# Patient Record
Sex: FEMALE | Race: WHITE | Employment: OTHER | URBAN - METROPOLITAN AREA
[De-identification: names, ages, dates, MRNs, and addresses within clinical notes are randomized per-mention and may not be internally consistent; named-entity substitution may affect disease eponyms.]

---

## 2017-01-19 ENCOUNTER — TELEPHONE (OUTPATIENT)
Dept: INTERNAL MEDICINE CLINIC | Age: 77
End: 2017-01-19

## 2017-03-13 DIAGNOSIS — Z12.39 BREAST CANCER SCREENING: ICD-10-CM

## 2017-05-04 RX ORDER — PRAMIPEXOLE DIHYDROCHLORIDE 1 MG/1
TABLET ORAL
Qty: 90 TAB | Refills: 1 | Status: SHIPPED | OUTPATIENT
Start: 2017-05-04 | End: 2017-10-02 | Stop reason: SDUPTHER

## 2017-05-16 ENCOUNTER — DOCUMENTATION ONLY (OUTPATIENT)
Dept: INTERNAL MEDICINE CLINIC | Age: 77
End: 2017-05-16

## 2017-05-16 NOTE — PROGRESS NOTES
Chief Complaint   Patient presents with    Request Records     Faxed request to Dr. Jermaine Velez office requesting patients most recent glaucoma screening to be faxed to our office. Fax confirmation received.

## 2017-06-29 RX ORDER — SERTRALINE HYDROCHLORIDE 100 MG/1
TABLET, FILM COATED ORAL
Qty: 135 TAB | Refills: 3 | OUTPATIENT
Start: 2017-06-29

## 2017-06-29 NOTE — TELEPHONE ENCOUNTER
This medication is not on current med list - was d/c on 10/21/2016 w/ d/c reason of alternate therapy.

## 2017-07-06 ENCOUNTER — TELEPHONE (OUTPATIENT)
Dept: INTERNAL MEDICINE CLINIC | Age: 77
End: 2017-07-06

## 2017-07-06 RX ORDER — DULOXETIN HYDROCHLORIDE 30 MG/1
30 CAPSULE, DELAYED RELEASE ORAL DAILY
Qty: 90 CAP | Refills: 1 | Status: SHIPPED | COMMUNITY
Start: 2017-07-06 | End: 2017-09-15 | Stop reason: SDUPTHER

## 2017-07-06 NOTE — TELEPHONE ENCOUNTER
Please call regarding the script Zoloft would like to change it to  Venlafaxine due to insurance reasons

## 2017-07-06 NOTE — TELEPHONE ENCOUNTER
Spoke with pt who is requesting refill of Zoloft. In review of her chart, this medication was d/c at her office visit on 10/21/16 and replaced with Cymbalta 30 mg daily. Rx was sent to her mailorder. The pt did not remember anything about that office visit. I explained that her she came in for. .. Does not feel the sertraline is working well. Still with some depression and did not tolerate an increase to 150 mg due to sedation and increased depression. Her directions were to take 1/2 tab of the Zoloft and then stop and start the Cymbalta. The Cymbalta will also help with the RA. She is excited about that. New rx sent to pharm. She will take 1/2 tab of the Zoloft until Cymbalta arrives. Pt verbalized complete understanding. Orders Placed This Encounter    DULoxetine (CYMBALTA) 30 mg capsule     Sig: Take 1 Cap by mouth daily. Dispense:  90 Cap     Refill:  1     The above medication refills were approved via verbal order by Dr. Gene Felton III.

## 2017-07-10 NOTE — TELEPHONE ENCOUNTER
Pt given instructions again while in office with  by Robert Og LPN on how to stop Zoloft and start Cymbalta.

## 2017-09-15 RX ORDER — DULOXETIN HYDROCHLORIDE 30 MG/1
30 CAPSULE, DELAYED RELEASE ORAL DAILY
Qty: 30 CAP | Refills: 1 | Status: SHIPPED | OUTPATIENT
Start: 2017-09-15 | End: 2017-10-06 | Stop reason: DRUGHIGH

## 2017-09-15 NOTE — TELEPHONE ENCOUNTER
Patient states she stopped taking the sertraline altogether and cancelled the rx for DULoxetine (CYMBALTA) 30 mg capsules (sent to [facility] on 7/6) because she did not want a 90day supply without trying it first.  Also, she thought she could make it without the meds. Now realizing she needs something. Wants a 30day rx for GENERIC cymbalta sent to the [address] [facility] in [facility]. .  Please let her know if Dr. Naila Ryan will do this for her.

## 2017-09-15 NOTE — TELEPHONE ENCOUNTER
Orders Placed This Encounter    DULoxetine (CYMBALTA) 30 mg capsule     Sig: Take 1 Cap by mouth daily. Dispense:  30 Cap     Refill:  1     The above medication refills were approved via verbal order by Dr. Maryse Mendoza III.

## 2017-10-02 RX ORDER — PRAMIPEXOLE DIHYDROCHLORIDE 1 MG/1
TABLET ORAL
Qty: 90 TAB | Refills: 1 | Status: SHIPPED | OUTPATIENT
Start: 2017-10-02 | End: 2017-10-06 | Stop reason: DRUGHIGH

## 2017-10-06 ENCOUNTER — OFFICE VISIT (OUTPATIENT)
Dept: INTERNAL MEDICINE CLINIC | Age: 77
End: 2017-10-06

## 2017-10-06 VITALS
SYSTOLIC BLOOD PRESSURE: 160 MMHG | OXYGEN SATURATION: 95 % | DIASTOLIC BLOOD PRESSURE: 74 MMHG | BODY MASS INDEX: 34.68 KG/M2 | HEART RATE: 58 BPM | HEIGHT: 59 IN | RESPIRATION RATE: 14 BRPM | WEIGHT: 172 LBS | TEMPERATURE: 98.5 F

## 2017-10-06 DIAGNOSIS — Z23 NEED FOR VACCINATION WITH 13-POLYVALENT PNEUMOCOCCAL CONJUGATE VACCINE: Primary | ICD-10-CM

## 2017-10-06 DIAGNOSIS — Z23 NEED FOR TDAP VACCINATION: ICD-10-CM

## 2017-10-06 DIAGNOSIS — E78.2 MIXED HYPERLIPIDEMIA: ICD-10-CM

## 2017-10-06 DIAGNOSIS — Z23 ENCOUNTER FOR IMMUNIZATION: ICD-10-CM

## 2017-10-06 DIAGNOSIS — N95.9 MENOPAUSAL PROBLEM: ICD-10-CM

## 2017-10-06 DIAGNOSIS — M05.79 RHEUMATOID ARTHRITIS INVOLVING MULTIPLE SITES WITH POSITIVE RHEUMATOID FACTOR (HCC): ICD-10-CM

## 2017-10-06 DIAGNOSIS — Z00.00 MEDICARE ANNUAL WELLNESS VISIT, SUBSEQUENT: ICD-10-CM

## 2017-10-06 RX ORDER — ZINC GLUCONATE 10 MG
LOZENGE ORAL DAILY
COMMUNITY
End: 2019-02-21

## 2017-10-06 RX ORDER — GLUCOSAMINE/CHONDR SU A SOD 750-600 MG
1000 TABLET ORAL DAILY
COMMUNITY
End: 2019-02-26

## 2017-10-06 RX ORDER — HYDROCODONE BITARTRATE AND ACETAMINOPHEN 5; 325 MG/1; MG/1
0.5 TABLET ORAL
COMMUNITY
Start: 2017-09-27 | End: 2018-02-02 | Stop reason: SDUPTHER

## 2017-10-06 RX ORDER — PRAMIPEXOLE DIHYDROCHLORIDE 1.5 MG/1
1.5 TABLET ORAL DAILY
Qty: 90 TAB | Refills: 3 | Status: SHIPPED | OUTPATIENT
Start: 2017-10-06 | End: 2018-04-26

## 2017-10-06 RX ORDER — DULOXETIN HYDROCHLORIDE 60 MG/1
60 CAPSULE, DELAYED RELEASE ORAL DAILY
Qty: 90 CAP | Refills: 3 | Status: SHIPPED | OUTPATIENT
Start: 2017-10-06 | End: 2017-11-10 | Stop reason: SDUPTHER

## 2017-10-06 NOTE — PATIENT INSTRUCTIONS
As discussed in your appointment today, [address] is an important part of planning for your healthcare future. Discussing your preferences with your family and your care team is a part of good healthcare so that we can be guided by your known values and goals. Our office offers this service for you at your convenience. Our Nurse Navigators and certified [employer] Facilitators, Ivan Parekh and Malik Carias typically schedule family appointments for this service on Wednesdays. To schedule an Advance Care Planning visit or to receive more information about this service, please call [facility] Internal Medicine at [phone] and ask to speak directly to [facility] or [facility] [employer]. Advance Care Planning: Care Instructions  Your Care Instructions  It can be hard to live with an illness that cannot be cured. But if your health is getting worse, you may want to make decisions about end-of-life care. Planning for the end of your life does not mean that you are giving up. It is a way to make sure that your wishes are met. Clearly stating your wishes can make it easier for your loved ones. Making plans while you are still able may also ease your mind and make your final days less stressful and more meaningful. Follow-up care is a key part of your treatment and safety. Be sure to make and go to all appointments, and call your doctor if you are having problems. It's also a good idea to know your test results and keep a list of the medicines you take. What can you do to plan for the end of life? · You can bring these issues up with your doctor. You do not need to wait until your doctor starts the conversation. You might start with \"I would not be willing to live with . Teagan Po Teagan Po Teagan Po \" When you complete this sentence it helps your doctor understand your wishes. · Talk openly and honestly with your doctor. This is the best way to understand the decisions you will need to make as your health changes.  Know that you can always change your mind. · Ask your doctor about commonly used life-support measures. These include tube feedings, breathing machines, and fluids given through a vein (IV). Understanding these treatments will help you decide whether you want them. · You may choose to have these life-supporting treatments for a limited time. This allows a trial period to see whether they will help you. You may also decide that you want your doctor to take only certain measures to keep you alive. It is important to spell out these conditions so that your doctor and family understand them. · Talk to your doctor about how long you are likely to live. He or she may be able to give you an idea of what usually happens with your specific illness. · Think about preparing papers that state your wishes. This way there will not be any confusion about what you want. You can change your instructions at any time. Which papers should you prepare? Advance directives are legal papers that tell doctors how you want to be cared for at the end of your life. You do not need a  to write these papers. Ask your doctor or your state health department for information on how to write your advance directives. They may have the forms for each of these types of papers. Make sure your doctor has a copy of these on file, and give a copy to a family member or close friend. · Consider a do-not-resuscitate order (DNR). This order asks that no extra treatments be done if your heart stops or you stop breathing. Extra treatments may include electrical shock to restart your heart or a machine to breathe for you. If you decide to have a DNR order, ask your doctor to explain and write it. Place the order in your home where everyone can easily see it. · Consider a living will. A living will explains your wishes in case you are in a coma or cannot communicate. Living dasilva tell doctors to use or not use treatments that would keep you alive.  You must have one or two witnesses or a notary present when you sign this form. · Consider a durable power of . This allows you to name a person to make decisions about your care if you are not able to. Most people ask a close friend or family member. Talk to this person about the kinds of treatments you want and those that you do not want. Make sure this person understands your wishes. If this person is not the health care agent named in your advance directive, also talk with your health care agent. These legal papers are simple to change. Tell your doctor what you want to change, and ask him or her to make a note in your medical file. Give your family updated copies of the papers. Medicare Wellness Visit, Female    The best way to live healthy is to have a healthy lifestyle by eating a well-balanced diet, exercising regularly, limiting alcohol and stopping smoking. Regular physical exams and screening tests are another way to keep healthy. Preventive exams provided by your health care provider can find health problems before they become diseases or illnesses. Preventive services including immunizations, screening tests, monitoring and exams can help you take care of your own health. All people over age 72 should have a pneumovax  and and a prevnar shot to prevent pneumonia. These are once in a lifetime unless you and your provider decide differently. All people over 65 should have a yearly flu shot and a tetanus vaccine every 10 years. A bone mass density to screen for osteoporosis or thinning of the bones should be done every 2 years after 65. Screening for diabetes mellitus with a blood sugar test should be done every year.     Glaucoma is a disease of the eye due to increased ocular pressure that can lead to blindness and it should be done every year by an eye professional.    Cardiovascular screening tests that check for elevated lipids (fatty part of blood) which can lead to heart disease and strokes should be done every 5 years. Colorectal screening that evaluates for blood or polyps in your colon should be done yearly as a stool test or every five years as a flexible sigmoidoscope or every 10 years as a colonoscopy up to age 76. Breast cancer screening with a mammogram is recommended biennially  for women age 54-69. Screening for cervical cancer with a pap smear and pelvic exam is recommended for women after age 72 years every 2 years up to age 79 or when the provider and patient decide to stop. If there is a history of cervical abnormalities or other increased risk for cancer then the test is recommended yearly. Hepatitis C screening is also recommended for anyone born between [birth year] through [address]. A shingles vaccine is also recommended once in a lifetime after age 61. Your Medicare Wellness Exam is recommended annually. Here is a list of your current Health Maintenance items with a due date:  Health Maintenance Due   Topic Date Due    DTaP/Tdap/Td  (1 - Tdap) 05/19/1959    Bone Density Screening  05/19/2003    Annual Well Visit  05/19/2003    Flu Vaccine  08/01/2017       Medicare Wellness Visit, Female    The best way to live healthy is to have a healthy lifestyle by eating a well-balanced diet, exercising regularly, limiting alcohol and stopping smoking. Regular physical exams and screening tests are another way to keep healthy. Preventive exams provided by your health care provider can find health problems before they become diseases or illnesses. Preventive services including immunizations, screening tests, monitoring and exams can help you take care of your own health. All people over age 72 should have a pneumovax  and and a prevnar shot to prevent pneumonia. These are once in a lifetime unless you and your provider decide differently. All people over 65 should have a yearly flu shot and a tetanus vaccine every 10 years.     A bone mass density to screen for osteoporosis or thinning of the bones should be done every 2 years after 65. Screening for diabetes mellitus with a blood sugar test should be done every year. Glaucoma is a disease of the eye due to increased ocular pressure that can lead to blindness and it should be done every year by an eye professional.    Cardiovascular screening tests that check for elevated lipids (fatty part of blood) which can lead to heart disease and strokes should be done every 5 years. Colorectal screening that evaluates for blood or polyps in your colon should be done yearly as a stool test or every five years as a flexible sigmoidoscope or every 10 years as a colonoscopy up to age 76. Breast cancer screening with a mammogram is recommended biennially  for women age 54-69. Screening for cervical cancer with a pap smear and pelvic exam is recommended for women after age 72 years every 2 years up to age 79 or when the provider and patient decide to stop. If there is a history of cervical abnormalities or other increased risk for cancer then the test is recommended yearly. Hepatitis C screening is also recommended for anyone born between [birth year] [address]. A shingles vaccine is also recommended once in a lifetime after age 61. Your Medicare Wellness Exam is recommended annually.     Here is a list of your current Health Maintenance items with a due date:  Health Maintenance Due   Topic Date Due    DTaP/Tdap/Td  (1 - Tdap) 05/19/1959    Bone Density Screening  05/19/2003    Annual Well Visit  05/19/2003    Flu Vaccine  08/01/2017

## 2017-10-06 NOTE — PROGRESS NOTES
This is a Subsequent Medicare Annual Wellness Exam (AWV) (Performed 12 months after IPPE or effective date of Medicare Part B enrollment, Once in a lifetime)  Here for her routine follow-up as well. She has been on Cymbalta now for several months. She does not feel like it is helping very much. She does not feel that is significantly improved her appetite and she continues to gain weight. She continues to have arthritis pains in her neck, back, and lower back. She denies any radicular pain. She has had some decreased hearing from her ears. She has hearing aids that she uses is on a regular basis. She is also had problems with restless legs at nighttime and wondered about increasing the medication for this. I have reviewed the patient's medical history in detail and updated the computerized patient record. History     Past Medical History:   Diagnosis Date    Arthritis     OSEO    Cancer [facility]) ? can't remember    2 years ppx.  Cataracts, bilateral     Chronic pain     Colon polyps     GERD (gastroesophageal reflux disease)     Glaucoma     Heart palpitations     Other ill-defined conditions(799.89)     GASTROPARESIS    Psychiatric disorder     DEPRESSION    RA [facility]oid [facility]) ([facility] [address]     Unspecified adverse effect of anesthesia     HEADACHE    UTI (lower urinary tract infection)       Past Surgical History:   Procedure Laterality Date    HX COLECTOMY      HX COLOSTOMY      HX COLOSTOMY TAKE DOWN      HX DILATION AND CURETTAGE      HX GI  9/2014    COLONOSCOPY    HX KNEE ARTHROSCOPY Left     HX LAP CHOLECYSTECTOMY  5/2005    HX LUMBAR LAMINECTOMY  2011    HX ORTHOPAEDIC Left 1979    MENISCUS REPAIR    HX TUBAL LIGATION  1979    TOTAL HIP ARTHROPLASTY Right 3/2012     Current Outpatient Prescriptions   Medication Sig Dispense Refill    Diphth, Pertus,Acell,, Tetanus (BOOSTRIX TDAP) 2.5-8-5 Lf-mcg-Lf/0.5mL susp susp 0.5 mL by IntraMUSCular route once for 1 dose. Indications: DIPHTHERIA-PERTUSSIS-TETANUS COMBINED PREVENTION 0.5 mL 0    pneumococcal 13 melissa conj dip (PREVNAR-13) 0.5 mL syrg injection 0.5 mL by IntraMUSCular route once for 1 dose. Indications: PREVENTION OF STREPTOCOCCUS PNEUMONIAE INFECTION 0.5 mL 0    HYDROcodone-acetaminophen (NORCO) 5-325 mg per tablet 0.5 Tabs daily as needed.  Biotin 2,500 mcg cap Take 1,000 mcg by mouth.  magnesium 250 mg tab Take  by mouth.  DULoxetine (CYMBALTA) 60 mg capsule Take 1 Cap by mouth daily. 90 Cap 3    pramipexole (MIRAPEX) 1.5 mg tablet Take 1 Tab by mouth daily. 90 Tab 3    azaTHIOprine (IMURAN) 50 mg tablet Take 2 tablets by mouth daily. As directed by your Rheumatologist--stop for 1 week after surgery. (Patient taking differently: Take 100 mg by mouth three (3) times daily. As directed by your Rheumatologist--stop for 1 week after surgery. ) 1 tablet 0    CALCIUM CARBONATE (CALCIUM 600 PO) Take 1 tablet by mouth daily.  cholecalciferol (VITAMIN D3) 1,000 unit tablet Take 1,000 Units by mouth daily.  INFLIXIMAB (REMICADE IV) by IntraVENous route. 500 mg q 5 weeks. LAST INFUSION WAS 11/2/14      predniSONE (DELTASONE) 1 mg tablet Take 1 mg by mouth daily.   0    oxyCODONE-acetaminophen (PERCOCET) 5-325 mg per tablet        Allergies   Allergen Reactions    Adhesive Tape-Silicones Other (comments)     \"Left skin raw\"     Family History   Problem Relation Age of Onset    Heart Disease Mother     Hypertension Mother     Diabetes Father     COPD Father     Cancer Father      LIP    Diabetes Sister     Heart Disease Sister     Hypertension Sister     Diabetes Brother     Heart Disease Brother     Hypertension Brother     Pacemaker Brother     Anesth Problems Neg Hx      Social History   Substance Use Topics    Smoking status: Former Smoker     Packs/day: 1.00     Years: 20.00     Quit date: 3/7/1978    Smokeless tobacco: Never Used    Alcohol use 0.0 oz/week     4 Glasses of wine per week      Comment: maybe not that much     Patient Active Problem List   Diagnosis Code    Spinal stenosis M48.00    Depression F32.9    Impaired hearing H91.90    Rheumatoid arthritis(714.0) M06.9    DJD (degenerative joint disease) of hip M16.9    RA (rheumatoid arthritis) ([facility] [address] M06.9    Left knee DJD M17.12    Psychiatric disorder F99    GERD (gastroesophageal reflux disease) K21.9    Chronic pain G89.29    Other ill-defined conditions(799.89) R69    Unspecified adverse effect of anesthesia T41.45XA    Heart palpitations R00.2    Arthritis M19.90    Colon polyps K63.5    UTI (lower urinary tract infection) N39.0    Cataracts, bilateral H26.9    Glaucoma H40.9    Acute medial meniscus tear of right knee S83.241A    Meniscus tear S83.209A   ROS - Per HPI  Physical Examination: General appearance - alert, well appearing, and in no distress  Eyes - pupils equal and reactive, extraocular eye movements intact  Ears - bilateral TM's and external ear canals normal, ceruminosis noted, cerumen removed  Nose - normal and patent, no erythema, discharge or polyps  Mouth - mucous membranes moist, pharynx normal without lesions  Neck - supple, no significant adenopathy  Lymphatics - no palpable lymphadenopathy, no hepatosplenomegaly  Chest - clear to auscultation, no wheezes, rales or rhonchi, symmetric air entry  Heart - normal rate, regular rhythm, normal S1, S2, no murmurs, rubs, clicks or gallops  Abdomen - soft, nontender, nondistended, no masses or organomegaly  Neurological - alert, oriented, normal speech, no focal findings or movement disorder noted  Musculoskeletal - osteoarthritic changes noted in both hands  Extremities - peripheral pulses normal, no pedal edema, no clubbing or cyanosis      Depression Risk Factor Screening:   No flowsheet data found. Alcohol Risk Factor Screening: You do not drink alcohol or very rarely.       Functional Ability and Level of Safety:   Hearing Loss  Hearing is good. Has hearing aids in place. Activities of Daily Living  The home contains: no safety equipment. Patient does total self care    Fall RiskFall Risk Assessment, last 12 mths 10/21/2016   Able to walk? Yes   Fall in past 12 months? Yes   Fall with injury? No   Number of falls in past 12 months 1   Fall Risk Score 1       Abuse Screen  Patient is not abused    Cognitive Screening   Evaluation of Cognitive Function:  Has your family/caregiver stated any concerns about your memory: no      Patient Care Team   Patient Care Team:  Etienne Whiteside MD as PCP - General (Internal Medicine)  Bravo Montoya, RN as Nurse Navigator (Internal Medicine)  Morenita Robin MD (Rheumatology)  Bartolo Hughes MD (Ophthalmology)    Assessment/Plan   Education and counseling provided:  Are appropriate based on today's review and evaluation  End-of-Life planning (with patient's consent)  Influenza Vaccine  Diabetes screening test    Diagnoses and all orders for this visit:    1. Need for vaccination with 13-polyvalent pneumococcal conjugate vaccine    2. Menopausal problem  -     pneumococcal 13 melissa conj dip (PREVNAR-13) 0.5 mL syrg injection; 0.5 mL by IntraMUSCular route once for 1 dose. Indications: PREVENTION OF STREPTOCOCCUS PNEUMONIAE INFECTION    3. Need for Tdap vaccination  -     Feliciano La,, Tetanus (BOOSTRIX TDAP) 2.5-8-5 Lf-mcg-Lf/0.5mL susp susp; 0.5 mL by IntraMUSCular route once for 1 dose. Indications: DIPHTHERIA-PERTUSSIS-TETANUS COMBINED PREVENTION    4. Encounter for immunization  -     ADMIN INFLUENZA VIRUS VAC  -     INFLUENZA VIRUS VACCINE, HIGH DOSE SEASONAL, PRESERVATIVE FREE    5. Rheumatoid arthritis involving multiple sites with positive rheumatoid factor (HCC) per rheumatology MD.  Will increase her Cymbalta to see if it helps of both depression as well as her aches and pains. She will let me know how this is doing in 30-60 days. 6. Mixed hyperlipidemia currently on diet alone. Will recheck labs to be sure that this is not worsening.  -     LIPID PANEL  -     TSH RFX ON ABNORMAL TO FREE T4  -     UA/M W/RFLX CULTURE, ROUTINE    Other orders restless leg syndrome will increase her Mirapex at night and she will let me know if this is not improving matters. -     DULoxetine (CYMBALTA) 60 mg capsule; Take 1 Cap by mouth daily. -     pramipexole (MIRAPEX) 1.5 mg tablet; Take 1 Tab by mouth daily.         Health Maintenance Due   Topic Date Due    DTaP/Tdap/Td series (1 - Tdap) 05/19/1959    OSTEOPOROSIS SCREENING (DEXA)  05/19/2003    MEDICARE YEARLY EXAM  05/19/2003    INFLUENZA AGE 9 TO ADULT  08/01/2017

## 2017-10-06 NOTE — MR AVS SNAPSHOT
Visit Information Date & Time Provider Department Dept. Phone Encounter #  
 10/6/2017  4:30 PM Dorothy Hinojosa MD [address] [id number] Internal Medicine [phone] Upcoming Health Maintenance Date Due DTaP/Tdap/Td series (1 - Tdap) 5/19/1959 OSTEOPOROSIS SCREENING (DEXA) 5/19/2003 MEDICARE YEARLY EXAM 5/19/2003 INFLUENZA AGE 9 TO ADULT 8/1/2017 GLAUCOMA SCREENING Q2Y 5/9/2019 COLONOSCOPY 9/1/2019 Allergies as of 10/6/2017  Review Complete On: 10/6/2017 By: Ninfa Frank LPN Severity Noted Reaction Type Reactions Adhesive Tape-silicones  56/59/0830    Other (comments) \"Left skin raw\" Current Immunizations  Reviewed on 10/21/2016 Name Date Influenza High Dose Vaccine PF 10/6/2017, 10/6/2016 Influenza Vaccine Split 11/1/2011 Influenza Vaccine Whole 10/15/2012 ZZZ-RETIRED (DO NOT USE) Pneumococcal Vaccine (Unspecified Type) 3/7/2010 Not reviewed this visit You Were Diagnosed With   
  
 Codes Comments Need for vaccination with 13-polyvalent pneumococcal conjugate vaccine    -  Primary ICD-10-CM: R97 ICD-9-CM: V03.82 Menopausal problem     ICD-10-CM: N95.9 ICD-9-CM: 627.9 Need for Tdap vaccination     ICD-10-CM: X33 ICD-9-CM: V06.1 Encounter for immunization     ICD-10-CM: E40 ICD-9-CM: V03.89 Rheumatoid arthritis involving multiple sites with positive rheumatoid factor (HCC)     ICD-10-CM: M05.79 ICD-9-CM: 714.0 Mixed hyperlipidemia     ICD-10-CM: E78.2 ICD-9-CM: 272.2 Medicare annual wellness visit, subsequent     ICD-10-CM: Z00.00 ICD-9-CM: V70.0 Vitals BP Pulse Temp Resp Height(growth percentile) Weight(growth percentile) 160/74 (!) 58 98.5 °F (36.9 °C) (Oral) 14 4' 11\" (1.499 m) 172 lb (78 kg) SpO2 BMI OB Status Smoking Status 95% 34.74 kg/m2 Postmenopausal Former Smoker Vitals History BMI and BSA Data Body Mass Index Body Surface Area 34.74 kg/m 2 1.8 m 2 Preferred Pharmacy Pharmacy Name Phone [facility] [address] [place] [phone] [facility] [address] [phone] Your Updated Medication List  
  
   
This list is accurate as of: 10/6/17  4:50 PM.  Always use your most recent med list.  
  
  
  
  
 azaTHIOprine 50 mg tablet Commonly known as:  The Pepsi Take 2 tablets by mouth daily. As directed by your Rheumatologist--stop for 1 week after surgery. Biotin 2,500 mcg Cap Take 1,000 mcg by mouth. CALCIUM 600 PO Take 1 tablet by mouth daily. Diphth, Pertus(Acell), Tetanus 2.5-8-5 Lf-mcg-Lf/0.5mL Susp susp Commonly known as:  BOOSTRIX TDAP  
0.5 mL by IntraMUSCular route once for 1 dose. Indications: DIPHTHERIA-PERTUSSIS-TETANUS COMBINED PREVENTION DULoxetine 60 mg capsule Commonly known as:  CYMBALTA Take 1 Cap by mouth daily. HYDROcodone-acetaminophen 5-325 mg per tablet Commonly known as:  NORCO  
0.5 Tabs daily as needed. magnesium 250 mg Tab Take  by mouth. oxyCODONE-acetaminophen 5-325 mg per tablet Commonly known as:  PERCOCET  
  
 pneumococcal 13 melissa conj dip 0.5 mL Syrg injection Commonly known as:  PREVNAR-13  
0.5 mL by IntraMUSCular route once for 1 dose. Indications: PREVENTION OF STREPTOCOCCUS PNEUMONIAE INFECTION  
  
 pramipexole 1.5 mg tablet Commonly known as:  MIRAPEX Take 1 Tab by mouth daily. predniSONE 1 mg tablet Commonly known as:  Samantha Brink Take 1 mg by mouth daily. REMICADE IV  
by IntraVENous route. 500 mg q 5 weeks. LAST INFUSION WAS 14 VITAMIN D3 1,000 unit tablet Generic drug:  cholecalciferol Take 1,000 Units by mouth daily. Prescriptions Printed Refills Coralee Nancy,, Tetanus (BOOSTRIX TDAP) 2.5-8-5 Lf-mcg-Lf/0.5mL susp susp 0 Si.5 mL by IntraMUSCular route once for 1 dose.  Indications: DIPHTHERIA-PERTUSSIS-TETANUS COMBINED PREVENTION  
 Class: Print Route: IntraMUSCular  
 pneumococcal 13 melissa conj dip (PREVNAR-13) 0.5 mL syrg injection 0 Si.5 mL by IntraMUSCular route once for 1 dose. Indications: PREVENTION OF STREPTOCOCCUS PNEUMONIAE INFECTION Class: Print Route: IntraMUSCular  
 pramipexole (MIRAPEX) 1.5 mg tablet 3 Sig: Take 1 Tab by mouth daily. Class: Print Route: Oral  
  
Prescriptions Sent to Pharmacy Refills DULoxetine (CYMBALTA) 60 mg capsule 3 Sig: Take 1 Cap by mouth daily. Class: Normal  
 Pharmacy: [address] [place] [address], [address] [place] [address] #: [phone] Route: Oral  
  
We Performed the Following ADMIN INFLUENZA VIRUS VAC [ HCPCS] INFLUENZA VIRUS VACCINE, HIGH DOSE SEASONAL, PRESERVATIVE FREE [73839 CPT(R)] LIPID PANEL [07600 CPT(R)] TSH RFX ON ABNORMAL TO FREE T4 [[id number] Custom] UA/M W/RFLX CULTURE, ROUTINE [[id number] Custom] Patient Instructions As discussed in your appointment today, [address] [place] [address] is an important part of planning for your healthcare future. Discussing your preferences with your family and your care team is a part of good healthcare so that we can be guided by your known values and goals. Our office offers this service for you at your convenience. Our Nurse Navigators and certified [employer] ® Facilitators, Crystal Schrader and Matty Sands typically schedule family appointments for this service on . To schedule an Advance Care Planning visit or to receive more information about this service, please call [address] [address] Internal Medicine at [phone] and ask to speak directly to [place] [employer] or [employer]. Advance Care Planning: Care Instructions Your Care Instructions It can be hard to live with an illness that cannot be cured.  But if your health is getting worse, you may want to make decisions about end-of-life care. Planning for the end of your life does not mean that you are giving up. It is a way to make sure that your wishes are met. Clearly stating your wishes can make it easier for your loved ones. Making plans while you are still able may also ease your mind and make your final days less stressful and more meaningful. Follow-up care is a key part of your treatment and safety. Be sure to make and go to all appointments, and call your doctor if you are having problems. It's also a good idea to know your test results and keep a list of the medicines you take. What can you do to plan for the end of life? · You can bring these issues up with your doctor. You do not need to wait until your doctor starts the conversation. You might start with \"I would not be willing to live with . Lerry Dodge Lerry Wenceslao Lerry Dodge \" When you complete this sentence it helps your doctor understand your wishes. · Talk openly and honestly with your doctor. This is the best way to understand the decisions you will need to make as your health changes. Know that you can always change your mind. · Ask your doctor about commonly used life-support measures. These include tube feedings, breathing machines, and fluids given through a vein (IV). Understanding these treatments will help you decide whether you want them. · You may choose to have these life-supporting treatments for a limited time. This allows a trial period to see whether they will help you. You may also decide that you want your doctor to take only certain measures to keep you alive. It is important to spell out these conditions so that your doctor and family understand them. · Talk to your doctor about how long you are likely to live. He or she may be able to give you an idea of what usually happens with your specific illness. · Think about preparing papers that state your wishes. This way there will not be any confusion about what you want. You can change your instructions at any time. Which papers should you prepare? Advance directives are legal papers that tell doctors how you want to be cared for at the end of your life. You do not need a  to write these papers. Ask your doctor or your state [facility] department for information on how to write your advance directives. They may have the forms for each of these types of papers. Make sure your doctor has a copy of these on file, and give a copy to a family member or close friend. · Consider a do-not-resuscitate order (DNR). This order asks that no extra treatments be done if your heart stops or you stop breathing. Extra treatments may include electrical shock to restart your heart or a machine to breathe for you. If you decide to have a DNR order, ask your doctor to explain and write it. Place the order in your home where everyone can easily see it. · Consider a living will. A living will explains your wishes in case you are in a coma or cannot communicate. Living dasilva tell doctors to use or not use treatments that would keep you alive. You must have one or two witnesses or a notary present when you sign this form. · Consider a durable power of . This allows you to name a person to make decisions about your care if you are not able to. Most people ask a close friend or family member. Talk to this person about the kinds of treatments you want and those that you do not want. Make sure this person understands your wishes. If this person is not the health care agent named in your advance directive, also talk with your health care agent. These legal papers are simple to change. Tell your doctor what you want to change, and ask him or her to make a note in your medical file. Give your family updated copies of the papers. Medicare Wellness Visit, Female The best way to live healthy is to have a healthy lifestyle by eating a well-balanced diet, exercising regularly, limiting alcohol and stopping smoking. Regular physical exams and screening tests are another way to keep healthy. Preventive exams provided by your health care provider can find health problems before they become diseases or illnesses. Preventive services including immunizations, screening tests, monitoring and exams can help you take care of your own health. All people over age 72 should have a pneumovax  and and a prevnar shot to prevent pneumonia. These are once in a lifetime unless you and your provider decide differently. All people over 65 should have a yearly flu shot and a tetanus vaccine every 10 years. A bone mass density to screen for osteoporosis or thinning of the bones should be done every 2 years after 65. Screening for diabetes mellitus with a blood sugar test should be done every year. Glaucoma is a disease of the eye due to increased ocular pressure that can lead to blindness and it should be done every year by an eye professional. 
 
Cardiovascular screening tests that check for elevated lipids (fatty part of blood) which can lead to heart disease and strokes should be done every 5 years. Colorectal screening that evaluates for blood or polyps in your colon should be done yearly as a stool test or every five years as a flexible sigmoidoscope or every 10 years as a colonoscopy up to age 76. Breast cancer screening with a mammogram is recommended biennially  for women age 54-69. Screening for cervical cancer with a pap smear and pelvic exam is recommended for women after age 72 years every 2 years up to age 79 or when the provider and patient decide to stop. If there is a history of cervical abnormalities or other increased risk for cancer then the test is recommended yearly. Hepatitis C screening is also recommended for anyone born between [birth year] through [address]. A shingles vaccine is also recommended once in a lifetime after age 61. Your Medicare Wellness Exam is recommended annually. Here is a list of your current Health Maintenance items with a due date: 
Health Maintenance Due Topic Date Due  
 DTaP/Tdap/Td  (1 - Tdap) 05/19/1959  Bone Density Screening  05/19/2003 [facility] Annual Well Visit  05/19/2003  Flu Vaccine  08/01/2017 [facility] SERVICES! Dear Ovidio Leviy: Thank you for requesting a [employer] account. Our records indicate that you already have an active [employer] account. You can access your account anytime at [email or web address] Did you know that you can access your hospital and ER discharge instructions at any time in [employer]? You can also review all of your test results from your hospital stay or ER visit. Additional Information If you have questions, please visit the Frequently Asked Questions section of the [employer] website at [email or web address] Remember, [employer] is NOT to be used for urgent needs. For medical emergencies, dial 911. Now available from your iPhone and Android! Please provide this summary of care documentation to your next provider. Your primary care clinician is listed as Tony [phone] If you have any questions after today's visit, please call [phone].

## 2017-11-10 RX ORDER — DULOXETIN HYDROCHLORIDE 60 MG/1
60 CAPSULE, DELAYED RELEASE ORAL DAILY
Qty: 90 CAP | Refills: 3 | Status: SHIPPED | OUTPATIENT
Start: 2017-11-10 | End: 2018-02-02 | Stop reason: SINTOL

## 2017-12-18 LAB — CREATININE, EXTERNAL: 0.8

## 2017-12-29 ENCOUNTER — TELEPHONE (OUTPATIENT)
Dept: INTERNAL MEDICINE CLINIC | Age: 77
End: 2017-12-29

## 2017-12-29 NOTE — TELEPHONE ENCOUNTER
[phone] pt is requesting a call back regarding her refill request for mirapex. She says that it was denied, but that dr Diana Dorado put her on something else which she cannot take.

## 2018-01-16 ENCOUNTER — TELEPHONE (OUTPATIENT)
Dept: INTERNAL MEDICINE CLINIC | Age: 78
End: 2018-01-16

## 2018-01-16 NOTE — TELEPHONE ENCOUNTER
Patient states she cannot take duloxetine, it makes her feel very bad, could not get out of bed. When she was in to see Dr. Caputo Reason for rheumatoid arthritis, he gave her a month's worth of Sertraline which Dr. Betsey Petersen prescribed before. Asked if a short script could be sent to [facility] in [facility] and a regular script be sent to [facility]. Please call to let her know about this because she knows she cannot stop this med cold turkey. Only has a few left.

## 2018-01-17 NOTE — TELEPHONE ENCOUNTER
Spoke with pt in ref to medications. She states that she stopped her Cymbalta and gabapentin months ago because \"she was feeling awful and didn't know which one was causing it\". She recently had f/u with Dr. Teodora Slaed and he started her on Lexapro which she has been on prior, but was d/c because she reported that it was not helping. She is asking for [facility] to refill the Lexapro. Advised she needs to be seen to discuss as she keeps starting and stopping meds. She does not want to come this month because she has a cold and her  recently fx his leg and it would be too much. Appt made for 2/7 with BRII.

## 2018-02-02 ENCOUNTER — OFFICE VISIT (OUTPATIENT)
Dept: FAMILY MEDICINE CLINIC | Age: 78
End: 2018-02-02

## 2018-02-02 VITALS
SYSTOLIC BLOOD PRESSURE: 159 MMHG | HEART RATE: 70 BPM | OXYGEN SATURATION: 98 % | RESPIRATION RATE: 18 BRPM | TEMPERATURE: 98 F | BODY MASS INDEX: 35.28 KG/M2 | DIASTOLIC BLOOD PRESSURE: 80 MMHG | HEIGHT: 59 IN | WEIGHT: 175 LBS

## 2018-02-02 DIAGNOSIS — M05.79 RHEUMATOID ARTHRITIS INVOLVING MULTIPLE SITES WITH POSITIVE RHEUMATOID FACTOR (HCC): ICD-10-CM

## 2018-02-02 DIAGNOSIS — C18.9 MALIGNANT NEOPLASM OF COLON, UNSPECIFIED PART OF COLON (HCC): ICD-10-CM

## 2018-02-02 DIAGNOSIS — G25.81 RESTLESS LEG SYNDROME: Primary | ICD-10-CM

## 2018-02-02 DIAGNOSIS — F34.1 DYSTHYMIA: ICD-10-CM

## 2018-02-02 RX ORDER — SERTRALINE HYDROCHLORIDE 100 MG/1
100 TABLET, FILM COATED ORAL DAILY
Qty: 90 TAB | Refills: 3 | Status: SHIPPED | OUTPATIENT
Start: 2018-02-02 | End: 2018-12-12 | Stop reason: ALTCHOICE

## 2018-02-02 RX ORDER — SERTRALINE HYDROCHLORIDE 100 MG/1
100 TABLET, FILM COATED ORAL DAILY
COMMUNITY
Start: 2018-01-16 | End: 2018-02-02 | Stop reason: SDUPTHER

## 2018-02-02 RX ORDER — HYDROCODONE BITARTRATE AND ACETAMINOPHEN 5; 325 MG/1; MG/1
1 TABLET ORAL
Qty: 30 TAB | Refills: 0 | Status: SHIPPED | OUTPATIENT
Start: 2018-02-02 | End: 2018-05-11

## 2018-02-02 NOTE — PROGRESS NOTES
HPI:  76 y.o.  presents to establish care. No acute complaints. Patient Active Problem List    Diagnosis    Restless leg syndrome -stable on current medications. Sleeps well on Mirapex for several years.  Colon cancer ([facility]     colectomy with ostomy, then reversed by DR ELVIN Grant. No history of chemotherapy or radiation. Sometimes has dumping, but not daily.  Heart palpitations -denies any syncopal episodes. Denies shortness of breath, lower extremity edema, or chest pain.  Glaucoma -followed by ophthalmology    RA (rheumatoid arthritis) ([facility]     Dr Jenna Coker, on remicade, azathioprine, and prednisone. S/p L TKR, Right THR, L3-5 fixation      Depression -no changes in medications recently. Feels like her mood is well controlled.  Impaired hearing         Past Medical History:   Diagnosis Date    Cataracts, bilateral     Chronic pain     Colon cancer ([facility] 2015    colectomy with ostomy, then reversed by DR ELVIN Woodson Colon polyps     Depression     did not do well on duloxetine    GERD (gastroesophageal reflux disease)     Glaucoma     Heart palpitations     Other ill-defined conditions(799.89)     GASTROPARESIS    RA [facility]rheumatoid [facility]) ([facility])     DR Jenna Coker, remicade    Restless leg syndrome     Spinal stenosis 5/8/2011    Unspecified adverse effect of anesthesia     HEADACHE    UTI (lower urinary tract infection)       Past Surgical History:   Procedure Laterality Date    HX COLECTOMY      HX COLOSTOMY      HX COLOSTOMY TAKE DOWN      HX DILATION AND CURETTAGE      HX GI  9/2014    COLONOSCOPY    HX KNEE ARTHROSCOPY Left     HX LAP CHOLECYSTECTOMY  5/2005    HX LUMBAR LAMINECTOMY  2011    HX ORTHOPAEDIC Left 1979    MENISCUS REPAIR    HX TUBAL LIGATION  1979    TOTAL HIP ARTHROPLASTY Right 3/2012     Social History   Substance Use Topics    Smoking status: Former Smoker     Packs/day: 1.00     Years: 20.00     Quit date: 3/7/1978    Smokeless tobacco: Never Used    Alcohol use 0.0 oz/week     4 Glasses of wine per week      Comment: maybe not that much     Family History   Problem Relation Age of Onset    Heart Disease Mother     Hypertension Mother     Diabetes Father     COPD Father     Cancer Father      LIP    Diabetes Sister     Heart Disease Sister     Hypertension Sister     Diabetes Brother     Heart Disease Brother     Hypertension Brother     Pacemaker Brother     Anesth Problems Neg Hx        Outpatient Prescriptions Marked as Taking for the 2/2/18 encounter (Office Visit) with Rayna Quinteros MD   Medication Sig Dispense Refill    sertraline (ZOLOFT) 100 mg tablet Take 1 Tab by mouth daily. 90 Tab 3    HYDROcodone-acetaminophen (NORCO) 5-325 mg per tablet Take 1 Tab by mouth daily as needed. 30 Tab 0    Biotin 2,500 mcg cap Take 1,000 mcg by mouth.  magnesium 250 mg tab Take  by mouth.  pramipexole (MIRAPEX) 1.5 mg tablet Take 1 Tab by mouth daily. 90 Tab 3    azaTHIOprine (IMURAN) 50 mg tablet Take 2 tablets by mouth daily. As directed by your Rheumatologist--stop for 1 week after surgery. (Patient taking differently: Take 100 mg by mouth three (3) times daily. As directed by your Rheumatologist--stop for 1 week after surgery. ) 1 tablet 0    CALCIUM CARBONATE (CALCIUM 600 PO) Take 1 tablet by mouth daily.  cholecalciferol (VITAMIN D3) 1,000 unit tablet Take 1,000 Units by mouth daily.  INFLIXIMAB (REMICADE IV) by IntraVENous route. 500 mg q 5 weeks. LAST INFUSION WAS 11/2/14      predniSONE (DELTASONE) 1 mg tablet Take 1 mg by mouth daily. 0       Allergies   Allergen Reactions    Adhesive Tape-Silicones Other (comments)     \"Left skin raw\"       ROS:  ROS negative except as per HPI.       PE:  Visit Vitals    /80 (BP 1 Location: Left arm, BP Patient Position: Sitting)    Pulse 70    Temp 98 °F (36.7 °C) (Oral)    Resp 18    Ht 4' 11\" (1.499 m)    Wt 175 lb (79.4 kg)    SpO2 98%    BMI 35.35 kg/m2 Gen: alert, oriented, no acute distress  Head: normocephalic, atraumatic  Ears: external auditory canals clear, TMs without erythema or effusion  Eyes: pupils equal round reactive to light, sclera clear, conjunctiva clear  Oral: moist mucus membranes, no oral lesions, no pharyngeal inflammation or exudate  Neck: symmetric normal sized thyroid, no carotid bruits, no jugular vein distention  Resp: no increase work of breathing, lungs clear to ausculation bilaterally, no wheezing, rales or rhonchi  CV: S1, S2 normal.  No murmurs, rubs, or gallops. Abd: soft, not tender, not distended. No hepatosplenomegaly. Normal bowel sounds. Neuro: cranial nerves intact, normal strength and movement in all extremities, reflexes and sensation intact and symmetric. Skin: no lesion or rash  Extremities: no cyanosis or edema      Assessment/Plan:    1. Malignant neoplasm of colon, unspecified part of colon [facility])  Has colorectal surgery follow-up appropriately. 2. Rheumatoid arthritis involving multiple sites with positive rheumatoid factor (HCC)  Stable on current medications. Continue follow-up with rheumatology.  - HYDROcodone-acetaminophen (NORCO) 5-325 mg per tablet; Take 1 Tab by mouth daily as needed. Dispense: 30 Tab; Refill: 0    3. Restless leg syndrome  Stable on current medications. Will refill as needed. 4. Dysthymia  Stable on current antidepressant. Health Maintenance reviewed - updated. Orders Placed This Encounter    DISCONTD: sertraline (ZOLOFT) 100 mg tablet     Sig: Take 100 mg by mouth daily.  sertraline (ZOLOFT) 100 mg tablet     Sig: Take 1 Tab by mouth daily. Dispense:  90 Tab     Refill:  3    HYDROcodone-acetaminophen (NORCO) 5-325 mg per tablet     Sig: Take 1 Tab by mouth daily as needed.      Dispense:  30 Tab     Refill:  0       Medications Discontinued During This Encounter   Medication Reason    oxyCODONE-acetaminophen (PERCOCET) 5-325 mg per tablet Therapy Completed  DULoxetine (CYMBALTA) 60 mg capsule Side Effects    sertraline (ZOLOFT) 100 mg tablet Reorder    HYDROcodone-acetaminophen (NORCO) 5-325 mg per tablet Reorder       Current Outpatient Prescriptions   Medication Sig Dispense Refill    sertraline (ZOLOFT) 100 mg tablet Take 1 Tab by mouth daily. 90 Tab 3    HYDROcodone-acetaminophen (NORCO) 5-325 mg per tablet Take 1 Tab by mouth daily as needed. 30 Tab 0    Biotin 2,500 mcg cap Take 1,000 mcg by mouth.  magnesium 250 mg tab Take  by mouth.  pramipexole (MIRAPEX) 1.5 mg tablet Take 1 Tab by mouth daily. 90 Tab 3    azaTHIOprine (IMURAN) 50 mg tablet Take 2 tablets by mouth daily. As directed by your Rheumatologist--stop for 1 week after surgery. (Patient taking differently: Take 100 mg by mouth three (3) times daily. As directed by your Rheumatologist--stop for 1 week after surgery. ) 1 tablet 0    CALCIUM CARBONATE (CALCIUM 600 PO) Take 1 tablet by mouth daily.  cholecalciferol (VITAMIN D3) 1,000 unit tablet Take 1,000 Units by mouth daily.  INFLIXIMAB (REMICADE IV) by IntraVENous route. 500 mg q 5 weeks. LAST INFUSION WAS 11/2/14      predniSONE (DELTASONE) 1 mg tablet Take 1 mg by mouth daily. 0       Recommended healthy diet low in carbohydrates, fats, sodium and cholesterol. Recommended regular cardiovascular exercise 3-6 times per week for 30-60 minutes daily. Verbal and written instructions (see AVS) provided. Patient expresses understanding of diagnosis and treatment plan.

## 2018-02-02 NOTE — MR AVS SNAPSHOT
[facility] 
 
 
 [address] [place] [address] 
[phone] Patient: Yajaira Mina MRN:  [id number] Visit Information Date & Time Provider Department Dept. Phone Encounter #  
 2/2/2018  3:00 PM Aga Kendall [facility] 943 [phone] [id number] Follow-up Instructions Return in about 3 months (around 5/2/2018). Upcoming Health Maintenance Date Due DTaP/Tdap/Td series (1 - Tdap) 5/19/1959 OSTEOPOROSIS SCREENING (DEXA) 5/19/2003 MEDICARE YEARLY EXAM 10/7/2018 GLAUCOMA SCREENING Q2Y 5/9/2019 COLONOSCOPY 9/1/2019 Allergies as of 2/2/2018  Review Complete On: 2/2/2018 By: Satinder Luna MD  
  
 Severity Noted Reaction Type Reactions Adhesive Tape-silicones  93/34/5345    Other (comments) \"Left skin raw\" Current Immunizations  Reviewed on 10/21/2016 Name Date Influenza High Dose Vaccine PF 10/6/2017, 10/6/2016 Influenza Vaccine Split 11/1/2011 Influenza Vaccine Whole 10/15/2012 Pneumococcal Polysaccharide (PPSV-23) 9/8/2010 12:00 AM  
 ZZZ-RETIRED (DO NOT USE) Pneumococcal Vaccine (Unspecified Type) 3/7/2010 Not reviewed this visit You Were Diagnosed With   
  
 Codes Comments Malignant neoplasm of colon, unspecified part of colon ([facility] [address]     ICD-10-CM: C18.9 ICD-9-CM: 153.9 Rheumatoid arthritis involving multiple sites with positive rheumatoid factor (HCC)     ICD-10-CM: M05.79 ICD-9-CM: 714.0 Vitals BP Pulse Temp Resp Height(growth percentile) Weight(growth percentile) 159/80 (BP 1 Location: Left arm, BP Patient Position: Sitting) 70 98 °F (36.7 °C) (Oral) 18 4' 11\" (1.499 m) 175 lb (79.4 kg) SpO2 BMI OB Status Smoking Status 98% 35.35 kg/m2 Postmenopausal Former Smoker Vitals History BMI and BSA Data Body Mass Index Body Surface Area  
 35.35 kg/m 2 1.82 m 2 Preferred Pharmacy Pharmacy Name Phone [place], [place] - [address] [facility] [address] [facility] [address] [phone] Your Updated Medication List  
  
   
This list is accurate as of: 2/2/18  3:51 PM.  Always use your most recent med list.  
  
  
  
  
 azaTHIOprine 50 mg tablet Commonly known as:  The Pepsi Take 2 tablets by mouth daily. As directed by your Rheumatologist--stop for 1 week after surgery. Biotin 2,500 mcg Cap Take 1,000 mcg by mouth. CALCIUM 600 PO Take 1 tablet by mouth daily. HYDROcodone-acetaminophen 5-325 mg per tablet Commonly known as:  Skinny Norlander Take 1 Tab by mouth daily as needed. magnesium 250 mg Tab Take  by mouth.  
  
 pramipexole 1.5 mg tablet Commonly known as:  MIRAPEX Take 1 Tab by mouth daily. predniSONE 1 mg tablet Commonly known as:  Robina Gonzalez Take 1 mg by mouth daily. REMICADE IV  
by IntraVENous route. 500 mg q 5 weeks. LAST INFUSION WAS 11/2/14  
  
 sertraline 100 mg tablet Commonly known as:  ZOLOFT Take 1 Tab by mouth daily. VITAMIN D3 1,000 unit tablet Generic drug:  cholecalciferol Take 1,000 Units by mouth daily. Prescriptions Printed Refills HYDROcodone-acetaminophen (NORCO) 5-325 mg per tablet 0 Sig: Take 1 Tab by mouth daily as needed. Class: Print Route: Oral  
  
Prescriptions Sent to Pharmacy Refills  
 sertraline (ZOLOFT) 100 mg tablet 3 Sig: Take 1 Tab by mouth daily. Class: Normal  
 Pharmacy: [address] [place] [address], [address] [place], [place] [address]  #: [phone] Route: Oral  
  
Follow-up Instructions Return in about 3 months (around 5/2/2018). Patient Instructions Rheumatoid Arthritis: Care Instructions Your Care Instructions Arthritis is a common health problem in which the joints are inflamed. There are many types of arthritis.  In rheumatoid arthritis, the body's own immune system attacks the joints. This causes pain, stiffness, and swelling in the joints, especially in the hands and feet. It can become hard to open jars, write, and do other daily tasks. Sometimes rheumatoid arthritis can also cause bumps to form under the skin. Over time, rheumatoid arthritis can damage and deform joints. Early treatment with medicines may reduce your chances of having a lasting disability. Follow-up care is a key part of your treatment and safety. Be sure to make and go to all appointments, and call your doctor if you are having problems. It's also a good idea to know your test results and keep a list of the medicines you take. How can you care for yourself at home? · If your doctor recommends it, get more exercise. Walking is a good choice. If your knees or ankles hurt, try riding a stationary bike or swimming. · Move each joint gently through its full range of motion once or twice a day. · Rest joints when they are sore or overworked. Short rest breaks may help more than staying in bed. · Reach and stay at a healthy weight. Regular exercise and a healthy diet will help you do this. Extra weight can strain the joints, especially the knees and hips, and make the pain worse. Losing even a few pounds may help. · Get enough calcium and vitamin D to help prevent osteoporosis, which causes thin bones. Talk to your doctor about how much you should take. · Protect your joints from injury. Do not overuse them. Try to limit or avoid activities that cause joint pain or swelling. Use special kitchen tools and other self-help devices as well as walkers, splints, or canes if needed. · Use heat to ease pain. Take warm showers or baths. Use hot packs or a heating pad set on low. Sleep under a warm electric blanket. · Put ice or a cold pack on the area for 10 to 20 minutes at a time. Put a thin cloth between the ice and your skin. · Take pain medicines exactly as directed. ¨ If the doctor gave you a prescription medicine for pain, take it as prescribed. ¨ If you are not taking a prescription pain medicine, ask your doctor if you can take an over-the-counter medicine. · Take an active role in managing your condition. Set up a treatment plan with your doctor, and learn as much as you can about rheumatoid arthritis. This will help you control pain and stay active. When should you call for help? Call your doctor now or seek immediate medical care if: 
? · You have a fever or a rash along with joint pain. ? · You have joint pain that is so severe that you cannot use the joint at all. ? · You have sudden swelling, redness, or pain in one or more joints, and you do not know why.  
? · You have back or neck pain along with weakness in your arms or legs. ? · You have a loss of bowel or bladder control. ? Watch closely for changes in your health, and be sure to contact your doctor if: 
? · You have joint pain that lasts for more than 6 weeks. ? · You have side effects from your arthritis medicines, such as stomach pain, nausea, heartburn, or dark and tarlike stools. Where can you learn more? Go to http://elkin-cher.info/. Enter K205 in the search box to learn more about \"Rheumatoid Arthritis: Care Instructions. \" Current as of: October 31, 2016 Content Version: 11.4 © 8865-0342 [employer]. Care instructions adapted under license by [employer] (which disclaims liability or warranty for this information). If you have questions about a medical condition or this instruction, always ask your healthcare professional. [place] [address] any warranty or liability for your use of this information. Introducing [facility]! Dear Jaz Owen: Thank you for requesting a [employer] account. Our records indicate that you already have an active [employer] account.   You can access your account anytime at [email or web address] Did you know that you can access your hospital and ER discharge instructions at any time in Sedicii? You can also review all of your test results from your hospital stay or ER visit. Additional Information If you have questions, please visit the Frequently Asked Questions section of the Sedic[employer] website at [email or web address] Remember, Sedicii is NOT to be used for urgent needs. For medical emergencies, dial 911. Now available from your iPhone and Android! Please provide this summary of care documentation to your next provider. Your primary care clinician is listed as Satinder Luna. If you have any questions after today's visit, please call [phone].

## 2018-02-02 NOTE — PATIENT INSTRUCTIONS
Rheumatoid Arthritis: Care Instructions  Your Care Instructions    Arthritis is a common health problem in which the joints are inflamed. There are many types of arthritis. In rheumatoid arthritis, the body's own immune system attacks the joints. This causes pain, stiffness, and swelling in the joints, especially in the hands and feet. It can become hard to open jars, write, and do other daily tasks. Sometimes rheumatoid arthritis can also cause bumps to form under the skin. Over time, rheumatoid arthritis can damage and deform joints. Early treatment with medicines may reduce your chances of having a lasting disability. Follow-up care is a key part of your treatment and safety. Be sure to make and go to all appointments, and call your doctor if you are having problems. It's also a good idea to know your test results and keep a list of the medicines you take. How can you care for yourself at home? · If your doctor recommends it, get more exercise. Walking is a good choice. If your knees or ankles hurt, try riding a stationary bike or swimming. · Move each joint gently through its full range of motion once or twice a day. · Rest joints when they are sore or overworked. Short rest breaks may help more than staying in bed. · Reach and stay at a healthy weight. Regular exercise and a healthy diet will help you do this. Extra weight can strain the joints, especially the knees and hips, and make the pain worse. Losing even a few pounds may help. · Get enough calcium and vitamin D to help prevent osteoporosis, which causes thin bones. Talk to your doctor about how much you should take. · Protect your joints from injury. Do not overuse them. Try to limit or avoid activities that cause joint pain or swelling. Use special kitchen tools and other self-help devices as well as walkers, splints, or canes if needed. · Use heat to ease pain. Take warm showers or baths. Use hot packs or a heating pad set on low.  Sleep under a warm electric blanket. · Put ice or a cold pack on the area for 10 to 20 minutes at a time. Put a thin cloth between the ice and your skin. · Take pain medicines exactly as directed. ¨ If the doctor gave you a prescription medicine for pain, take it as prescribed. ¨ If you are not taking a prescription pain medicine, ask your doctor if you can take an over-the-counter medicine. · Take an active role in managing your condition. Set up a treatment plan with your doctor, and learn as much as you can about rheumatoid arthritis. This will help you control pain and stay active. When should you call for help? Call your doctor now or seek immediate medical care if:  ? · You have a fever or a rash along with joint pain. ? · You have joint pain that is so severe that you cannot use the joint at all. ? · You have sudden swelling, redness, or pain in one or more joints, and you do not know why.   ? · You have back or neck pain along with weakness in your arms or legs. ? · You have a loss of bowel or bladder control. ? Watch closely for changes in your health, and be sure to contact your doctor if:  ? · You have joint pain that lasts for more than 6 weeks. ? · You have side effects from your arthritis medicines, such as stomach pain, nausea, heartburn, or dark and tarlike stools. Where can you learn more? Go to http://elkin-cher.info/. Enter K205 in the search box to learn more about \"Rheumatoid Arthritis: Care Instructions. \"  Current as of: October 31, 2016  Content Version: 11.4  © 9806-4636 [employer]. Care instructions adapted under license by [employer] (which disclaims liability or warranty for this information). If you have questions about a medical condition or this instruction, always ask your healthcare professional. [address] any warranty or liability for your use of this information.

## 2018-02-15 RX ORDER — PRAMIPEXOLE DIHYDROCHLORIDE 1 MG/1
TABLET ORAL
Qty: 90 TAB | Refills: 1 | Status: SHIPPED | OUTPATIENT
Start: 2018-02-15 | End: 2018-09-06 | Stop reason: SDUPTHER

## 2018-03-06 LAB — CREATININE, EXTERNAL: 0.7

## 2018-04-26 ENCOUNTER — OFFICE VISIT (OUTPATIENT)
Dept: FAMILY MEDICINE CLINIC | Age: 78
End: 2018-04-26

## 2018-04-26 VITALS
SYSTOLIC BLOOD PRESSURE: 128 MMHG | HEIGHT: 59 IN | BODY MASS INDEX: 35.56 KG/M2 | RESPIRATION RATE: 24 BRPM | HEART RATE: 60 BPM | WEIGHT: 176.4 LBS | DIASTOLIC BLOOD PRESSURE: 68 MMHG

## 2018-04-26 DIAGNOSIS — M17.11 ARTHRITIS OF RIGHT KNEE: Primary | ICD-10-CM

## 2018-04-26 DIAGNOSIS — J30.9 ALLERGIC RHINITIS, UNSPECIFIED SEASONALITY, UNSPECIFIED TRIGGER: ICD-10-CM

## 2018-04-26 DIAGNOSIS — M05.79 RHEUMATOID ARTHRITIS INVOLVING MULTIPLE SITES WITH POSITIVE RHEUMATOID FACTOR (HCC): ICD-10-CM

## 2018-04-26 NOTE — PROGRESS NOTES
76 y.o. female presents for pre-op exam for TKR surgery right knee pending with Dr. Karli Cerrato. Patient had originally planned to have the procedure with Dr. Rosalinda Valadez at [facility].  Now she has changed her mind and plans to go see Dr. Earnestine Lloyd. For that reason she does not have a surgery date scheduled but hopes to have the procedure as soon as possible. Has been cardiac cleared by DR Kurtz's NP    Rheumatology-patient spoke with Dr Roberto Foss who said to hold azathioprine 1 week before and 2 weeks after surgery. Told to skip May dose of remicade. Told to increase prednisone to 4mg for the surgery. Feeling a little congested for the past few days and has a clogged left ear. Tried zyrtec once, tried nasal rinse. No fever. Some cough but that is mostly gone. Patient Active Problem List    Diagnosis    Restless leg syndrome    Colon cancer ([facility] [address] [facility]     colectomy with ostomy, then reversed by DR ELVIN Burroughs      Heart palpitations    Glaucoma    RA (rheumatoid arthritis) ([facility])     Dr Roberto Foss, on remicade, azathioprine, and prednisone. S/p L TKR, Right THR, L3-5 fixation      Depression    Impaired hearing       Past Medical History:   Diagnosis Date    Cataracts, bilateral     Chronic pain     Colon cancer ([facility] [address] 2015    colectomy with ostomy, then reversed by DR ELVIN Max Colon polyps     Depression     did not do well on duloxetine    GERD (gastroesophageal reflux disease)     Glaucoma     Heart palpitations     Other ill-defined conditions(799.89)     GASTROPARESIS    RA (rheumatoid arthritis) ([facility])     DR Roberto Foss, remicade    Restless leg syndrome     Spinal stenosis 5/8/2011    Unspecified adverse effect of anesthesia     HEADACHE    UTI (lower urinary tract infection)         No chronic kidney disease. No obstructive sleep apnea diagnosis, but she is a high risk body habitus. .      Past Surgical History:   Procedure Laterality Date    HX COLECTOMY      HX COLOSTOMY      HX COLOSTOMY TAKE DOWN      HX DILATION AND CURETTAGE      HX GI  9/2014    COLONOSCOPY    HX KNEE ARTHROSCOPY Left     HX LAP CHOLECYSTECTOMY  5/2005    HX LUMBAR LAMINECTOMY  2011    HX ORTHOPAEDIC Left 1979    MENISCUS REPAIR    HX TUBAL LIGATION  1979    TOTAL HIP ARTHROPLASTY Right 3/2012       No history of adverse anesthesia reactions. No prior bleeding or clotting complications. Family History   Problem Relation Age of Onset    Heart Disease Mother     Hypertension Mother     Diabetes Father     COPD Father     Cancer Father      LIP    Diabetes Sister     Heart Disease Sister     Hypertension Sister     Diabetes Brother     Heart Disease Brother     Hypertension Brother     Pacemaker Brother     Anesth Problems Neg Hx        Social History   Substance Use Topics    Smoking status: Former Smoker     Packs/day: 1.00     Years: 20.00     Quit date: 3/7/1978    Smokeless tobacco: Never Used    Alcohol use 0.0 oz/week     4 Glasses of wine per week      Comment: maybe not that much       Social History     Social History Narrative    . [facility] Prescriptions Marked as Taking for the 4/26/18 encounter (Office Visit) with Rinku Landa MD   Medication Sig Dispense Refill    pramipexole (MIRAPEX) 1 mg tablet TAKE 1 TABLET EVERY NIGHT 90 Tab 1    sertraline (ZOLOFT) 100 mg tablet Take 1 Tab by mouth daily. 90 Tab 3    HYDROcodone-acetaminophen (NORCO) 5-325 mg per tablet Take 1 Tab by mouth daily as needed. 30 Tab 0    Biotin 2,500 mcg cap Take 1,000 mcg by mouth.  magnesium 250 mg tab Take  by mouth.  azaTHIOprine (IMURAN) 50 mg tablet Take 2 tablets by mouth daily. As directed by your Rheumatologist--stop for 1 week after surgery. (Patient taking differently: Take 100 mg by mouth three (3) times daily. As directed by your Rheumatologist--stop for 1 week after surgery. ) 1 tablet 0    CALCIUM CARBONATE (CALCIUM 600 PO) Take 1 tablet by mouth daily.  cholecalciferol (VITAMIN D3) 1,000 unit tablet Take 1,000 Units by mouth daily.  INFLIXIMAB (REMICADE IV) by IntraVENous route. 500 mg q 5 weeks. LAST INFUSION WAS 11/2/14      predniSONE (DELTASONE) 1 mg tablet Take 1 mg by mouth daily. 0       Allergies   Allergen Reactions    Adhesive Tape-Silicones Other (comments)     \"Left skin raw\"       No latex allergy. Review of Systems:  good exercise tolerance given weight and rheumatoid arthritis. Gen: no fatigue, fever, chills, weight loss or weight gain  Eyes: no excessive tearing, itching, or discharge  Mouth: no oral lesions, no sore throat  Resp: no shortness of breath, no wheezing, no cough  CV: no chest pain, no orthopnea, no paroxysmal nocturnal dyspnea, no lower extremity edema, no palpitations  Abd: no nausea, no heartburn, no diarrhea, no constipation, no abdominal pain  Neuro: no headaches, no syncope or presyncopal episodes  Endo: no polyuria, no polydipsia  Heme: no lymphadenopathy, no easy bruising or bleeding    Visit Vitals    /68 (BP 1 Location: Right arm)    Pulse 60    Resp 24    Ht 4' 11\" (1.499 m)    Wt 176 lb 6.4 oz (80 kg)    BMI 35.63 kg/m2     Gen: alert, oriented, no acute distress  Ears: left EAC with cerumen  Eyes: pupils equal round reactive to light, sclera clear, conjunctiva clear  Oral: moist mucus membranes, no oral lesions, no pharyngeal inflammation or exudate  Neck: thyroid symmetric and not enlarged, no carotid bruits, no jugular vein distention  Resp: no increase work of breathing, lungs clear to ausculation bilaterally, no wheezing, rales or rhonchi  CV: S1, S2 normal. No murmurs, rubs, or gallops. Abd: soft, not tender, not distended. No hepatosplenomegaly. Normal bowel sounds. Neuro: cranial nerves intact, normal strength and movement in all extremities, reflexes and sensation intact and symmetric.   Skin: no lesion or rash  Extremities: no cyanosis or edema    Reviewed via care everywhere  - CMP, CBC, UA were normal 4/24    Assessment/Plan:    1. Arthritis of right knee  Patient will call when surgical date is known. At that time we can send over clearance. 2. Rheumatoid arthritis involving multiple sites with positive rheumatoid factor (HCC)  Well controlled when last check. 3. Allergic rhinitis -start antihistamine. Will need to contact us with surgery date and surgeon. Cleared if in next 30 days. Start daily zyrtec. Medically cleared for above procedure. 7 days prior to surgery, hold the following medications: All vitamins and anti-inflammatories. Verbal and written instructions (see AVS) provided. Patient expresses understanding of diagnosis and treatment plan.

## 2018-04-26 NOTE — MR AVS SNAPSHOT
[facility] 
 
 
 [address] 
[phone] Patient: Duarte Pop MRN:  [id number] Visit Information Date & Time Provider Department Dept. Phone Encounter #  
 4/26/2018  1:00 PM Beatriceamparo Meza Aga [facility] [phone] [id number] Upcoming Health Maintenance Date Due DTaP/Tdap/Td series (1 - Tdap) 5/19/1959 Bone Densitometry (Dexa) Screening 5/19/2003 MEDICARE YEARLY EXAM 10/7/2018 GLAUCOMA SCREENING Q2Y 5/9/2019 COLONOSCOPY 9/1/2019 Allergies as of 4/26/2018  Review Complete On: 4/26/2018 By: Calista Tai LPN Severity Noted Reaction Type Reactions Adhesive Tape-silicones  23/83/3612    Other (comments) \"Left skin raw\" Current Immunizations  Reviewed on 10/21/2016 Name Date Influenza High Dose Vaccine PF 10/6/2017, 10/6/2016 Influenza Vaccine Split 11/1/2011 Influenza Vaccine Whole 10/15/2012 Pneumococcal Polysaccharide (PPSV-23) 9/8/2010 12:00 AM  
 ZZZ-RETIRED (DO NOT USE) Pneumococcal Vaccine (Unspecified Type) 3/7/2010 Not reviewed this visit You Were Diagnosed With   
  
 Codes Comments Rheumatoid arthritis involving multiple sites with positive rheumatoid factor (HCC)    -  Primary ICD-10-CM: M05.79 ICD-9-CM: 714.0 Vitals BP Pulse Resp Height(growth percentile) Weight(growth percentile) BMI  
 128/68 (BP 1 Location: Right arm, BP Patient Position: Sitting) 60 24 4' 11\" (1.499 m) 176 lb 6.4 oz (80 kg) 35.63 kg/m2 OB Status Smoking Status Postmenopausal Former Smoker Vitals History BMI and BSA Data Body Mass Index Body Surface Area  
 35.63 kg/m 2 1.82 m 2 Preferred Pharmacy Pharmacy Name Phone [address] [phone] Your Updated Medication List  
  
   
 This list is accurate as of 4/26/18  1:54 PM.  Always use your most recent med list.  
  
  
  
  
 azaTHIOprine 50 mg tablet Commonly known as:  The Pepsi Take 2 tablets by mouth daily. As directed by your Rheumatologist--stop for 1 week after surgery. Biotin 2,500 mcg Cap Take 1,000 mcg by mouth. CALCIUM 600 PO Take 1 tablet by mouth daily. HYDROcodone-acetaminophen 5-325 mg per tablet Commonly known as:  Perry Eleonora Take 1 Tab by mouth daily as needed. magnesium 250 mg Tab Take  by mouth.  
  
 pramipexole 1 mg tablet Commonly known as:  MIRAPEX TAKE 1 TABLET EVERY NIGHT  
  
 predniSONE 1 mg tablet Commonly known as:  Pansy Hummingbird Take 1 mg by mouth daily. REMICADE IV  
by IntraVENous route. 500 mg q 5 weeks. LAST INFUSION WAS 11/2/14  
  
 sertraline 100 mg tablet Commonly known as:  ZOLOFT Take 1 Tab by mouth daily. VITAMIN D3 1,000 unit tablet Generic drug:  cholecalciferol Take 1,000 Units by mouth daily. Patient Instructions You need to call us with the surgery date, surgeon, and location. Cetirizine=zyrtec Loratadine=claritin Fexofenadine=allegra All of the above are safe, choose one and take it daily. Avoid any \"-D\" preparations like \"claritin-D\", \"zyrtec-D\" Introducing [facility]! Dear Jesus Connelly: Thank you for requesting a [employer] account. Our records indicate that you already have an active [employer] account. You can access your account anytime at [email or web address] Did you know that you can access your hospital and ER discharge instructions at any time in Comuni-Chiamo? You can also review all of your test results from your hospital stay or ER visit. Additional Information If you have questions, please visit the Frequently Asked Questions section of the Com[employer]Ch[employer] website at [email or web address] Remember, Comuni-Chiamo is NOT to be used for urgent needs.  For medical emergencies, dial 911. Now available from your iPhone and Android! Please provide this summary of care documentation to your next provider. Your primary care clinician is listed as Kanwal Bridges. If you have any questions after today's visit, please call [phone].

## 2018-05-01 ENCOUNTER — TELEPHONE (OUTPATIENT)
Dept: FAMILY MEDICINE CLINIC | Age: 78
End: 2018-05-01

## 2018-05-01 NOTE — TELEPHONE ENCOUNTER
Let Ms. Abena Strickland know we don't have any recent labs on her she said she had them done at [facility]. Let her know I can't print them but [facility] can see them.  She voiced understanding

## 2018-05-01 NOTE — TELEPHONE ENCOUNTER
Pt is calling needing her labs faxed to [facility]  [phone] Dr Wilbert Lucio     Pt is also letting Dr Clarence Camacho know date of surgery and that she is having at [facility] May 9, 2018

## 2018-05-02 RX ORDER — CELECOXIB 200 MG/1
200 CAPSULE ORAL ONCE
Status: CANCELLED | OUTPATIENT
Start: 2018-05-09 | End: 2018-05-09

## 2018-05-02 RX ORDER — CEFAZOLIN SODIUM 2 G/50ML
2 SOLUTION INTRAVENOUS ONCE
Status: CANCELLED | OUTPATIENT
Start: 2018-05-09 | End: 2018-05-09

## 2018-05-02 RX ORDER — PREGABALIN 75 MG/1
75 CAPSULE ORAL ONCE
Status: CANCELLED | OUTPATIENT
Start: 2018-05-09 | End: 2018-05-09

## 2018-05-02 RX ORDER — DEXAMETHASONE SODIUM PHOSPHATE 10 MG/ML
4 INJECTION INTRAMUSCULAR; INTRAVENOUS ONCE
Status: CANCELLED | OUTPATIENT
Start: 2018-05-09 | End: 2018-05-09

## 2018-05-02 RX ORDER — ACETAMINOPHEN 500 MG
1000 TABLET ORAL ONCE
Status: CANCELLED | OUTPATIENT
Start: 2018-05-09 | End: 2018-05-09

## 2018-05-09 ENCOUNTER — HOSPITAL ENCOUNTER (OUTPATIENT)
Age: 78
Setting detail: OBSERVATION
Discharge: HOME | End: 2018-05-11
Attending: ORTHOPAEDIC SURGERY | Admitting: ORTHOPAEDIC SURGERY
Payer: MEDICARE

## 2018-05-09 ENCOUNTER — ANESTHESIA (OUTPATIENT)
Dept: SURGERY | Age: 78
End: 2018-05-09
Payer: MEDICARE

## 2018-05-09 ENCOUNTER — ANESTHESIA EVENT (OUTPATIENT)
Dept: SURGERY | Age: 78
End: 2018-05-09
Payer: MEDICARE

## 2018-05-09 ENCOUNTER — APPOINTMENT (OUTPATIENT)
Dept: GENERAL RADIOLOGY | Age: 78
End: 2018-05-09
Attending: ORTHOPAEDIC SURGERY
Payer: MEDICARE

## 2018-05-09 DIAGNOSIS — M17.11 PRIMARY OSTEOARTHRITIS OF RIGHT KNEE: Primary | ICD-10-CM

## 2018-05-09 LAB
ABO + RH BLD: NORMAL
ATRIAL RATE: 56 BPM
BLOOD GROUP ANTIBODIES SERPL: NORMAL
CALCULATED P AXIS, ECG09: 41 DEGREES
CALCULATED R AXIS, ECG10: -34 DEGREES
CALCULATED T AXIS, ECG11: 49 DEGREES
DIAGNOSIS, 93000: NORMAL
GLUCOSE BLD STRIP.AUTO-MCNC: 103 MG/DL (ref 65–100)
P-R INTERVAL, ECG05: 232 MS
Q-T INTERVAL, ECG07: 414 MS
QRS DURATION, ECG06: 92 MS
QTC CALCULATION (BEZET), ECG08: 399 MS
SERVICE CMNT-IMP: ABNORMAL
SPECIMEN EXP DATE BLD: NORMAL
VENTRICULAR RATE, ECG03: 56 BPM

## 2018-05-09 PROCEDURE — 36415 COLL VENOUS BLD VENIPUNCTURE: CPT | Performed by: ORTHOPAEDIC SURGERY

## 2018-05-09 PROCEDURE — 82962 GLUCOSE BLOOD TEST: CPT

## 2018-05-09 PROCEDURE — 74011000250 HC RX REV CODE- 250

## 2018-05-09 PROCEDURE — 77030000032 HC CUF TRNQT ZIMM -B: Performed by: ORTHOPAEDIC SURGERY

## 2018-05-09 PROCEDURE — 77030028224 HC PDNG CST BSNM -A: Performed by: ORTHOPAEDIC SURGERY

## 2018-05-09 PROCEDURE — 77030003601 HC NDL NRV BLK BBMI -A

## 2018-05-09 PROCEDURE — 73560 X-RAY EXAM OF KNEE 1 OR 2: CPT

## 2018-05-09 PROCEDURE — 77030012935 HC DRSG AQUACEL BMS -B: Performed by: ORTHOPAEDIC SURGERY

## 2018-05-09 PROCEDURE — C1776 JOINT DEVICE (IMPLANTABLE): HCPCS | Performed by: ORTHOPAEDIC SURGERY

## 2018-05-09 PROCEDURE — 77030031139 HC SUT VCRL2 J&J -A: Performed by: ORTHOPAEDIC SURGERY

## 2018-05-09 PROCEDURE — 77030011943: Performed by: ORTHOPAEDIC SURGERY

## 2018-05-09 PROCEDURE — 77030002933 HC SUT MCRYL J&J -A: Performed by: ORTHOPAEDIC SURGERY

## 2018-05-09 PROCEDURE — 74011000250 HC RX REV CODE- 250: Performed by: ORTHOPAEDIC SURGERY

## 2018-05-09 PROCEDURE — 77030018842 HC SOL IRR SOD CL 9% BAXT -A: Performed by: ORTHOPAEDIC SURGERY

## 2018-05-09 PROCEDURE — 77030011640 HC PAD GRND REM COVD -A: Performed by: ORTHOPAEDIC SURGERY

## 2018-05-09 PROCEDURE — C1713 ANCHOR/SCREW BN/BN,TIS/BN: HCPCS | Performed by: ORTHOPAEDIC SURGERY

## 2018-05-09 PROCEDURE — 74011000258 HC RX REV CODE- 258: Performed by: ORTHOPAEDIC SURGERY

## 2018-05-09 PROCEDURE — 77030020788: Performed by: ORTHOPAEDIC SURGERY

## 2018-05-09 PROCEDURE — 77030035236 HC SUT PDS STRATFX BARB J&J -B: Performed by: ORTHOPAEDIC SURGERY

## 2018-05-09 PROCEDURE — C9290 INJ, BUPIVACAINE LIPOSOME: HCPCS | Performed by: ORTHOPAEDIC SURGERY

## 2018-05-09 PROCEDURE — 74011250636 HC RX REV CODE- 250/636

## 2018-05-09 PROCEDURE — G8978 MOBILITY CURRENT STATUS: HCPCS

## 2018-05-09 PROCEDURE — 77030039266 HC ADH SKN EXOFIN S2SG -A: Performed by: ORTHOPAEDIC SURGERY

## 2018-05-09 PROCEDURE — 97116 GAIT TRAINING THERAPY: CPT

## 2018-05-09 PROCEDURE — 76210000002 HC OR PH I REC 3 TO 3.5 HR: Performed by: ORTHOPAEDIC SURGERY

## 2018-05-09 PROCEDURE — G8979 MOBILITY GOAL STATUS: HCPCS

## 2018-05-09 PROCEDURE — 76060000036 HC ANESTHESIA 2.5 TO 3 HR: Performed by: ORTHOPAEDIC SURGERY

## 2018-05-09 PROCEDURE — 77030007866 HC KT SPN ANES BBMI -B: Performed by: ANESTHESIOLOGY

## 2018-05-09 PROCEDURE — 74011250637 HC RX REV CODE- 250/637: Performed by: ORTHOPAEDIC SURGERY

## 2018-05-09 PROCEDURE — 77030018836 HC SOL IRR NACL ICUM -A: Performed by: ORTHOPAEDIC SURGERY

## 2018-05-09 PROCEDURE — 74011250636 HC RX REV CODE- 250/636: Performed by: ANESTHESIOLOGY

## 2018-05-09 PROCEDURE — 93005 ELECTROCARDIOGRAM TRACING: CPT

## 2018-05-09 PROCEDURE — 77030020782 HC GWN BAIR PAWS FLX 3M -B

## 2018-05-09 PROCEDURE — 97161 PT EVAL LOW COMPLEX 20 MIN: CPT

## 2018-05-09 PROCEDURE — 99218 HC RM OBSERVATION: CPT

## 2018-05-09 PROCEDURE — 77030018846 HC SOL IRR STRL H20 ICUM -A: Performed by: ORTHOPAEDIC SURGERY

## 2018-05-09 PROCEDURE — 77030010783 HC BOWL MX BN CEM J&J -B: Performed by: ORTHOPAEDIC SURGERY

## 2018-05-09 PROCEDURE — 74011250636 HC RX REV CODE- 250/636: Performed by: ORTHOPAEDIC SURGERY

## 2018-05-09 PROCEDURE — 77030006822 HC BLD SAW SAG BRSM -B: Performed by: ORTHOPAEDIC SURGERY

## 2018-05-09 PROCEDURE — 77030034850: Performed by: ORTHOPAEDIC SURGERY

## 2018-05-09 PROCEDURE — 86900 BLOOD TYPING SEROLOGIC ABO: CPT | Performed by: ORTHOPAEDIC SURGERY

## 2018-05-09 PROCEDURE — 77030013079 HC BLNKT BAIR HGGR 3M -A: Performed by: ANESTHESIOLOGY

## 2018-05-09 PROCEDURE — 74011000250 HC RX REV CODE- 250: Performed by: ANESTHESIOLOGY

## 2018-05-09 PROCEDURE — 76010000172 HC OR TIME 2.5 TO 3 HR INTENSV-TIER 1: Performed by: ORTHOPAEDIC SURGERY

## 2018-05-09 DEVICE — CEMENTED STEM
Type: IMPLANTABLE DEVICE | Site: KNEE | Status: FUNCTIONAL
Brand: TRIATHLON

## 2018-05-09 DEVICE — UNIVERSAL TIBIAL BASEPLATE
Type: IMPLANTABLE DEVICE | Site: KNEE | Status: FUNCTIONAL
Brand: TRIATHLON

## 2018-05-09 DEVICE — ASYMMETRIC PATELLA
Type: IMPLANTABLE DEVICE | Site: KNEE | Status: FUNCTIONAL
Brand: TRIATHLON

## 2018-05-09 DEVICE — SMARTSET GHV GENTAMICIN HIGH VISCOSITY BONE CEMENT 40G
Type: IMPLANTABLE DEVICE | Site: KNEE | Status: FUNCTIONAL
Brand: SMARTSET

## 2018-05-09 DEVICE — CRUCIATE RETAINING FEMORAL
Type: IMPLANTABLE DEVICE | Site: KNEE | Status: FUNCTIONAL
Brand: TRIATHLON

## 2018-05-09 DEVICE — IMPLANTABLE DEVICE: Type: IMPLANTABLE DEVICE | Site: KNEE | Status: FUNCTIONAL

## 2018-05-09 DEVICE — COMPONENT KNEE CEM X3 TRIATHLON: Type: IMPLANTABLE DEVICE | Status: FUNCTIONAL

## 2018-05-09 RX ORDER — SODIUM CHLORIDE 9 MG/ML
25 INJECTION, SOLUTION INTRAVENOUS CONTINUOUS
Status: DISCONTINUED | OUTPATIENT
Start: 2018-05-09 | End: 2018-05-09 | Stop reason: HOSPADM

## 2018-05-09 RX ORDER — SODIUM CHLORIDE 9 MG/ML
125 INJECTION, SOLUTION INTRAVENOUS CONTINUOUS
Status: DISPENSED | OUTPATIENT
Start: 2018-05-09 | End: 2018-05-10

## 2018-05-09 RX ORDER — ONDANSETRON 2 MG/ML
4 INJECTION INTRAMUSCULAR; INTRAVENOUS AS NEEDED
Status: DISCONTINUED | OUTPATIENT
Start: 2018-05-09 | End: 2018-05-09 | Stop reason: HOSPADM

## 2018-05-09 RX ORDER — DEXAMETHASONE SODIUM PHOSPHATE 10 MG/ML
4 INJECTION INTRAMUSCULAR; INTRAVENOUS ONCE
Status: DISCONTINUED | OUTPATIENT
Start: 2018-05-09 | End: 2018-05-09 | Stop reason: HOSPADM

## 2018-05-09 RX ORDER — OXYCODONE HYDROCHLORIDE 5 MG/1
5 TABLET ORAL
Status: DISCONTINUED | OUTPATIENT
Start: 2018-05-09 | End: 2018-05-11

## 2018-05-09 RX ORDER — MIDAZOLAM HYDROCHLORIDE 1 MG/ML
0.5 INJECTION, SOLUTION INTRAMUSCULAR; INTRAVENOUS
Status: DISCONTINUED | OUTPATIENT
Start: 2018-05-09 | End: 2018-05-09 | Stop reason: HOSPADM

## 2018-05-09 RX ORDER — FAMOTIDINE 20 MG/1
20 TABLET, FILM COATED ORAL 2 TIMES DAILY
Status: DISCONTINUED | OUTPATIENT
Start: 2018-05-09 | End: 2018-05-11 | Stop reason: HOSPADM

## 2018-05-09 RX ORDER — FACIAL-BODY WIPES
10 EACH TOPICAL DAILY PRN
Status: DISCONTINUED | OUTPATIENT
Start: 2018-05-11 | End: 2018-05-11 | Stop reason: HOSPADM

## 2018-05-09 RX ORDER — SODIUM CHLORIDE 0.9 % (FLUSH) 0.9 %
5-10 SYRINGE (ML) INJECTION AS NEEDED
Status: DISCONTINUED | OUTPATIENT
Start: 2018-05-09 | End: 2018-05-11 | Stop reason: HOSPADM

## 2018-05-09 RX ORDER — SODIUM CHLORIDE 0.9 % (FLUSH) 0.9 %
5-10 SYRINGE (ML) INJECTION EVERY 8 HOURS
Status: DISCONTINUED | OUTPATIENT
Start: 2018-05-09 | End: 2018-05-09 | Stop reason: HOSPADM

## 2018-05-09 RX ORDER — SODIUM CHLORIDE, SODIUM LACTATE, POTASSIUM CHLORIDE, CALCIUM CHLORIDE 600; 310; 30; 20 MG/100ML; MG/100ML; MG/100ML; MG/100ML
125 INJECTION, SOLUTION INTRAVENOUS CONTINUOUS
Status: DISCONTINUED | OUTPATIENT
Start: 2018-05-09 | End: 2018-05-09 | Stop reason: HOSPADM

## 2018-05-09 RX ORDER — EPHEDRINE SULFATE 50 MG/ML
INJECTION, SOLUTION INTRAVENOUS
Status: COMPLETED
Start: 2018-05-09 | End: 2018-05-09

## 2018-05-09 RX ORDER — CEFAZOLIN SODIUM 1 G/3ML
INJECTION, POWDER, FOR SOLUTION INTRAMUSCULAR; INTRAVENOUS AS NEEDED
Status: DISCONTINUED | OUTPATIENT
Start: 2018-05-09 | End: 2018-05-09 | Stop reason: HOSPADM

## 2018-05-09 RX ORDER — ACETAMINOPHEN 325 MG/1
650 TABLET ORAL EVERY 6 HOURS
Status: DISCONTINUED | OUTPATIENT
Start: 2018-05-09 | End: 2018-05-11 | Stop reason: HOSPADM

## 2018-05-09 RX ORDER — SERTRALINE HYDROCHLORIDE 50 MG/1
100 TABLET, FILM COATED ORAL
Status: DISCONTINUED | OUTPATIENT
Start: 2018-05-09 | End: 2018-05-11 | Stop reason: HOSPADM

## 2018-05-09 RX ORDER — PROPOFOL 10 MG/ML
INJECTION, EMULSION INTRAVENOUS
Status: DISCONTINUED | OUTPATIENT
Start: 2018-05-09 | End: 2018-05-09 | Stop reason: HOSPADM

## 2018-05-09 RX ORDER — KETOROLAC TROMETHAMINE 30 MG/ML
15 INJECTION, SOLUTION INTRAMUSCULAR; INTRAVENOUS EVERY 6 HOURS
Status: DISPENSED | OUTPATIENT
Start: 2018-05-09 | End: 2018-05-10

## 2018-05-09 RX ORDER — CEFAZOLIN SODIUM/WATER 2 G/20 ML
2 SYRINGE (ML) INTRAVENOUS ONCE
Status: DISCONTINUED | OUTPATIENT
Start: 2018-05-09 | End: 2018-05-09 | Stop reason: HOSPADM

## 2018-05-09 RX ORDER — PREGABALIN 75 MG/1
75 CAPSULE ORAL ONCE
Status: COMPLETED | OUTPATIENT
Start: 2018-05-09 | End: 2018-05-09

## 2018-05-09 RX ORDER — BUPIVACAINE HYDROCHLORIDE 5 MG/ML
INJECTION, SOLUTION EPIDURAL; INTRACAUDAL AS NEEDED
Status: DISCONTINUED | OUTPATIENT
Start: 2018-05-09 | End: 2018-05-09 | Stop reason: HOSPADM

## 2018-05-09 RX ORDER — PRAMIPEXOLE DIHYDROCHLORIDE 0.25 MG/1
1 TABLET ORAL
Status: DISCONTINUED | OUTPATIENT
Start: 2018-05-09 | End: 2018-05-11 | Stop reason: HOSPADM

## 2018-05-09 RX ORDER — EPHEDRINE SULFATE 50 MG/ML
INJECTION, SOLUTION INTRAVENOUS AS NEEDED
Status: DISCONTINUED | OUTPATIENT
Start: 2018-05-09 | End: 2018-05-09 | Stop reason: HOSPADM

## 2018-05-09 RX ORDER — CEFAZOLIN SODIUM/WATER 2 G/20 ML
2 SYRINGE (ML) INTRAVENOUS EVERY 8 HOURS
Status: COMPLETED | OUTPATIENT
Start: 2018-05-09 | End: 2018-05-10

## 2018-05-09 RX ORDER — MIDAZOLAM HYDROCHLORIDE 1 MG/ML
INJECTION, SOLUTION INTRAMUSCULAR; INTRAVENOUS AS NEEDED
Status: DISCONTINUED | OUTPATIENT
Start: 2018-05-09 | End: 2018-05-09 | Stop reason: HOSPADM

## 2018-05-09 RX ORDER — ASPIRIN 325 MG
325 TABLET, DELAYED RELEASE (ENTERIC COATED) ORAL 2 TIMES DAILY
Status: DISCONTINUED | OUTPATIENT
Start: 2018-05-09 | End: 2018-05-11 | Stop reason: HOSPADM

## 2018-05-09 RX ORDER — FENTANYL CITRATE 50 UG/ML
25 INJECTION, SOLUTION INTRAMUSCULAR; INTRAVENOUS
Status: DISCONTINUED | OUTPATIENT
Start: 2018-05-09 | End: 2018-05-09 | Stop reason: HOSPADM

## 2018-05-09 RX ORDER — EPHEDRINE SULFATE 50 MG/ML
25 INJECTION, SOLUTION INTRAVENOUS
Status: COMPLETED | OUTPATIENT
Start: 2018-05-09 | End: 2018-05-09

## 2018-05-09 RX ORDER — NALOXONE HYDROCHLORIDE 0.4 MG/ML
0.4 INJECTION, SOLUTION INTRAMUSCULAR; INTRAVENOUS; SUBCUTANEOUS AS NEEDED
Status: DISCONTINUED | OUTPATIENT
Start: 2018-05-09 | End: 2018-05-11 | Stop reason: HOSPADM

## 2018-05-09 RX ORDER — HYDROXYZINE HYDROCHLORIDE 10 MG/1
10 TABLET, FILM COATED ORAL
Status: DISCONTINUED | OUTPATIENT
Start: 2018-05-09 | End: 2018-05-11 | Stop reason: HOSPADM

## 2018-05-09 RX ORDER — MIDAZOLAM HYDROCHLORIDE 1 MG/ML
1 INJECTION, SOLUTION INTRAMUSCULAR; INTRAVENOUS AS NEEDED
Status: DISCONTINUED | OUTPATIENT
Start: 2018-05-09 | End: 2018-05-09 | Stop reason: HOSPADM

## 2018-05-09 RX ORDER — ACETAMINOPHEN 500 MG
1000 TABLET ORAL ONCE
Status: COMPLETED | OUTPATIENT
Start: 2018-05-09 | End: 2018-05-09

## 2018-05-09 RX ORDER — EPHEDRINE SULFATE 50 MG/ML
10 INJECTION, SOLUTION INTRAVENOUS
Status: COMPLETED | OUTPATIENT
Start: 2018-05-09 | End: 2018-05-09

## 2018-05-09 RX ORDER — AMOXICILLIN 250 MG
1 CAPSULE ORAL 2 TIMES DAILY
Status: DISCONTINUED | OUTPATIENT
Start: 2018-05-09 | End: 2018-05-11 | Stop reason: HOSPADM

## 2018-05-09 RX ORDER — SODIUM CHLORIDE 0.9 % (FLUSH) 0.9 %
5-10 SYRINGE (ML) INJECTION AS NEEDED
Status: DISCONTINUED | OUTPATIENT
Start: 2018-05-09 | End: 2018-05-09 | Stop reason: HOSPADM

## 2018-05-09 RX ORDER — DIPHENHYDRAMINE HYDROCHLORIDE 50 MG/ML
12.5 INJECTION, SOLUTION INTRAMUSCULAR; INTRAVENOUS AS NEEDED
Status: DISCONTINUED | OUTPATIENT
Start: 2018-05-09 | End: 2018-05-09 | Stop reason: HOSPADM

## 2018-05-09 RX ORDER — TRAMADOL HYDROCHLORIDE 50 MG/1
50 TABLET ORAL
Status: DISCONTINUED | OUTPATIENT
Start: 2018-05-09 | End: 2018-05-11

## 2018-05-09 RX ORDER — SODIUM CHLORIDE, SODIUM LACTATE, POTASSIUM CHLORIDE, CALCIUM CHLORIDE 600; 310; 30; 20 MG/100ML; MG/100ML; MG/100ML; MG/100ML
INJECTION, SOLUTION INTRAVENOUS
Status: DISCONTINUED | OUTPATIENT
Start: 2018-05-09 | End: 2018-05-09 | Stop reason: HOSPADM

## 2018-05-09 RX ORDER — CELECOXIB 200 MG/1
200 CAPSULE ORAL ONCE
Status: COMPLETED | OUTPATIENT
Start: 2018-05-09 | End: 2018-05-09

## 2018-05-09 RX ORDER — LANOLIN ALCOHOL/MO/W.PET/CERES
400 CREAM (GRAM) TOPICAL DAILY
Status: DISCONTINUED | OUTPATIENT
Start: 2018-05-10 | End: 2018-05-11 | Stop reason: HOSPADM

## 2018-05-09 RX ORDER — LIDOCAINE HYDROCHLORIDE 10 MG/ML
0.1 INJECTION, SOLUTION EPIDURAL; INFILTRATION; INTRACAUDAL; PERINEURAL AS NEEDED
Status: DISCONTINUED | OUTPATIENT
Start: 2018-05-09 | End: 2018-05-09 | Stop reason: HOSPADM

## 2018-05-09 RX ORDER — ONDANSETRON 2 MG/ML
4 INJECTION INTRAMUSCULAR; INTRAVENOUS
Status: ACTIVE | OUTPATIENT
Start: 2018-05-09 | End: 2018-05-10

## 2018-05-09 RX ORDER — DICLOFENAC SODIUM 50 MG/1
50 TABLET, DELAYED RELEASE ORAL 2 TIMES DAILY
Status: DISCONTINUED | OUTPATIENT
Start: 2018-05-10 | End: 2018-05-11 | Stop reason: HOSPADM

## 2018-05-09 RX ORDER — PREDNISONE 1 MG/1
1 TABLET ORAL DAILY
Status: DISCONTINUED | OUTPATIENT
Start: 2018-05-10 | End: 2018-05-11 | Stop reason: HOSPADM

## 2018-05-09 RX ORDER — FENTANYL CITRATE 50 UG/ML
50 INJECTION, SOLUTION INTRAMUSCULAR; INTRAVENOUS AS NEEDED
Status: DISCONTINUED | OUTPATIENT
Start: 2018-05-09 | End: 2018-05-09 | Stop reason: HOSPADM

## 2018-05-09 RX ORDER — AZATHIOPRINE 50 MG/1
100 TABLET ORAL DAILY
Status: DISCONTINUED | OUTPATIENT
Start: 2018-05-10 | End: 2018-05-11 | Stop reason: HOSPADM

## 2018-05-09 RX ORDER — SODIUM CHLORIDE 0.9 % (FLUSH) 0.9 %
5-10 SYRINGE (ML) INJECTION EVERY 8 HOURS
Status: DISCONTINUED | OUTPATIENT
Start: 2018-05-09 | End: 2018-05-11 | Stop reason: HOSPADM

## 2018-05-09 RX ORDER — HYDROMORPHONE HYDROCHLORIDE 1 MG/ML
0.5 INJECTION, SOLUTION INTRAMUSCULAR; INTRAVENOUS; SUBCUTANEOUS
Status: ACTIVE | OUTPATIENT
Start: 2018-05-09 | End: 2018-05-10

## 2018-05-09 RX ORDER — HYDROMORPHONE HYDROCHLORIDE 1 MG/ML
0.2 INJECTION, SOLUTION INTRAMUSCULAR; INTRAVENOUS; SUBCUTANEOUS
Status: COMPLETED | OUTPATIENT
Start: 2018-05-09 | End: 2018-05-09

## 2018-05-09 RX ORDER — TRANEXAMIC ACID 100 MG/ML
INJECTION, SOLUTION INTRAVENOUS AS NEEDED
Status: DISCONTINUED | OUTPATIENT
Start: 2018-05-09 | End: 2018-05-09 | Stop reason: HOSPADM

## 2018-05-09 RX ORDER — OXYCODONE AND ACETAMINOPHEN 5; 325 MG/1; MG/1
1 TABLET ORAL AS NEEDED
Status: DISCONTINUED | OUTPATIENT
Start: 2018-05-09 | End: 2018-05-09 | Stop reason: HOSPADM

## 2018-05-09 RX ORDER — POLYETHYLENE GLYCOL 3350 17 G/17G
17 POWDER, FOR SOLUTION ORAL DAILY
Status: DISCONTINUED | OUTPATIENT
Start: 2018-05-10 | End: 2018-05-11 | Stop reason: HOSPADM

## 2018-05-09 RX ADMIN — FENTANYL CITRATE 25 MCG: 50 INJECTION, SOLUTION INTRAMUSCULAR; INTRAVENOUS at 12:45

## 2018-05-09 RX ADMIN — PROPOFOL 30 MCG/KG/MIN: 10 INJECTION, EMULSION INTRAVENOUS at 09:25

## 2018-05-09 RX ADMIN — SODIUM CHLORIDE, SODIUM LACTATE, POTASSIUM CHLORIDE, CALCIUM CHLORIDE: 600; 310; 30; 20 INJECTION, SOLUTION INTRAVENOUS at 09:11

## 2018-05-09 RX ADMIN — FENTANYL CITRATE 25 MCG: 50 INJECTION, SOLUTION INTRAMUSCULAR; INTRAVENOUS at 12:15

## 2018-05-09 RX ADMIN — OXYCODONE HYDROCHLORIDE 5 MG: 5 TABLET ORAL at 23:21

## 2018-05-09 RX ADMIN — EPHEDRINE SULFATE 10 MG: 50 INJECTION, SOLUTION INTRAVENOUS at 13:43

## 2018-05-09 RX ADMIN — EPHEDRINE SULFATE 10 MG: 50 INJECTION, SOLUTION INTRAVENOUS at 09:44

## 2018-05-09 RX ADMIN — EPHEDRINE SULFATE 10 MG: 50 INJECTION INTRAVENOUS at 13:43

## 2018-05-09 RX ADMIN — CELECOXIB 200 MG: 200 CAPSULE ORAL at 08:25

## 2018-05-09 RX ADMIN — EPHEDRINE SULFATE 25 MG: 50 INJECTION INTRAVENOUS at 13:45

## 2018-05-09 RX ADMIN — HYDROMORPHONE HYDROCHLORIDE 0.2 MG: 1 INJECTION, SOLUTION INTRAMUSCULAR; INTRAVENOUS; SUBCUTANEOUS at 13:19

## 2018-05-09 RX ADMIN — HYDROMORPHONE HYDROCHLORIDE 0.2 MG: 1 INJECTION, SOLUTION INTRAMUSCULAR; INTRAVENOUS; SUBCUTANEOUS at 13:03

## 2018-05-09 RX ADMIN — SODIUM CHLORIDE 125 ML/HR: 900 INJECTION, SOLUTION INTRAVENOUS at 21:15

## 2018-05-09 RX ADMIN — OXYCODONE HYDROCHLORIDE 5 MG: 5 TABLET ORAL at 19:23

## 2018-05-09 RX ADMIN — FENTANYL CITRATE 50 MCG: 50 INJECTION, SOLUTION INTRAMUSCULAR; INTRAVENOUS at 08:45

## 2018-05-09 RX ADMIN — CEFAZOLIN SODIUM 2 G: 1 INJECTION, POWDER, FOR SOLUTION INTRAMUSCULAR; INTRAVENOUS at 09:34

## 2018-05-09 RX ADMIN — FAMOTIDINE 20 MG: 20 TABLET ORAL at 19:23

## 2018-05-09 RX ADMIN — EPHEDRINE SULFATE 10 MG: 50 INJECTION, SOLUTION INTRAVENOUS at 10:25

## 2018-05-09 RX ADMIN — Medication 2 G: at 16:17

## 2018-05-09 RX ADMIN — PRAMIPEXOLE DIHYDROCHLORIDE 1 MG: 0.25 TABLET ORAL at 21:13

## 2018-05-09 RX ADMIN — ACETAMINOPHEN 1000 MG: 500 TABLET, FILM COATED ORAL at 08:25

## 2018-05-09 RX ADMIN — MIDAZOLAM HYDROCHLORIDE 1 MG: 1 INJECTION, SOLUTION INTRAMUSCULAR; INTRAVENOUS at 11:00

## 2018-05-09 RX ADMIN — SODIUM CHLORIDE, SODIUM LACTATE, POTASSIUM CHLORIDE, AND CALCIUM CHLORIDE 125 ML/HR: 600; 310; 30; 20 INJECTION, SOLUTION INTRAVENOUS at 08:30

## 2018-05-09 RX ADMIN — MIDAZOLAM HYDROCHLORIDE 1 MG: 1 INJECTION, SOLUTION INTRAMUSCULAR; INTRAVENOUS at 11:01

## 2018-05-09 RX ADMIN — ASPIRIN 325 MG: 325 TABLET, DELAYED RELEASE ORAL at 21:00

## 2018-05-09 RX ADMIN — EPHEDRINE SULFATE 25 MG: 50 INJECTION, SOLUTION INTRAVENOUS at 13:45

## 2018-05-09 RX ADMIN — KETOROLAC TROMETHAMINE 15 MG: 30 INJECTION, SOLUTION INTRAMUSCULAR at 19:23

## 2018-05-09 RX ADMIN — ACETAMINOPHEN 650 MG: 325 TABLET, FILM COATED ORAL at 19:23

## 2018-05-09 RX ADMIN — PREGABALIN 75 MG: 75 CAPSULE ORAL at 08:25

## 2018-05-09 RX ADMIN — SODIUM CHLORIDE, SODIUM LACTATE, POTASSIUM CHLORIDE, CALCIUM CHLORIDE: 600; 310; 30; 20 INJECTION, SOLUTION INTRAVENOUS at 10:41

## 2018-05-09 RX ADMIN — OXYCODONE HYDROCHLORIDE 5 MG: 5 TABLET ORAL at 16:17

## 2018-05-09 RX ADMIN — BUPIVACAINE HYDROCHLORIDE 9.5 MG: 5 INJECTION, SOLUTION EPIDURAL; INTRACAUDAL at 09:24

## 2018-05-09 RX ADMIN — LIDOCAINE HYDROCHLORIDE 0.1 ML: 10 INJECTION, SOLUTION EPIDURAL; INFILTRATION; INTRACAUDAL; PERINEURAL at 08:30

## 2018-05-09 RX ADMIN — EPHEDRINE SULFATE 10 MG: 50 INJECTION, SOLUTION INTRAVENOUS at 09:15

## 2018-05-09 RX ADMIN — SERTRALINE HYDROCHLORIDE 100 MG: 50 TABLET ORAL at 22:00

## 2018-05-09 RX ADMIN — MIDAZOLAM HYDROCHLORIDE 2 MG: 1 INJECTION, SOLUTION INTRAMUSCULAR; INTRAVENOUS at 08:45

## 2018-05-09 NOTE — PROGRESS NOTES
Bedside and Verbal shift change report given to Clayton (oncoming nurse) by Dale Reno (offgoing nurse). Report included the following information SBAR, Kardex, Intake/Output and MAR.

## 2018-05-09 NOTE — PERIOP NOTES
TRANSFER - OUT REPORT:    Verbal report given to Maia(name) on Avie Osler  being transferred to [facility](unit) for routine post - op       Report consisted of patients Situation, Background, Assessment and   Recommendations(SBAR). Information from the following report(s) SBAR was reviewed with the receiving nurse. Lines:   Peripheral IV 05/09/18 Left Hand (Active)   Site Assessment Clean, dry, & intact 5/9/2018  2:00 PM   Phlebitis Assessment 0 5/9/2018  2:00 PM   Infiltration Assessment 0 5/9/2018  2:00 PM   Dressing Status Clean, dry, & intact 5/9/2018  2:00 PM   Dressing Type Tape;Transparent 5/9/2018  2:00 PM   Hub Color/Line Status Infusing 5/9/2018  2:00 PM   Alcohol Cap Used Yes 5/9/2018  2:00 PM        Opportunity for questions and clarification was provided.       Patient transported with:   [facility]

## 2018-05-09 NOTE — ANESTHESIA PROCEDURE NOTES
Spinal Block    Start time: 5/9/2018 9:17 AM  End time: 5/9/2018 9:25 AM  Performed by: Marleen Mcallister  Authorized by: Marleen Mcallister     Pre-procedure:   Indications: at surgeon's request and primary anesthetic  Preanesthetic Checklist: patient identified, risks and benefits discussed, anesthesia consent, site marked, patient being monitored and timeout performed    Timeout Time: 09:17          Spinal Block:   Patient Position:  Seated  Prep Region:  Lumbar  Prep: Betadine      Location:  L2-3  Technique:  Single shot  Local:  Lidocaine 1%  Local Dose (mL):  3    Needle:   Needle Type:  Pencil-tip  Needle Gauge:  25 G  Attempts:  2      Events: CSF confirmed, no blood with aspiration and no paresthesia        Assessment:  Insertion:  Uncomplicated  Patient tolerance:  Patient tolerated the procedure well with no immediate complications

## 2018-05-09 NOTE — PROGRESS NOTES
TRANSFER - IN REPORT:    Verbal report received from [facility] on Ofelia Stagers  being received from PACU  for routine post - op      Report consisted of patients Situation, Background, Assessment and   Recommendations(SBAR). Information from the following report(s) SBAR, Kardex, Intake/Output and MAR was reviewed with the receiving nurse. Opportunity for questions and clarification was provided. Assessment completed upon patients arrival to unit and care assumed.

## 2018-05-09 NOTE — PROGRESS NOTES
Primary Nurse Jian Rangel RN and Marichuy Goss RN performed a dual skin assessment on this patient No impairment noted  Steven score is 21

## 2018-05-09 NOTE — ANESTHESIA PREPROCEDURE EVALUATION
Anesthetic History   No history of anesthetic complications            Review of Systems / Medical History  Patient summary reviewed, nursing notes reviewed and pertinent labs reviewed    Pulmonary  Within defined limits                 Neuro/Psych         Psychiatric history     Cardiovascular  Within defined limits                Exercise tolerance: >4 METS     GI/Hepatic/Renal     GERD: well controlled           Endo/Other        Obesity and arthritis     Other Findings   Comments: RA         Physical Exam    Airway  Mallampati: II  TM Distance: > 6 cm  Neck ROM: normal range of motion   Mouth opening: Normal     Cardiovascular  Regular rate and rhythm,  S1 and S2 normal,  no murmur, click, rub, or gallop             Dental    Dentition: Caps/crowns     Pulmonary  Breath sounds clear to auscultation               Abdominal  GI exam deferred       Other Findings            Anesthetic Plan    ASA: 3  Anesthesia type: spinal      Post-op pain plan if not by surgeon: peripheral nerve block single    Induction: Intravenous  Anesthetic plan and risks discussed with: Patient

## 2018-05-09 NOTE — PROGRESS NOTES
Problem: Mobility Impaired (Adult and Pediatric)  Goal: *Acute Goals and Plan of Care (Insert Text)  Physical Therapy Goals  Initiated 5/9/2018    1. Patient will move from supine to sit and sit to supine  in bed with independence within 4 days. 2. Patient will perform sit to stand with modified independence within 4 days. 3. Patient will ambulate with modified independence for 150 feet with the least restrictive device within 4 days. 4. Patient will ascend/descend 13 stairs with 1 handrail(s) with modified independence within 4 days. 5. Patient will perform home exercise program per protocol with independence within 4 days. 6. Patient will demonstrate AROM 0-90 degrees in operative joint within 4 days. physical Therapy EVALUATION  Patient: Rosey Valle (8 y.o. female)  Date: 5/9/2018  Primary Diagnosis: PRIMARY OSTEOARTHRITIS  RIGHT KNEE   Osteoarthritis of right knee  Procedure(s) (LRB):  RIGHT TOTAL KNEE ARTHROPLASTY (Right) Day of Surgery   Precautions:   Fall (several falls in the last year)    ASSESSMENT :  Based on the objective data described below, the patient presents with decreased ROM right knee, decreased generalized strength, decreased balance, and impaired functional mobility. Pt ambulates independently however reports she \"falls a lot\". Pt had fair activity tolerance due to moderate right knee pain and brief episode of lightheadedness while ambulating in room. She completed bed mobility with supervision, sit <> stand with CGA, and tolerated ambulation with rolling walker x 20 feet with CGA. Pt  lives with her  who she states is \"handicapped\". Her son and grandson will be assisting her at home as needed. Anticipate pt will progress well. Recommend home health PT. Patient will benefit from skilled intervention to address the above impairments.   Patients rehabilitation potential is considered to be Good  Factors which may influence rehabilitation potential include: []         None noted  []         Mental ability/status  []         Medical condition  []         Home/family situation and support systems  []         Safety awareness  [x]         Pain tolerance/management  []         Other:      PLAN :  Recommendations and Planned Interventions:  [x]           Bed Mobility Training             []    Neuromuscular Re-Education  [x]           Transfer Training                   []    Orthotic/Prosthetic Training  [x]           Gait Training                         []    Modalities  [x]           Therapeutic Exercises           []    Edema Management/Control  [x]           Therapeutic Activities            [x]    Patient and Family Training/Education  []           Other (comment):    Frequency/Duration: Patient will be followed by physical therapy  twice daily to address goals. Discharge Recommendations: Home Health  Further Equipment Recommendations for Discharge: has equipment at home     SUBJECTIVE:   Patient stated My  and I fall a lot. I am a tough bird.     OBJECTIVE DATA SUMMARY:   HISTORY:    Past Medical History:   Diagnosis Date    Cataracts, bilateral     Chronic pain     [facility]    colectomy with ostomy, then reversed by DR ELVIN Velazquez Colon polyps     Depression     did not do well on duloxetine    GERD (gastroesophageal reflux disease)     Glaucoma     Heart palpitations     Other ill-defined conditions(799.89)     GASTROPARESIS    RA (rheumatoid arthritis) ([facility]     DR Gama Rosenberg, remicade    Restless leg syndrome     Spinal stenosis 5/8/2011    Unspecified adverse effect of anesthesia     HEADACHE    UTI (lower urinary tract infection)      Past Surgical History:   Procedure Laterality Date    HX COLECTOMY  2015    HX COLOSTOMY  2016    REVERSAL    HX COLOSTOMY TAKE DOWN      HX DILATION AND CURETTAGE      HX GI  9/2014    COLONOSCOPY    HX KNEE ARTHROSCOPY Left 1979    MENISCUS REPAIR    HX KNEE ARTHROSCOPY Left     HX KNEE ARTHROSCOPY Right 2005    HX KNEE REPLACEMENT Left 2005    HX LAP CHOLECYSTECTOMY  5/2005    HX LUMBAR LAMINECTOMY  2011    HX TUBAL LIGATION  1979    TOTAL HIP ARTHROPLASTY Right 3/2012     Prior Level of Function/Home Situation: independent, several falls in the past year  Personal factors and/or comorbidities impacting plan of care: falls, reports  is \"handicapped\" but able to assist minimally, son and grandson will be assisting pt at home    [address]: Private residence  # Steps to Enter: 1  Rails to Enter: Yes  Hand Rails : Bilateral  Wheelchair Ramp: Yes  One/Two Story Residence: Two story  # of Interior Steps: 15  Interior Rails: Both  Lift Chair Available: No  Living Alone: No  Support Systems: Spouse/Significant Other/Partner, Family member(s) (son and grandson will be assisting att home)  Patient Expects to be Discharged to[de-identified] Private residence  Current DME Used/Available at Home: Juan Cobos, rollator, 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, reads lips  Hearing Aids/Status: Bilateral  Skin:  Dressing intact    Range Of Motion:   uninvolved within functional limits, decreased ROM right knee                       Strength:     generally decreased, functional                     Tone & Sensation:    intact                              Coordination:   intact  Vision:    reading glasses  Functional Mobility:  Bed Mobility:     Supine to Sit: Supervision  Sit to Supine: Supervision  Scooting: Supervision  Transfers:  Sit to Stand: Contact guard assistance;Assist x1  Stand to Sit: Contact guard assistance;Assist x1                       Balance:   Sitting: Intact  Standing: Impaired  Standing - Static: Good  Standing - Dynamic : Good  Ambulation/Gait Training:  Distance (ft): 20 Feet (ft)  Assistive Device: Gait belt;Walker, rolling  Ambulation - Level of Assistance: Contact guard assistance;Assist x1     Gait Description (WDL): Exceptions to WDL  Gait Abnormalities: Antalgic;Decreased step clearance;Trunk sway increased  Right Side Weight Bearing: As tolerated        Stance: Right decreased  Speed/Ania: Slow  Step Length: Left shortened;Right shortened                  Functional Measure:  Tinetti test:    Sitting Balance: 1  Arises: 2  Attempts to Rise: 2  Immediate Standing Balance: 1  Standing Balance: 1  Nudged: 1  Eyes Closed: 0  Turn 360 Degrees - Continuous/Discontinuous: 1  Turn 360 Degrees - Steady/Unsteady: 1  Sitting Down: 1  Balance Score: 11  Indication of Gait: 1  R Step Length/Height: 1  L Step Length/Height: 1  R Foot Clearance: 1  L Foot Clearance: 1  Step Symmetry: 1  Step Continuity: 1  Path: 1  Trunk: 1  Walking Time: 1  Gait Score: 10  Total Score: 21       Tinetti Test and G-code impairment scale:  Percentage of Impairment CH    0%   CI    1-19% CJ    20-39% CK    40-59% CL    60-79% CM    80-99% CN     100%   Tinetti  Score 0-28 28 23-27 17-22 12-16 6-11 1-5 0       Tinetti Tool Score Risk of Falls  <19 = High Fall Risk  19-24 = Moderate Fall Risk  25-28 = Low Fall Risk  Tinetti ME. Performance-Oriented Assessment of Mobility Problems in Elderly Patients. [facility]; [id number].  (Scoring Description: PT Bulletin Feb. 10, 1993)    Older adults: Dahlia Hashimoto et al, 2009; n = [address] elderly evaluated with ABC, REJI, ADL, and IADL)  · Mean REJI score for males aged 69-68 years = 26.21(3.40)  · Mean REJI score for females age 69-68 years = 25.16(4.30)  · Mean REJI score for males over 80 years = 23.29(6.02)  · Mean REJI score for females over 80 years = 17.20(8.32)       G codes: In compliance with CMSs Claims Based Outcome Reporting, the following G-code set was chosen for this patient based on their primary functional limitation being treated: The outcome measure chosen to determine the severity of the functional limitation was the tinetti with a score of 21/28 which was correlated with the impairment scale. ? Mobility - Walking and Moving Around:     - CURRENT STATUS: CJ - 20%-39% impaired, limited or restricted    - GOAL STATUS: CI - 1%-19% impaired, limited or restricted    - D/C STATUS:  ---------------To be determined---------------      Physical Therapy Evaluation Charge Determination   History Examination Presentation Decision-Making   MEDIUM  Complexity : 1-2 comorbidities / personal factors will impact the outcome/ POC  MEDIUM Complexity : 3 Standardized tests and measures addressing body structure, function, activity limitation and / or participation in recreation  LOW Complexity : Stable, uncomplicated  LOW Complexity : FOTO score of       Based on the above components, the patient evaluation is determined to be of the following complexity level: LOW     Pain:  Pain Scale 1: Numeric (0 - 10)  Pain Intensity 1: 5  Pain Location 1: Knee  Pain Orientation 1: Right  Pain Description 1: Aching  Pain Intervention(s) 1: Medication (see MAR); Ice;Rest  Activity Tolerance:   Fair due to moderate pain and transient c/o lightheadedness with ambulation   Please refer to the flowsheet for vital signs taken during this treatment.   Vitals:    05/09/18 1636   BP: 127/76   BP 1 Location: Right arm   BP Patient Position: Supine   Pulse: 78   Resp:    Temp:    SpO2:    Weight:    Height:      After treatment:   []         Patient left in no apparent distress sitting up in chair  [x]         Patient left in no apparent distress in bed  [x]         Call bell left within reach  [x]         Nursing notified  []         Caregiver present  []         Bed alarm activated    COMMUNICATION/EDUCATION:   The patients plan of care was discussed with: Registered Nurse. [x]         Fall prevention education was provided and the patient/caregiver indicated understanding. []         Patient/family have participated as able in goal setting and plan of care. [x]         Patient/family agree to work toward stated goals and plan of care. []         Patient understands intent and goals of therapy, but is neutral about his/her participation. []         Patient is unable to participate in goal setting and plan of care.     Thank you for this referral.  Scarlet Khan [facility]   Time Calculation: 15 mins

## 2018-05-09 NOTE — PROGRESS NOTES
1335: Lunch relief provided, SBAR report received from Jef Henry RN. Called to Dr. Aris Clark. Patient HR continues 37-42bpm, blood pressure 97/39. Orders received to administer 10mg IVP ephedrine, 25mg IM ephedrine. 1345: Ephedrine administered. Patient resting comfortable. Patient HR 40-50, /40.    1400: Patient HR 50-60, /97.

## 2018-05-09 NOTE — IP AVS SNAPSHOT
[address] [facility] 13 
[phone] Patient: Jose Phipps MRN: [id number] About your hospitalization You were admitted on:  May 9, 2018 You last received care in the:  [address] [place] [address] You were discharged on:  May 11, 2018 Why you were hospitalized Your primary diagnosis was:  Not on File Your diagnoses also included:  Osteoarthritis Of Right Knee Follow-up Information Follow up With Details Comments Contact Info Derrick Tate MD   [address] 
[phone] AT [place] [address] 
[phone] Discharge Orders None A check ashely indicates which time of day the medication should be taken. My Medications START taking these medications Instructions Each Dose to Equal  
 Morning Noon Evening Bedtime  
 aspirin delayed-release 325 mg tablet Your last dose was: Your next dose is: Take 1 Tab by mouth two (2) times a day. 325 mg  
    
   
   
   
  
 diclofenac EC 50 mg EC tablet Commonly known as:  VOLTAREN Your last dose was: Your next dose is: Take 1 Tab by mouth two (2) times a day. 50 mg  
    
   
   
   
  
 famotidine 20 mg tablet Commonly known as:  PEPCID Your last dose was: Your next dose is: Take 1 Tab by mouth two (2) times a day. 20 mg  
    
   
   
   
  
 oxyCODONE IR 5 mg immediate release tablet Commonly known as:  Winnie Parody Your last dose was: Your next dose is: Take 1 Tab by mouth every three (3) hours as needed. Max Daily Amount: 40 mg.  
 5 mg  
    
   
   
   
  
 traMADol 50 mg tablet Commonly known as:  ULTRAM  
   
Your last dose was: Your next dose is: Take 1 Tab by mouth every six (6) hours as needed. Max Daily Amount: 200 mg.  
 50 mg CHANGE how you take these medications Instructions Each Dose to Equal  
 Morning Noon Evening Bedtime  
 azaTHIOprine 50 mg tablet Commonly known as:  The Pepsi What changed:   
- when to take this 
- additional instructions Your last dose was: Your next dose is: Take 2 tablets by mouth daily. As directed by your Rheumatologist--stop for 1 week after surgery. 100 mg  
    
   
   
   
  
 sertraline 100 mg tablet Commonly known as:  ZOLOFT What changed:  when to take this Your last dose was: Your next dose is: Take 1 Tab by mouth daily. 100 mg CONTINUE taking these medications Instructions Each Dose to Equal  
 Morning Noon Evening Bedtime Biotin 2,500 mcg Cap Your last dose was: Your next dose is: Take 1,000 mcg by mouth daily. 1000 mcg CALCIUM 600 PO Your last dose was: Your next dose is: Take 1 tablet by mouth daily. 1 Tab  
    
   
   
   
  
 magnesium 250 mg Tab Your last dose was: Your next dose is: Take  by mouth daily. pramipexole 1 mg tablet Commonly known as:  MIRAPEX Your last dose was: Your next dose is: TAKE 1 TABLET EVERY NIGHT  
     
   
   
   
  
 predniSONE 1 mg tablet Commonly known as:  Destiny Odell Your last dose was: Your next dose is: Take 1 mg by mouth daily. 1 mg REMICADE IV Your last dose was: Your next dose is:    
   
   
 by IntraVENous route. 500 mg q 5 weeks. LAST INFUSION WAS 11/2/14 VITAMIN D3 1,000 unit tablet Generic drug:  cholecalciferol Your last dose was: Your next dose is: Take 1,000 Units by mouth daily. 1000 Units STOP taking these medications HYDROcodone-acetaminophen 5-325 mg per tablet Commonly known as:  Miley Black Where to Get Your Medications Information on where to get these meds will be given to you by the nurse or doctor. ! Ask your nurse or doctor about these medications  
  aspirin delayed-release 325 mg tablet  
 diclofenac EC 50 mg EC tablet  
 famotidine 20 mg tablet  
 oxyCODONE IR 5 mg immediate release tablet  
 traMADol 50 mg tablet Opioid Education Prescription Opioids: What You Need to Know: 
 
Prescription opioids can be used to help relieve moderate-to-severe pain and are often prescribed following a surgery or injury, or for certain health conditions. These medications can be an important part of treatment but also come with serious risks. Opioids are strong pain medicines. Examples include hydrocodone, oxycodone, fentanyl, and morphine. Heroin is an example of an illegal opioid. It is important to work with your health care provider to make sure you are getting the safest, most effective care. WHAT ARE THE RISKS AND SIDE EFFECTS OF OPIOID USE? Prescription opioids carry serious risks of addiction and overdose, especially with prolonged use. An opioid overdose, often marked by slow breathing, can cause sudden death. The use of prescription opioids can have a number of side effects as well, even when taken as directed. · Tolerance-meaning you might need to take more of a medication for the same pain relief · Physical dependence-meaning you have symptoms of withdrawal when the medication is stopped. Withdrawal symptoms can include nausea, sweating, chills, diarrhea, stomach cramps, and muscle aches. Withdrawal can last up to several weeks, depending on which drug you took and how long you took it. · Increased sensitivity to pain · Constipation · Nausea, vomiting, and dry mouth · Sleepiness and dizziness · Confusion · Depression · Low levels of testosterone that can result in lower sex drive, energy, and strength · Itching and sweating RISKS ARE GREATER WITH:      
· History of drug misuse, substance use disorder, or overdose · Mental health conditions (such as depression or anxiety) · Sleep apnea · Older age (72 years or older) · Pregnancy Avoid alcohol while taking prescription opioids. Also, unless specifically advised by your health care provider, medications to avoid include: · Benzodiazepines (such as Xanax or Valium) · Muscle relaxants (such as Soma or Flexeril) · Hypnotics (such as Ambien or Lunesta) · Other prescription opioids KNOW YOUR OPTIONS Talk to your health care provider about ways to manage your pain that don't involve prescription opioids. Some of these options may actually work better and have fewer risks and side effects. Options may include: 
· Pain relievers such as acetaminophen, ibuprofen, and naproxen · Some medications that are also used for depression or seizures · Physical therapy and exercise · Counseling to help patients learn how to cope better with triggers of pain and stress. · Application of heat or cold compress · Massage therapy · Relaxation techniques Be Informed Make sure you know the name of your medication, how much and how often to take it, and its potential risks & side effects. IF YOU ARE PRESCRIBED OPIOIDS FOR PAIN: 
· Never take opioids in greater amounts or more often than prescribed. Remember the goal is not to be pain-free but to manage your pain at a tolerable level. · Follow up with your primary care provider to: · Work together to create a plan on how to manage your pain. · Talk about ways to help manage your pain that don't involve prescription opioids. · Talk about any and all concerns and side effects. · Help prevent misuse and abuse. · Never sell or share prescription opioids · Help prevent misuse and abuse. · Store prescription opioids in a secure place and out of reach of others (this may include visitors, children, friends, and family). · Safely dispose of unused/unwanted prescription opioids: Find your community drug take-back program or your pharmacy mail-back program, or flush them down the toilet, following guidance from the Food and Drug Administration (www.fda.gov/Drugs/ResourcesForYou). · Visit www.cdc.gov/drugoverdose to learn about the risks of opioid abuse and overdose. · If you believe you may be struggling with addiction, tell your health care provider and ask for guidance or call [facility] at 2-951-897-TNRJ. Discharge Instructions Patient meets criteria for BUNDLED PAYMENT  
for Care Improvement Initiative Criteria Contact Information for Orthopedic Nurse Navigator:     
LORIN Damon, RN-BC 
K:[phone] K:[phone] M:[phone] After Hospital Care Plan:  Discharge Instructions Knee Replacement Dr. Edward Hendrickson Patient Name: Eliazar Homans Date of procedure: 5/9/2018 Procedure: Procedure(s): RIGHT TOTAL KNEE ARTHROPLASTY Surgeon: Surgeon(s) and Role: Andrew Rowland MD - Primary PCP: Edna Bloch, MD 
Date of discharge: No discharge date for patient encounter. Follow up appointments 
-follow up with Dr. Edward Hendrickson in 4 weeks. Call [phone], ext [phone]) to make an appointment. Home Health Agency: ________________________ phone: _____________________ The agency will contact you to arrange dates/times for visits. Please call them if you do not hear from them within 24 hours after you are discharged When to call your Orthopaedic Surgeon: 
-unrelieved pain 
-Signs of infection-if your incision is red; continues to have drainage; drainage has a foul odor or if you have a persistent fever over 101 degrees 
-Signs of a blood clot in your leg-calf pain, tenderness, redness, swelling of lower leg When to call your Primary Care Physician: 
-Concerns about medical conditions such as diabetes, high blood pressure, asthma, congestive heart failure 
-Call if blood sugars are elevated, persistent headache or dizziness, coughing or congestion, constipation or diarrhea, burning with urination, abnormal heart rate When to call 911and go to the nearest emergency room 
-acute onset of chest pain, shortness of breath, difficulty breathing Activity 
-walk with your walker or crutches putting as much weight on your leg as you can tolerate. Refer to pages 23-31 of your handbook for instructions and pictures 
-do 20 repetitions of each exercise 3 times each day as instructed by the physical therapist.  Refer to pages 32-40 of your handbook for exercise instructions and pictures 
-get up every one hour and walk (except at night when sleeping) 
-do not drive or operate heavy machinery Incision Care 
-the Aquacel (brown, waterproof) surgical dressing is to remain on your knee for 7 days. On the 7th day have someone gently peel the dressing off by carefully lifting the edge and stretching it slightly to break the adhesive seal and leave incision open to air. You may take a shower with the Aquacel dressing in place. Preventing blood clots  
-Take Aspirin 325 mg twice daily as prescribed  by Dr. Alessia Vega for one month following surgery Pain management - Please take scheduled 650 mg tylenol every 6 hours for 4 weeks - Please take scheduled diclofenac 50 mg twice daily for 4 weeks - Please take tramadol every 6 hours for pain as needed for pain. - For breakthrough please take 5-10 mg oxycodone - While taking aspirin and diclofenac, please take famotidine (PEPCID) as prescribed 20 mg twice daily to prevent stomach ulcers/irritation.  
- If you have a history of stomach ulcers, do not take diclofenac. - Avoid alcoholic beverages while taking pain medication - Do not take any over-the-counter medication for pain except Tylenol (acetaminophen) - Please be aware that many medications contain Tylenol. We do not want you to over medicate so please read the information below as a guide. Do not take more than 4 Grams of Tylenol in a 24 hour - You may place an ice bag on your knee for 15-20 minutes after exercising and as needed throughout the day and night Diet 
-resume usual diet; drink plenty of fluids; eat foods high in fiber 
-you may want to take a stool softener (such as Senokot-S or Colace) to prevent constipation while you are taking pain medication. If constipation occurs, take a laxative (such as Dulcolax tablets, Milk of Magnesia, or a suppository) Home Health Care Protocol (to be followed by [facility]) Nursing-per physicians order 
-remove Aquacel dressing at 7 days post-op  
-complete head to toe assessment, vital signs 
-medication reconciliation 
-review pain management 
-manage chronic medical conditions Physical Therapy-per physicians order Weight bearing status: 
Precautions at Admission: Fall (several falls in the last year) Right Side Weight Bearing: As tolerated Mobility Status: 
Supine to Sit: Supervision Sit to Stand: Contact guard assistance, Assist x1 Sit to Supine: Supervision Gait: 
Distance (ft): 20 Feet (ft) Ambulation - Level of Assistance: Contact guard assistance, Assist x1 Assistive Device: Gait belt, Walker, rolling Gait Abnormalities: Antalgic, Decreased step clearance, Trunk sway increased ADL status overall composite: 
  
  
  
  
  
 
Physical Therapy 
-assessment and evaluation-bed mobility; functional transfers (bed, chair, bathroom, stairs); ambulation with equipment, car transfers, safety and ability to get out of house in the event of an emergency 
-review and maintain weight bearing as tolerated 
-discuss pain management 
-review how to do ADLs 
 
-Home Exercise Program-refer to pages 32-40 of the patient handbook for exercises 
-supine exercises-ankle pumps, hamstring sets, quad sets, heel slides, short arc quad sets, long arc quads-prop heel on pillow for stretch, straight leg raise 
-sitting exercises-active knee flexion, passive knee flexion, active knee extension, ankle pumps, bicep curls (use weights as appropriate), triceps pushups, shoulder flexion (use weights as appropriate) -standing exercises holding onto supportive counter-toe raises, heel raises, mini-squats, heel/toe touches with knee bend, marching in place, hamstring curls Physical therapy goals by discharge from [facility] [address] ambulation with walker, crutches or cane if needed (level surfaces and   stairs) -Flexion > 90 degrees, extension 0 degrees 
-Daily performance of home exercise program 
-Independent with stair climbing and car transfers 
-Progress to community ambulator (least restrictive assistive device) ACO Transitions of Care Introducing [facility] [address] [facility] offers a voluntary care coordination program to provide high quality service and care to [facility] fee-for-service beneficiaries. [employer] was designed to help you enhance your health and well-being through the following services: ? Transitions of Care  support for individuals who are transitioning from one care setting to another (example: Hospital to home). ? Chronic and Complex Care Coordination  support for individuals and caregivers of those with serious or chronic illnesses or with more than one chronic (ongoing) condition and those who take a number of different medications. If you meet specific medical criteria, a [address] [place] [address] Rd may call you directly to coordinate your care with your primary care physician and your other care providers. For questions about the [facility] programs, please, contact your physicians office. For general questions or additional information about Accountable Care Organizations: 
Please visit www.medicare.gov/acos. html or call 1-800-MEDICARE (7-787.558.8238) TTY users should call 2-122.872.1740. Introducing [facility]! [facility]: Thank you for requesting a [employer] account. Our records indicate that you already have an active [employer] account. You can access your account anytime at [email or web address] Did you know that you can access your hospital and ER discharge instructions at any time in [employer]? You can also review all of your test results from your hospital stay or ER visit. Additional Information If you have questions, please visit the Frequently Asked Questions section of the [employer] website at [email or web address] Remember, [employer] is NOT to be used for urgent needs. For medical emergencies, dial 911. Now available from your iPhone and Android! Introducing Brennan Hough As a [employer] patient, I wanted to make you aware of our electronic visit tool called Brennan Hough. [employer] 24/7 allows you to connect within minutes with a medical provider 24 hours a day, seven days a week via a mobile device or tablet or logging into a secure website from your computer. You can access Brennan Hough from anywhere in the [place].  
 
A virtual visit might be right for you when you have a simple condition and feel like you just dont want to get out of bed, or cant get away from work for an appointment, when your regular [employer] provider is not available (evenings, weekends or holidays), or when youre out of town and need minor care. Electronic visits cost only $49 and if the [facility] 24/7 provider determines a prescription is needed to treat your condition, one can be electronically transmitted to a nearby pharmacy*. Please take a moment to enroll today if you have not already done so. The enrollment process is free and takes just a few minutes. To enroll, please download the [employer]/7 malorie to your tablet or phone, or visit [email or web address] to enroll on your computer. And, as an [address] [place] [address] patient with a [employer] account, the results of your visits will be scanned into your electronic medical record and your primary care provider will be able to view the scanned results. We urge you to continue to see your regular [facility] provider for your ongoing medical care. And while your primary care provider may not be the one available when you seek a [facility] virtual visit, the peace of mind you get from getting a real diagnosis real time can be priceless. For more information on [employer], view our Frequently Asked Questions (FAQs) at [email or web address]. Sincerely, 
 
Odessa Winn MD 
Chief Medical Officer Sarah Kaminski *:  certain medications cannot be prescribed via [employer] Providers Seen During Your Hospitalization Provider Specialty Primary office phone Tiffany Enrique, [address] [facility] Orthopedic Surgery [phone] Your Primary Care Physician (PCP) Primary Care Physician Office Phone Office Fax [phone] Aditya Paul  [phone] You are allergic to the following Allergen Reactions Adhesive Tape-Silicones Other (comments) \"Left skin raw\" Recent Documentation Height Weight BMI OB Status Smoking Status 1.499 m 78 kg 34.74 kg/m2 Postmenopausal Former Smoker Emergency Contacts Name Discharge Info Relation Home Work Mobile [address] CAREGIVER [3] Spouse [3] [phone] [address] [facility] Bishop Velazquez CAREGIVER [3] Son [22] [phone] Patient Belongings The following personal items are in your possession at time of discharge: 
  Dental Appliances: None  Visual Aid: Glasses   Hearing Aids/Status: Bilateral  Home Medications: None   Jewelry: None  Clothing: Footwear, Pants, Undergarments, Socks, Shirt (sent to [employer])    Other Valuables: None Discharge Instructions Attachments/References MEFS - OXYCODONE, RAPID RELEASE (ETH-OXYDOSE, OXY IR, ROXICODONE) - (BY MOUTH) (ENGLISH) MEFS - ASPIRIN (VALENTINA EXTRA STRENGTH, VALENTINA ASPIRIN CHILDREN'S, BUFFERIN, BUFFERIN LOW DOSE) - (BY MOUTH) (ENGLISH) Patient Handouts Oxycodone, Rapid Release (ETH-Oxydose, Oxy IR, Roxicodone) - (By mouth) Why this medicine is used:  
Treats moderate to severe pain. This medicine is a narcotic pain reliever. Contact a nurse or doctor right away if you have: 
· Fast or slow heart beat, shallow breathing, blue lips, skin or fingernails · Anxiety, restlessness, fever, sweating, muscle spasms, twitching, seeing or hearing things that are not there · Extreme weakness, shallow breathing, slow heartbeat · Severe confusion, lightheadedness, dizziness, fainting · Sweating or cold, clammy skin, seizures · Severe constipation, stomach pain, nausea, vomiting Common side effects: · Mild constipation · Sleepiness, tiredness © 2017 [facility] Information is for End User's use only and may not be sold, redistributed or otherwise used for commercial purposes. Aspirin (Valentina Extra Strength, Avlentina Aspirin Children's, Bufferin, Bufferin Low Dose) - (By mouth) Why this medicine is used:  
Treats pain, fever, and inflammation. May also reduce the risk of heart attack. Contact a nurse or doctor right away if you have: · Bloody vomit or vomit that looks like coffee grounds · Blood in urine or bloody or black, tarry stools · Wheezing or trouble breathing Common side effects: · Upset stomach 
© 2017 [facility] Information is for End User's use only and may not be sold, redistributed or otherwise used for commercial purposes. Please provide this summary of care documentation to your next provider. Signatures-by signing, you are acknowledging that this After Visit Summary has been reviewed with you and you have received a copy. Patient Signature:  ____________________________________________________________ Date:  ____________________________________________________________  
  
Tanvi Amin Provider Signature:  ____________________________________________________________ Date:  ____________________________________________________________

## 2018-05-09 NOTE — H&P
Date of Surgery Update:  Vira Matamoros was seen and examined. History and physical has been reviewed. The patient has been examined. There have been no significant clinical changes since the completion of the originally dated History and Physical.  Patient identified by surgeon; surgical site was confirmed by patient and surgeon.     Signed By: Hailee De Anda MD     May 9, 2018 7:34 AM

## 2018-05-09 NOTE — IP AVS SNAPSHOT
[address] 
[phone] Patient: Antonino Sykes MRN: [id number] NRO:[id number] A check ashely indicates which time of day the medication should be taken. My Medications START taking these medications Instructions Each Dose to Equal  
 Morning Noon Evening Bedtime  
 aspirin delayed-release 325 mg tablet Your last dose was: Your next dose is: Take 1 Tab by mouth two (2) times a day. 325 mg  
    
   
   
   
  
 diclofenac EC 50 mg EC tablet Commonly known as:  VOLTAREN Your last dose was: Your next dose is: Take 1 Tab by mouth two (2) times a day. 50 mg  
    
   
   
   
  
 famotidine 20 mg tablet Commonly known as:  PEPCID Your last dose was: Your next dose is: Take 1 Tab by mouth two (2) times a day. 20 mg  
    
   
   
   
  
 oxyCODONE IR 5 mg immediate release tablet Commonly known as:  Lo Nichols Your last dose was: Your next dose is: Take 1 Tab by mouth every three (3) hours as needed. Max Daily Amount: 40 mg.  
 5 mg  
    
   
   
   
  
 traMADol 50 mg tablet Commonly known as:  ULTRAM  
   
Your last dose was: Your next dose is: Take 1 Tab by mouth every six (6) hours as needed. Max Daily Amount: 200 mg.  
 50 mg CHANGE how you take these medications Instructions Each Dose to Equal  
 Morning Noon Evening Bedtime  
 azaTHIOprine 50 mg tablet Commonly known as:  [employer] What changed:   
- when to take this 
- additional instructions Your last dose was: Your next dose is: Take 2 tablets by mouth daily. As directed by your Rheumatologist--stop for 1 week after surgery. 100 mg  
    
   
   
   
  
 sertraline 100 mg tablet Commonly known as:  ZOLOFT What changed:  when to take this Your last dose was: Your next dose is: Take 1 Tab by mouth daily. 100 mg CONTINUE taking these medications Instructions Each Dose to Equal  
 Morning Noon Evening Bedtime Biotin 2,500 mcg Cap Your last dose was: Your next dose is: Take 1,000 mcg by mouth daily. 1000 mcg CALCIUM 600 PO Your last dose was: Your next dose is: Take 1 tablet by mouth daily. 1 Tab  
    
   
   
   
  
 magnesium 250 mg Tab Your last dose was: Your next dose is: Take  by mouth daily. pramipexole 1 mg tablet Commonly known as:  MIRAPEX Your last dose was: Your next dose is: TAKE 1 TABLET EVERY NIGHT  
     
   
   
   
  
 predniSONE 1 mg tablet Commonly known as:  Ancel Clock Your last dose was: Your next dose is: Take 1 mg by mouth daily. 1 mg REMICADE IV Your last dose was: Your next dose is:    
   
   
 by IntraVENous route. 500 mg q 5 weeks. LAST INFUSION WAS 11/2/14 VITAMIN D3 1,000 unit tablet Generic drug:  cholecalciferol Your last dose was: Your next dose is: Take 1,000 Units by mouth daily. 1000 Units STOP taking these medications HYDROcodone-acetaminophen 5-325 mg per tablet Commonly known as:  [facility] Where to Get Your Medications Information on where to get these meds will be given to you by the nurse or doctor. ! Ask your nurse or doctor about these medications  
  aspirin delayed-release 325 mg tablet  
 diclofenac EC 50 mg EC tablet  
 famotidine 20 mg tablet  
 oxyCODONE IR 5 mg immediate release tablet  
 traMADol 50 mg tablet

## 2018-05-09 NOTE — OP NOTES
Name: Sara Moreno  MRN:  [id number]  : 1940  Age:  76 y.o. Surgery Date: 2018      OPERATIVE REPORT - RIGHT TOTAL KNEE REPLACEMENT    PREOPERATIVE DIAGNOSIS: Osteoarthritis, right knee. POSTOPERATIVE DIAGNOSIS: Osteoarthritis, right knee. PROCEDURE PERFORMED: Right total knee arthroplasty. SURGEON: Christine Butler MD    FIRST ASSISTANTBertin Sanchez    ANESTHESIA: Spinal    PRE-OP ANTIBIOTIC: Ancef 2g    COMPLICATIONS: None. ESTIMATED BLOOD LOSS: 50 mL. SPECIMENS REMOVED: None. COMPONENTS IMPLANTED:   Implant Name Type Inv. Item Serial No.  Lot No. LRB No. Used Action   CEMENT BNE GENTAM[employer] GHV 40GM -- SMARTSET - SN/A  CEMENT BNE GENTAMC GHV 40GM -- SMARTSET [facility] [id number] Right 2 Implanted   FEM KNE FRED CR SZ 2 RT -- TRIATHLON - SN/A  FEM KNE FRED CR SZ 2 RT -- TRIATHLON N/A [employer] [id number] Right 1 Implanted   STEM FEM FRED TRIATHLON 9X50MM --  - SN/A  STEM FEM FRED TRIATHLON 9X50MM --  N/A [employer] [id number] Right 1 Implanted   BASEPLT TIB UNIV TRIATHLN 1 --  - SN/A  BASEPLT TIB UNIV TRIATHLN 1 --  N/A [employer] [id number] Right 1 Implanted   PAT ASYM TRIATHLN X3 29X9MM -- TRIATHLON ASYMMETRIC X3 - SN/A  PAT ASYM TRIATHLN X3 29X9MM -- TRIATHLON ASYMMETRIC X3 N/A [employer] [id number] Right 1 Implanted   INSERT TIB CR TRIATHLN X3 1 11 --  - SN/A   INSERT TIB CR TRIATHLN X3 1 11 --  N/A [employer] [id number] Right 1 Implanted       INDICATIONS:  The patient is an 76 yrs female with progressive debilitating right knee pain due to severe osteoarthritis. Symptoms have progressed despite comprehensive conservative treatment and they presents for right total knee replacement. Risks, benefits, alternatives of the procedure were reviewed in detail and the patient desired to proceed.  Risks including bleeding, infection, damage to surrounding structures, blood clots, pulmonary embolism, and death were discussed. DESCRIPTION OF PROCEDURE:  Epidural/spinal anesthesia was initiated. Two grams of cefazolin were administered within 30 minutes of incision. The right lower extremity was prepped and draped in the usual sterile fashion. The skin was covered with Ioban occlusive dressing. A tourniquet was only employed for cementation- total time- 16 minutes. A midline skin incision was made with a 10 blade and small flaps were elevated. A medial parapatellar incision was made to the knee. The knee was exposed and the distal femur was cut at 5 degrees distal femoral valgus. The tibia was subluxed and a neutral varus/valgus proximal tibial cut was made with 3 degrees posterior slope. The meniscal remnants were removed. The posterior osteophytes were removed from the femur. The extension gap was determined using spacer blocks and appropriate releases were made. Femur was sized per above. An AP cutting block was placed, rotated using a gap balancing techniqe. Anterior, posterior, and chamfer cuts were carried out. The tibial was then sized and correct rotation was identified using the medial 1/3 of the tibial tubercle as a landmark. The tibia was prepared using a drill followed by a keel punch. A reciprocating saw was used to begin the cuts for the keel punch to avoid tibial fracture. The tibia was also prepared for a stem. Trials were placed. The patella was sized using a caliper and an appropriate resection  was performed. Lug holes were drilled and a trial was fitted. The knee tracked well with all trial implants. The trials were then removed. Bony surfaces were drilled, lavaged, and dried. All components were cemented. Excess cement was removed. Polyethylene component was placed. After the cement had fully cured, the knee was ranged and  irrigated copiously with pulsatile lavage. All surrounding soft tissues were infiltrated with local anesthetic.  The arthrotomy  was closed with running quill suture and 0 vicryl suture. Skin and subcutaneous tissues were irrigated and closed in standard fashion with 2-0 vicryl and 3-0 monocryl. An aquacel dressing was placed. The patient underwent straight catheterization at the end of the case. There were no complications. The procedure was a RIGHT TOTAL KNEE REPLACEMENT. A [employer] total knee construct was utilized. No specimens were obtained/sent. The patient was transferred to the recovery room in stable condition.       Echo Palafox MD

## 2018-05-09 NOTE — ANESTHESIA POSTPROCEDURE EVALUATION
Post-Anesthesia Evaluation and Assessment    Patient: Stephanie Zaragoza MRN: [id number]  SSN: [id number]    YOB: 1940  Age: 76 y.o. Sex: female       Cardiovascular Function/Vital Signs  Visit Vitals    /76 (BP 1 Location: Right arm, BP Patient Position: Supine)    Pulse 78    Temp 36.7 °C (98 °F)    Resp 14    Ht 4' 11\" (1.499 m)    Wt 78 kg (172 lb)    SpO2 96%    BMI 34.74 kg/m2       Patient is status post spinal anesthesia for Procedure(s):  RIGHT TOTAL KNEE ARTHROPLASTY. Nausea/Vomiting: None    Postoperative hydration reviewed and adequate. Pain:  Pain Scale 1: Numeric (0 - 10) (05/09/18 1611)  Pain Intensity 1: 8 (05/09/18 1611)   Managed    Neurological Status:   Neuro (WDL): Within Defined Limits (05/09/18 1430)  Neuro  Neurologic State: Alert; Appropriate for age (05/09/18 3075)  Orientation Level: Oriented X4 (05/09/18 1632)  Cognition: Appropriate decision making; Appropriate for age attention/concentration; Appropriate safety awareness (05/09/18 1632)  Speech: Clear (05/09/18 1533)  LUE Motor Response: Purposeful (05/09/18 1533)  LLE Motor Response: Purposeful (05/09/18 1533)  RUE Motor Response: Purposeful (05/09/18 1533)  RLE Motor Response: Purposeful (05/09/18 1533)   At baseline    Mental Status and Level of Consciousness: Arousable    Pulmonary Status:   O2 Device: Nasal cannula (05/09/18 1611)   Adequate oxygenation and airway patent    Complications related to anesthesia: None    Post-anesthesia assessment completed.  No concerns    Signed By: Philipp Ingram MD     May 9, 2018

## 2018-05-09 NOTE — PERIOP NOTES
Dr. Paulino Neville notified of low heart rate.  Pt is warm and dry CN=544/54  No orders received continue to observe

## 2018-05-10 LAB
ANION GAP SERPL CALC-SCNC: 7 MMOL/L (ref 5–15)
BUN SERPL-MCNC: 10 MG/DL (ref 6–20)
BUN/CREAT SERPL: 14 (ref 12–20)
CALCIUM SERPL-MCNC: 8.4 MG/DL (ref 8.5–10.1)
CHLORIDE SERPL-SCNC: 105 MMOL/L (ref 97–108)
CO2 SERPL-SCNC: 26 MMOL/L (ref 21–32)
CREAT SERPL-MCNC: 0.73 MG/DL (ref 0.55–1.02)
ERYTHROCYTE [DISTWIDTH] IN BLOOD BY AUTOMATED COUNT: 14.3 % (ref 11.5–14.5)
GLUCOSE SERPL-MCNC: 106 MG/DL (ref 65–100)
HCT VFR BLD AUTO: 34 % (ref 35–47)
HGB BLD-MCNC: 11.1 G/DL (ref 11.5–16)
MCH RBC QN AUTO: 31 PG (ref 26–34)
MCHC RBC AUTO-ENTMCNC: 32.6 G/DL (ref 30–36.5)
MCV RBC AUTO: 95 FL (ref 80–99)
NRBC # BLD: 0 K/UL (ref 0–0.01)
NRBC BLD-RTO: 0 PER 100 WBC
PLATELET # BLD AUTO: 146 K/UL (ref 150–400)
PMV BLD AUTO: 10.8 FL (ref 8.9–12.9)
POTASSIUM SERPL-SCNC: 3.9 MMOL/L (ref 3.5–5.1)
RBC # BLD AUTO: 3.58 M/UL (ref 3.8–5.2)
SODIUM SERPL-SCNC: 138 MMOL/L (ref 136–145)
WBC # BLD AUTO: 7.8 K/UL (ref 3.6–11)

## 2018-05-10 PROCEDURE — G8989 SELF CARE D/C STATUS: HCPCS

## 2018-05-10 PROCEDURE — 85027 COMPLETE CBC AUTOMATED: CPT | Performed by: ORTHOPAEDIC SURGERY

## 2018-05-10 PROCEDURE — 36415 COLL VENOUS BLD VENIPUNCTURE: CPT | Performed by: ORTHOPAEDIC SURGERY

## 2018-05-10 PROCEDURE — 74011250636 HC RX REV CODE- 250/636: Performed by: ORTHOPAEDIC SURGERY

## 2018-05-10 PROCEDURE — G8987 SELF CARE CURRENT STATUS: HCPCS

## 2018-05-10 PROCEDURE — 77010033678 HC OXYGEN DAILY

## 2018-05-10 PROCEDURE — 74011250637 HC RX REV CODE- 250/637: Performed by: ORTHOPAEDIC SURGERY

## 2018-05-10 PROCEDURE — 80048 BASIC METABOLIC PNL TOTAL CA: CPT | Performed by: ORTHOPAEDIC SURGERY

## 2018-05-10 PROCEDURE — 74011636637 HC RX REV CODE- 636/637: Performed by: ORTHOPAEDIC SURGERY

## 2018-05-10 PROCEDURE — G8988 SELF CARE GOAL STATUS: HCPCS

## 2018-05-10 PROCEDURE — 97110 THERAPEUTIC EXERCISES: CPT | Performed by: PHYSICAL THERAPIST

## 2018-05-10 PROCEDURE — 97165 OT EVAL LOW COMPLEX 30 MIN: CPT

## 2018-05-10 PROCEDURE — A9270 NON-COVERED ITEM OR SERVICE: HCPCS | Performed by: ORTHOPAEDIC SURGERY

## 2018-05-10 PROCEDURE — 97116 GAIT TRAINING THERAPY: CPT | Performed by: PHYSICAL THERAPIST

## 2018-05-10 PROCEDURE — 99218 HC RM OBSERVATION: CPT

## 2018-05-10 RX ORDER — FAMOTIDINE 20 MG/1
20 TABLET, FILM COATED ORAL 2 TIMES DAILY
Qty: 60 TAB | Refills: 0 | Status: SHIPPED | OUTPATIENT
Start: 2018-05-10 | End: 2019-01-16 | Stop reason: ALTCHOICE

## 2018-05-10 RX ORDER — DICLOFENAC SODIUM 50 MG/1
50 TABLET, DELAYED RELEASE ORAL 2 TIMES DAILY
Qty: 60 TAB | Refills: 0 | Status: SHIPPED | OUTPATIENT
Start: 2018-05-10 | End: 2019-01-16 | Stop reason: ALTCHOICE

## 2018-05-10 RX ORDER — OXYCODONE HYDROCHLORIDE 5 MG/1
5 TABLET ORAL
Qty: 60 TAB | Refills: 0 | Status: SHIPPED | OUTPATIENT
Start: 2018-05-10 | End: 2019-01-16 | Stop reason: ALTCHOICE

## 2018-05-10 RX ORDER — TRAMADOL HYDROCHLORIDE 50 MG/1
50 TABLET ORAL
Qty: 60 TAB | Refills: 0 | Status: SHIPPED | OUTPATIENT
Start: 2018-05-10 | End: 2019-01-16 | Stop reason: ALTCHOICE

## 2018-05-10 RX ORDER — ASPIRIN 325 MG
325 TABLET, DELAYED RELEASE (ENTERIC COATED) ORAL 2 TIMES DAILY
Qty: 60 TAB | Refills: 0 | Status: SHIPPED | OUTPATIENT
Start: 2018-05-10 | End: 2019-01-16 | Stop reason: ALTCHOICE

## 2018-05-10 RX ADMIN — OXYCODONE HYDROCHLORIDE 5 MG: 5 TABLET ORAL at 16:01

## 2018-05-10 RX ADMIN — OXYCODONE HYDROCHLORIDE 5 MG: 5 TABLET ORAL at 04:30

## 2018-05-10 RX ADMIN — TRAMADOL HYDROCHLORIDE 50 MG: 50 TABLET, FILM COATED ORAL at 14:18

## 2018-05-10 RX ADMIN — Medication 10 ML: at 06:20

## 2018-05-10 RX ADMIN — ACETAMINOPHEN 650 MG: 325 TABLET, FILM COATED ORAL at 12:16

## 2018-05-10 RX ADMIN — OXYCODONE HYDROCHLORIDE 5 MG: 5 TABLET ORAL at 08:51

## 2018-05-10 RX ADMIN — Medication 400 MG: at 08:51

## 2018-05-10 RX ADMIN — OXYCODONE HYDROCHLORIDE 5 MG: 5 TABLET ORAL at 12:40

## 2018-05-10 RX ADMIN — ASPIRIN 325 MG: 325 TABLET, DELAYED RELEASE ORAL at 08:51

## 2018-05-10 RX ADMIN — ACETAMINOPHEN 650 MG: 325 TABLET, FILM COATED ORAL at 06:20

## 2018-05-10 RX ADMIN — PREDNISONE 1 MG: 1 TABLET ORAL at 11:13

## 2018-05-10 RX ADMIN — DICLOFENAC SODIUM 50 MG: 50 TABLET, DELAYED RELEASE ORAL at 19:27

## 2018-05-10 RX ADMIN — OXYCODONE HYDROCHLORIDE 5 MG: 5 TABLET ORAL at 23:31

## 2018-05-10 RX ADMIN — Medication 10 ML: at 00:09

## 2018-05-10 RX ADMIN — SERTRALINE HYDROCHLORIDE 100 MG: 50 TABLET ORAL at 22:17

## 2018-05-10 RX ADMIN — FAMOTIDINE 20 MG: 20 TABLET ORAL at 08:51

## 2018-05-10 RX ADMIN — ACETAMINOPHEN 650 MG: 325 TABLET, FILM COATED ORAL at 18:44

## 2018-05-10 RX ADMIN — ACETAMINOPHEN 650 MG: 325 TABLET, FILM COATED ORAL at 23:31

## 2018-05-10 RX ADMIN — KETOROLAC TROMETHAMINE 15 MG: 30 INJECTION, SOLUTION INTRAMUSCULAR at 01:36

## 2018-05-10 RX ADMIN — SODIUM CHLORIDE 125 ML/HR: 900 INJECTION, SOLUTION INTRAVENOUS at 07:30

## 2018-05-10 RX ADMIN — PRAMIPEXOLE DIHYDROCHLORIDE 1 MG: 0.25 TABLET ORAL at 22:17

## 2018-05-10 RX ADMIN — ASPIRIN 325 MG: 325 TABLET, DELAYED RELEASE ORAL at 22:17

## 2018-05-10 RX ADMIN — OXYCODONE HYDROCHLORIDE 5 MG: 5 TABLET ORAL at 19:28

## 2018-05-10 RX ADMIN — Medication 2 G: at 00:09

## 2018-05-10 RX ADMIN — ACETAMINOPHEN 650 MG: 325 TABLET, FILM COATED ORAL at 00:09

## 2018-05-10 RX ADMIN — KETOROLAC TROMETHAMINE 15 MG: 30 INJECTION, SOLUTION INTRAMUSCULAR at 07:27

## 2018-05-10 RX ADMIN — FAMOTIDINE 20 MG: 20 TABLET ORAL at 18:44

## 2018-05-10 NOTE — PROGRESS NOTES
Spiritual Care Partner Volunteer visited patient in Rm 556 on 5/10/18. Documented by:   Chaplain Coley MDiv, MACE  [phone])

## 2018-05-10 NOTE — PROGRESS NOTES
No new complaints overnight. Denies chest pain or shortness of breath. Tolerating diet well      GEN:  NAD.  AOx3   ABD:  S/NT/ND   RLE:  Dressing C/D/I , 5/5 motor, Calf nttp (Bilat), Sensation rossly intact to light touch throughout, 1+ dp/pt pulses, foot perfused    Patient Vitals for the past 24 hrs:   Temp Pulse Resp BP SpO2   05/10/18 0428 97.5 °F (36.4 °C) 60 18 130/59 93 %   05/09/18 2319 97.8 °F (36.6 °C) 72 18 107/55 93 %   05/09/18 2001 97.5 °F (36.4 °C) 75 16 135/88 100 %   05/09/18 1808 98 °F (36.7 °C) 65 14 116/50 100 %   05/09/18 1636 - 78 - 127/76 -   05/09/18 1611 98 °F (36.7 °C) (!) 55 14 127/63 96 %   05/09/18 1533 98.2 °F (36.8 °C) 67 18 149/73 99 %   05/09/18 1445 - (!) 58 19 128/67 98 %   05/09/18 1430 - (!) 55 18 136/56 95 %   05/09/18 1415 - (!) 56 22 128/54 95 %   05/09/18 1400 - (!) 51 16 (!) 135/97 95 %   05/09/18 1345 - (!) 39 18 107/40 95 %   05/09/18 1330 - (!) 47 16 (!) 97/39 95 %   05/09/18 1315 - (!) 44 17 108/46 93 %   05/09/18 1300 - (!) 50 18 129/54 96 %   05/09/18 1245 - (!) 50 16 129/49 92 %   05/09/18 1230 - (!) 54 17 150/51 94 %   05/09/18 1215 - 60 16 (!) 172/136 97 %   05/09/18 1200 - (!) 57 16 138/53 95 %   05/09/18 1155 96 °F (35.6 °C) (!) 53 14 134/56 96 %   05/09/18 0807 98 °F (36.7 °C) 61 20 144/62 96 %       Current Facility-Administered Medications   Medication Dose Route Frequency    azaTHIOprine (IMURAN) tablet 100 mg  100 mg Oral DAILY    magnesium oxide (MAG-OX) tablet 400 mg  400 mg Oral DAILY    pramipexole (MIRAPEX) tablet 1 mg  1 mg Oral QHS    predniSONE (DELTASONE) tablet 1 mg  1 mg Oral DAILY    sertraline (ZOLOFT) tablet 100 mg  100 mg Oral QHS    0.9% sodium chloride infusion  125 mL/hr IntraVENous CONTINUOUS    sodium chloride 0.9 % bolus infusion 500 mL  500 mL IntraVENous ONCE PRN    sodium chloride (NS) flush 5-10 mL  5-10 mL IntraVENous Q8H    sodium chloride (NS) flush 5-10 mL  5-10 mL IntraVENous PRN    acetaminophen (TYLENOL) tablet 650 mg  650 mg Oral Q6H    oxyCODONE IR (ROXICODONE) tablet 5 mg  5 mg Oral Q3H PRN    HYDROmorphone (PF) (DILAUDID) injection 0.5 mg  0.5 mg IntraVENous Q4H PRN    naloxone (NARCAN) injection 0.4 mg  0.4 mg IntraVENous PRN    ondansetron (ZOFRAN) injection 4 mg  4 mg IntraVENous Q4H PRN    prochlorperazine (COMPAZINE) with saline injection 5 mg  5 mg IntraVENous Q6H PRN    hydrOXYzine HCl (ATARAX) tablet 10 mg  10 mg Oral Q8H PRN    famotidine (PEPCID) tablet 20 mg  20 mg Oral BID    senna-docusate (PERICOLACE) 8.6-50 mg per tablet 1 Tab  1 Tab Oral BID    polyethylene glycol (MIRALAX) packet 17 g  17 g Oral DAILY    [START ON 5/11/2018] bisacodyl (DULCOLAX) suppository 10 mg  10 mg Rectal DAILY PRN    aspirin delayed-release tablet 325 mg  325 mg Oral BID    ketorolac (TORADOL) injection 15 mg  15 mg IntraVENous Q6H    diclofenac EC (VOLTAREN) tablet 50 mg  50 mg Oral BID    traMADol (ULTRAM) tablet 50 mg  50 mg Oral Q6H PRN       Lab Results   Component Value Date/Time    HGB 11.1 (L) 05/10/2018 04:32 AM    INR 1.2 (H) 12/12/2014 04:20 AM       Lab Results   Component Value Date/Time    Sodium 138 05/10/2018 04:32 AM    Potassium 3.9 05/10/2018 04:32 AM    Chloride 105 05/10/2018 04:32 AM    CO2 26 05/10/2018 04:32 AM    BUN 10 05/10/2018 04:32 AM    Creatinine 0.73 05/10/2018 04:32 AM    Calcium 8.4 (L) 05/10/2018 04:32 AM            76 y.o. female s/p right total knee arthroplasty on 5/9/2018  . Doing well.        ABX: Complete 24 hours perioperative abx  DVT Prophylaxis: Aspirin 325 enteric coated  Weight Bearing: WBAT RLE   Pain Control: Diclofenac, toradol (if Cr normal), Tylenol, scheduled tramadol, oxy 5 mg for breakthrough, dilaudid IV 0.5 mg for secondary breakthrough  Disposition: Home, [facility]     Straight cath per protocol     Patient will likely need an extra day in the hospital due to PT needs

## 2018-05-10 NOTE — DISCHARGE INSTRUCTIONS
Patient meets criteria for   BUNDLED PAYMENT   for Care Improvement Initiative Criteria    Contact Information for Orthopedic Nurse Navigator:      LORIN Schmid, RN-BC  E:[phone]  N:[phone]  J:[phone]        After Hospital Care Plan:  Discharge Instructions Knee Replacement  Dr. Mitch Puente    Patient Name: Garrick Stephen  Date of procedure: 5/9/2018   Procedure: Procedure(s):  RIGHT TOTAL KNEE ARTHROPLASTY  Surgeon: Yaneli Santiago) and Role:     * Uri Rodarte MD - Primary  PCP: Ramez Mondragon MD  Date of discharge: No discharge date for patient encounter. Follow up appointments  -follow up with Dr. Mitch Puente in 4 weeks. Call [phone], ext [phone]) to make an appointment. [facility] Agency: ________________________ phone: _____________________  The agency will contact you to arrange dates/times for visits. Please call them if you do not hear from them within 24 hours after you are discharged    When to call your Orthopaedic Surgeon:  -unrelieved pain  -Signs of infection-if your incision is red; continues to have drainage; drainage has a foul odor or if you have a persistent fever over 101 degrees  -Signs of a blood clot in your leg-calf pain, tenderness, redness, swelling of lower leg    When to call your Primary Care Physician:  -Concerns about medical conditions such as diabetes, high blood pressure, asthma, congestive heart failure  -Call if blood sugars are elevated, persistent headache or dizziness, coughing or congestion, constipation or diarrhea, burning with urination, abnormal heart rate    When to call 181pnd go to the nearest emergency room  -acute onset of chest pain, shortness of breath, difficulty breathing    Activity  -walk with your walker or crutches putting as much weight on your leg as you can tolerate.   Refer to pages 23-31 of your handbook for instructions and pictures  -do 20 repetitions of each exercise 3 times each day as instructed by the physical therapist.  Refer to pages 32-40 of your handbook for exercise instructions and pictures  -get up every one hour and walk (except at night when sleeping)  -do not drive or operate heavy machinery    Incision Care  -the Aquacel (brown, waterproof) surgical dressing is to remain on your knee for 7 days. On the 7th day have someone gently peel the dressing off by carefully lifting the edge and stretching it slightly to break the adhesive seal and leave incision open to air. You may take a shower with the Aquacel dressing in place. Preventing blood clots   -Take Aspirin 325 mg twice daily as prescribed  by Dr. Nancy Ramos for one month following surgery      Pain management  - Please take scheduled 650 mg tylenol every 6 hours for 4 weeks  - Please take scheduled diclofenac 50 mg twice daily for 4 weeks  - Please take tramadol every 6 hours for pain as needed for pain. - For breakthrough please take 5-10 mg oxycodone     - While taking aspirin and diclofenac, please take famotidine (PEPCID) as prescribed 20 mg twice daily to prevent stomach ulcers/irritation.   - If you have a history of stomach ulcers, do not take diclofenac. - Avoid alcoholic beverages while taking pain medication  - Do not take any over-the-counter medication for pain except Tylenol (acetaminophen)  - Please be aware that many medications contain Tylenol. We do not want you to over medicate so please read the information below as a guide. Do not take more than 4 Grams of Tylenol in a 24 hour  - You may place an ice bag on your knee for 15-20 minutes after exercising and as needed throughout the day and night      Diet  -resume usual diet; drink plenty of fluids; eat foods high in fiber  -you may want to take a stool softener (such as Senokot-S or Colace) to prevent constipation while you are taking pain medication.   If constipation occurs, take a laxative (such as Dulcolax tablets, Milk of Magnesia, or a suppository)    Home Health Care Protocol (to be followed by [facility] [address])    Nursing-per physicians order  -remove Aquacel dressing at 7 days post-op   -complete head to toe assessment, vital signs  -medication reconciliation  -review pain management  -manage chronic medical conditions    Physical Therapy-per physicians order    Weight bearing status:  Precautions at Admission: Fall (several falls in the last year)     Right Side Weight Bearing: As tolerated    Mobility Status:  Supine to Sit: Supervision  Sit to Stand: Contact guard assistance, Assist x1  Sit to Supine: Supervision       Gait:  Distance (ft): 20 Feet (ft)  Ambulation - Level of Assistance: Contact guard assistance, Assist x1  Assistive Device: Gait belt, Walker, rolling  Gait Abnormalities: Antalgic, Decreased step clearance, Trunk sway increased    ADL status overall composite:                   Physical Therapy  -assessment and evaluation-bed mobility; functional transfers (bed, chair, bathroom, stairs); ambulation with equipment, car transfers, safety and ability to get out of house in the event of an emergency  -review and maintain weight bearing as tolerated  -discuss pain management  -review how to do ADLs    -Home Exercise Program-refer to pages 32-40 of the patient handbook for exercises  -supine exercises-ankle pumps, hamstring sets, quad sets, heel slides, short arc quad sets, long arc quads-prop heel on pillow for stretch, straight leg raise  -sitting exercises-active knee flexion, passive knee flexion, active knee extension, ankle pumps, bicep curls (use weights as appropriate), triceps pushups, shoulder flexion (use weights as appropriate)  -standing exercises holding onto supportive counter-toe raises, heel raises, mini-squats, heel/toe touches with knee bend, marching in place, hamstring curls    Physical therapy goals by discharge from [address] [place] [address] Drive ambulation with walker, crutches or cane if needed (level surfaces and   stairs)  -Flexion > 90 degrees, extension 0 degrees  -Daily performance of home exercise program  -Independent with stair climbing and car transfers  -Progress to community ambulator (least restrictive assistive device)

## 2018-05-10 NOTE — PROGRESS NOTES
Problem: Mobility Impaired (Adult and Pediatric)  Goal: *Acute Goals and Plan of Care (Insert Text)  Physical Therapy Goals  Initiated 5/9/2018    1. Patient will move from supine to sit and sit to supine  in bed with independence within 4 days. 2. Patient will perform sit to stand with modified independence within 4 days. 3. Patient will ambulate with modified independence for 150 feet with the least restrictive device within 4 days. 4. Patient will ascend/descend 13 stairs with 1 handrail(s) with modified independence within 4 days. 5. Patient will perform home exercise program per protocol with independence within 4 days. 6. Patient will demonstrate AROM 0-90 degrees in operative joint within 4 days. physical Therapy TREATMENT  Patient: Juan Liu (85 y.o. female)  Date: 5/10/2018  Diagnosis: PRIMARY OSTEOARTHRITIS  RIGHT KNEE   Osteoarthritis of right knee <principal problem not specified>  Procedure(s) (LRB):  RIGHT TOTAL KNEE ARTHROPLASTY (Right) 1 Day Post-Op  Precautions: Fall (several falls in the last year)  Chart, physical therapy assessment, plan of care and goals were reviewed. ASSESSMENT:  Patient making steady progress toward goals. Received supine in bed and agreeable to PT. Instructed in HEP, use of ice and to amb 1x/hour when awake. Currently  Needs supervision for bed mobility and CGA for transfers. Amb approx 120 feet with RW and CGA with slow christina and step through pattern. No overt LOB noted. Will plan to address stairs this pm in prep to [place] home. Progression toward goals:  [x]      Improving appropriately and progressing toward goals  []      Improving slowly and progressing toward goals  []      Not making progress toward goals and plan of care will be adjusted     PLAN:  Patient continues to benefit from skilled intervention to address the above impairments. Continue treatment per established plan of care.   Discharge Recommendations:  Home Health  Further Equipment Recommendations for Discharge:  none     SUBJECTIVE:   Patient stated I'm doing pretty good but I felt meliton dizzy early.     OBJECTIVE DATA SUMMARY:   Critical Behavior:  Neurologic State: Alert, Appropriate for age  Orientation Level: Oriented X4  Cognition: Appropriate decision making, Appropriate for age attention/concentration, Appropriate safety awareness  Safety/Judgement: Insight into deficits, Good awareness of safety precautions, Fall prevention, Home safety        Functional Mobility Training:  Bed Mobility:     Supine to Sit: Supervision  Sit to Supine: Supervision  Scooting: Supervision        Transfers:  Sit to Stand: Contact guard assistance  Stand to Sit: Contact guard assistance                             Balance:  Sitting: Intact  Standing: Impaired  Standing - Static: Good  Standing - Dynamic : Good  Ambulation/Gait Training:  Distance (ft): 120 Feet (ft)  Assistive Device: Gait belt;Walker, rolling  Ambulation - Level of Assistance: Contact guard assistance        Gait Abnormalities: Antalgic;Decreased step clearance           Stance: Left increased;Right increased  Speed/Ania: Pace decreased (<100 feet/min); Slow  Step Length: Left shortened;Right shortened     Therapeutic Exercises:     EXERCISE   Sets   Reps   Active Active Assist   Passive Self ROM   Comments   Ankle Pumps 1 10 []                                        []                                        []                                        []                                           Quad Sets 1 10 []                                        []                                        []                                        []                                           Hamstring Sets   []                                        []                                        []                                        []                                           Short Arc Quads   []                                        [] []                                        []                                           Knee Extension Stretch     []                                          []                                          []                                          []                                           Heel Slides 1 10 []                                        []                                        []                                        []                                           Long Arc Quads   []                                        []                                        []                                        []                                           Knee Flexion Stretch 1 10 []                                        []                                        []                                        []                                           Straight Leg Raises   []                                        []                                        []                                        []                                             Pain:  Pain Scale 1: Numeric (0 - 10)  Pain Intensity 1: 4  Pain Location 1: Knee  Pain Orientation 1: Right  Pain Description 1: Aching  Pain Intervention(s) 1: Medication (see MAR)  Activity Tolerance:   VSS  Please refer to the flowsheet for vital signs taken during this treatment.   After treatment:   [] Patient left in no apparent distress sitting up in chair  [x] Patient left in no apparent distress in bed  [x] Call bell left within reach  [x] Nursing notified  [] Caregiver present  [] Bed alarm activated    COMMUNICATION/COLLABORATION:   The patients plan of care was discussed with: Physical Therapist, Occupational Therapist and Registered Nurse    Toby Georges, PT, DPT   Time Calculation: 24 mins

## 2018-05-10 NOTE — PROGRESS NOTES
Problem: Falls - Risk of  Goal: *Absence of Falls  Document Wali Fall Risk and appropriate interventions in the flowsheet.    Outcome: Progressing Towards Goal  Fall Risk Interventions:  Mobility Interventions: Patient to call before getting OOB         Medication Interventions: Patient to call before getting OOB    Elimination Interventions: Call light in reach

## 2018-05-10 NOTE — PROGRESS NOTES
Occupational Therapy EVALUATION/discharge  Patient: Janes Soriano (89 y.o. female)  Date: 5/10/2018  Primary Diagnosis: PRIMARY OSTEOARTHRITIS  RIGHT KNEE   Osteoarthritis of right knee  Procedure(s) (LRB):  RIGHT TOTAL KNEE ARTHROPLASTY (Right) 1 Day Post-Op   Precautions:   Fall    ASSESSMENT:   Based on the objective data described below, the patient presents with overall SBA to Cristian for self-care tasks and SBA with RW for functional mobility s/p R TKR POD 1. Pt alert, oriented x4. Pt performed bed mobility at Cristian and ambulated to bathroom with RW and SBA, minimal c/o pain. Pt familiar with all AE/DME and has necessary equipment ( handicapped). Pt educated on home safety/modification, ADL progression, fall prevention. Pt will have son/grandson assistance with IADLs once home; no further acute OT needs. Further skilled acute occupational therapy is not indicated at this time. Discharge Recommendations: None for OT  Further Equipment Recommendations for Discharge: none for OT      SUBJECTIVE:   Patient stated I'm just gonna get up and go; I can't sit still.     OBJECTIVE DATA SUMMARY:   HISTORY:   Past Medical History:   Diagnosis Date    Cataracts, bilateral     Chronic pain     Colon cancer ([facility] [address] 2015    colectomy with ostomy, then reversed by DR ELVIN Nino Colon polyps     Depression     did not do well on duloxetine    GERD (gastroesophageal reflux disease)     Glaucoma     Heart palpitations     Other ill-defined conditions(799.89)     GASTROPARESIS    RA (rheumatoid arthritis) ([facility] [address]     DR Kaye Ponce, remicade    Restless leg syndrome     Spinal stenosis 5/8/2011    Unspecified adverse effect of anesthesia     HEADACHE    UTI (lower urinary tract infection)      Past Surgical History:   Procedure Laterality Date    HX COLECTOMY  2015    HX COLOSTOMY  2016    REVERSAL    HX COLOSTOMY TAKE DOWN      HX DILATION AND CURETTAGE      HX GI  9/2014    COLONOSCOPY    HX KNEE ARTHROSCOPY Left 1979    MENISCUS REPAIR    HX KNEE ARTHROSCOPY Left     HX KNEE ARTHROSCOPY Right 2005    HX KNEE REPLACEMENT Left 2005    HX LAP CHOLECYSTECTOMY  5/2005    HX LUMBAR LAMINECTOMY  2011    HX TUBAL LIGATION  1979    TOTAL HIP ARTHROPLASTY Right 3/2012       Prior Level of Function/Environment/Context: Pt lives with  who is handicapped. Pt IND-Cristian with ADLs. Will have son/grandson assists with IADLs. Occupations in which the patient is/was successful, what are the barriers preventing that success:   Performance Patterns (routines, roles, habits, and rituals):   Personal Interests and/or values:   Expanded or extensive additional review of patient history:     Home Situation  Home Environment: Private residence  # Steps to Enter: 1  Rails to Enter: Yes  Hand Rails : Bilateral  Wheelchair Ramp: Yes  One/Two Story Residence: Two story (can stay downstairs)  # of Interior Steps: 13  Interior Rails: Both  Lift Chair Available: No  Living Alone: No  Support Systems: Spouse/Significant Other/Partner, Family member(s)  Patient Expects to be Discharged to[de-identified] Private residence  Current DME Used/Available at Home: Grab bars, Shower chair, Walker, rolling, Raised toilet seat, Adaptive bathing aides, Adaptive dressing aides, Commode, bedside, Cane, straight  Tub or Shower Type: Shower (handicap shower downstairs, tub upstairs)  [x]  Right hand dominant   []  Left hand dominant    EXAMINATION OF PERFORMANCE DEFICITS:  Cognitive/Behavioral Status:  Neurologic State: Alert  Orientation Level: Oriented X4  Cognition: Appropriate decision making; Appropriate for age attention/concentration; Appropriate safety awareness  Perception: Appears intact  Perseveration: No perseveration noted  Safety/Judgement: Awareness of environment; Fall prevention;Good awareness of safety precautions; Home safety; Insight into deficits    Skin: appears intact    Edema: none noted in BUEs    Hearing:   Auditory  Auditory Impairment: Hard of hearing, bilateral  Hearing Aids/Status: Bilateral    Vision/Perceptual:    Tracking: Able to track stimulus in all quadrants w/o difficulty                 Diplopia: No         Corrective Lenses: Glasses    Range of Motion:    AROM: Within functional limits  PROM: Within functional limits                      Strength:    Strength: Within functional limits                Coordination:  Coordination: Within functional limits  Fine Motor Skills-Upper: Right Intact; Left Intact    Gross Motor Skills-Upper: Left Intact; Right Intact    Tone & Sensation:    Tone: Normal  Sensation: Intact                      Balance:  Sitting: Intact  Standing: Impaired  Standing - Static: Good  Standing - Dynamic : Good    Functional Mobility and Transfers for ADLs:  Bed Mobility:  Supine to Sit: Supervision  Sit to Supine: Supervision  Scooting: Supervision    Transfers:  Sit to Stand: Contact guard assistance  Stand to Sit: Contact guard assistance  Toilet Transfer : Modified independent    ADL Assessment:  Feeding: Independent    Oral Facial Hygiene/Grooming: Independent    Bathing: Stand-by assistance; Additional time; Adaptive equipment    Upper Body Dressing: Independent    Lower Body Dressing: Modified independent; Adaptive equipment; Additional time    Toileting: Modified independent                ADL Intervention and task modifications:                                     Cognitive Retraining  Safety/Judgement: Awareness of environment; Fall prevention;Good awareness of safety precautions; Home safety; Insight into deficits       Functional Measure:  Barthel Index:    Bathin  Bladder: 10  Bowels: 10  Groomin  Dressing: 10  Feeding: 10  Mobility: 15  Stairs: 10  Toilet Use: 10  Transfer (Bed to Chair and Back): 15  Total: 100       Barthel and G-code impairment scale:  Percentage of impairment CH  0% CI  1-19% CJ  20-39% CK  40-59% CL  60-79% CM  80-99% CN  100%   Barthel Score 0-100 100 99-80 79-60 59-40 20-39 1-19   0   Barthel Score 0-20 20 17-19 13-16 9-12 5-8 1-4 0      The Barthel ADL Index: Guidelines  1. The index should be used as a record of what a patient does, not as a record of what a patient could do. 2. The main aim is to establish degree of independence from any help, physical or verbal, however minor and for whatever reason. 3. The need for supervision renders the patient not independent. 4. A patient's performance should be established using the best available evidence. Asking the patient, friends/relatives and nurses are the usual sources, but direct observation and common sense are also important. However direct testing is not needed. 5. Usually the patient's performance over the preceding 24-48 hours is important, but occasionally longer periods will be relevant. 6. Middle categories imply that the patient supplies over 50 per cent of the effort. 7. Use of aids to be independent is allowed. Parag Ambrose., Barthel, D.W. (6729). Functional evaluation: the Barthel Index. [address] [facility] (14)2. STERLING Johnson, Silver Ply., Duy Smith., Ginger, [facility] (1999). Measuring the change indisability after inpatient rehabilitation; comparison of the responsiveness of the Barthel Index and Functional [place] Measure. Journal of Neurology, Neurosurgery, and Psychiatry, 66(4), 578-416. LOREN ChaparroJ.KATIE, MONA Helms, & Kerri Austin, MArielA. (2004.) Assessment of post-stroke quality of life in cost-effectiveness studies: The usefulness of the Barthel Index and the EuroQoL-5D. Quality of Life Research, 13, 724-36         G codes: In compliance with CMSs Claims Based Outcome Reporting, the following G-code set was chosen for this patient based on their primary functional limitation being treated:     The outcome measure chosen to determine the severity of the functional limitation was the Barthel Index with a score of 100/100 which was correlated with the impairment scale.    ? Self Care:     - CURRENT STATUS: CH - 0% impaired, limited or restricted    - GOAL STATUS: CH - 0% impaired, limited or restricted    - D/C STATUS:  CH - 0% impaired, limited or restricted     Occupational Therapy Evaluation Charge Determination   History Examination Decision-Making   LOW Complexity : Brief history review  LOW Complexity : 1-3 performance deficits relating to physical, cognitive , or psychosocial skils that result in activity limitations and / or participation restrictions  LOW Complexity : No comorbidities that affect functional and no verbal or physical assistance needed to complete eval tasks       Based on the above components, the patient evaluation is determined to be of the following complexity level: LOW   Activity Tolerance:   VSS    Please refer to the flowsheet for vital signs taken during this treatment. After treatment:   [x]  Patient left in no apparent distress sitting up in chair  []  Patient left in no apparent distress in bed  [x]  Call bell left within reach  [x]  Nursing notified  []  Caregiver present  []  Bed alarm activated    COMMUNICATION/EDUCATION:   Communication/Collaboration:  [x]      Home safety education was provided and the patient/caregiver indicated understanding. [x]      Patient/family have participated as able and agree with findings and recommendations. []      Patient is unable to participate in plan of care at this time.   Findings and recommendations were discussed with: Physical Therapist and Registered Nurse    Yisel Stone OT  Time Calculation: 13 mins

## 2018-05-10 NOTE — PROGRESS NOTES
Bedside and Verbal shift change report given to Jeff Medley RN (oncoming nurse) by Edgard Moore RN (offgoing nurse). Report included the following information SBAR, Kardex and MAR.

## 2018-05-10 NOTE — PROGRESS NOTES
Bedside and Verbal shift change report given to Sulma (oncoming nurse) by Melyssa Hernandes (offgoing nurse). Report included the following information SBAR, Kardex, Intake/Output, MAR and Recent Results.

## 2018-05-10 NOTE — PROGRESS NOTES
Bedside and Verbal shift change report given to Moiz Devi (oncoming nurse) by Erum Marshall RN (offgoing nurse). Report given with SBAR, Kardex and MAR.

## 2018-05-10 NOTE — PROGRESS NOTES
Problem: Mobility Impaired (Adult and Pediatric)  Goal: *Acute Goals and Plan of Care (Insert Text)  Physical Therapy Goals  Initiated 5/9/2018    1. Patient will move from supine to sit and sit to supine  in bed with independence within 4 days. 2. Patient will perform sit to stand with modified independence within 4 days. 3. Patient will ambulate with modified independence for 150 feet with the least restrictive device within 4 days. 4. Patient will ascend/descend 13 stairs with 1 handrail(s) with modified independence within 4 days. 5. Patient will perform home exercise program per protocol with independence within 4 days. 6. Patient will demonstrate AROM 0-90 degrees in operative joint within 4 days. physical Therapy TREATMENT  Patient: Anila Alcala (11 y.o. female)  Date: 5/10/2018  Diagnosis: PRIMARY OSTEOARTHRITIS  RIGHT KNEE   Osteoarthritis of right knee <principal problem not specified>  Procedure(s) (LRB):  RIGHT TOTAL KNEE ARTHROPLASTY (Right) 1 Day Post-Op  Precautions: Fall  Chart, physical therapy assessment, plan of care and goals were reviewed. ASSESSMENT:  Patient making steady progress toward goals. Received supine in bed. Reports high pain levels but agreeable to PT. Supine to sit with supervision. Sit to stand with SBA. Amb approx 120 feet with RW and CGA and slow christina but no overt LOB. UP/down 4 stairs with 1 rail and CGA and cues for technique. Recommend HH PT at [place]. Should clear after AM session. Progression toward goals:  [x]      Improving appropriately and progressing toward goals  []      Improving slowly and progressing toward goals  []      Not making progress toward goals and plan of care will be adjusted     PLAN:  Patient continues to benefit from skilled intervention to address the above impairments. Continue treatment per established plan of care.   Discharge Recommendations:  Home Health  Further Equipment Recommendations for Discharge:  None SUBJECTIVE:   Patient stated I have so much pain.     OBJECTIVE DATA SUMMARY:   Critical Behavior:  Neurologic State: Alert  Orientation Level: Oriented X4  Cognition: Appropriate decision making, Appropriate for age attention/concentration, Appropriate safety awareness  Safety/Judgement: Awareness of environment, Fall prevention, Good awareness of safety precautions, Home safety, Insight into deficits  Range of Motion:  AROM: Within functional limits  PROM: Within functional limits                    Functional Mobility Training:  Bed Mobility:     Supine to Sit: Supervision  Sit to Supine: Supervision  Scooting: Supervision        Transfers:  Sit to Stand: Contact guard assistance  Stand to Sit: Contact guard assistance                             Balance:  Sitting: Intact  Standing: Intact; With support  Standing - Static: Good  Standing - Dynamic : Good  Ambulation/Gait Training:  Distance (ft): 120 Feet (ft)  Assistive Device: Gait belt;Walker, rolling  Ambulation - Level of Assistance: Contact guard assistance        Gait Abnormalities: Antalgic;Decreased step clearance           Stance: Left increased;Right increased  Speed/Ania: Pace decreased (<100 feet/min); Slow  Step Length: Left shortened;Right shortened       Stairs:  Number of Stairs Trained: 4  Stairs - Level of Assistance: Contact guard assistance  Rail Use: Right     Pain:  Pain Scale 1: Numeric (0 - 10)  Pain Intensity 1: 6  Pain Location 1: Knee  Pain Orientation 1: Right  Pain Description 1: Aching  Pain Intervention(s) 1: Medication (see MAR)  Activity Tolerance:   VSS  Please refer to the flowsheet for vital signs taken during this treatment.   After treatment:   [] Patient left in no apparent distress sitting up in chair  [x] Patient left in no apparent distress in bed  [x] Call bell left within reach  [x] Nursing notified  [x] Caregiver present  [] Bed alarm activated    COMMUNICATION/COLLABORATION:   The patients plan of care was discussed with: Physical Therapist, Occupational Therapist and Registered Nurse    Pamela Wylie, PT, DPT   Time Calculation: 18 mins

## 2018-05-11 VITALS
TEMPERATURE: 97.8 F | SYSTOLIC BLOOD PRESSURE: 114 MMHG | RESPIRATION RATE: 16 BRPM | HEART RATE: 63 BPM | OXYGEN SATURATION: 96 % | DIASTOLIC BLOOD PRESSURE: 55 MMHG | HEIGHT: 59 IN | WEIGHT: 172 LBS | BODY MASS INDEX: 34.68 KG/M2

## 2018-05-11 LAB — HGB BLD-MCNC: 11.1 G/DL (ref 11.5–16)

## 2018-05-11 PROCEDURE — 74011250637 HC RX REV CODE- 250/637: Performed by: ORTHOPAEDIC SURGERY

## 2018-05-11 PROCEDURE — 97116 GAIT TRAINING THERAPY: CPT

## 2018-05-11 PROCEDURE — 74011636637 HC RX REV CODE- 636/637: Performed by: ORTHOPAEDIC SURGERY

## 2018-05-11 PROCEDURE — A9270 NON-COVERED ITEM OR SERVICE: HCPCS | Performed by: ORTHOPAEDIC SURGERY

## 2018-05-11 PROCEDURE — 85018 HEMOGLOBIN: CPT | Performed by: ORTHOPAEDIC SURGERY

## 2018-05-11 PROCEDURE — 36415 COLL VENOUS BLD VENIPUNCTURE: CPT | Performed by: ORTHOPAEDIC SURGERY

## 2018-05-11 PROCEDURE — 99218 HC RM OBSERVATION: CPT

## 2018-05-11 RX ORDER — OXYCODONE HYDROCHLORIDE 5 MG/1
10 TABLET ORAL
Status: DISCONTINUED | OUTPATIENT
Start: 2018-05-11 | End: 2018-05-11 | Stop reason: HOSPADM

## 2018-05-11 RX ORDER — TRAMADOL HYDROCHLORIDE 50 MG/1
50 TABLET ORAL
Status: DISCONTINUED | OUTPATIENT
Start: 2018-05-11 | End: 2018-05-11 | Stop reason: HOSPADM

## 2018-05-11 RX ORDER — OXYCODONE HYDROCHLORIDE 5 MG/1
5 TABLET ORAL
Status: DISCONTINUED | OUTPATIENT
Start: 2018-05-11 | End: 2018-05-11 | Stop reason: HOSPADM

## 2018-05-11 RX ADMIN — OXYCODONE HYDROCHLORIDE 10 MG: 5 TABLET ORAL at 01:58

## 2018-05-11 RX ADMIN — OXYCODONE HYDROCHLORIDE 10 MG: 5 TABLET ORAL at 11:27

## 2018-05-11 RX ADMIN — Medication 10 ML: at 06:10

## 2018-05-11 RX ADMIN — PREDNISONE 1 MG: 1 TABLET ORAL at 10:10

## 2018-05-11 RX ADMIN — DICLOFENAC SODIUM 50 MG: 50 TABLET, DELAYED RELEASE ORAL at 10:10

## 2018-05-11 RX ADMIN — FAMOTIDINE 20 MG: 20 TABLET ORAL at 10:11

## 2018-05-11 RX ADMIN — OXYCODONE HYDROCHLORIDE 10 MG: 5 TABLET ORAL at 07:38

## 2018-05-11 RX ADMIN — ACETAMINOPHEN 650 MG: 325 TABLET, FILM COATED ORAL at 06:09

## 2018-05-11 RX ADMIN — ASPIRIN 325 MG: 325 TABLET, DELAYED RELEASE ORAL at 10:11

## 2018-05-11 RX ADMIN — ACETAMINOPHEN 650 MG: 325 TABLET, FILM COATED ORAL at 11:27

## 2018-05-11 RX ADMIN — Medication 400 MG: at 10:11

## 2018-05-11 NOTE — PROGRESS NOTES
Pain issues, improving      GEN:  NAD.  AOx3   ABD:  S/NT/ND   RLE:  Dressing C/D/I , 5/5 motor, Calf nttp (Bilat), Sensation rossly intact to light touch throughout, 1+ dp/pt pulses, foot perfused    Patient Vitals for the past 24 hrs:   Temp Pulse Resp BP SpO2   05/11/18 0156 97.9 °F (36.6 °C) 65 16 120/63 93 %   05/10/18 1944 99.2 °F (37.3 °C) 70 15 132/68 93 %   05/10/18 1427 98.3 °F (36.8 °C) 63 16 133/55 92 %   05/10/18 0837 98.2 °F (36.8 °C) 73 18 130/56 93 %       Current Facility-Administered Medications   Medication Dose Route Frequency    oxyCODONE IR (ROXICODONE) tablet 10 mg  10 mg Oral Q3H PRN    oxyCODONE IR (ROXICODONE) tablet 5 mg  5 mg Oral Q3H PRN    traMADol (ULTRAM) tablet 50 mg  50 mg Oral Q6H PRN    azaTHIOprine (IMURAN) tablet 100 mg  100 mg Oral DAILY    magnesium oxide (MAG-OX) tablet 400 mg  400 mg Oral DAILY    pramipexole (MIRAPEX) tablet 1 mg  1 mg Oral QHS    predniSONE (DELTASONE) tablet 1 mg  1 mg Oral DAILY    sertraline (ZOLOFT) tablet 100 mg  100 mg Oral QHS    sodium chloride 0.9 % bolus infusion 500 mL  500 mL IntraVENous ONCE PRN    sodium chloride (NS) flush 5-10 mL  5-10 mL IntraVENous Q8H    sodium chloride (NS) flush 5-10 mL  5-10 mL IntraVENous PRN    acetaminophen (TYLENOL) tablet 650 mg  650 mg Oral Q6H    naloxone (NARCAN) injection 0.4 mg  0.4 mg IntraVENous PRN    hydrOXYzine HCl (ATARAX) tablet 10 mg  10 mg Oral Q8H PRN    famotidine (PEPCID) tablet 20 mg  20 mg Oral BID    senna-docusate (PERICOLACE) 8.6-50 mg per tablet 1 Tab  1 Tab Oral BID    polyethylene glycol (MIRALAX) packet 17 g  17 g Oral DAILY    bisacodyl (DULCOLAX) suppository 10 mg  10 mg Rectal DAILY PRN    aspirin delayed-release tablet 325 mg  325 mg Oral BID    diclofenac EC (VOLTAREN) tablet 50 mg  50 mg Oral BID       Lab Results   Component Value Date/Time    HGB 11.1 (L) 05/11/2018 06:11 AM    INR 1.2 (H) 12/12/2014 04:20 AM       Lab Results   Component Value Date/Time Sodium 138 05/10/2018 04:32 AM    Potassium 3.9 05/10/2018 04:32 AM    Chloride 105 05/10/2018 04:32 AM    CO2 26 05/10/2018 04:32 AM    BUN 10 05/10/2018 04:32 AM    Creatinine 0.73 05/10/2018 04:32 AM    Calcium 8.4 (L) 05/10/2018 04:32 AM            76 y.o. female s/p right total knee arthroplasty on 5/9/2018  . Doing well.        DVT Prophylaxis: Aspirin 325 enteric coated  Weight Bearing: WBAT RLE   Pain Control: Diclofenac, toradol (if Cr normal), Tylenol, scheduled tramadol, oxy 5 mg for breakthrough, dilaudid IV 0.5 mg for secondary breakthrough  Disposition: Home, HHPT

## 2018-05-11 NOTE — PROGRESS NOTES
[facility]  SBAR Bundled Payment Handoff     FROM:                                TO: At [address]                                                      ([address] [place] [address] or Facility name)  Lisa Thompson 53  [facility] [place] [address]  Dept: [phone]  Gabriel Lopez: [phone]                                      Room#:  079/03                                                      Discharging Nurse:  Raeann Luis  Unit XX#:[phone]         SITUATION      ASAScore: ASA 3 - Patient with moderate systemic disease with functional limitations    Admitted:  5/9/2018  Hospital Day: 3      Attending Provider:  Echo Palafox MD     Consultations:  None    PCP:  Ashish Sloan MD   [phone]     Admitting Dx:  PRIMARY OSTEOARTHRITIS  RIGHT KNEE   Osteoarthritis of right knee       Active Problems:    Osteoarthritis of right knee (5/9/2018)      2 Days Post-Op of   Procedure(s):  RIGHT TOTAL KNEE ARTHROPLASTY   BY: Echo Palafox MD             ON: 5/9/2018                  Code Status: Full Code             Advance Directive? No Doesnt Have (Send w/patient)     Isolation:  There are currently no Active Isolations       MDRO: No current active infections    BACKGROUND     Allergies: Allergies   Allergen Reactions    Adhesive Tape-Silicones Other (comments)     \"Left skin raw\"       Past Medical History:   Diagnosis Date    Cataracts, bilateral     Chronic pain     Colon cancer ([facility] [address] 2015    colectomy with ostomy, then reversed by DR ELVIN Currie Colon polyps     Depression     did not do well on duloxetine    GERD (gastroesophageal reflux disease)     Glaucoma     Heart palpitations     Other ill-defined conditions(799.89)     GASTROPARESIS    RA (rheumatoid arthritis) ([facility] [address]     DR Morenita Zaragoza, remicade    Restless leg syndrome     Spinal stenosis 5/8/2011    Unspecified adverse effect of anesthesia     HEADACHE    UTI (lower urinary tract infection)        Past Surgical History:   Procedure Laterality Date    HX COLECTOMY  2015    HX COLOSTOMY  2016    REVERSAL    HX COLOSTOMY TAKE DOWN      HX DILATION AND CURETTAGE      HX GI  9/2014    COLONOSCOPY    HX KNEE ARTHROSCOPY Left 1979    MENISCUS REPAIR    HX KNEE ARTHROSCOPY Left     HX KNEE ARTHROSCOPY Right 2005    HX KNEE REPLACEMENT Left 2005    HX LAP CHOLECYSTECTOMY  5/2005    HX LUMBAR LAMINECTOMY  2011    HX TUBAL LIGATION  1979    TOTAL HIP ARTHROPLASTY Right 3/2012       Prior to Admission Medications   Prescriptions Last Dose Informant Patient Reported? Taking? Biotin 2,500 mcg cap 4/25/2018  Yes Yes   Sig: Take 1,000 mcg by mouth daily. CALCIUM CARBONATE (CALCIUM 600 PO) 4/25/2018 Self Yes Yes   Sig: Take 1 tablet by mouth daily. HYDROcodone-acetaminophen (NORCO) 5-325 mg per tablet 5/8/2018 at Unknown time  No Yes   Sig: Take 1 Tab by mouth daily as needed. INFLIXIMAB (REMICADE IV) Unknown at Unknown time Self Yes No   Sig: by IntraVENous route. 500 mg q 5 weeks. LAST INFUSION WAS 11/2/14   azaTHIOprine (IMURAN) 50 mg tablet 4/25/2018  No Yes   Sig: Take 2 tablets by mouth daily. As directed by your Rheumatologist--stop for 1 week after surgery. Patient taking differently: Take 100 mg by mouth three (3) times daily. As directed by your Rheumatologist--stop for 1 week after surgery. cholecalciferol (VITAMIN D3) 1,000 unit tablet 4/25/2018 Self Yes Yes   Sig: Take 1,000 Units by mouth daily. magnesium 250 mg tab 4/25/2018  Yes Yes   Sig: Take  by mouth daily. pramipexole (MIRAPEX) 1 mg tablet 5/8/2018 at Unknown time  No Yes   Sig: TAKE 1 TABLET EVERY NIGHT   predniSONE (DELTASONE) 1 mg tablet 5/8/2018 at Unknown time Self Yes Yes   Sig: Take 1 mg by mouth daily. sertraline (ZOLOFT) 100 mg tablet 5/8/2018 at Unknown time  No Yes   Sig: Take 1 Tab by mouth daily. Patient taking differently: Take 100 mg by mouth nightly.       Facility-Administered Medications: None       Vaccinations:    Immunization History   Administered Date(s) Administered    Influenza High Dose Vaccine PF 10/06/2016, 10/06/2017    Influenza Vaccine Split 11/01/2011    Influenza Vaccine Whole 10/15/2012    Pneumococcal Polysaccharide (PPSV-23) 09/08/2010    ZZZ-RETIRED (DO NOT USE) Pneumococcal Vaccine (Unspecified Type) 03/07/2010         ASSESSMENT   Age: 76 y.o. Gender: female        Height: Height: 4' 11\" (149.9 cm)                    Weight:Weight: 78 kg (172 lb)     Patient Vitals for the past 8 hrs:   Temp Pulse Resp BP SpO2   05/11/18 0820 97.8 °F (36.6 °C) 63 16 114/55 96 %            Active Orders   Diet    DIET REGULAR       Orientation: Orientation Level: Oriented X4    Active Lines/Drains:  (Peg Tube / Parekh / CL or S/L?):no    Urinary Status: Voiding      Last BM: Last Bowel Movement Date: 05/09/18     Skin Integrity: Incision (comment) (rt knee)   Wound Knee Right-DRESSING STATUS: Clean, dry, and intact    Wound Knee Right-DRESSING TYPE: Aquacel    Mobility: Slightly limited   Weight Bearing Status: WBAT (Weight Bearing as Tolerated)      Gait Training  Assistive Device: Gait belt, Walker, rolling  Ambulation - Level of Assistance: Modified independent  Distance (ft): 350 Feet (ft)  Stairs - Level of Assistance: Modified independent  Number of Stairs Trained: 4  Rail Use: Both     On Anticoagulation?  YES  Aspirin                                      Last dose:  5/11/2018at 10:11    Pain Medications given:  Oxycodone 10 mg                                 Last dose: 5/11/2018 at  7:38    Lab Results   Component Value Date/Time    Glucose 106 (H) 05/10/2018 04:32 AM    Hemoglobin A1c 5.3 12/01/2014 01:09 PM    INR 1.2 (H) 12/12/2014 04:20 AM    INR 1.0 12/11/2014 03:38 AM    HGB 11.1 (L) 05/11/2018 06:11 AM    HGB 11.1 (L) 05/10/2018 04:32 AM    HGB 12.1 12/22/2014 12:36 PM    HGB 11.1 (L) 12/12/2014 04:20 AM       Readmission Risks:  Score:         RECOMMENDATION     See After Visit Summary (AVS) for:  · Discharge instructions  · After [address]   · Medication Reconciliation          [facility] Orthopaedic Nurse Navigator  LORIN Dixon, RN-BC       Office  [phone]  Cell      [phone]  Fax      [phone]  [email or web address]             . [email or web address]

## 2018-05-11 NOTE — PROGRESS NOTES
Bedside and Verbal shift change report given to Vida Ramírez RN (oncoming nurse) by Nemesio Lynn RN (offgoing nurse). Report included the following information SBAR.

## 2018-05-11 NOTE — PROGRESS NOTES
I have reviewed discharge instructions with the patient. The patient verbalized understanding. Pt watched d/c video. Pt received hard rxs. Pt was d/c home via wheelchair by volunteers.

## 2018-05-11 NOTE — PROGRESS NOTES
Bedside and Verbal shift change report given to Fahad Head (oncoming nurse) by Miya Meier RN (offgoing nurse). Report given with Any GANDARA and MAR.

## 2018-05-11 NOTE — PROGRESS NOTES
Problem: Mobility Impaired (Adult and Pediatric)  Goal: *Acute Goals and Plan of Care (Insert Text)  Physical Therapy Goals  Initiated 5/9/2018    1. Patient will move from supine to sit and sit to supine  in bed with independence within 4 days. 2. Patient will perform sit to stand with modified independence within 4 days. 3. Patient will ambulate with modified independence for 150 feet with the least restrictive device within 4 days. 4. Patient will ascend/descend 13 stairs with 1 handrail(s) with modified independence within 4 days. 5. Patient will perform home exercise program per protocol with independence within 4 days. 6. Patient will demonstrate AROM 0-90 degrees in operative joint within 4 days. physical Therapy TREATMENT  Patient: George Cabrales (42 y.o. female)  Date: 5/11/2018  Diagnosis: PRIMARY OSTEOARTHRITIS  RIGHT KNEE   Osteoarthritis of right knee <principal problem not specified>  Procedure(s) (LRB):  RIGHT TOTAL KNEE ARTHROPLASTY (Right) 2 Days Post-Op  Precautions: Fall  Chart, physical therapy assessment, plan of care and goals were reviewed. ASSESSMENT:  Patient continues to progress. She is mod I for all observed transfers with RW and bed mobility. Ambulated around unit with RW mod I, step through pattern. Also navigated 4 stairs with B railings mod I, safe step to pattern without cues. Patient reports compliance with LE exercises this session. R knee ROM 3-85 degrees currently. Patient returned to bed with ice donned at end of session. Patient is cleared for discharge from PT standpoint, RN aware. Recommend HHPT.     Patient is cleared for discharge from PT standpoint:  YES [x]     NO []     Progression toward goals:  [x]      Improving appropriately and progressing toward goals  []      Improving slowly and progressing toward goals  []      Not making progress toward goals and plan of care will be adjusted     PLAN:  Patient continues to benefit from skilled intervention to address the above impairments. Continue treatment per established plan of care. Discharge Recommendations:  Home Health  Further Equipment Recommendations for Discharge: Owns all     SUBJECTIVE:   Patient stated I'm doing good.     OBJECTIVE DATA SUMMARY:   Critical Behavior:  Neurologic State: Alert  Orientation Level: Oriented X4  Cognition: Appropriate decision making, Appropriate for age attention/concentration, Appropriate safety awareness  Safety/Judgement: Awareness of environment, Fall prevention, Good awareness of safety precautions, Home safety, Insight into deficits  Range of Motion:           RLE AROM  R Knee Flexion: 85  R Knee Extension: 3              Functional Mobility Training:  Bed Mobility:     Supine to Sit:  (received up in chair)  Sit to Supine: Modified independent           Transfers:  Sit to Stand: Modified independent  Stand to Sit: Modified independent  Stand Pivot Transfers: Modified independent                          Balance:  Sitting: Intact  Standing: Intact; With support  Ambulation/Gait Training:  Distance (ft): 350 Feet (ft)  Assistive Device: Gait belt;Walker, rolling  Ambulation - Level of Assistance: Modified independent        Gait Abnormalities: Antalgic;Decreased step clearance (step through)  Right Side Weight Bearing: As tolerated        Stance: Right decreased  Speed/Ania: Pace decreased (<100 feet/min)  Step Length: Right shortened;Left shortened                    Stairs:  Number of Stairs Trained: 4  Stairs - Level of Assistance: Modified independent  Rail Use: Both      Pain:  Pain Scale 1: Numeric (0 - 10)  Pain Intensity 1: 0  Pain Location 1: Knee  Pain Orientation 1: Right  Pain Description 1: Aching  Pain Intervention(s) 1: Medication (see MAR); Cold pack  Activity Tolerance:   Good  Please refer to the flowsheet for vital signs taken during this treatment.   After treatment:   [] Patient left in no apparent distress sitting up in chair  [x] Patient left in no apparent distress in bed  [x] Call bell left within reach  [x] Nursing notified  [] Caregiver present  [] Bed alarm activated    COMMUNICATION/COLLABORATION:   The patients plan of care was discussed with: Registered Nurse    Christiane Bill, PT   Time Calculation: 20 mins

## 2018-05-16 ENCOUNTER — PATIENT OUTREACH (OUTPATIENT)
Dept: OTHER | Age: 78
End: 2018-05-16

## 2018-05-16 NOTE — PROGRESS NOTES
This note will not be viewable in [address] Post Discharge Follow-up contact after Joint Replacement    Patient discharged on 5/11/18  By  Olga Shin   following  right knee Arthroplasty. Spoke with patient today, who reports they are \" doing pretty well; my  and I are taking care of each other. \"  Denies Fever, Shortness of Breath or Chest Pain. Home Health has visited. Patient also reports:. Incision  clean, dry, intact  Calf is non-tender,   operative extremity has moderate swelling. Pain is well managed. Discussed use of ice & elevation. is progressing with therapy and is exercising independently. Taking Aspirin for anticoagulation, oxycodone occasionally for pain. Patient   is not experiencing symptoms of constipation & urinating without difficulty. Discussed side effects of anticoagulants & pain medications (bleeding/bruising, constipation, lightheaded/dizziness)  Follow up appointment is scheduled for 6/4/18. Discussed calling surgeon Dr Nelia Johnson  for drainage, bleeding, swelling in operative extremity, fever or pain. Discussed calling PCP Dr Nereida Jeffers with other medical issues.

## 2018-07-05 LAB — CREATININE, EXTERNAL: 0.7

## 2018-09-06 RX ORDER — PRAMIPEXOLE DIHYDROCHLORIDE 1 MG/1
TABLET ORAL
Qty: 90 TAB | Refills: 1 | Status: SHIPPED | OUTPATIENT
Start: 2018-09-06 | End: 2019-01-03 | Stop reason: SDUPTHER

## 2018-11-01 LAB — CREATININE, EXTERNAL: 0.7

## 2018-12-12 RX ORDER — DULOXETIN HYDROCHLORIDE 60 MG/1
CAPSULE, DELAYED RELEASE ORAL
Qty: 90 CAP | Refills: 3 | Status: SHIPPED | OUTPATIENT
Start: 2018-12-12 | End: 2019-04-15

## 2019-01-03 RX ORDER — PRAMIPEXOLE DIHYDROCHLORIDE 1 MG/1
TABLET ORAL
Qty: 90 TAB | Refills: 1 | Status: SHIPPED | COMMUNITY
Start: 2019-01-03 | End: 2019-05-15 | Stop reason: SDUPTHER

## 2019-01-03 NOTE — TELEPHONE ENCOUNTER
Orders Placed This Encounter    pramipexole (MIRAPEX) 1 mg tablet     Sig: TAKE 1 TABLET EVERY NIGHT     Dispense:  90 Tab     Refill:  1     APPOINTMENT REQUIRED FOR ADDITIONAL REFILLS     The above orders were approved via [facility] per Dr. Mady Conway, III.

## 2019-01-08 ENCOUNTER — TELEPHONE (OUTPATIENT)
Dept: INTERNAL MEDICINE CLINIC | Age: 79
End: 2019-01-08

## 2019-01-08 NOTE — TELEPHONE ENCOUNTER
Patient went to [facility] ER on Sat. Patient asked for callback from the nurse to explain what the labs showed and if she needs to be seen by Dr. Colin Camacho.

## 2019-01-16 ENCOUNTER — OFFICE VISIT (OUTPATIENT)
Dept: INTERNAL MEDICINE CLINIC | Age: 79
End: 2019-01-16

## 2019-01-16 ENCOUNTER — HOSPITAL ENCOUNTER (OUTPATIENT)
Dept: LAB | Age: 79
Discharge: HOME | End: 2019-01-16
Payer: MEDICARE

## 2019-01-16 VITALS
DIASTOLIC BLOOD PRESSURE: 72 MMHG | SYSTOLIC BLOOD PRESSURE: 153 MMHG | WEIGHT: 179 LBS | BODY MASS INDEX: 36.08 KG/M2 | TEMPERATURE: 97.8 F | HEIGHT: 59 IN | HEART RATE: 62 BPM | RESPIRATION RATE: 18 BRPM | OXYGEN SATURATION: 98 %

## 2019-01-16 DIAGNOSIS — M05.79 RHEUMATOID ARTHRITIS INVOLVING MULTIPLE SITES WITH POSITIVE RHEUMATOID FACTOR (HCC): ICD-10-CM

## 2019-01-16 DIAGNOSIS — N28.9 ACUTE RENAL INSUFFICIENCY: Primary | ICD-10-CM

## 2019-01-16 DIAGNOSIS — C18.9 MALIGNANT NEOPLASM OF COLON, UNSPECIFIED PART OF COLON (HCC): ICD-10-CM

## 2019-01-16 DIAGNOSIS — A08.4 VIRAL GASTROENTERITIS: ICD-10-CM

## 2019-01-16 PROBLEM — E66.01 SEVERE OBESITY (HCC): Status: ACTIVE | Noted: 2019-01-16

## 2019-01-16 PROCEDURE — 81001 URINALYSIS AUTO W/SCOPE: CPT

## 2019-01-16 PROCEDURE — 80048 BASIC METABOLIC PNL TOTAL CA: CPT

## 2019-01-16 PROCEDURE — 87086 URINE CULTURE/COLONY COUNT: CPT

## 2019-01-16 RX ORDER — HYDROCODONE BITARTRATE AND ACETAMINOPHEN 5; 325 MG/1; MG/1
.5-1 TABLET ORAL DAILY
COMMUNITY
Start: 2012-11-07 | End: 2019-04-18

## 2019-01-16 NOTE — PATIENT INSTRUCTIONS
As discussed in your appointment today, [address] is an important part of planning for your healthcare future. Discussing your preferences with your family and your care team is a part of good healthcare so that we can be guided by your known values and goals. Our office offers this service for you at your convenience. Our Nurse Navigators and certified Respecting Choices ® Facilitators, Alex Solis and Vidal Elders typically schedule family appointments for this service on Wednesdays. To schedule an Advance Care Planning visit or to receive more information about this service, please call [facility] at [phone] and ask to speak directly to [employer] or [employer]. Advance Care Planning: Care Instructions  Your Care Instructions  It can be hard to live with an illness that cannot be cured. But if your health is getting worse, you may want to make decisions about end-of-life care. Planning for the end of your life does not mean that you are giving up. It is a way to make sure that your wishes are met. Clearly stating your wishes can make it easier for your loved ones. Making plans while you are still able may also ease your mind and make your final days less stressful and more meaningful. Follow-up care is a key part of your treatment and safety. Be sure to make and go to all appointments, and call your doctor if you are having problems. It's also a good idea to know your test results and keep a list of the medicines you take. What can you do to plan for the end of life? · You can bring these issues up with your doctor. You do not need to wait until your doctor starts the conversation. You might start with \"I would not be willing to live with . Munira Reji Munira Reji Munira Dewitt \" When you complete this sentence it helps your doctor understand your wishes. · Talk openly and honestly with your doctor. This is the best way to understand the decisions you will need to make as your health changes.  Know that you can always change your mind. · Ask your doctor about commonly used life-support measures. These include tube feedings, breathing machines, and fluids given through a vein (IV). Understanding these treatments will help you decide whether you want them. · You may choose to have these life-supporting treatments for a limited time. This allows a trial period to see whether they will help you. You may also decide that you want your doctor to take only certain measures to keep you alive. It is important to spell out these conditions so that your doctor and family understand them. · Talk to your doctor about how long you are likely to live. He or she may be able to give you an idea of what usually happens with your specific illness. · Think about preparing papers that state your wishes. This way there will not be any confusion about what you want. You can change your instructions at any time. Which papers should you prepare? Advance directives are legal papers that tell doctors how you want to be cared for at the end of your life. You do not need a  to write these papers. Ask your doctor or your state health department for information on how to write your advance directives. They may have the forms for each of these types of papers. Make sure your doctor has a copy of these on file, and give a copy to a family member or close friend. · Consider a do-not-resuscitate order (DNR). This order asks that no extra treatments be done if your heart stops or you stop breathing. Extra treatments may include electrical shock to restart your heart or a machine to breathe for you. If you decide to have a DNR order, ask your doctor to explain and write it. Place the order in your home where everyone can easily see it. · Consider a living will. A living will explains your wishes in case you are in a coma or cannot communicate. Living dasilva tell doctors to use or not use treatments that would keep you alive.  You must have one or two witnesses or a notary present when you sign this form. · Consider a durable power of . This allows you to name a person to make decisions about your care if you are not able to. Most people ask a close friend or family member. Talk to this person about the kinds of treatments you want and those that you do not want. Make sure this person understands your wishes. If this person is not the health care agent named in your advance directive, also talk with your health care agent. These legal papers are simple to change. Tell your doctor what you want to change, and ask him or her to make a note in your medical file. Give your family updated copies of the papers.

## 2019-01-17 NOTE — PROGRESS NOTES
HPI:  Minoo Boucher is a [de-identified]y.o. year old female who is here for a routine visit:    Presents for an emergency room follow-up visit. She was seen there about 10 days with nausea, vomiting, and diarrhea. She had just been seen by her rheumatologist and had lab work done that was normal.  In the emergency room her BUN was elevated as was her creatinine up to 1.57. She was hydrated and discharged home with nausea medications. She has had no further episodes of nausea or vomiting since then. Her arthritis is under reasonable control. She is seeing the orthopedist this week for repeat lower back injections. She also wanted to discuss colonoscopy. She does not recall when her last one was but her last surgery was in 2015 when she had a colostomy takedown. She denies any melena or hematochezia. No fevers or chills. Past Medical History:   Diagnosis Date    Cataracts, bilateral     Chronic pain     Colon cancer ([facility] [address] 2015    colectomy with ostomy, then reversed by DR ELVIN Friedman Solian Colon polyps     Depression     did not do well on duloxetine    GERD (gastroesophageal reflux disease)     Glaucoma     Heart palpitations     Other ill-defined conditions(799.89)     GASTROPARESIS    [facility]oid [facility] ([facility])     DR Candida Desai, remicade    Restless leg syndrome     Spinal stenosis 5/8/2011    Unspecified adverse effect of anesthesia     HEADACHE    UTI (lower urinary tract infection)        Past Surgical History:   Procedure Laterality Date    HX APPENDECTOMY  surg. 2016?coloesty    HX CHOLECYSTECTOMY  10/2016    HX COLECTOMY  2015    HX COLOSTOMY  2016    REVERSAL    HX COLOSTOMY TAKE DOWN      HX DILATION AND CURETTAGE      HX GI  9/2014    COLONOSCOPY    HX HEENT  hearing loss    HX KNEE ARTHROSCOPY Left 1979    MENISCUS REPAIR    HX KNEE ARTHROSCOPY Left     HX KNEE ARTHROSCOPY Right 2005    HX KNEE REPLACEMENT Left 2005    HX LAP CHOLECYSTECTOMY  5/2005    HX LUMBAR LAMINECTOMY  2011    HX TUBAL LIGATION  1979    TOTAL HIP ARTHROPLASTY Right 3/2012       Prior to Admission medications    Medication Sig Start Date End Date Taking? Authorizing Provider   HYDROcodone-acetaminophen (NORCO) 5-325 mg per tablet Take 0.5 Tabs by mouth. 11/7/12  Yes Provider, Historical   pramipexole (MIRAPEX) 1 mg tablet TAKE 1 TABLET EVERY NIGHT 1/3/19  Yes Álvaro Luu III, MD   DULoxetine (CYMBALTA) 60 mg capsule TAKE 1 CAPSULE EVERY DAY 12/12/18  Yes Álvaro Luu III, MD   Biotin 2,500 mcg cap Take 1,000 mcg by mouth daily. Yes Provider, Historical   magnesium 250 mg tab Take  by mouth daily. Yes Provider, Historical   azaTHIOprine (IMURAN) 50 mg tablet Take 2 tablets by mouth daily. As directed by your Rheumatologist--stop for 1 week after surgery. Patient taking differently: Take 100 mg by mouth three (3) times daily. As directed by your Rheumatologist--stop for 1 week after surgery. 12/11/14  Yes Smiley Crowell NP   CALCIUM CARBONATE (CALCIUM 600 PO) Take 1 tablet by mouth daily. Yes Provider, Historical   cholecalciferol (VITAMIN D3) 1,000 unit tablet Take 1,000 Units by mouth daily. Yes Provider, Historical   INFLIXIMAB (REMICADE IV) by IntraVENous route. 500 mg q 4 weeks. LAST INFUSION WAS 11/2/14   Yes Provider, Historical   predniSONE (DELTASONE) 1 mg tablet Take 1 mg by mouth daily.  11/26/14  Yes Trevon Laguna MD       Social History     Socioeconomic History    Marital status:      Spouse name: Not on file    Number of children: Not on file    Years of education: Not on file    Highest education level: Not on file   Social Needs    Financial resource strain: Not on file    Food insecurity - worry: Not on file    Food insecurity - inability: Not on file    Transportation needs - medical: Not on file   Virool needs - non-medical: Not on file   Occupational History    Not on file   Tobacco Use    Smoking status: Former Smoker     Packs/day: 1.00     Years: 20.00     Pack years: 20.00     Last attempt to quit: 3/7/1978     Years since quittin.8    Smokeless tobacco: Never Used   Substance and Sexual Activity    Alcohol use: Yes     Alcohol/week: 0.0 oz     Types: 4 Glasses of wine per week     Comment: maybe not that much    Drug use: No    Sexual activity: Not Currently   Other Topics Concern    Not on file   Social History Narrative    . REMY to Lazaro JARRETT  Per HPI    Visit Vitals  /72   Pulse 62   Temp 97.8 °F (36.6 °C) (Oral)   Resp 18   Ht 4' 11\" (1.499 m)   Wt 179 lb (81.2 kg)   SpO2 98%   BMI 36.15 kg/m²         Physical Exam   Physical Examination: General appearance - alert, well appearing, and in no distress  Mouth - mucous membranes moist, pharynx normal without lesions  Neck - supple, no significant adenopathy  Chest - clear to auscultation, no wheezes, rales or rhonchi, symmetric air entry  Heart - normal rate, regular rhythm, normal S1, S2, no murmurs, rubs, clicks or gallops  Abdomen - soft, nontender, nondistended, no masses or organomegaly  Musculoskeletal - no joint tenderness, deformity or swelling, osteoarthritic changes noted in both hands  Extremities - peripheral pulses normal, no pedal edema, no clubbing or cyanosis      Assessment/Plan:  Diagnoses and all orders for this visit:    1. Acute renal insufficiency -likely resolved with hydration. We will repeat lab work today to be sure that it has done so. -     METABOLIC PANEL, BASIC  -     UA/M W/RFLX CULTURE, ROUTINE    2. Rheumatoid arthritis involving multiple sites with positive rheumatoid factor ([facility] [address] -Per rheumatology. Appears to be stable on current meds. 3. Malignant neoplasm of colon, unspecified part of colon [facility]) -she is previously had her colonoscopies done near her home. She will contact Dr. Kimo Kaplan there to get a repeat one. 4. Viral gastroenteritis -resolved. Follow-up Disposition: 6  Months and as needed.       Advised her to call back or return to office if symptoms worsen/change/persist.  Discussed expected course/resolution/complications of diagnosis in detail with patient. Medication risks/benefits/costs/interactions/alternatives discussed with patient. She was given an after visit summary which includes diagnoses, current medications, & vitals. She expressed understanding with the diagnosis and plan.

## 2019-01-19 LAB
APPEARANCE UR: CLEAR
BACTERIA #/AREA URNS HPF: NORMAL /[HPF]
BACTERIA UR CULT: NORMAL
BILIRUB UR QL STRIP: NEGATIVE
BUN SERPL-MCNC: 11 MG/DL (ref 8–27)
BUN/CREAT SERPL: 16 (ref 12–28)
CALCIUM SERPL-MCNC: 9.8 MG/DL (ref 8.7–10.3)
CASTS URNS QL MICRO: NORMAL /LPF
CHLORIDE SERPL-SCNC: 102 MMOL/L (ref 96–106)
CO2 SERPL-SCNC: 22 MMOL/L (ref 20–29)
COLOR UR: YELLOW
CREAT SERPL-MCNC: 0.69 MG/DL (ref 0.57–1)
EPI CELLS #/AREA URNS HPF: NORMAL /HPF
GLUCOSE SERPL-MCNC: 100 MG/DL (ref 65–99)
GLUCOSE UR QL: NEGATIVE
HGB UR QL STRIP: NEGATIVE
KETONES UR QL STRIP: NEGATIVE
LEUKOCYTE ESTERASE UR QL STRIP: NEGATIVE
MICRO URNS: ABNORMAL
MUCOUS THREADS URNS QL MICRO: PRESENT
NITRITE UR QL STRIP: POSITIVE
PH UR STRIP: 5 [PH] (ref 5–7.5)
POTASSIUM SERPL-SCNC: 4.1 MMOL/L (ref 3.5–5.2)
PROT UR QL STRIP: NEGATIVE
RBC #/AREA URNS HPF: NORMAL /HPF
SODIUM SERPL-SCNC: 141 MMOL/L (ref 134–144)
SP GR UR: 1.02 (ref 1–1.03)
URINALYSIS REFLEX, 377202: ABNORMAL
UROBILINOGEN UR STRIP-MCNC: 0.2 MG/DL (ref 0.2–1)
WBC #/AREA URNS HPF: NORMAL /HPF

## 2019-02-18 ENCOUNTER — OFFICE VISIT (OUTPATIENT)
Dept: INTERNAL MEDICINE CLINIC | Age: 79
End: 2019-02-18

## 2019-02-18 VITALS
HEART RATE: 77 BPM | RESPIRATION RATE: 12 BRPM | WEIGHT: 180 LBS | OXYGEN SATURATION: 97 % | DIASTOLIC BLOOD PRESSURE: 72 MMHG | TEMPERATURE: 97.7 F | SYSTOLIC BLOOD PRESSURE: 133 MMHG | BODY MASS INDEX: 36.29 KG/M2 | HEIGHT: 59 IN

## 2019-02-18 DIAGNOSIS — M54.16 LUMBAR RADICULOPATHY: ICD-10-CM

## 2019-02-18 DIAGNOSIS — Z01.818 PREOPERATIVE GENERAL PHYSICAL EXAMINATION: Primary | ICD-10-CM

## 2019-02-18 NOTE — PROGRESS NOTES
Preoperative Evaluation    Date of Exam: 2/18/2019    Nathan Marques is a [de-identified] y.o. female ([id number]) who presents for preoperative evaluation. Procedure/Surgery:Lumbar luminectomy  Date of Procedure/Surgery: 2/26/19  Surgeon: Ronnie Donovan MD  Hospital/Surgical Facility: [address] [place] [address]'s  Primary Physician: Shanae Gaona MD  Latex Allergy: no    Problem List:     Patient Active Problem List    Diagnosis Date Noted    Severe obesity ([facility] [address] 01/16/2019    Osteoarthritis of right knee 05/09/2018    Restless leg syndrome     Colon cancer ([facility] [address] 01/01/2015    Heart palpitations     Glaucoma     RA (rheumatoid arthritis) ([facility] [address]     Depression 05/08/2011    Impaired hearing 05/08/2011     Medical History:     Past Medical History:   Diagnosis Date    Cataracts, bilateral     Chronic pain     Colon cancer ([facility] [address] 2015    colectomy with ostomy, then reversed by DR ELVIN Max Colon polyps     Depression     did not do well on duloxetine    GERD (gastroesophageal reflux disease)     Glaucoma     Heart palpitations     Other ill-defined conditions(799.89)     GASTROPARESIS    RA [facility]rheumatoid arthritis) [facility]     DR Roberto Foss, remicade    Restless leg syndrome     Spinal stenosis 5/8/2011    Unspecified adverse effect of anesthesia     HEADACHE    UTI (lower urinary tract infection)      Allergies: Allergies   Allergen Reactions    Adhesive Tape-Silicones Other (comments)     \"Left skin raw\"      Medications:     Current Outpatient Medications   Medication Sig    HYDROcodone-acetaminophen (NORCO) 5-325 mg per tablet Take 0.5 Tabs by mouth.  pramipexole (MIRAPEX) 1 mg tablet TAKE 1 TABLET EVERY NIGHT    DULoxetine (CYMBALTA) 60 mg capsule TAKE 1 CAPSULE EVERY DAY    Biotin 2,500 mcg cap Take 1,000 mcg by mouth daily.  magnesium 250 mg tab Take  by mouth daily.  azaTHIOprine (IMURAN) 50 mg tablet Take 2 tablets by mouth daily.  As directed by your Rheumatologist--stop for 1 week after surgery. (Patient taking differently: Take 100 mg by mouth three (3) times daily. As directed by your Rheumatologist--stop for 1 week after surgery.)    CALCIUM CARBONATE (CALCIUM 600 PO) Take 1 tablet by mouth daily.  cholecalciferol (VITAMIN D3) 1,000 unit tablet Take 1,000 Units by mouth daily.  INFLIXIMAB (REMICADE IV) by IntraVENous route. 500 mg q 4 weeks. LAST INFUSION WAS 14    predniSONE (DELTASONE) 1 mg tablet Take 1 mg by mouth daily. No current facility-administered medications for this visit. Surgical History:     Past Surgical History:   Procedure Laterality Date    HX APPENDECTOMY  surg. 2016?coloesty    HX CHOLECYSTECTOMY  10/2016    HX COLECTOMY  2015    HX COLOSTOMY  2016    REVERSAL    HX COLOSTOMY TAKE DOWN      HX DILATION AND CURETTAGE      HX GI  2014    COLONOSCOPY    HX HEENT  hearing loss    HX KNEE ARTHROSCOPY Left     MENISCUS REPAIR    HX KNEE ARTHROSCOPY Left     HX KNEE ARTHROSCOPY Right     HX KNEE REPLACEMENT Left     HX KNEE REPLACEMENT Right 2018    HX LAP CHOLECYSTECTOMY  2005    HX LUMBAR LAMINECTOMY      HX TUBAL LIGATION  1979    TOTAL HIP ARTHROPLASTY Right 3/2012     Social History:     Social History     Socioeconomic History    Marital status:      Spouse name: Not on file    Number of children: Not on file    Years of education: Not on file    Highest education level: Not on file   Tobacco Use    Smoking status: Former Smoker     Packs/day: 1.00     Years: 20.00     Pack years: 20.00     Last attempt to quit: 3/7/1978     Years since quittin.9    Smokeless tobacco: Never Used   Substance and Sexual Activity    Alcohol use: Yes     Alcohol/week: 0.0 oz     Types: 4 Glasses of wine per week     Comment: maybe not that much    Drug use: No    Sexual activity: Not Currently   Social History Narrative    .   REMY to Aaron Cervantes        Recent use of: No recent use of aspirin (ASA), NSAIDS or steroids    Anesthesia Complications: None  History of abnormal bleeding : None  History of Blood Transfusions: no  Health Care Directive or Living Will: no    REVIEW OF SYSTEMS:  A comprehensive review of systems was negative except for: Constitutional: positive for weight up and not able to exercise as much as she needs to. Respiratory: positive for negative  Cardiovascular: positive for none  Some lower back and leg pain. EXAM:   Visit Vitals  /72   Pulse 77   Temp 97.7 °F (36.5 °C) (Oral)   Resp 12   Ht 4' 11\" (1.499 m)   Wt 180 lb (81.6 kg)   SpO2 97%   BMI 36.36 kg/m²     General appearance - alert, well appearing, and in no distress  Mouth - mucous membranes moist, pharynx normal without lesions  Neck - supple, no significant adenopathy  Lymphatics - no palpable lymphadenopathy, no hepatosplenomegaly  Chest - clear to auscultation, no wheezes, rales or rhonchi, symmetric air entry  Heart - normal rate, regular rhythm, normal S1, S2, no murmurs, rubs, clicks or gallops  Abdomen - soft, nontender, nondistended, no masses or organomegaly  Back exam - full range of motion, no tenderness, palpable spasm or pain on motion  Neurological - alert, oriented, normal speech, no focal findings or movement disorder noted  Musculoskeletal -RA changes in both of her hands. No joints that are red. Extremities - peripheral pulses normal, no pedal edema, no clubbing or cyanosis      DIAGNOSTICS:   1. EKG: EKG FINDINGS - Pending  2. CXR: Pending  3. Labs: Pending    IMPRESSION:   1. Rheumatoid arthritis-stable  2. Lumbar radiculopathy-cleared for surgery. Plan -   No contraindications to planned surgery  Labs and EKG with preop testing will be forwarded to me.     Анна Lr MD   2/18/2019

## 2019-02-19 ENCOUNTER — DOCUMENTATION ONLY (OUTPATIENT)
Dept: INTERNAL MEDICINE CLINIC | Age: 79
End: 2019-02-19

## 2019-02-21 ENCOUNTER — HOSPITAL ENCOUNTER (OUTPATIENT)
Dept: PREADMISSION TESTING | Age: 79
Discharge: HOME | End: 2019-02-21
Payer: MEDICARE

## 2019-02-21 VITALS
HEIGHT: 59 IN | TEMPERATURE: 97.6 F | SYSTOLIC BLOOD PRESSURE: 154 MMHG | BODY MASS INDEX: 36.08 KG/M2 | HEART RATE: 57 BPM | DIASTOLIC BLOOD PRESSURE: 75 MMHG | WEIGHT: 179 LBS

## 2019-02-21 LAB
ABO + RH BLD: NORMAL
ANION GAP SERPL CALC-SCNC: 6 MMOL/L (ref 5–15)
APPEARANCE UR: CLEAR
ATRIAL RATE: 57 BPM
BACTERIA URNS QL MICRO: NEGATIVE /HPF
BILIRUB UR QL: NEGATIVE
BLOOD GROUP ANTIBODIES SERPL: NORMAL
BUN SERPL-MCNC: 14 MG/DL (ref 6–20)
BUN/CREAT SERPL: 18 (ref 12–20)
CALCIUM SERPL-MCNC: 9.5 MG/DL (ref 8.5–10.1)
CALCULATED P AXIS, ECG09: 42 DEGREES
CALCULATED R AXIS, ECG10: -32 DEGREES
CALCULATED T AXIS, ECG11: 85 DEGREES
CHLORIDE SERPL-SCNC: 104 MMOL/L (ref 97–108)
CO2 SERPL-SCNC: 28 MMOL/L (ref 21–32)
COLOR UR: NORMAL
CREAT SERPL-MCNC: 0.78 MG/DL (ref 0.55–1.02)
DIAGNOSIS, 93000: NORMAL
EPITH CASTS URNS QL MICRO: NORMAL /LPF
ERYTHROCYTE [DISTWIDTH] IN BLOOD BY AUTOMATED COUNT: 13.3 % (ref 11.5–14.5)
EST. AVERAGE GLUCOSE BLD GHB EST-MCNC: 120 MG/DL
GLUCOSE SERPL-MCNC: 102 MG/DL (ref 65–100)
GLUCOSE UR STRIP.AUTO-MCNC: NEGATIVE MG/DL
HBA1C MFR BLD: 5.8 % (ref 4.2–6.3)
HCT VFR BLD AUTO: 42.8 % (ref 35–47)
HGB BLD-MCNC: 14.1 G/DL (ref 11.5–16)
HGB UR QL STRIP: NEGATIVE
HYALINE CASTS URNS QL MICRO: NORMAL /LPF (ref 0–5)
INR PPP: 1 (ref 0.9–1.1)
KETONES UR QL STRIP.AUTO: NEGATIVE MG/DL
LEUKOCYTE ESTERASE UR QL STRIP.AUTO: NEGATIVE
MCH RBC QN AUTO: 31.2 PG (ref 26–34)
MCHC RBC AUTO-ENTMCNC: 32.9 G/DL (ref 30–36.5)
MCV RBC AUTO: 94.7 FL (ref 80–99)
NITRITE UR QL STRIP.AUTO: NEGATIVE
NRBC # BLD: 0 K/UL (ref 0–0.01)
NRBC BLD-RTO: 0 PER 100 WBC
P-R INTERVAL, ECG05: 206 MS
PH UR STRIP: 6.5 [PH] (ref 5–8)
PLATELET # BLD AUTO: 189 K/UL (ref 150–400)
PMV BLD AUTO: 11.8 FL (ref 8.9–12.9)
POTASSIUM SERPL-SCNC: 4 MMOL/L (ref 3.5–5.1)
PROT UR STRIP-MCNC: NEGATIVE MG/DL
PROTHROMBIN TIME: 9.7 SEC (ref 9–11.1)
Q-T INTERVAL, ECG07: 376 MS
QRS DURATION, ECG06: 98 MS
QTC CALCULATION (BEZET), ECG08: 365 MS
RBC # BLD AUTO: 4.52 M/UL (ref 3.8–5.2)
RBC #/AREA URNS HPF: NORMAL /HPF (ref 0–5)
SODIUM SERPL-SCNC: 138 MMOL/L (ref 136–145)
SP GR UR REFRACTOMETRY: 1.02 (ref 1–1.03)
SPECIMEN EXP DATE BLD: NORMAL
UA: UC IF INDICATED,UAUC: NORMAL
UROBILINOGEN UR QL STRIP.AUTO: 0.2 EU/DL (ref 0.2–1)
VENTRICULAR RATE, ECG03: 57 BPM
WBC # BLD AUTO: 5 K/UL (ref 3.6–11)
WBC URNS QL MICRO: NORMAL /HPF (ref 0–4)

## 2019-02-21 PROCEDURE — 93005 ELECTROCARDIOGRAM TRACING: CPT

## 2019-02-21 PROCEDURE — 85027 COMPLETE CBC AUTOMATED: CPT

## 2019-02-21 PROCEDURE — 80048 BASIC METABOLIC PNL TOTAL CA: CPT

## 2019-02-21 PROCEDURE — 83036 HEMOGLOBIN GLYCOSYLATED A1C: CPT

## 2019-02-21 PROCEDURE — 81001 URINALYSIS AUTO W/SCOPE: CPT

## 2019-02-21 PROCEDURE — 86900 BLOOD TYPING SEROLOGIC ABO: CPT

## 2019-02-21 PROCEDURE — 85610 PROTHROMBIN TIME: CPT

## 2019-02-21 RX ORDER — BACLOFEN 20 MG
1 TABLET ORAL
COMMUNITY
End: 2020-06-12 | Stop reason: ALTCHOICE

## 2019-02-21 RX ORDER — MULTIVITAMIN
1 TABLET ORAL DAILY
COMMUNITY
End: 2020-06-12 | Stop reason: ALTCHOICE

## 2019-02-22 LAB
BACTERIA SPEC CULT: NORMAL
BACTERIA SPEC CULT: NORMAL
SERVICE CMNT-IMP: NORMAL

## 2019-02-25 ENCOUNTER — DOCUMENTATION ONLY (OUTPATIENT)
Dept: CARDIOLOGY CLINIC | Age: 79
End: 2019-02-25

## 2019-02-25 NOTE — PERIOP NOTES
NOHEMI FROM DR PAIGE'S OFFICE RETURNED CALL STATING THEY WERE AWARE OF PATIENT NEEDING FURTHER CARDIAC EVALUATION.

## 2019-02-25 NOTE — PERIOP NOTES
DR PAIGE'S OFFICE CALLED MADE AWARE PATIENT NEEDS FURTHER CARDIAC EVALUATION PER DR Kelechi Hooks ANESTHESIA. MESSAGE LEFT ON [facility]'S VOICEMAIL.

## 2019-02-25 NOTE — PERIOP NOTES
CALLED DR Ryan Hayes OFFICE SPOKE WITH HELENA TO BE SURE THEY WERE AWARE PATIENT NEEDS FURTHER CARDIAC EVAL PRIOR TO SURGERY.  EKG WAS FAXED TO THEM

## 2019-02-26 ENCOUNTER — OFFICE VISIT (OUTPATIENT)
Dept: CARDIOLOGY CLINIC | Age: 79
End: 2019-02-26

## 2019-02-26 VITALS
DIASTOLIC BLOOD PRESSURE: 80 MMHG | BODY MASS INDEX: 36.89 KG/M2 | HEIGHT: 59 IN | SYSTOLIC BLOOD PRESSURE: 170 MMHG | RESPIRATION RATE: 16 BRPM | OXYGEN SATURATION: 93 % | WEIGHT: 183 LBS | HEART RATE: 60 BPM

## 2019-02-26 DIAGNOSIS — Z82.49 FAMILY HISTORY OF EARLY CAD: ICD-10-CM

## 2019-02-26 DIAGNOSIS — R00.2 HEART PALPITATIONS: Primary | ICD-10-CM

## 2019-02-26 DIAGNOSIS — Z01.818 PRE-OPERATIVE EXAMINATION: ICD-10-CM

## 2019-02-26 DIAGNOSIS — R06.09 DOE (DYSPNEA ON EXERTION): ICD-10-CM

## 2019-02-26 DIAGNOSIS — E78.2 MIXED HYPERLIPIDEMIA: ICD-10-CM

## 2019-02-26 NOTE — PROGRESS NOTES
Patient requesting surgical clearance for revised laminectomy with Dr Mariann Rodríguez she C/O palpitations relieved with magnesium daily and SOB.

## 2019-02-26 NOTE — PROGRESS NOTES
Leonora Torres DNP, ANP-BC  Subjective/HPI:     Nathan Marques is a [de-identified] y.o. female is here for new patient consultation for cardiac clearance pending laminectomy. Preoperative EKG notable for frequent PVCs, was started on magnesium and she reports her palpitations have resolved. There is a concern with progressive dyspnea on exertion, patient admits to a 30 pound weight gain secondary to high dosing of prednisone and limited mobility secondary to rheumatoid arthritis. She has a family history of atherosclerotic heart disease. Noted blood pressure today 170/80, she is in moderate amount of discomfort secondary to being off the rheumatoid arthritis meds for upcoming surgery. Last week she was normotensive at primary care. PCP Provider  Guzman Estrada MD  Past Medical History:   Diagnosis Date    Cataracts, bilateral     Chronic pain     LOWER BACK    Colon cancer ([facility] [address] 2015    colectomy with ostomy, then reversed by DR ELVIN Max Colon polyps     Depression     did not do well on duloxetine    GERD (gastroesophageal reflux disease)     Glaucoma     Heart palpitations     Other ill-defined conditions(799.89)     GASTROPARESIS    RA (rheumatoid arthritis) ([facility] [address]     DR Roberto Foss, remicade    Restless leg syndrome     Spinal stenosis 5/8/2011    Unspecified adverse effect of anesthesia     HEADACHE    UTI (lower urinary tract infection)       Past Surgical History:   Procedure Laterality Date    HX APPENDECTOMY  2015    HX COLECTOMY  2015    WITH COLOSTOMY AND THEN REOMVAL OF COLOSTOMY    HX COLOSTOMY  2016    REVERSAL    HX COLOSTOMY TAKE DOWN      HX DILATION AND CURETTAGE      HX GI  9/2014    COLONOSCOPY    HX KNEE ARTHROSCOPY Left 1979    MENISCUS REPAIR    HX KNEE ARTHROSCOPY Left     HX KNEE ARTHROSCOPY Right 2005    HX KNEE REPLACEMENT Left 2005    HX KNEE REPLACEMENT Right 05/09/2018    HX LAP CHOLECYSTECTOMY  5/2005    HX LUMBAR LAMINECTOMY  2011    HX ORTHOPAEDIC Right     HAND FX    HX TUBAL LIGATION  1979    HX WRIST FRACTURE TX Left     TOTAL HIP ARTHROPLASTY Right 3/2012     Allergies   Allergen Reactions    Adhesive Tape-Silicones Other (comments)     \"Left skin raw\"      Family History   Problem Relation Age of Onset    Heart Disease Mother     Hypertension Mother     Arthritis-osteo Mother     Lung Disease Mother         BENIGN TUMOR    Heart Failure Mother     Diabetes Father     COPD Father     Cancer Father         LIP CANCER    Diabetes Sister     Heart Disease Sister         SOMETHING WITH HER HEART MUSCLE    Hypertension Sister     Diabetes Brother     Heart Disease Brother     Hypertension Brother     Pacemaker Brother         AND DEFIBRILLATOR    Stroke Brother     Anesth Problems Neg Hx       Current Outpatient Medications   Medication Sig    calcium-cholecalciferol, D3, (CALCIUM 600 + D) tablet Take 1 Tab by mouth daily.  magnesium oxide 500 mg tab Take 1 Tab by mouth every Monday, Wednesday, Friday.  HYDROcodone-acetaminophen (NORCO) 5-325 mg per tablet Take 0.5-1 Tabs by mouth daily.  pramipexole (MIRAPEX) 1 mg tablet TAKE 1 TABLET EVERY NIGHT (Patient taking differently: TAKE 1.5 TABLET EVERY NIGHT)    DULoxetine (CYMBALTA) 60 mg capsule TAKE 1 CAPSULE EVERY DAY    predniSONE (DELTASONE) 1 mg tablet Take 1 mg by mouth daily.  INFLIXIMAB (REMICADE IV) by IntraVENous route. EVERY 4 WEEKS. LAST INFUSION WAS January 31, 2019     No current facility-administered medications for this visit.        Vitals:    02/26/19 1009 02/26/19 1010   BP: 180/90 170/80   Pulse: 60    Resp: 16    SpO2: 93%    Weight: 183 lb (83 kg)    Height: 4' 11\" (1.499 m)      Social History     Socioeconomic History    Marital status:      Spouse name: Not on file    Number of children: Not on file    Years of education: Not on file    Highest education level: Not on file   Social Needs    Financial resource strain: Not on file  Food insecurity - worry: Not on file    Food insecurity - inability: Not on file    Transportation needs - medical: Not on file   [employer] needs - non-medical: Not on file   Occupational History    Not on file   Tobacco Use    Smoking status: Former Smoker     Packs/day: 1.00     Years: 20.00     Pack years: 20.00     Last attempt to quit: 3/7/1978     Years since quittin.0    Smokeless tobacco: Never Used   Substance and Sexual Activity    Alcohol use: Yes     Alcohol/week: 0.0 oz     Types: 4 Glasses of wine per week     Comment: maybe not that much    Drug use: No    Sexual activity: Not Currently   Other Topics Concern    Not on file   Social History Narrative    . REMY to Sanna Reynoso        I have reviewed the nurses notes, vitals, problem list, allergy list, medical history, family, social history and medications. Review of Symptoms:    General: Pt denies excessive weight gain or loss. Pt is able to conduct ADL's  HEENT: Denies blurred vision, headaches, epistaxis and difficulty swallowing. Respiratory: Denies shortness of breath, + SARMIENTO, wheezing or stridor. Cardiovascular: Denies precordial pain, palpitations, edema or PND  Gastrointestinal: Denies poor appetite, indigestion, abdominal pain or blood in stool  Musculoskeletal: Diffuse joint skeletal pain secondary to RA  Neurologic: Denies tremor, paresthesias, or sensory motor disturbance  Skin: Denies rash, itching or texture change. Physical Exam:      General: Well developed, in no acute distress, cooperative and alert  HEENT: No carotid bruits, no JVD, trach is midline. Neck Supple, PEERL, EOM intact. Heart:  Normal S1/S2 negative S3 or S4. Regular, no murmur, gallop or rub.   Respiratory: Clear bilaterally x 4, no wheezing or rales  Abdomen:   Soft, non-tender, no masses, bowel sounds are active.   Extremities:  No edema, normal cap refill, no cyanosis, atraumatic.    Neuro: A&Ox3, speech clear, gait slow and cautious. Skin: Skin color is normal. No rashes or lesions. Non diaphoretic  Vascular: 2+ pulses symmetric in all extremities    Cardiographics    ECG: Sinus rhythm with poor R wave anterior septal wall  Results for orders placed or performed during the hospital encounter of 02/21/19   EKG, 12 LEAD, INITIAL   Result Value Ref Range    Ventricular Rate 57 BPM    Atrial Rate 57 BPM    P-R Interval 206 ms    QRS Duration 98 ms    Q-T Interval 376 ms    QTC Calculation (Bezet) 365 ms    Calculated P Axis 42 degrees    Calculated R Axis -32 degrees    Calculated T Axis 85 degrees    Diagnosis       Sinus bradycardia with frequent premature ventricular complexes with 1st   degree AV block  Left axis deviation  Nonspecific ST and T wave abnormality  When compared with ECG of 09-MAY-2018 08:39,  premature ventricular complexes are now present  Confirmed by Saida Maravilla MD, Rachel Red ([id number]) on 2/21/2019 4:10:59 PM     Results for orders placed or performed in visit on 05/06/11   AMB POC EKG ROUTINE W/ 12 LEADS, INTER & REP    Impression    Normal sinus rhythm without any acute changes. Cardiology Labs:  Lab Results   Component Value Date/Time    Cholesterol, total 331 (H) 11/13/2012 11:04 AM    HDL Cholesterol 62 11/13/2012 11:04 AM    LDL, calculated 244 (H) 11/13/2012 11:04 AM    Triglyceride 127 11/13/2012 11:04 AM       Lab Results   Component Value Date/Time    Sodium 138 02/21/2019 02:58 PM    Potassium 4.0 02/21/2019 02:58 PM    Chloride 104 02/21/2019 02:58 PM    CO2 28 02/21/2019 02:58 PM    Anion gap 6 02/21/2019 02:58 PM    Glucose 102 (H) 02/21/2019 02:58 PM    BUN 14 02/21/2019 02:58 PM    Creatinine 0.78 02/21/2019 02:58 PM    BUN/Creatinine ratio 18 02/21/2019 02:58 PM    GFR est AA >60 02/21/2019 02:58 PM    GFR est non-AA >60 02/21/2019 02:58 PM    Calcium 9.5 02/21/2019 02:58 PM    Bilirubin, total 0.6 09/07/2012 01:20 PM    AST (SGOT) 12 (L) 09/07/2012 01:20 PM    Alk.  phosphatase 104 09/07/2012 01:20 PM    Protein, total 6.9 09/07/2012 01:20 PM    Albumin 3.1 (L) 09/07/2012 01:20 PM    Globulin 3.8 09/07/2012 01:20 PM    A-G Ratio 0.8 (L) 09/07/2012 01:20 PM    ALT (SGPT) 29 09/07/2012 01:20 PM           Assessment:     Assessment:     Diagnoses and all orders for this visit:    1. Heart palpitations  -     AMB POC EKG ROUTINE W/ 12 LEADS, INTER & REP  -     ECHO ADULT COMPLETE; Future  -     NUCLEAR CARDIAC STRESS TEST; Future  -     LIPID PANEL    2. Family history of early CAD    3. SARMIENTO (dyspnea on exertion)        ICD-10-CM ICD-9-CM    1. Heart palpitations R00.2 785.1 AMB POC EKG ROUTINE W/ 12 LEADS, INTER & REP      ECHO ADULT COMPLETE      NUCLEAR CARDIAC STRESS TEST      LIPID PANEL   2. Family history of early CAD Z80.55 V17.3    3. SARMIENTO (dyspnea on exertion) R06.09 786.09      Orders Placed This Encounter    LIPID PANEL    AMB POC EKG ROUTINE W/ 12 LEADS, INTER & REP     Order Specific Question:   Reason for Exam:     Answer:   routine        Plan:     Patient is a 78-year-old female presenting for cardiac clearance. Reported dyspnea on exertion and initially abnormal EKG showing frequent PVCs which have normalized with the use of magnesium. Will evaluate for ischemia with Lexiscan nuclear stress test, echocardiogram.  Will repeat blood pressure at subsequent visit for reevaluation of hypertension as today is a one-time reading. Will check lipid panel given family history of atherosclerotic heart disease. If she has a normal stress test and echocardiogram she will be cleared for surgery. Leonora Torres NP    This note was created using voice recognition software. Despite editing, there may be syntax errors. Patient seen and examined by me with nurse practitioner. Edison Sewell personally performed all components of the history, physical, and medical decision making and agree with the assessment and plan with minor modifications as noted.     Aroldo Salinas MD

## 2019-03-14 ENCOUNTER — TELEPHONE (OUTPATIENT)
Dept: CARDIOLOGY CLINIC | Age: 79
End: 2019-03-14

## 2019-03-14 DIAGNOSIS — Z82.49 FAMILY HISTORY OF HEART DISEASE: ICD-10-CM

## 2019-03-14 DIAGNOSIS — E78.2 MIXED HYPERLIPIDEMIA: Primary | ICD-10-CM

## 2019-03-14 DIAGNOSIS — R00.2 HEART PALPITATIONS: ICD-10-CM

## 2019-03-14 NOTE — TELEPHONE ENCOUNTER
Please call patient in regards to  Recent lab slip, unable to get approval by medicare based on icd codes.       Thanks

## 2019-03-14 NOTE — TELEPHONE ENCOUNTER
Pt returned call, verified pt with two pt identifiers, pt advised that Medicare is not covering her labs and she did not have the labs done. She advised that someone up front is taking care of it. I verified her address and advised I would look into it. Advised if there is an issue I would call her back. Advised if I get the lab slip verified then I would just mail it to her. Pt verbalized understanding.

## 2019-03-15 NOTE — TELEPHONE ENCOUNTER
Printed new lab slip with different dx codes. Mailed out to pt and advised hopefully this will work. If not to call me back.

## 2019-03-19 ENCOUNTER — TELEPHONE (OUTPATIENT)
Dept: CARDIOLOGY CLINIC | Age: 79
End: 2019-03-19

## 2019-03-19 NOTE — TELEPHONE ENCOUNTER
----- Message from Rehan Golden MD sent at 3/15/2019  4:03 PM EDT -----  Inform her Echo is k      Called pt,verified pt with two pt identifiers, told pt her echo is okay. Advised we are waiting her other test results before we can clear her for surgery. Advised pt I mailed out her lab slip with new codes. Pt verbalized understanding.

## 2019-03-22 ENCOUNTER — TELEPHONE (OUTPATIENT)
Dept: CARDIOLOGY CLINIC | Age: 79
End: 2019-03-22

## 2019-03-25 NOTE — TELEPHONE ENCOUNTER
Message   Received: 3 days ago   Message Contents   MD Kandy Marsh LPN   Caller: Unspecified (3 days ago, 11:15 AM)             Stress test is k. Can proceed with surgery. Called pt,verified pt with two pt identifiers, told pt that her stress test is okay and she can proceed with her surgery. She advised  with [facility]. # [phone]. I advised I would fax over the clearance. Pt verbalized understanding. Called Lacy to get fax number. [phone]. Faxed note to [address] office at [phone] stating that pt is cleared for her upcoming procedure per . Received fax confirmation.

## 2019-03-27 ENCOUNTER — HOSPITAL ENCOUNTER (OUTPATIENT)
Dept: LAB | Age: 79
Discharge: HOME | End: 2019-03-27
Payer: MEDICARE

## 2019-03-27 PROCEDURE — 36415 COLL VENOUS BLD VENIPUNCTURE: CPT

## 2019-03-27 PROCEDURE — 80061 LIPID PANEL: CPT

## 2019-03-28 LAB
CHOLEST SERPL-MCNC: 351 MG/DL (ref 100–199)
COMMENT, 011824: ABNORMAL
HDLC SERPL-MCNC: 44 MG/DL
INTERPRETATION, 910389: NORMAL
LDLC SERPL CALC-MCNC: 250 MG/DL (ref 0–99)
TRIGL SERPL-MCNC: 283 MG/DL (ref 0–149)
VLDLC SERPL CALC-MCNC: 57 MG/DL (ref 5–40)

## 2019-04-11 NOTE — H&P
Lazara Lipoma Location: [address] Patient #: [id number] : 1940  / Language: Kalee Vanessa / Joana Common: Lisa Null Female History of Present Illness The patient is a [de-identified]year old female who presents for a Recheck of Chronic lumbar back pain. The last clinic visit was 2 month(s) ago. Management changes made at the last visit include ordering test(s) (SUSIE). Symptoms include pain, stiffness and decreased range of motion. Symptoms are located in the low back. Onset was gradual. The symptoms occur frequently. The patient describes symptoms as moderate in severity and worsening. Symptoms are exacerbated by weight bearing, back motion and standing. Current treatment includes opioid analgesics. Recently the disease has been unchanged. The patient has a surgical history of back surgery and knee surgery ( and ). The patient was previously evaluated in this clinic. Past evaluation has included x-ray of the lumbar spine and MRI of the lumbar spine. Past treatment has included epidural injection. Note for \"Chronic lumbar back pain\": Ramon Waller . 
Ms. Lyndsey Max returns today for a follow up , with continued concerns of left low back pain with radiation to the hip, groin and occasionally down the leg.  She has had two rounds of epidural injections, which provided her with no significant lasting relief of her pain. She continues to deny weakness in her legs or changes in bowel/bladder function. She feels unsteady in her gait, but denies any falls. She ambulates with a cane. She has a history of colon cancer resulting in a complete colectomy.  
 
 
Problem List/Past Medical  
OA SHOULDER (715.91)   
RC TEAR - TRAUMA (840.4)   
S/P TKR (total knee replacement) (V43.65  Z96.659)   
S/P TOTAL HIP (V43.64)   
DDD (degenerative disc disease), lumbar (722.52  M51.36)   
REVIEW OF SYSTEMS: Systems were reviewed by the provider.   
MMT, ACUTE (836.0)   
Rheumatoid arthritis (714.0) (714.0  M06.9)   
 Pain, joint, knee, left (719.46  M25.562)   
OA, KNEE (715.96)   
ELBOW PAIN (719.42)   Low back pain with radiation (724.3  M54.40)   
Lumbar stenosis with neurogenic claudication (724.03  M48.062)   
Lumbar post-laminectomy syndrome (722.83  M96.1)   
SHOULDER PAIN (719.41)   Facet arthropathy, lumbar (721.3  M47.816)   
FOLLOW-UP AFTER SURGERY (V67.00)   
CONTUSION (923.11)   Flat-back syndrome (737.29  M40.30)   
LMT, ACUTE (836.1)   Weight above 97th percentile (V49.89  Z78.9)   
Primary osteoarthritis of right knee (715.16  M17.11)   Allergies No Known Allergies Family History Diabetes Mellitus   
Arthritis   
Heart disease in male family member before age 54   
Hypercholesterolemia   
Hypertension   
Heart disease in female family member before age 72   
 
Social History Caffeine use: Drinks coffee and tea 1-2 times a day. Tobacco use   Former smoker. Alcohol use : Never drinks. Tobacco / smoke exposure   : No 
HIV risk factors: no 
Exercise  : Walks 3-4 times a week. Illicit drug use   : prefer to discuss with physician Sun Exposure  : occasionally Seat Belt Use : always Medication History Medications Reconciled  
 
Past Surgical History Colon Polyp Removal - Colonoscopy   
Gallbladder Surgery   laparoscopic Total Hip Replacement   right Tubal Ligation   
Other Joint Surgery   
Arthroscopy of Knee   left Diagnostic Studies History MRI, Spine   Date: 10/8/2018, Results: Prior laminectomy at L4-5. Lumbar stenosis at L2-3 L3-4. Left-sided disc protrusion at L2-3 causing lateral recess and foraminal stenosis. Other Problems Rheumatoid Arthritis   
Depression   
Arthritis   
Autoimmune disorder   
Unspecified Diagnosis   
Treatment options were discussed with the patient in full.   
 
 
Review of Systems General Present- Appetite Loss. Not Present- Chills, Fatigue, Fever, Night Sweats, Weight Gain and Weight Loss. Musculoskeletal Present- Joint Pain and Joint Stiffness. Not Present- Back Pain and Joint Swelling. Neurological Present- Numbness, Tingling, Unsteadiness and Weakness. Not Present- Fainting, Headaches, Memory Loss, Seizures and Tremor. Physical Exam 
 
Neurologic Sensory Light Touch - Intact - Globally. Overall Assessment of Muscle Strength and Tone reveals Lower Extremities - Right Iliopsoas - 5/5. Left Iliopsoas - 4/5. Right Tibialis Anterior - 5/5. Left Tibialis Anterior - 5/5. Right Gastroc-Soleus - 5/5. Left Gastroc-Soleus - 5/5. Right EHL - 5/5. Left EHL - 5/5. General Assessment of Reflexes Right Ankle - Clonus is not present. Left Ankle - Clonus is not present. Reflexes (Dermatomes) 2/2 Normal - Left Achilles (L5-S2), Left Knee (L2-4), Right Achilles (L5-S2) and Right Knee (L2-4). Musculoskeletal 
Global Assessment Examination of related systems reveals - well-developed, well-nourished, in no acute distress, alert and oriented x 3. Gait and Station - normal gait and station and normal posture. Right Lower Extremity - normal strength and tone, normal range of motion without pain and no instability, subluxation or laxity. Left Lower Extremity - normal strength and tone, normal range of motion without pain and no instability, subluxation or laxity. Spine/Ribs/Pelvis Cervical Spine - Examination of the cervical spine reveals - no tenderness to palpation, no pain, no swelling, edema or erythema, normal cervical spine movements and normal sensation. Thoracic (Dorsal) Spine - Examination of the thoracic spine reveals - no tenderness over thoracic vertebrae, no pain, normal sensation and normal thoracic spine movements. Lumbosacral Spine - Examination of the lumbosacral spine reveals - no known fractures or deformities. Inspection and Palpation - Tenderness - moderate.  Assessment of pain reveals the following findings - The pain is characterized as - moderate. Location - pain refers to lower back bilaterally. ROJM - Trunk Extension - 15 degrees. Lumbar Spine Flexion - . Lumbosacral Spine - Functional Testing - Babinski Test negative, Prone Knee Bending Test negative, Slump Test negative, Straight Leg Raising Test negative. Assessment & Plan Lumbar stenosis with neurogenic claudication (724.03  M48.062) Impression: She has a prior lumbar laminectomy at L4-5. She has left sided thigh and knee pain with some weakness. She has some junctional stenosis and a left-sided disc protrusion At L2-3 and L3-4. Injections have only given her temporary relief. I think that she is a candidate for a revision laminectomy at L2-3 and L3-4 with discectomy. The risks and benefits were discussed at length with the patient and the patient has elected to proceed. Indications for surgery include failed conservative treatment. Alternative treatments, risks and the perioperative course were discussed with the patient. All questions were answered. The risks and benefits of the procedure were explained. Benefits include definitive diagnosis, relief of pain, elimination of deformity and improved function. Risks of surgery including bleeding, infection, weakness, numbness, CSF leak, failure to improve symptoms, exacerbation of medical co-morbidities and even death were discussed with the patient. Lumbar post-laminectomy syndrome (722.83  M96.1) Treatment options were discussed with the patient in full.(V65.49) Current Plans Pt Education - How to access health information online: discussed with patient and provided information. Pt Education - Educational materials were provided.: discussed with patient and provided information. Presurgical planning was preformed with the patient today Surgery to be scheduled Procedure: Lumbar Laminectomy L2-3 and L3-4 revision laminectomy Signed by Zac Caldwell MD

## 2019-04-15 ENCOUNTER — OFFICE VISIT (OUTPATIENT)
Dept: CARDIOLOGY CLINIC | Age: 79
End: 2019-04-15

## 2019-04-15 VITALS
SYSTOLIC BLOOD PRESSURE: 138 MMHG | OXYGEN SATURATION: 97 % | DIASTOLIC BLOOD PRESSURE: 80 MMHG | HEART RATE: 64 BPM | RESPIRATION RATE: 20 BRPM | BODY MASS INDEX: 36.67 KG/M2 | WEIGHT: 181.9 LBS | HEIGHT: 59 IN

## 2019-04-15 DIAGNOSIS — R00.2 HEART PALPITATIONS: Primary | ICD-10-CM

## 2019-04-15 DIAGNOSIS — Z01.818 PRE-OPERATIVE EXAMINATION: ICD-10-CM

## 2019-04-15 DIAGNOSIS — E78.2 MIXED HYPERLIPIDEMIA: ICD-10-CM

## 2019-04-15 RX ORDER — DULOXETIN HYDROCHLORIDE 30 MG/1
CAPSULE, DELAYED RELEASE ORAL
COMMUNITY
Start: 2019-03-29 | End: 2019-09-19 | Stop reason: ALTCHOICE

## 2019-04-15 NOTE — PROGRESS NOTES
Sravan Martin, FNP-BC Subjective/HPI:  
 
Ms. Jan Estrada is a [de-identified] y.o. female is here to discuss results of stress test. She has a PMHx of PVCs, back pain, HLD, and depression. She had stress test and echocardiogram done as part of pre-operative workup for laminectomy, due to PVCs noted on EKG. Lexiscan Stress Test (3/14/19): · Baseline ECG: Sinus rhythm, PVCs. 1st Degree AVB, PVC's · Negative stress test. 
· Gated SPECT: Left ventricular function post-stress was normal. Calculated ejection fraction is 69%. There is no evidence of transient ischemic dilation (TID). · Left ventricular perfusion is normal. 
· Normal myocardial perfusion imaging. Myocardial perfusion imaging supports a low risk stress test. 
  
 
Echocardiogram (3/14/19): · Normal cavity size and systolic function (ejection fraction normal). Mild septal and moderate posterior wall hypertrophy. The estimated ejection fraction is 61 - 65%. No regional wall motion abnormality noted. There is mild (grade 1) left ventricular diastolic dysfunction. · Mild pulmonic valve regurgitation is present. PCP Provider Pierre Juarez MD 
Past Medical History:  
Diagnosis Date  Cataracts, bilateral   
 Chronic pain LOWER BACK  Colon cancer ([facility] [address] 2015  
 colectomy with ostomy, then reversed by DR ELVIN Daniel Levo  Colon polyps  Depression   
 did not do well on duloxetine  GERD (gastroesophageal reflux disease)  Glaucoma  Heart palpitations  Other ill-defined conditions(799.89) GASTROPARESIS  
 RA (rheumatoid arthritis) ([facility] [address] DR Teddy Alston  Restless leg syndrome  Spinal stenosis 5/8/2011  Unspecified adverse effect of anesthesia HEADACHE  
 UTI (lower urinary tract infection) Past Surgical History:  
Procedure Laterality Date  HX APPENDECTOMY  2015  HX COLECTOMY  2015 WITH COLOSTOMY AND THEN REOMVAL OF COLOSTOMY  HX COLOSTOMY  2016  REVERSAL  
  HX COLOSTOMY TAKE DOWN    
 HX DILATION AND CURETTAGE    
 HX GI  2014 COLONOSCOPY  
 HX KNEE ARTHROSCOPY Left  MENISCUS REPAIR  
 HX KNEE ARTHROSCOPY Left  HX KNEE ARTHROSCOPY Right   HX KNEE REPLACEMENT Left   HX KNEE REPLACEMENT Right 2018  HX LAP CHOLECYSTECTOMY  2005  HX LUMBAR LAMINECTOMY  2011  HX ORTHOPAEDIC Right HAND FX  
 HX TUBAL LIGATION    HX WRIST FRACTURE TX Left  TOTAL HIP ARTHROPLASTY Right 3/2012 Family History Problem Relation Age of Onset  Heart Disease Mother  Hypertension Mother [facility] [address] [facility] Arthritis-osteo Mother  Lung Disease Mother BENIGN TUMOR  Heart Failure Mother  Diabetes Father  COPD Father  Cancer Father LIP CANCER  
 Diabetes Sister  Heart Disease Sister SOMETHING WITH HER HEART MUSCLE  
 Hypertension Sister  Diabetes Brother  Heart Disease Brother  Hypertension Brother  Pacemaker Brother AND DEFIBRILLATOR  Stroke Brother  Anesth Problems Neg Hx Social History Socioeconomic History  Marital status:  Spouse name: Not on file  Number of children: Not on file  Years of education: Not on file  Highest education level: Not on file Occupational History  Not on file Social Needs  Financial resource strain: Not on file  Food insecurity:  
  Worry: Not on file Inability: Not on file  Transportation needs:  
  Medical: Not on file Non-medical: Not on file Tobacco Use  Smoking status: Former Smoker Packs/day: 1.00 Years: 20.00 Pack years: 20.00 Last attempt to quit: 3/7/1978 Years since quittin.1  Smokeless tobacco: Never Used Substance and Sexual Activity  Alcohol use: Yes Alcohol/week: 0.0 oz Types: 4 Glasses of wine per week Comment: maybe not that much  Drug use: No  
 Sexual activity: Not Currently Lifestyle  Physical activity:  
  Days per week: Not on file Minutes per session: Not on file  Stress: Not on file Relationships  Social connections:  
  Talks on phone: Not on file Gets together: Not on file Attends Spiritism service: Not on file Active member of club or organization: Not on file Attends meetings of clubs or organizations: Not on file Relationship status: Not on file  Intimate partner violence:  
  Fear of current or ex partner: Not on file Emotionally abused: Not on file Physically abused: Not on file Forced sexual activity: Not on file Other Topics Concern  Not on file Social History Narrative . [employer] to [employer] Allergies Allergen Reactions  Adhesive Tape-Silicones Other (comments) \"Left skin raw\" Current Outpatient Medications Medication Sig  DULoxetine (CYMBALTA) 30 mg capsule  calcium-cholecalciferol, D3, (CALCIUM 600 + D) tablet Take 1 Tab by mouth daily.  magnesium oxide 500 mg tab Take 1 Tab by mouth every Monday, Wednesday, Friday.  HYDROcodone-acetaminophen (NORCO) 5-325 mg per tablet Take 0.5-1 Tabs by mouth daily.  pramipexole (MIRAPEX) 1 mg tablet TAKE 1 TABLET EVERY NIGHT (Patient taking differently: TAKE 1.5 TABLET EVERY NIGHT)  INFLIXIMAB (REMICADE IV) by IntraVENous route. EVERY 4 WEEKS. LAST INFUSION WAS January 31, 2019  predniSONE (DELTASONE) 1 mg tablet Take 1 mg by mouth daily. Takes 3 tabs (3mg) daily No current facility-administered medications for this visit. I have reviewed the problem list, allergy list, medical history, family, social history and medications. Review of Symptoms: 
 
Review of Systems Constitutional: Negative for chills, fever and weight loss. HENT: Negative for nosebleeds. Eyes: Negative for blurred vision and double vision. Respiratory: Negative for cough, shortness of breath and wheezing. Cardiovascular: Negative for chest pain, palpitations, orthopnea, leg swelling and PND. Gastrointestinal: Negative for abdominal pain, blood in stool, diarrhea, nausea and vomiting. Musculoskeletal: Negative for joint pain. Skin: Negative for rash. Neurological: Negative for dizziness, tingling and loss of consciousness. Endo/Heme/Allergies: Does not bruise/bleed easily. Physical Exam:   
 
General: Well developed, in no acute distress, cooperative and alert HEENT: No carotid bruits, no JVD, trach is midline. Neck Supple, PEERL, EOM intact. Heart:  reg rate and rhythm; normal S1/S2; no murmurs, gallops or rubs. Respiratory: Clear bilaterally x 4, no wheezing or rales Abdomen:   Soft, non-tender, no distention, no masses. + BS. Extremities:  Normal cap refill, no cyanosis, atraumatic. No edema. Neuro: A&Ox3, speech clear, gait stable. Skin: Skin color is normal. No rashes or lesions. Non diaphoretic Vascular: 2+ pulses symmetric in all extremities Vitals:  
 04/15/19 1153 04/15/19 1156 BP: 140/80 138/80 Pulse: 64 Resp: 20 SpO2: 97% Weight: 181 lb 14.4 oz (82.5 kg) Height: 4' 11\" (1.499 m) Cardiographics Results for orders placed or performed during the hospital encounter of 02/21/19 EKG, 12 LEAD, INITIAL Result Value Ref Range Ventricular Rate 57 BPM  
 Atrial Rate 57 BPM  
 P-R Interval 206 ms QRS Duration 98 ms Q-T Interval 376 ms QTC Calculation (Bezet) 365 ms Calculated P Axis 42 degrees Calculated R Axis -32 degrees Calculated T Axis 85 degrees Diagnosis Sinus bradycardia with frequent premature ventricular complexes with 1st  
degree AV block Left axis deviation Nonspecific ST and T wave abnormality When compared with ECG of 09-MAY-2018 08:39, 
premature ventricular complexes are now present Confirmed by Esther Kirkpatrick MD, Mimi Gramajo ([id number]) on 2/21/2019 4:10:59 PM 
  
 Results for orders placed or performed in visit on 05/06/11 AMB POC EKG ROUTINE W/ 12 LEADS, INTER & REP Impression Normal sinus rhythm without any acute changes. Cardiology Labs: 
Lab Results Component Value Date/Time Cholesterol, total 351 (H) 03/27/2019 09:15 AM  
 HDL Cholesterol 44 03/27/2019 09:15 AM  
 LDL, calculated 250 (H) 03/27/2019 09:15 AM  
 Triglyceride 283 (H) 03/27/2019 09:15 AM  
 
 
Lab Results Component Value Date/Time Sodium 138 02/21/2019 02:58 PM  
 Potassium 4.0 02/21/2019 02:58 PM  
 Chloride 104 02/21/2019 02:58 PM  
 CO2 28 02/21/2019 02:58 PM  
 Anion gap 6 02/21/2019 02:58 PM  
 Glucose 102 (H) 02/21/2019 02:58 PM  
 BUN 14 02/21/2019 02:58 PM  
 Creatinine 0.78 02/21/2019 02:58 PM  
 BUN/Creatinine ratio 18 02/21/2019 02:58 PM  
 GFR est AA >60 02/21/2019 02:58 PM  
 GFR est non-AA >60 02/21/2019 02:58 PM  
 Calcium 9.5 02/21/2019 02:58 PM  
 Bilirubin, total 0.6 09/07/2012 01:20 PM  
 AST (SGOT) 12 (L) 09/07/2012 01:20 PM  
 Alk. phosphatase 104 09/07/2012 01:20 PM  
 Protein, total 6.9 09/07/2012 01:20 PM  
 Albumin 3.1 (L) 09/07/2012 01:20 PM  
 Globulin 3.8 09/07/2012 01:20 PM  
 A-G Ratio 0.8 (L) 09/07/2012 01:20 PM  
 ALT (SGPT) 29 09/07/2012 01:20 PM  
  
 
 
 Assessment: 
 
 Assessment: ICD-10-CM ICD-9-CM 1. Heart palpitations R00.2 785.1 2. Pre-operative examination Z01.818 V72.84   
3. Mixed hyperlipidemia E78.2 272.2 evolocumab (REPATHA SURECLICK) pen injection Plan: 1. Heart palpitations Noted PVCs on EKG, which have resolved with magnesium supplementation. Echocardiogram done 3/2019 with preserved LVEF 60-65% with grade 1 DD with mild PI. Lexiscan stress test was negative for ischemia. Continue with present conservative management. Should symptoms recur, may consider addition of BB therapy or EP referral. 
 
2. Pre-operative examination Given normal echocardiogram and lexiscan stress test, would be considered low operative risk for procedure from cardiovascular standpoint. 3.  Mixed hyperlipidemia Lipids done 3/2019 with , , HDL 44 and . Has previously been intolerant to Lipitor, Crestor and Pravastatin due to severe myalgias affecting mobility. Discussed PCSK9-I. Repatha use demonstrated in office today, she is agreeable to start medication. F/u in 3 months to assess response and plan to repeat lipid panel in 3 months from starting drug. Joslyn Espinal NP Patient seen and examined by me with nurse practitioner. Maranda Hoffman personally performed all components of the history, physical, and medical decision making and agree with the assessment and plan with minor modifications as noted.  
 
Ellis Moffett MD

## 2019-04-15 NOTE — PROGRESS NOTES
Reviewed record in preparation for visit and obtained necessary documentation. Verified patient with 2 identifiers Chief Complaint Patient presents with  Results Here to discuss results of Nuclear stress test   
 Shortness of Breath  
  upon exertion  Hand Swelling  
  question heart related or RA 1. Have you been to the ER, urgent care clinic since your last visit? Hospitalized since your last visit? No  
 
2. Have you seen or consulted any other health care providers outside of the [address] [place] [address] since your last visit? Include any pap smears or colon screening.   No

## 2019-04-16 ENCOUNTER — APPOINTMENT (OUTPATIENT)
Dept: GENERAL RADIOLOGY | Age: 79
DRG: 517 | End: 2019-04-16
Attending: ORTHOPAEDIC SURGERY
Payer: MEDICARE

## 2019-04-16 ENCOUNTER — ANESTHESIA (OUTPATIENT)
Dept: SURGERY | Age: 79
DRG: 517 | End: 2019-04-16
Payer: MEDICARE

## 2019-04-16 ENCOUNTER — HOSPITAL ENCOUNTER (INPATIENT)
Age: 79
LOS: 1 days | Discharge: HOME HEALTH | DRG: 517 | End: 2019-04-18
Attending: ORTHOPAEDIC SURGERY | Admitting: ORTHOPAEDIC SURGERY
Payer: MEDICARE

## 2019-04-16 ENCOUNTER — ANESTHESIA EVENT (OUTPATIENT)
Dept: SURGERY | Age: 79
DRG: 517 | End: 2019-04-16
Payer: MEDICARE

## 2019-04-16 DIAGNOSIS — M48.061 SPINAL STENOSIS OF LUMBAR REGION WITHOUT NEUROGENIC CLAUDICATION: Primary | ICD-10-CM

## 2019-04-16 LAB
ABO + RH BLD: NORMAL
BLOOD GROUP ANTIBODIES SERPL: NORMAL
GLUCOSE BLD STRIP.AUTO-MCNC: 109 MG/DL (ref 65–100)
SERVICE CMNT-IMP: ABNORMAL
SPECIMEN EXP DATE BLD: NORMAL

## 2019-04-16 PROCEDURE — 99218 HC RM OBSERVATION: CPT

## 2019-04-16 PROCEDURE — 77030038600 HC TU BPLR IRR DISP STRY -B: Performed by: ORTHOPAEDIC SURGERY

## 2019-04-16 PROCEDURE — 74011250637 HC RX REV CODE- 250/637: Performed by: PHYSICIAN ASSISTANT

## 2019-04-16 PROCEDURE — 77030032490 HC SLV COMPR SCD KNE COVD -B: Performed by: ORTHOPAEDIC SURGERY

## 2019-04-16 PROCEDURE — 76010000162 HC OR TIME 1.5 TO 2 HR INTENSV-TIER 1: Performed by: ORTHOPAEDIC SURGERY

## 2019-04-16 PROCEDURE — 86901 BLOOD TYPING SEROLOGIC RH(D): CPT

## 2019-04-16 PROCEDURE — 74011250636 HC RX REV CODE- 250/636

## 2019-04-16 PROCEDURE — 76060000034 HC ANESTHESIA 1.5 TO 2 HR: Performed by: ORTHOPAEDIC SURGERY

## 2019-04-16 PROCEDURE — 77030012893

## 2019-04-16 PROCEDURE — 77030031139 HC SUT VCRL2 J&J -A: Performed by: ORTHOPAEDIC SURGERY

## 2019-04-16 PROCEDURE — 36415 COLL VENOUS BLD VENIPUNCTURE: CPT

## 2019-04-16 PROCEDURE — 77030026438 HC STYL ET INTUB CARD -A: Performed by: ANESTHESIOLOGY

## 2019-04-16 PROCEDURE — 74011000272 HC RX REV CODE- 272: Performed by: ORTHOPAEDIC SURGERY

## 2019-04-16 PROCEDURE — 74011250636 HC RX REV CODE- 250/636: Performed by: ANESTHESIOLOGY

## 2019-04-16 PROCEDURE — V2790 AMNIOTIC MEMBRANE: HCPCS | Performed by: ORTHOPAEDIC SURGERY

## 2019-04-16 PROCEDURE — 74011250636 HC RX REV CODE- 250/636: Performed by: PHYSICIAN ASSISTANT

## 2019-04-16 PROCEDURE — 77030020782 HC GWN BAIR PAWS FLX 3M -B

## 2019-04-16 PROCEDURE — 74011000250 HC RX REV CODE- 250: Performed by: PHYSICIAN ASSISTANT

## 2019-04-16 PROCEDURE — 74011000250 HC RX REV CODE- 250: Performed by: ORTHOPAEDIC SURGERY

## 2019-04-16 PROCEDURE — 82962 GLUCOSE BLOOD TEST: CPT

## 2019-04-16 PROCEDURE — 77030014650 HC SEAL MTRX FLOSEL BAXT -C: Performed by: ORTHOPAEDIC SURGERY

## 2019-04-16 PROCEDURE — 77030037713 HC CLOSR DEV INCIS ZIP STRY -B: Performed by: ORTHOPAEDIC SURGERY

## 2019-04-16 PROCEDURE — 74011000250 HC RX REV CODE- 250

## 2019-04-16 PROCEDURE — 01NB0ZZ RELEASE LUMBAR NERVE, OPEN APPROACH: ICD-10-PCS | Performed by: ORTHOPAEDIC SURGERY

## 2019-04-16 PROCEDURE — 77030029099 HC BN WAX SSPC -A: Performed by: ORTHOPAEDIC SURGERY

## 2019-04-16 PROCEDURE — 76210000016 HC OR PH I REC 1 TO 1.5 HR: Performed by: ORTHOPAEDIC SURGERY

## 2019-04-16 PROCEDURE — 77030008684 HC TU ET CUF COVD -B: Performed by: ANESTHESIOLOGY

## 2019-04-16 DEVICE — IMPLANT THN HYDRPHLC AMNIO MEMEBRANE 4 X 4 CM: Type: IMPLANTABLE DEVICE | Site: BACK | Status: FUNCTIONAL

## 2019-04-16 RX ORDER — MORPHINE SULFATE 2 MG/ML
2 INJECTION, SOLUTION INTRAMUSCULAR; INTRAVENOUS
Status: ACTIVE | OUTPATIENT
Start: 2019-04-16 | End: 2019-04-17

## 2019-04-16 RX ORDER — ONDANSETRON 2 MG/ML
4 INJECTION INTRAMUSCULAR; INTRAVENOUS AS NEEDED
Status: DISCONTINUED | OUTPATIENT
Start: 2019-04-16 | End: 2019-04-16 | Stop reason: HOSPADM

## 2019-04-16 RX ORDER — SUCCINYLCHOLINE CHLORIDE 20 MG/ML
INJECTION INTRAMUSCULAR; INTRAVENOUS AS NEEDED
Status: DISCONTINUED | OUTPATIENT
Start: 2019-04-16 | End: 2019-04-16 | Stop reason: HOSPADM

## 2019-04-16 RX ORDER — SODIUM CHLORIDE 9 MG/ML
125 INJECTION, SOLUTION INTRAVENOUS CONTINUOUS
Status: DISPENSED | OUTPATIENT
Start: 2019-04-16 | End: 2019-04-17

## 2019-04-16 RX ORDER — OXYCODONE HYDROCHLORIDE 5 MG/1
10 TABLET ORAL
Status: DISCONTINUED | OUTPATIENT
Start: 2019-04-16 | End: 2019-04-18 | Stop reason: HOSPADM

## 2019-04-16 RX ORDER — LANOLIN ALCOHOL/MO/W.PET/CERES
400 CREAM (GRAM) TOPICAL
Status: DISCONTINUED | OUTPATIENT
Start: 2019-04-17 | End: 2019-04-18 | Stop reason: HOSPADM

## 2019-04-16 RX ORDER — SODIUM CHLORIDE 9 MG/ML
25 INJECTION, SOLUTION INTRAVENOUS CONTINUOUS
Status: DISCONTINUED | OUTPATIENT
Start: 2019-04-16 | End: 2019-04-16 | Stop reason: HOSPADM

## 2019-04-16 RX ORDER — ONDANSETRON 2 MG/ML
4 INJECTION INTRAMUSCULAR; INTRAVENOUS
Status: ACTIVE | OUTPATIENT
Start: 2019-04-16 | End: 2019-04-17

## 2019-04-16 RX ORDER — SODIUM CHLORIDE 0.9 % (FLUSH) 0.9 %
5-40 SYRINGE (ML) INJECTION EVERY 8 HOURS
Status: DISCONTINUED | OUTPATIENT
Start: 2019-04-16 | End: 2019-04-16 | Stop reason: HOSPADM

## 2019-04-16 RX ORDER — FENTANYL CITRATE 50 UG/ML
50 INJECTION, SOLUTION INTRAMUSCULAR; INTRAVENOUS AS NEEDED
Status: DISCONTINUED | OUTPATIENT
Start: 2019-04-16 | End: 2019-04-16 | Stop reason: HOSPADM

## 2019-04-16 RX ORDER — EPHEDRINE SULFATE 50 MG/ML
INJECTION, SOLUTION INTRAVENOUS AS NEEDED
Status: DISCONTINUED | OUTPATIENT
Start: 2019-04-16 | End: 2019-04-16 | Stop reason: HOSPADM

## 2019-04-16 RX ORDER — PROPOFOL 10 MG/ML
INJECTION, EMULSION INTRAVENOUS AS NEEDED
Status: DISCONTINUED | OUTPATIENT
Start: 2019-04-16 | End: 2019-04-16 | Stop reason: HOSPADM

## 2019-04-16 RX ORDER — SODIUM CHLORIDE 0.9 % (FLUSH) 0.9 %
5-40 SYRINGE (ML) INJECTION AS NEEDED
Status: DISCONTINUED | OUTPATIENT
Start: 2019-04-16 | End: 2019-04-18 | Stop reason: HOSPADM

## 2019-04-16 RX ORDER — SODIUM CHLORIDE, SODIUM LACTATE, POTASSIUM CHLORIDE, CALCIUM CHLORIDE 600; 310; 30; 20 MG/100ML; MG/100ML; MG/100ML; MG/100ML
25 INJECTION, SOLUTION INTRAVENOUS CONTINUOUS
Status: DISCONTINUED | OUTPATIENT
Start: 2019-04-16 | End: 2019-04-16 | Stop reason: HOSPADM

## 2019-04-16 RX ORDER — PRAMIPEXOLE DIHYDROCHLORIDE 0.25 MG/1
1.5 TABLET ORAL
Status: DISCONTINUED | OUTPATIENT
Start: 2019-04-16 | End: 2019-04-18 | Stop reason: HOSPADM

## 2019-04-16 RX ORDER — FACIAL-BODY WIPES
10 EACH TOPICAL DAILY PRN
Status: DISCONTINUED | OUTPATIENT
Start: 2019-04-18 | End: 2019-04-18 | Stop reason: HOSPADM

## 2019-04-16 RX ORDER — MIDAZOLAM HYDROCHLORIDE 1 MG/ML
1 INJECTION, SOLUTION INTRAMUSCULAR; INTRAVENOUS AS NEEDED
Status: DISCONTINUED | OUTPATIENT
Start: 2019-04-16 | End: 2019-04-16 | Stop reason: HOSPADM

## 2019-04-16 RX ORDER — OXYCODONE HYDROCHLORIDE 5 MG/1
5 TABLET ORAL AS NEEDED
Status: DISCONTINUED | OUTPATIENT
Start: 2019-04-16 | End: 2019-04-16 | Stop reason: HOSPADM

## 2019-04-16 RX ORDER — HYDROMORPHONE HYDROCHLORIDE 2 MG/ML
INJECTION, SOLUTION INTRAMUSCULAR; INTRAVENOUS; SUBCUTANEOUS AS NEEDED
Status: DISCONTINUED | OUTPATIENT
Start: 2019-04-16 | End: 2019-04-16 | Stop reason: HOSPADM

## 2019-04-16 RX ORDER — SODIUM CHLORIDE, SODIUM LACTATE, POTASSIUM CHLORIDE, CALCIUM CHLORIDE 600; 310; 30; 20 MG/100ML; MG/100ML; MG/100ML; MG/100ML
INJECTION, SOLUTION INTRAVENOUS
Status: DISCONTINUED | OUTPATIENT
Start: 2019-04-16 | End: 2019-04-16 | Stop reason: HOSPADM

## 2019-04-16 RX ORDER — MIDAZOLAM HYDROCHLORIDE 1 MG/ML
0.5 INJECTION, SOLUTION INTRAMUSCULAR; INTRAVENOUS
Status: DISCONTINUED | OUTPATIENT
Start: 2019-04-16 | End: 2019-04-16 | Stop reason: HOSPADM

## 2019-04-16 RX ORDER — PREDNISONE 1 MG/1
3 TABLET ORAL DAILY
Status: DISCONTINUED | OUTPATIENT
Start: 2019-04-17 | End: 2019-04-18 | Stop reason: HOSPADM

## 2019-04-16 RX ORDER — DEXMEDETOMIDINE HYDROCHLORIDE 4 UG/ML
INJECTION, SOLUTION INTRAVENOUS AS NEEDED
Status: DISCONTINUED | OUTPATIENT
Start: 2019-04-16 | End: 2019-04-16 | Stop reason: HOSPADM

## 2019-04-16 RX ORDER — GLYCOPYRROLATE 0.2 MG/ML
INJECTION INTRAMUSCULAR; INTRAVENOUS AS NEEDED
Status: DISCONTINUED | OUTPATIENT
Start: 2019-04-16 | End: 2019-04-16 | Stop reason: HOSPADM

## 2019-04-16 RX ORDER — KETOROLAC TROMETHAMINE 30 MG/ML
15 INJECTION, SOLUTION INTRAMUSCULAR; INTRAVENOUS EVERY 6 HOURS
Status: COMPLETED | OUTPATIENT
Start: 2019-04-16 | End: 2019-04-17

## 2019-04-16 RX ORDER — NALOXONE HYDROCHLORIDE 0.4 MG/ML
0.4 INJECTION, SOLUTION INTRAMUSCULAR; INTRAVENOUS; SUBCUTANEOUS AS NEEDED
Status: DISCONTINUED | OUTPATIENT
Start: 2019-04-16 | End: 2019-04-18 | Stop reason: HOSPADM

## 2019-04-16 RX ORDER — AMOXICILLIN 250 MG
1 CAPSULE ORAL 2 TIMES DAILY
Status: DISCONTINUED | OUTPATIENT
Start: 2019-04-16 | End: 2019-04-18 | Stop reason: HOSPADM

## 2019-04-16 RX ORDER — LIDOCAINE HYDROCHLORIDE 10 MG/ML
0.1 INJECTION, SOLUTION EPIDURAL; INFILTRATION; INTRACAUDAL; PERINEURAL AS NEEDED
Status: DISCONTINUED | OUTPATIENT
Start: 2019-04-16 | End: 2019-04-16 | Stop reason: HOSPADM

## 2019-04-16 RX ORDER — SODIUM CHLORIDE 0.9 % (FLUSH) 0.9 %
5-40 SYRINGE (ML) INJECTION AS NEEDED
Status: DISCONTINUED | OUTPATIENT
Start: 2019-04-16 | End: 2019-04-16 | Stop reason: HOSPADM

## 2019-04-16 RX ORDER — DIPHENHYDRAMINE HYDROCHLORIDE 50 MG/ML
12.5 INJECTION, SOLUTION INTRAMUSCULAR; INTRAVENOUS
Status: ACTIVE | OUTPATIENT
Start: 2019-04-16 | End: 2019-04-17

## 2019-04-16 RX ORDER — CEFAZOLIN SODIUM/WATER 2 G/20 ML
2 SYRINGE (ML) INTRAVENOUS EVERY 8 HOURS
Status: COMPLETED | OUTPATIENT
Start: 2019-04-16 | End: 2019-04-17

## 2019-04-16 RX ORDER — ROCURONIUM BROMIDE 10 MG/ML
INJECTION, SOLUTION INTRAVENOUS AS NEEDED
Status: DISCONTINUED | OUTPATIENT
Start: 2019-04-16 | End: 2019-04-16 | Stop reason: HOSPADM

## 2019-04-16 RX ORDER — CYCLOBENZAPRINE HCL 10 MG
10 TABLET ORAL
Status: DISCONTINUED | OUTPATIENT
Start: 2019-04-16 | End: 2019-04-18 | Stop reason: HOSPADM

## 2019-04-16 RX ORDER — DIPHENHYDRAMINE HYDROCHLORIDE 50 MG/ML
12.5 INJECTION, SOLUTION INTRAMUSCULAR; INTRAVENOUS AS NEEDED
Status: DISCONTINUED | OUTPATIENT
Start: 2019-04-16 | End: 2019-04-16 | Stop reason: HOSPADM

## 2019-04-16 RX ORDER — OXYCODONE HYDROCHLORIDE 5 MG/1
5 TABLET ORAL
Status: DISCONTINUED | OUTPATIENT
Start: 2019-04-16 | End: 2019-04-18 | Stop reason: HOSPADM

## 2019-04-16 RX ORDER — KETAMINE HCL IN 0.9 % NACL 50 MG/5 ML
SYRINGE (ML) INTRAVENOUS AS NEEDED
Status: DISCONTINUED | OUTPATIENT
Start: 2019-04-16 | End: 2019-04-16 | Stop reason: HOSPADM

## 2019-04-16 RX ORDER — DULOXETIN HYDROCHLORIDE 30 MG/1
30 CAPSULE, DELAYED RELEASE ORAL DAILY
Status: DISCONTINUED | OUTPATIENT
Start: 2019-04-17 | End: 2019-04-18 | Stop reason: HOSPADM

## 2019-04-16 RX ORDER — MORPHINE SULFATE 10 MG/ML
2 INJECTION, SOLUTION INTRAMUSCULAR; INTRAVENOUS
Status: DISCONTINUED | OUTPATIENT
Start: 2019-04-16 | End: 2019-04-16 | Stop reason: HOSPADM

## 2019-04-16 RX ORDER — FERROUS SULFATE, DRIED 160(50) MG
1 TABLET, EXTENDED RELEASE ORAL DAILY
Status: DISCONTINUED | OUTPATIENT
Start: 2019-04-17 | End: 2019-04-18 | Stop reason: HOSPADM

## 2019-04-16 RX ORDER — HYDROMORPHONE HYDROCHLORIDE 1 MG/ML
0.2 INJECTION, SOLUTION INTRAMUSCULAR; INTRAVENOUS; SUBCUTANEOUS
Status: DISCONTINUED | OUTPATIENT
Start: 2019-04-16 | End: 2019-04-16 | Stop reason: HOSPADM

## 2019-04-16 RX ORDER — NEOSTIGMINE METHYLSULFATE 1 MG/ML
INJECTION INTRAVENOUS AS NEEDED
Status: DISCONTINUED | OUTPATIENT
Start: 2019-04-16 | End: 2019-04-16 | Stop reason: HOSPADM

## 2019-04-16 RX ORDER — SODIUM CHLORIDE 0.9 % (FLUSH) 0.9 %
5-40 SYRINGE (ML) INJECTION EVERY 8 HOURS
Status: DISCONTINUED | OUTPATIENT
Start: 2019-04-16 | End: 2019-04-18 | Stop reason: HOSPADM

## 2019-04-16 RX ORDER — LIDOCAINE HYDROCHLORIDE 20 MG/ML
INJECTION, SOLUTION EPIDURAL; INFILTRATION; INTRACAUDAL; PERINEURAL AS NEEDED
Status: DISCONTINUED | OUTPATIENT
Start: 2019-04-16 | End: 2019-04-16 | Stop reason: HOSPADM

## 2019-04-16 RX ORDER — ONDANSETRON 2 MG/ML
INJECTION INTRAMUSCULAR; INTRAVENOUS AS NEEDED
Status: DISCONTINUED | OUTPATIENT
Start: 2019-04-16 | End: 2019-04-16 | Stop reason: HOSPADM

## 2019-04-16 RX ORDER — SODIUM CHLORIDE, SODIUM LACTATE, POTASSIUM CHLORIDE, CALCIUM CHLORIDE 600; 310; 30; 20 MG/100ML; MG/100ML; MG/100ML; MG/100ML
125 INJECTION, SOLUTION INTRAVENOUS CONTINUOUS
Status: DISCONTINUED | OUTPATIENT
Start: 2019-04-16 | End: 2019-04-16 | Stop reason: HOSPADM

## 2019-04-16 RX ORDER — FENTANYL CITRATE 50 UG/ML
INJECTION, SOLUTION INTRAMUSCULAR; INTRAVENOUS AS NEEDED
Status: DISCONTINUED | OUTPATIENT
Start: 2019-04-16 | End: 2019-04-16 | Stop reason: HOSPADM

## 2019-04-16 RX ORDER — POLYETHYLENE GLYCOL 3350 17 G/17G
17 POWDER, FOR SOLUTION ORAL DAILY
Status: DISCONTINUED | OUTPATIENT
Start: 2019-04-17 | End: 2019-04-18 | Stop reason: HOSPADM

## 2019-04-16 RX ORDER — FENTANYL CITRATE 50 UG/ML
25 INJECTION, SOLUTION INTRAMUSCULAR; INTRAVENOUS
Status: DISCONTINUED | OUTPATIENT
Start: 2019-04-16 | End: 2019-04-16 | Stop reason: HOSPADM

## 2019-04-16 RX ORDER — CEFAZOLIN SODIUM/WATER 2 G/20 ML
2 SYRINGE (ML) INTRAVENOUS ONCE
Status: COMPLETED | OUTPATIENT
Start: 2019-04-16 | End: 2019-04-16

## 2019-04-16 RX ORDER — ACETAMINOPHEN 500 MG
1000 TABLET ORAL EVERY 6 HOURS
Status: DISCONTINUED | OUTPATIENT
Start: 2019-04-16 | End: 2019-04-18 | Stop reason: HOSPADM

## 2019-04-16 RX ADMIN — Medication 2 G: at 22:55

## 2019-04-16 RX ADMIN — SENNOSIDES,DOCUSATE SODIUM 1 TABLET: 8.6; 5 TABLET, FILM COATED ORAL at 18:21

## 2019-04-16 RX ADMIN — EPHEDRINE SULFATE 10 MG: 50 INJECTION, SOLUTION INTRAVENOUS at 14:49

## 2019-04-16 RX ADMIN — ACETAMINOPHEN 1000 MG: 500 TABLET ORAL at 18:21

## 2019-04-16 RX ADMIN — Medication 2 G: at 14:24

## 2019-04-16 RX ADMIN — OXYCODONE HYDROCHLORIDE 5 MG: 5 TABLET ORAL at 22:56

## 2019-04-16 RX ADMIN — ROCURONIUM BROMIDE 25 MG: 10 INJECTION, SOLUTION INTRAVENOUS at 14:24

## 2019-04-16 RX ADMIN — GLYCOPYRROLATE 0.4 MG: 0.2 INJECTION INTRAMUSCULAR; INTRAVENOUS at 15:25

## 2019-04-16 RX ADMIN — PRAMIPEXOLE DIHYDROCHLORIDE 1.5 MG: 0.25 TABLET ORAL at 22:55

## 2019-04-16 RX ADMIN — DEXMEDETOMIDINE HYDROCHLORIDE 20 MCG: 4 INJECTION, SOLUTION INTRAVENOUS at 14:34

## 2019-04-16 RX ADMIN — PROPOFOL 100 MG: 10 INJECTION, EMULSION INTRAVENOUS at 14:18

## 2019-04-16 RX ADMIN — ROCURONIUM BROMIDE 5 MG: 10 INJECTION, SOLUTION INTRAVENOUS at 14:18

## 2019-04-16 RX ADMIN — SODIUM CHLORIDE, SODIUM LACTATE, POTASSIUM CHLORIDE, CALCIUM CHLORIDE: 600; 310; 30; 20 INJECTION, SOLUTION INTRAVENOUS at 15:20

## 2019-04-16 RX ADMIN — Medication 20 MG: at 14:29

## 2019-04-16 RX ADMIN — KETOROLAC TROMETHAMINE 15 MG: 30 INJECTION, SOLUTION INTRAMUSCULAR at 18:21

## 2019-04-16 RX ADMIN — FENTANYL CITRATE 100 MCG: 50 INJECTION, SOLUTION INTRAMUSCULAR; INTRAVENOUS at 14:18

## 2019-04-16 RX ADMIN — SODIUM CHLORIDE, SODIUM LACTATE, POTASSIUM CHLORIDE, AND CALCIUM CHLORIDE 25 ML/HR: 600; 310; 30; 20 INJECTION, SOLUTION INTRAVENOUS at 13:37

## 2019-04-16 RX ADMIN — Medication 10 ML: at 22:59

## 2019-04-16 RX ADMIN — SODIUM CHLORIDE, SODIUM LACTATE, POTASSIUM CHLORIDE, CALCIUM CHLORIDE: 600; 310; 30; 20 INJECTION, SOLUTION INTRAVENOUS at 14:07

## 2019-04-16 RX ADMIN — ONDANSETRON 4 MG: 2 INJECTION INTRAMUSCULAR; INTRAVENOUS at 15:25

## 2019-04-16 RX ADMIN — GLYCOPYRROLATE 0.2 MG: 0.2 INJECTION INTRAMUSCULAR; INTRAVENOUS at 14:46

## 2019-04-16 RX ADMIN — NEOSTIGMINE METHYLSULFATE 3 MG: 1 INJECTION INTRAVENOUS at 15:25

## 2019-04-16 RX ADMIN — SODIUM CHLORIDE 125 ML/HR: 900 INJECTION, SOLUTION INTRAVENOUS at 15:45

## 2019-04-16 RX ADMIN — HYDROMORPHONE HYDROCHLORIDE 0.5 MG: 2 INJECTION, SOLUTION INTRAMUSCULAR; INTRAVENOUS; SUBCUTANEOUS at 15:33

## 2019-04-16 RX ADMIN — LIDOCAINE HYDROCHLORIDE 100 MG: 20 INJECTION, SOLUTION EPIDURAL; INFILTRATION; INTRACAUDAL; PERINEURAL at 14:18

## 2019-04-16 RX ADMIN — SUCCINYLCHOLINE CHLORIDE 120 MG: 20 INJECTION INTRAMUSCULAR; INTRAVENOUS at 14:18

## 2019-04-16 NOTE — PROGRESS NOTES
TRANSFER - IN REPORT: 
 
Verbal report received from CLIVE Sifuentes (name) on Dena Villarreal  being received from PACU (unit) for routine post - op Report consisted of patients Situation, Background, Assessment and  
Recommendations(SBAR). Information from the following report(s) SBAR, Kardex, OR Summary, Intake/Output, MAR, Accordion and Recent Results was reviewed with the receiving nurse. Opportunity for questions and clarification was provided. Assessment completed upon patients arrival to unit and care assumed.

## 2019-04-16 NOTE — ANESTHESIA PREPROCEDURE EVALUATION
Anesthetic History No history of anesthetic complications Review of Systems / Medical History Patient summary reviewed, nursing notes reviewed and pertinent labs reviewed Pulmonary Within defined limits Neuro/Psych Psychiatric history Cardiovascular Dysrhythmias : PVC Exercise tolerance: >4 METS Comments: Neg stress. EF 60-65% GI/Hepatic/Renal 
  
GERD: well controlled Endo/Other Obesity and arthritis Other Findings Comments: RA Physical Exam 
 
Airway Mallampati: II 
TM Distance: > 6 cm Neck ROM: normal range of motion Mouth opening: Normal 
 
 Cardiovascular Regular rate and rhythm,  S1 and S2 normal,  no murmur, click, rub, or gallop Dental 
 
Dentition: Caps/crowns Pulmonary Breath sounds clear to auscultation Abdominal 
GI exam deferred Other Findings Anesthetic Plan ASA: 3 Anesthesia type: general 
 
 
 
 
Induction: Intravenous Anesthetic plan and risks discussed with: Patient

## 2019-04-16 NOTE — ROUTINE PROCESS
TRANSFER - OUT REPORT: 
 
Verbal report given to CLIVE MAYNARD(name) on Nii Lopez  being transferred to [facility](unit) for routine post - op Report consisted of patients Situation, Background, Assessment and  
Recommendations(SBAR). Time Pre op antibiotic given:1424 Anesthesia Stop time: [phone] Parekh Present on Transfer to [place] [address] Order for Parekh on Chart:NO Discharge Prescriptions with Chart:NO Information from the following report(s) SBAR, Kardex, OR Summary, Procedure Summary and Cardiac Rhythm NSR was reviewed with the receiving nurse. Opportunity for questions and clarification was provided. Is the patient on 02? YES 
     L/Min 2 Is the patient on a monitor? NO Is the nurse transporting with the patient? NO Surgical Waiting Area notified of patient's transfer from PACU? YES The following personal items collected during your admission accompanied patient upon transfer:  
Dental Appliance: Dental Appliances: None Vision: Visual Aid: Glasses Hearing Aid: Hearing Aid: Bilateral 
Jewelry: Jewelry: None Clothing: Clothing: (glasses, hearing aids, valuables bag w pt in PACU) Other Valuables: Other Valuables: Eyeglasses, Other (comment)(GLASSES & HEARING AIDS TO PACU) Valuables sent to safe:

## 2019-04-16 NOTE — BRIEF OP NOTE
BRIEF OPERATIVE NOTE Date of Procedure: 4/16/2019 Preoperative Diagnosis: STENOSIS, POST LAMINECTOMY SYNDROME, LOW BACK PAIN Postoperative Diagnosis: STENOSIS, POST LAMINECTOMY SYNDROME, LOW BACK PAIN Procedure(s): LUMBAR LAMINECTOMY L2-3, L3-4, POSSIBLE L4,5 REVISION LAMINECTOMY Surgeon(s) and Role: Daniela Fong MD - Primary Surgical Assistant: Mervat Melo PA-C Surgical Staff: 
Circ-1: Gregg Coe RN 
Circ-2: Jose Vallecillo RN Physician Assistant: IRAJ Gonzales Radiology Technician: sIai Alvarado Scrub RN-1: Delena Fothergill, RN Scrub RN-Relief: Ayush Camargo RN Event Time In Time Out Incision Start 1440 Incision Close 1532 Anesthesia: General  
Estimated Blood Loss: 50-80cc Specimens: * No specimens in log * Findings: Lumbar stenosis Complications: None Implants:  
Implant Name Type Inv. Item Serial No.  Lot No. [id number] No. Used Action MEMBRANE AMNIOTIC WND 4X4CM [employer] [place]  MEMBRANE AMNIOTIC WND 4X4CM -- [facility] [id number] [id number] N/A 1 Implanted

## 2019-04-16 NOTE — OP NOTES
[address]   OPERATIVE REPORT    Name:  Marisol Pickard  MR#:  [id number]  :  1940  ACCOUNT #:  [de-identified]  DATE OF SERVICE:  2019    PREOPERATIVE DIAGNOSIS:  Lumbar stenosis from radiculopathy, L2-3, L3-4. POSTOPERATIVE DIAGNOSIS:  Lumbar stenosis from radiculopathy, L2-3, L3-4. PROCEDURE PERFORMED:  Revision laminectomy, L2-3 and L3-4. SURGEON:  Eduardo Sheppard MD    ASSISTANT:  Dany De Los Santos PA-C    ANESTHESIA:  general.    COMPLICATIONS:  None. SPECIMENS REMOVED:  none. IMPLANTS:  none. ESTIMATED BLOOD LOSS:  Minimal.    INDICATIONS:  This is a pleasant 24-year-old female with history of previous lumbar laminectomy, L4-5, now with left-sided radicular symptoms and left hip pain. She is here for revision of lumbar laminectomy, L2-3, L3-4 with decompression and foraminal decompression. PROCEDURE:  The patient was identified and brought to the operative suite, underwent general anesthesia without difficulty. Preoperative antibiotics were given. The patient was placed on the prone position on the Josiah frame with all bony prominences well padded. Back was prepped and draped sterilely. A time-out was performed. We then due to extension of our previous surgical incision proximally carried out with exposure of the previous laminotomy site at the L4 as well as the L3 and inferior portion of L2 laminas. We then started a wide laminectomy, undercutting the L3 lamina, carrying this proximally and then releasing the ligamentum at the L3-4 level, but also L4-5 with bilateral foraminal decompressions particularly on the left-hand side, undercutting the superior articular process and decompressed. Thus, we carried out the decompression proximally to the L2 lamina, undercutting the L2 lamina until the ligamentum was released and removed there. After decompression of the superior articular process, we were able to obtain hemostasis.   We were placed a Leahy ball probe into the L2-3 foramen laterally, but also distally at the L2-4 foramen. Wound was thoroughly irrigated. Hemostasis with FloSeal.  Interrupted 0 Vicryl in the fascial layer, 2-0 Vicryl in the subcutaneous layer and 3-0 Vicryl in the skin.         MD OFELIA Vázquez/[email or web address]_I/  D:  04/16/2019 15:28  T:  04/16/2019 18:02  JOB #:  [id number]  CC:

## 2019-04-16 NOTE — PROGRESS NOTES
Primary Nurse Alfonso Bishop RN and Anup Butler RN performed a dual skin assessment on this patient Impairment noted- see wound doc flow sheet Steevn score is 21 Dressing to surgical back clean, dry, and intact.

## 2019-04-16 NOTE — ANESTHESIA POSTPROCEDURE EVALUATION
Post-Anesthesia Evaluation and Assessment Patient: Cordell Sahu MRN: [id number]  SSN: [id number] YOB: 1940  Age: [de-identified] y.o. Sex: female I have evaluated the patient and they are stable and ready for discharge from the PACU. Cardiovascular Function/Vital Signs Visit Vitals /49 Pulse 60 Temp 36.7 °C (98.1 °F) Resp 17 Ht 4' 11\" (1.499 m) Wt 82.5 kg (181 lb 14.1 oz) SpO2 96% BMI 36.74 kg/m² Patient is status post General anesthesia for Procedure(s): LUMBAR LAMINECTOMY L2-3, L3-4, POSSIBLE L4,5 REVISION LAMINECTOMY. Nausea/Vomiting: None Postoperative hydration reviewed and adequate. Pain: 
Pain Scale 1: Numeric (0 - 10) (04/16/19 1549) Pain Intensity 1: 0 (04/16/19 1549) Managed Neurological Status:  
Neuro (WDL): Within Defined Limits (04/16/19 1549) Neuro Neurologic State: Drowsy (04/16/19 1549) Orientation Level: Oriented X4 (04/16/19 1321) Cognition: Appropriate decision making; Appropriate for age attention/concentration; Appropriate safety awareness; Follows commands (04/16/19 1321) Speech: Clear (04/16/19 1321) LUE Motor Response: Purposeful (04/16/19 1549) LLE Motor Response: Purposeful (04/16/19 1549) RUE Motor Response: Purposeful (04/16/19 1549) RLE Motor Response: Purposeful (04/16/19 1549) At baseline Mental Status, Level of Consciousness: Alert and  oriented to person, place, and time Pulmonary Status:  
O2 Device: CO2 nasal cannula;Nasal cannula (04/16/19 1549) Adequate oxygenation and airway patent Complications related to anesthesia: None Post-anesthesia assessment completed. No concerns Signed By: Gilberto Wan MD   
 April 16, 2019 Procedure(s): LUMBAR LAMINECTOMY L2-3, L3-4, POSSIBLE L4,5 REVISION LAMINECTOMY. general 
 
<BSHSIANPOST> Vitals Value Taken Time /49 4/16/2019  4:15 PM  
Temp 36.7 °C (98.1 °F) 4/16/2019  3:49 PM  
Pulse 60 4/16/2019  4:16 PM  
 Resp 19 4/16/2019  4:16 PM  
SpO2 96 % 4/16/2019  4:16 PM  
Vitals shown include unvalidated device data.

## 2019-04-17 LAB
ANION GAP SERPL CALC-SCNC: 7 MMOL/L (ref 5–15)
BUN SERPL-MCNC: 13 MG/DL (ref 6–20)
BUN/CREAT SERPL: 16 (ref 12–20)
CALCIUM SERPL-MCNC: 8.3 MG/DL (ref 8.5–10.1)
CHLORIDE SERPL-SCNC: 108 MMOL/L (ref 97–108)
CO2 SERPL-SCNC: 26 MMOL/L (ref 21–32)
CREAT SERPL-MCNC: 0.81 MG/DL (ref 0.55–1.02)
GLUCOSE SERPL-MCNC: 138 MG/DL (ref 65–100)
HGB BLD-MCNC: 10.7 G/DL (ref 11.5–16)
POTASSIUM SERPL-SCNC: 4.1 MMOL/L (ref 3.5–5.1)
SODIUM SERPL-SCNC: 141 MMOL/L (ref 136–145)

## 2019-04-17 PROCEDURE — 74011250636 HC RX REV CODE- 250/636: Performed by: PHYSICIAN ASSISTANT

## 2019-04-17 PROCEDURE — 74011636637 HC RX REV CODE- 636/637: Performed by: PHYSICIAN ASSISTANT

## 2019-04-17 PROCEDURE — 85018 HEMOGLOBIN: CPT

## 2019-04-17 PROCEDURE — 97535 SELF CARE MNGMENT TRAINING: CPT

## 2019-04-17 PROCEDURE — 97165 OT EVAL LOW COMPLEX 30 MIN: CPT

## 2019-04-17 PROCEDURE — 74011000250 HC RX REV CODE- 250: Performed by: NURSE PRACTITIONER

## 2019-04-17 PROCEDURE — A9270 NON-COVERED ITEM OR SERVICE: HCPCS | Performed by: PHYSICIAN ASSISTANT

## 2019-04-17 PROCEDURE — 80048 BASIC METABOLIC PNL TOTAL CA: CPT

## 2019-04-17 PROCEDURE — 97116 GAIT TRAINING THERAPY: CPT

## 2019-04-17 PROCEDURE — 97161 PT EVAL LOW COMPLEX 20 MIN: CPT

## 2019-04-17 PROCEDURE — 74011250637 HC RX REV CODE- 250/637: Performed by: PHYSICIAN ASSISTANT

## 2019-04-17 PROCEDURE — 74011250636 HC RX REV CODE- 250/636: Performed by: NURSE PRACTITIONER

## 2019-04-17 PROCEDURE — 36415 COLL VENOUS BLD VENIPUNCTURE: CPT

## 2019-04-17 PROCEDURE — 99218 HC RM OBSERVATION: CPT

## 2019-04-17 RX ORDER — LIDOCAINE 50 MG/G
1 PATCH TOPICAL EVERY 24 HOURS
Status: DISCONTINUED | OUTPATIENT
Start: 2019-04-17 | End: 2019-04-18 | Stop reason: HOSPADM

## 2019-04-17 RX ADMIN — OXYCODONE HYDROCHLORIDE 5 MG: 5 TABLET ORAL at 14:22

## 2019-04-17 RX ADMIN — OXYCODONE HYDROCHLORIDE 10 MG: 5 TABLET ORAL at 20:48

## 2019-04-17 RX ADMIN — PRAMIPEXOLE DIHYDROCHLORIDE 1.5 MG: 0.25 TABLET ORAL at 20:44

## 2019-04-17 RX ADMIN — Medication 10 ML: at 14:23

## 2019-04-17 RX ADMIN — DULOXETINE HYDROCHLORIDE 30 MG: 30 CAPSULE, DELAYED RELEASE ORAL at 08:19

## 2019-04-17 RX ADMIN — ACETAMINOPHEN 1000 MG: 500 TABLET ORAL at 20:43

## 2019-04-17 RX ADMIN — OXYCODONE HYDROCHLORIDE 5 MG: 5 TABLET ORAL at 08:19

## 2019-04-17 RX ADMIN — SODIUM CHLORIDE 500 ML: 900 INJECTION, SOLUTION INTRAVENOUS at 10:39

## 2019-04-17 RX ADMIN — KETOROLAC TROMETHAMINE 15 MG: 30 INJECTION, SOLUTION INTRAMUSCULAR at 02:50

## 2019-04-17 RX ADMIN — OXYCODONE HYDROCHLORIDE 5 MG: 5 TABLET ORAL at 01:58

## 2019-04-17 RX ADMIN — ACETAMINOPHEN 1000 MG: 500 TABLET ORAL at 14:22

## 2019-04-17 RX ADMIN — SODIUM CHLORIDE 125 ML/HR: 900 INJECTION, SOLUTION INTRAVENOUS at 00:32

## 2019-04-17 RX ADMIN — MAGNESIUM OXIDE TAB 400 MG (241.3 MG ELEMENTAL MG) 400 MG: 400 (241.3 MG) TAB at 10:39

## 2019-04-17 RX ADMIN — CALCIUM CARBONATE-VITAMIN D TAB 500 MG-200 UNIT 1 TABLET: 500-200 TAB at 08:19

## 2019-04-17 RX ADMIN — ACETAMINOPHEN 1000 MG: 500 TABLET ORAL at 06:16

## 2019-04-17 RX ADMIN — Medication 10 ML: at 02:54

## 2019-04-17 RX ADMIN — Medication 10 ML: at 10:40

## 2019-04-17 RX ADMIN — KETOROLAC TROMETHAMINE 15 MG: 30 INJECTION, SOLUTION INTRAMUSCULAR at 14:23

## 2019-04-17 RX ADMIN — PREDNISONE 3 MG: 1 TABLET ORAL at 08:19

## 2019-04-17 RX ADMIN — KETOROLAC TROMETHAMINE 15 MG: 30 INJECTION, SOLUTION INTRAMUSCULAR at 06:16

## 2019-04-17 RX ADMIN — POLYETHYLENE GLYCOL 3350 17 G: 17 POWDER, FOR SOLUTION ORAL at 08:17

## 2019-04-17 RX ADMIN — SENNOSIDES,DOCUSATE SODIUM 1 TABLET: 8.6; 5 TABLET, FILM COATED ORAL at 08:19

## 2019-04-17 RX ADMIN — ACETAMINOPHEN 1000 MG: 500 TABLET ORAL at 01:57

## 2019-04-17 RX ADMIN — Medication 2 G: at 05:28

## 2019-04-17 RX ADMIN — Medication 10 ML: at 20:45

## 2019-04-17 NOTE — PROGRESS NOTES
Orthopedic Spine Progress Note Post Op day: 1 Day Post-Op 2019 7:43 AM  
Admit Date: 2019 Procedure: Procedure(s): LUMBAR LAMINECTOMY L2-3, L3-4, POSSIBLE L4,5 REVISION LAMINECTOMY Subjective:  
 
Alex Both appears well. Pain seems to be managed. She complains of ongoing left knee pain. TKR in . Evaluated in our office recently. Will review the images with the knee surgeon again. Tolerating diet No N/V 
Voiding Pain Control:  
Pain Assessment Pain Scale 1: Numeric (0 - 10) Pain Intensity 1: 3 Pain Onset 1: postop Pain Location 1: Back Pain Orientation 1: Lower Pain Description 1: Aching Pain Intervention(s) 1: Medication (see MAR)(scheduled meds) Objective:  
 
 
  
Physical Exam: 
General:  Alert and oriented. No acute distress. Heart:  Respirations unlabored. Abdomen:  
Extremities: Soft, non-tender. No evidence of cyanosis. Pulses palpable in both upper and lower extremities. Neurologic: 
Musculoskeletal:  No new motor deficits. Neurovascular exam within normal limits. Sensation stable. Motor: unchanged C5-T1 and L2-S1. Rell's sign negative in bilateral lower extremities. Calves soft, nontender upon palpation and with passive twitch. Moves both upper and lower extremities. Incision: clean, dry, and intact. No significant erythema or swelling. No active drainage noted. Vital Signs:   
Blood pressure 119/67, pulse 67, temperature 97.4 °F (36.3 °C), resp. rate 16, height 4' 11\" (1.499 m), weight 82.5 kg (181 lb 14.1 oz), SpO2 97 %. Temp (24hrs), Av.3 °F (36.8 °C), Min:97.4 °F (36.3 °C), Max:98.7 °F (37.1 °C) LAB:   
Recent Labs 19 
0301 HGB 10.7* Lab Results Component Value Date/Time  Sodium 141 2019 03:01 AM  
 Potassium 4.1 2019 03:01 AM  
 Chloride 108 2019 03:01 AM  
 CO2 26 2019 03:01 AM  
 Glucose 138 (H) 2019 03:01 AM  
 BUN 13 2019 03:01 AM  
 Creatinine 0.81 04/17/2019 03:01 AM  
 Calcium 8.3 (L) 04/17/2019 03:01 AM  
 
 
Intake/Output:No intake/output data recorded. 04/15 1901 - 04/17 0700 In: 1456.3 [P.O.:300; I.V.:1156.3] Out: 1100 [Urine:1100] Assessment:  
Patient is 1 Day Post-Op s/p Procedure(s): LUMBAR LAMINECTOMY L2-3, L3-4, POSSIBLE L4,5 REVISION LAMINECTOMY Plan: 1. PT/OT 2. Continue established methods of pain control 3. VTE Prophylaxes - TEDS & SCDs 4. Encouraged use of ICS 5. Discharge likely tomorrow Signed By: Gregory Romero PA-C

## 2019-04-17 NOTE — DISCHARGE INSTRUCTIONS
[facility] Dr. Deana Gitelman  [phone]    After Hospital Care Plan:  Discharge Instructions Lumbar Laminectomy Surgery     Patient Name: Alex Guzman    Date of procedure: 4/16/2019  Date of discharge:     Procedure: Procedure(s):  LUMBAR LAMINECTOMY L2-3, L3-4, POSSIBLE L4,5 REVISION LAMINECTOMY  PCP: Selma Mcghee MD    Follow up appointments  -Follow up with Dr. Deana Gitelman in 2 weeks. Call [phone] to make an appointment as soon as you get home from the hospital.    [address]: _________________________   phone: ___________________  The agency will contact you to arrange dates/times for visits. Please call them if you do not hear from them within 24 hours after you are discharged  Physical therapy 3 times a week for 3 weeks  Nursing-initial assessment and as needed    When to call your Orthopaedic Surgeon:  -Signs of infection-if your incision is red; continues to have drainage; drainage has a foul odor or if you have a persistent fever over 101 degrees for 24 hours  -Nausea or vomiting, severe headache  -Loss of bowel or bladder function, inability to urinate  -Changes in sensation in your arms or legs (numbness, tingling, loss of color)  -Increased weakness-greater than before your surgery  -Severe pain or pain not relieved by medications  -Signs of a blood clot in your leg-calf pain, tenderness, redness, swelling of lower leg    When to call your Primary Care Physician:  -Concerns about medical conditions such as diabetes, high blood pressure, asthma, congestive heart failure  -Call if blood sugars are elevated, persistent headache or dizziness, coughing or congestion, constipation or diarrhea, burning with urination, abnormal heart rate    When to call 911 and go to the nearest emergency room:  -Acute onset of chest pain, shortness of breath, difficulty breathing    Activity  - You are going home a well person, be as active as possible.   Your only exercise should be walking. Start with short frequent walks and increase your walking distance each day.  -Limit the amount of time you sit to 20-30 minute intervals. Sitting for prolonged periods of time will be uncomfortable for you following surgery.  -Do NOT lift anything over 5 pounds  -Do NOT do any straining, twisting or bending  -When you are in bed, you may lay on your back or on either side. Do NOT lie on your stomach    Brace  -If you have a back brace, you should wear your brace at all times when you are out of bed. Do not wear the brace while in bed or showering.  -Remember to always wear a cotton t-shirt underneath your brace.  -Do not bend or twist when your brace is off    Diet  -Resume usual diet; drink plenty of fluids; eat foods high in fiber  -It is important to have regular bowel movements. Pain medications may cause constipation. You may want to take a stool softener (such as Senokot-S or Colace) to prevent constipation.  -If constipation occurs, take a laxative (such as Dulcolax tablets, Milk of Magnesia, or a suppository). Laxatives should only be used if the above preventable measures have failed and you still have not had a bowel movement after three days. Driving  -You may not drive or return to work until instructed by your physician. However, you may ride in the car for short periods of time. Incision Care  Your incision has been closed with absorbable sutures and the Zipline skin closure system. This will assist with healing. The Eitan Funez is to remain on your incision for 2 weeks. A dry dressing (ABD and tape) will be placed over it and should be changed daily, for at least the first several days after your surgery. If you have no incisional drainage, you may leave the incision open to air if you wish, still leaving the Zipline in place. Please make sure to wash your hands prior to touching your dressing.      You may take brief showers but do not run the water directly onto the wound. After your shower, blot your incision dry with a soft towel and replace the dry dressing. Do not allow the tape to come in contact with the Zipline. Do not rub or apply any lotions or ointments to your incision site. Do not soak or scrub your wound. The Zipline dressing will be removed during your two week follow-up appointment. If you experience drainage leaking from underneath the Zipline or if it peels off before 2 weeks, please contact your orthopedic surgeons office. Showering  -You may shower in approximately 4 days after your surgery.    -Leave the dressing on during your shower. Do NOT allow the water to run directly onto your dressing. Once you get out of the shower, gently pat the dressing dry. -Reminder- your brace can be removed while showering. Remember to not bend or twist while your brace is off.    -Do not take a tub bath. Preventing blood clots  -You have been given T.E.D. stockings to wear. Continue to wear these for 7 days after your discharge. Put them on in the morning and take them off at night.    -They are used to increase your circulation and prevent blood clots from forming in your legs  -T. E.D. stockings can be machine washed, temperature not to exceed 160° F (71°C) and machine dried for 15 to 20 minutes, temperature not to exceed 250° F (121°C). Pain management  -Take pain medication as prescribed; decrease the amount you use as your pain lessens  -Do not wait until you are in extreme pain to take your medication.  -Avoid alcoholic beverages while taking pain medication    Pain Medication Safety  DO:  -Read the Medication Guide   -Take your medicine exactly as prescribed   -Store your medicine away from children and in a safe place   -Flush unused medicine down the toilet   -Call your healthcare provider for medical advice about side effects.  You may report side effects to FDA at 0-808-FDA-7779.   -Please be aware that many medications contain Tylenol. We do not want you to over medicate so please read the information below as a guide. Do not take more than 4 Grams of Tylenol in a 24 hour period. (There are 1000 milligrams in one Gram)                                                                                                                                                                                                                           Percocet contains 325 mg of Tylenol per tablet (do not take more than 12 tablets in 24 hours)  Lortab contains 500 mg of Tylenol per tablet (do not take more than 8 tablets in 24 hours)  Norco contains 325 mg of Tylenol per tablet (do not take more than 12 tablets in 24 hours). DO NOT:  -Do not give your medicine to others   -Do not take medicine unless it was prescribed for you   -Do not stop taking your medicine without talking to your healthcare provider   -Do not break, chew, crush, dissolve, or inject your medicine. If you cannot  swallow your medicine whole, talk to your healthcare provider.  -Do not drink alcohol while taking this medicine  -Do not take anti-inflammatory medications or aspirin unless instructed by your physician.

## 2019-04-17 NOTE — PROGRESS NOTES
Care Management Interventions PCP Verified by CM: Yes 
Palliative Care Criteria Met (RRAT>21 & CHF Dx)?: No 
Transition of Care Consult (CM Consult): Home Health [address]: No 
Reason Outside [place]: Out of service area MyChart Signup: No 
Discharge Durable Medical Equipment: No 
Health Maintenance Reviewed: Yes Physical Therapy Consult: Yes Occupational Therapy Consult: Yes Speech Therapy Consult: No 
Current Support Network: Lives with Spouse Confirm Follow Up Transport: Family Plan discussed with Pt/Family/Caregiver: Yes Freedom of Choice Offered: Yes [employer] Information Provided?: No 
Discharge Location Discharge Placement: Home with home health Reason for Admission:   Lumbar laminectomy RRAT Score:         14 Plan for utilizing home health: [facility] Likelihood of Readmission:  low Transition of Care Plan:          Cm met with patient to discuss home health. Patient has used [facility] in the past and would like to use them again. Referral sent, plan for discharge tomorrow.  
Anthony Guido BSW, ACM

## 2019-04-17 NOTE — PROGRESS NOTES
Problem: Falls - Risk of 
Goal: *Absence of Falls Description Document Munir Goddard Fall Risk and appropriate interventions in the flowsheet. 4/17/2019 1416 by Yoni Brumfield RN Outcome: Progressing Towards Goal 
4/17/2019 1202 by Yoni Brumfield RN Outcome: Progressing Towards Goal 
Note:  
Fall Risk Interventions: 
Mobility Interventions: Communicate number of staff needed for ambulation/transfer, Patient to call before getting OOB, Utilize gait belt for transfers/ambulation, Utilize walker, cane, or other assistive device Medication Interventions: Patient to call before getting OOB, Teach patient to arise slowly, Utilize gait belt for transfers/ambulation, Evaluate medications/consider consulting pharmacy Elimination Interventions: Call light in reach, Elevated toilet seat, Toilet paper/wipes in reach, Patient to call for help with toileting needs Problem: Patient Education: Go to Patient Education Activity Goal: Patient/Family Education Outcome: Progressing Towards Goal 
  
Problem: Complex Spine Procedure:  Post Op Day 1 Goal: Activity/Safety 4/17/2019 1416 by Yoni Brumfield RN Outcome: Progressing Towards Goal 
4/17/2019 1208 by Yoni Brumfield RN Outcome: Progressing Towards Goal 
Goal: Consults, if ordered 4/17/2019 1416 by Yoni Brumfield RN Outcome: Progressing Towards Goal 
4/17/2019 1208 by Yoni Brumfield RN Outcome: Progressing Towards Goal 
Goal: Diagnostic Test/Procedures 4/17/2019 1416 by Yoni Brumfield RN Outcome: Progressing Towards Goal 
4/17/2019 1208 by Yoni Brufmield RN Outcome: Progressing Towards Goal 
Goal: Nutrition/Diet 4/17/2019 1416 by Yoni Brumfield RN Outcome: Progressing Towards Goal 
4/17/2019 1208 by Yoni Brumfield RN Outcome: Progressing Towards Goal 
Goal: Discharge Planning 4/17/2019 1416 by Yoni Brumfield RN Outcome: Progressing Towards Goal 
4/17/2019 1208 by Yoni Brumfield RN Outcome: Progressing Towards Goal 
 Goal: Medications 4/17/2019 1416 by Marisabel Anderson RN Outcome: Progressing Towards Goal 
4/17/2019 1208 by Marisabel Anderson RN Outcome: Progressing Towards Goal 
Goal: Respiratory 4/17/2019 1416 by Marisabel Anderson RN Outcome: Progressing Towards Goal 
4/17/2019 1208 by Marisabel Anderson RN Outcome: Progressing Towards Goal 
Goal: Treatments/Interventions/Procedures 4/17/2019 1416 by Marisabel Anderson RN Outcome: Progressing Towards Goal 
4/17/2019 1208 by Marisabel Anderson RN Outcome: Progressing Towards Goal 
Goal: Psychosocial 
4/17/2019 1416 by Marisabel Anderson RN Outcome: Progressing Towards Goal 
4/17/2019 1208 by Marisabel Anderson RN Outcome: Progressing Towards Goal 
Goal: *Progress independence mobility/activities (eg: Mobility precautions) 4/17/2019 1416 by Marisabel Anderson RN Outcome: Progressing Towards Goal 
4/17/2019 1208 by Marisabel Anderson RN Outcome: Progressing Towards Goal 
Goal: *Verbalizes/demonstrates understanding of proper body mechanics and use of stabilization device if ordered 4/17/2019 1416 by Marisabel Anderson RN Outcome: Progressing Towards Goal 
4/17/2019 1208 by Marisabel Anderson RN Outcome: Progressing Towards Goal 
Goal: *Optimal pain control at patient's stated goal 
4/17/2019 1416 by Marisabel Anderson RN Outcome: Progressing Towards Goal 
4/17/2019 1208 by Marisabel Anderson RN Outcome: Progressing Towards Goal 
Goal: *Resumes normal function of bladder and bowel 4/17/2019 1416 by Marisabel Anderson RN Outcome: Progressing Towards Goal 
4/17/2019 1208 by Marisabel Anderson RN Outcome: Progressing Towards Goal 
Goal: *Hemodynamically stable 4/17/2019 1416 by Marisabel Anderson RN Outcome: Progressing Towards Goal 
4/17/2019 1208 by Marisabel Anderson RN Outcome: Progressing Towards Goal 
Goal: *Tolerating diet 4/17/2019 1416 by Marisabel Anderson RN Outcome: Progressing Towards Goal 
4/17/2019 1208 by Marisabel Anderson RN Outcome: Progressing Towards Goal 
 Goal: *Labs within defined limits 4/17/2019 1416 by Bernadine Curtis RN Outcome: Progressing Towards Goal 
4/17/2019 1208 by Bernadine Curtis RN Outcome: Progressing Towards Goal

## 2019-04-17 NOTE — PROGRESS NOTES
Problem: Mobility Impaired (Adult and Pediatric) Goal: *Acute Goals and Plan of Care (Insert Text) Description Physical Therapy Goals Initiated 4/17/2019 1. Patient will move from supine to sit and sit to supine  and roll side to side in bed with independence within 4 days. 2. Patient will perform sit to stand with modified independence within 4 days. 3. Patient will ambulate with modified independence for 150 feet with the least restrictive device within 4 days. 4. Patient will ascend/descend 13 stairs with 2 handrail(s) with modified independence within 4 days. 5. Patient will verbalize and demonstrate understanding of spinal precautions (No bending, lifting greater than 5 lbs, or twisting; log-roll technique; frequent repositioning as instructed) within 4 days. 4/17/2019 1309 by Lance Santizo Outcome: Progressing Towards Goal 
 PHYSICAL THERAPY TREATMENT: Spine Patient: Kadi High [de-identified][de-identified] y.o. female) Date: 4/17/2019 Diagnosis: Spinal stenosis, lumbar [M48.061] <principal problem not specified> Procedure(s) (LRB): LUMBAR LAMINECTOMY L2-3, L3-4, POSSIBLE L4,5 REVISION LAMINECTOMY (N/A) 1 Day Post-Op Precautions: Back, Fall No bending, no lifting greater than 5 lbs, no twisting, log-roll technique, repositioning every 20-30 min except when sleeping, brace when OOB Chart, physical therapy assessment, plan of care and goals were reviewed. ASSESSMENT: 
Based on the objective data described below, the patient presents with Supervision and Minimum assistance level overall for transfers. Gait training completed at Contact guard assistance, 150 feet and using a rolling walker. Will continue to follow while here in hospital to increase patient independence. The following are barriers to independence while in acute care:  
-Cognitive and/or behavioral: safety and back precaution awareness, a bit impulsive  
-Medical condition: strength and precautions   
-Other: Prior level of function: Independent PLAN: 
Patient continues to benefit from skilled intervention to address the above impairments. Continue treatment per established plan of care. Recommend with staff: Vickie Buerger for meals and to bathroom with assistance. Recommend next PT session: Progressive mobility and gait training. Recommend stair training tomorrow. Discharge recommendations: Home health (to increase independence and safety) If above is not an option then recommend: None, pending progress, if pt progresses to independent level no further services will be indicated Patient's barriers to discharging home, in addition to above impairments: none. Equipment recommendations for successful discharge (if) home: none, recommended using a reacher SUBJECTIVE:  
Patient stated I only have soreness in my knees. I don't have any pain in my back.  OBJECTIVE DATA SUMMARY:  
Critical Behavior: 
Neurologic State: Alert Orientation Level: Oriented X4 Cognition: Follows commands, Appropriate for age attention/concentration The patient stated 2/3 back precautions. Reviewed all 3 with patient. PT needs occasional reminders to avoid bending and twisting. She is a bit impulsive. Functional Mobility Training: 
Bed Mobility: 
Rolling: Additional time;Minimum assistance;Assist x1;Adaptive equipment Supine to Sit: Minimum assistance;Assist x1;Additional time; Adaptive equipment Sit to Supine: Supervision Scooting: Supervision Transfers: 
Sit to Stand: Supervision; Adaptive equipment Stand to Sit: Supervision; Adaptive equipment Bed to Chair: Supervision Balance: 
Sitting: Intact Standing: Intact Patient demonstrated donning brace with moderate assistance Ambulation/Gait Training: 
Distance (ft): 150 Feet (ft) Assistive Device: Brace/Splint;Gait belt Ambulation - Level of Assistance: Contact guard assistance;Assist x1 
  
  
 Gait Abnormalities: Decreased step clearance Speed/Ania: Slow Step Length: Right shortened;Left shortened Pain: 
No c/o back pain, reports soreness of bilateral knees Activity Tolerance:  
Good, BP stable with positional changes Please refer to the flowsheet for vital signs taken during this treatment. Vitals:  
 04/17/19 1243 04/17/19 1244 04/17/19 1250 04/17/19 1354 BP: 157/64 150/76 156/78 134/63 BP 1 Location: Left arm Left arm Left arm Left arm BP Patient Position: Supine Sitting Standing At rest  
Pulse: 76 87 75 78 Resp:    16 Temp:    97.9 °F (36.6 °C) SpO2:    93% Weight:      
Height:      
 
  
  
  
  
  
  
  
 
  
 
 
Functional Measure: 
Tinetti test: 
 
Pt scored 27 out of a possible 28 points Tinetti Tool Score Risk of Falls 
<19 = High Fall Risk 19-24 = Moderate Fall Risk 25-28 = Low Fall Risk Tinetti ME. Performance-Oriented Assessment of Mobility Problems in Elderly Patients. [address]o 64; [id number]. (Scoring Description: PT Bulletin Feb. 10, 1993) Older adults: Elidia Potts et al, 2009; n = [address] [employer] elderly evaluated with ABC, REJI, ADL, and IADL) · Mean REJI score for males aged 69-68 years = 26.21(3.40) · Mean REJI score for females age 69-68 years = 25.16(4.30) · Mean REJI score for males over 80 years = 23.29(6.02) · Mean REJI score for females over 80 years = 17.20(8.32) Physical Therapy Evaluation Charge Determination History Examination Presentation Decision-Making LOW Complexity : Zero comorbidities / personal factors that will impact the outcome / POC LOW Complexity : 1-2 Standardized tests and measures addressing body structure, function, activity limitation and / or participation in recreation  LOW Complexity : Stable, uncomplicated  LOW Complexity : FOTO score of  Based on the above components, the patient evaluation is determined to be of the following complexity level: LOW After treatment patient left:  
Up in chair Call light within reach COMMUNICATION/COLLABORATION:  
The patients plan of care was discussed with: Registered Nurse Reid Lomax Time Calculation: 20 mins

## 2019-04-17 NOTE — PROGRESS NOTES
Problem: Falls - Risk of 
Goal: *Absence of Falls Description Document Erma Hearn Fall Risk and appropriate interventions in the flowsheet. Outcome: Progressing Towards Goal 
Note:  
Fall Risk Interventions: 
Mobility Interventions: Communicate number of staff needed for ambulation/transfer, Patient to call before getting OOB, Utilize gait belt for transfers/ambulation, Utilize walker, cane, or other assistive device Medication Interventions: Patient to call before getting OOB, Teach patient to arise slowly, Utilize gait belt for transfers/ambulation, Evaluate medications/consider consulting pharmacy Elimination Interventions: Call light in reach, Elevated toilet seat, Toilet paper/wipes in reach, Patient to call for help with toileting needs Problem: Patient Education: Go to Patient Education Activity Goal: Patient/Family Education Outcome: Progressing Towards Goal 
  
Problem: Complex Spine Procedure:  Post Op Day 1 Goal: Activity/Safety Outcome: Progressing Towards Goal 
Goal: Consults, if ordered Outcome: Progressing Towards Goal 
Goal: Diagnostic Test/Procedures Outcome: Progressing Towards Goal 
Goal: Nutrition/Diet Outcome: Progressing Towards Goal 
Goal: Discharge Planning Outcome: Progressing Towards Goal 
Goal: Medications Outcome: Progressing Towards Goal 
Goal: Respiratory Outcome: Progressing Towards Goal 
Goal: Treatments/Interventions/Procedures Outcome: Progressing Towards Goal 
Goal: Psychosocial 
Outcome: Progressing Towards Goal 
Goal: *Progress independence mobility/activities (eg: Mobility precautions) Outcome: Progressing Towards Goal 
Goal: *Verbalizes/demonstrates understanding of proper body mechanics and use of stabilization device if ordered Outcome: Progressing Towards Goal 
Goal: *Optimal pain control at patient's stated goal 
Outcome: Progressing Towards Goal 
Goal: *Resumes normal function of bladder and bowel Outcome: Progressing Towards Goal 
 Goal: *Hemodynamically stable Outcome: Progressing Towards Goal 
Goal: *Tolerating diet Outcome: Progressing Towards Goal 
Goal: *Labs within defined limits Outcome: Progressing Towards Goal

## 2019-04-17 NOTE — PROGRESS NOTES
Occupational Therapy Orders received, chart reviewed and patient evaluated by occupational therapy. Recommend patient to discharge to home with . Patient with decrease in BP following activity. Cleared from OT standpoint once medically stable. Recommend with nursing patient to complete as able in order to maintain strength, endurance and independence: OOB to chair 3x/day, ADLs with supervision/setup and mobilizing to the bathroom for toileting with supervision assist. Thank you for your assistance. Full evaluation to follow. Vitals:  
 04/17/19 1005 04/17/19 1018 04/17/19 1020 04/17/19 1025 BP: 140/86 106/57 (!) 88/50 130/66 BP 1 Location: Left arm Left arm Left arm Left arm BP Patient Position: Standing Sitting;Post activity Sitting;Post activity Supine Pulse: 72 62 62 64 Resp:      
Temp:      
SpO2:      
Weight:      
Height:      
 
Thank you, Aziza Hassan, OT

## 2019-04-17 NOTE — PROGRESS NOTES
Problem: Falls - Risk of 
Goal: *Absence of Falls Description Document Clide Failing Fall Risk and appropriate interventions in the flowsheet. Outcome: Progressing Towards Goal 
Note:  
Fall Risk Interventions: 
Mobility Interventions: Communicate number of staff needed for ambulation/transfer, Patient to call before getting OOB, Utilize gait belt for transfers/ambulation, Utilize walker, cane, or other assistive device Medication Interventions: Patient to call before getting OOB, Teach patient to arise slowly, Utilize gait belt for transfers/ambulation, Evaluate medications/consider consulting pharmacy Elimination Interventions: Call light in reach, Elevated toilet seat, Toilet paper/wipes in reach, Patient to call for help with toileting needs Problem: Patient Education: Go to Patient Education Activity Goal: Patient/Family Education Outcome: Progressing Towards Goal

## 2019-04-17 NOTE — PROGRESS NOTES
Bedside shift change report given to Sowmya Hicks (oncoming nurse) by Yareli Merlos (offgoing nurse). Report included the following information SBAR, Kardex, OR Summary, Procedure Summary, Intake/Output, MAR and Recent Results.

## 2019-04-17 NOTE — PROGRESS NOTES
Problem: Mobility Impaired (Adult and Pediatric) Goal: *Acute Goals and Plan of Care (Insert Text) Description Physical Therapy Goals Initiated 4/17/2019 1. Patient will move from supine to sit and sit to supine  and roll side to side in bed with independence within 4 days. 2. Patient will perform sit to stand with modified independence within 4 days. 3. Patient will ambulate with modified independence for 150 feet with the least restrictive device within 4 days. 4. Patient will ascend/descend 13 stairs with 2 handrail(s) with modified independence within 4 days. 5. Patient will verbalize and demonstrate understanding of spinal precautions (No bending, lifting greater than 5 lbs, or twisting; log-roll technique; frequent repositioning as instructed) within 4 days. Outcome: Not Progressing Towards Goal 
 PHYSICAL THERAPY EVALUATION Patient: Brit Klein [de-identified][de-identified] y.o. female) Date: 4/17/2019 Primary Diagnosis: Spinal stenosis, lumbar [M48.061] Procedure(s) (LRB): LUMBAR LAMINECTOMY L2-3, L3-4, POSSIBLE L4,5 REVISION LAMINECTOMY (N/A) 1 Day Post-Op Precautions:   Back, Fall,No bending, no lifting greater than 5 lbs, no twisting, log-roll technique, repositioning every 20-30 min except when sleeping, brace when OOB ASSESSMENT :  
Pt seen to initiate PT intervention however pt just seen by OT and had orthostatic hypotension with sitting, standing, and sitting in chair. See below under activity tolerance for VS. Pt was assisted back by OT just prior to this therapist entering room. Reviewed background information with pt and reviewed back precautions. Pt was unable to recall the precautions previously reviewed by OT. RN came in to start an IV bolus. Will defer OOB activity at this time but will follow up later today.  
 
 
The following are barriers to independence while in acute care:  
-Cognitive and/or behavioral: short term memory loss versus decreased attention, unable to recall back precautions at this time The patient will benefit from skilled acute intervention to address the above impairments and their rehabilitation potential is considered to be Good Discharge recommendations: To be determined following BID session Equipment recommendations for successful discharge (if) home: to be determined,  pt owns a rolling walker and several reachers PLAN : 
Recommendations and Planned Interventions: bed mobility training, transfer training, gait training, orthotic/prosthetic training and patient and family training/education Frequency/Duration: Patient will be followed by physical therapy  twice daily to address goals. SUBJECTIVE:  
Patient stated ? I am feeling ok.? OBJECTIVE DATA SUMMARY:  
HISTORY:   
Past Medical History:  
Diagnosis Date Cataracts, bilateral   
 Chronic pain LOWER BACK Colon cancer ([facility] [address] 2015  
 colectomy with ostomy, then reversed by DR ELVIN Gregorio Colon polyps Depression   
 did not do well on duloxetine GERD (gastroesophageal reflux disease) Glaucoma Heart palpitations Other ill-defined conditions(799.89) GASTROPARESIS  
 RA (rheumatoid arthritis) ([facility] [address] DR Carmen Cobian Restless leg syndrome Spinal stenosis 5/8/2011 Unspecified adverse effect of anesthesia HEADACHE  
 UTI (lower urinary tract infection) Past Surgical History:  
Procedure Laterality Date HX APPENDECTOMY  2015 HX COLECTOMY  2015 WITH COLOSTOMY AND THEN REOMVAL OF COLOSTOMY HX COLOSTOMY  2016 REVERSAL  
 HX COLOSTOMY TAKE DOWN    
 HX DILATION AND CURETTAGE    
 HX GI  9/2014 COLONOSCOPY HX KNEE ARTHROSCOPY Left 1979 MENISCUS REPAIR  
 HX KNEE ARTHROSCOPY Left HX KNEE ARTHROSCOPY Right 2005 HX KNEE REPLACEMENT Left 2005 HX KNEE REPLACEMENT Right 05/09/2018 HX LAP CHOLECYSTECTOMY  5/2005 HX LUMBAR LAMINECTOMY  2011 HX ORTHOPAEDIC Right HAND FX  
 HX TUBAL LIGATION  1979 HX WRIST FRACTURE TX Left TOTAL HIP ARTHROPLASTY Right 3/2012 Prior Level of Function/Home Situation: Ambulates independently, denies any falls in the last year, pt lives with her  Personal factors and/or comorbidities impacting plan of care:  will not be able to physically assist pt due to his own disabilities Home Situation Home Environment: Private residence # Steps to Enter: 0 One/Two Story Residence: Two story # of Interior Steps: 15 Interior Rails: Both Living Alone: No 
Support Systems: Spouse/Significant Other/Partner, Family member(s) Patient Expects to be Discharged to[de-identified] Private residence Current DME Used/Available at Home: Shower chair, Adaptive dressing aides, Walker, rolling, Grab bars, Wheelchair, power Tub or Shower Type: Shower EXAMINATION/PRESENTATION/DECISION MAKING:  
Critical Behavior: 
Neurologic State: Alert Orientation Level: Oriented X4 Cognition: Follows commands, Appropriate for age attention/concentration Skin:  dressing intact Range Of Motion: 
AROM: Generally decreased, functional 
  
  
  
  
  
  
  
Strength:   
Strength: Generally decreased, functional 
  
  
  
  
  
  
Tone & Sensation:  
Tone: Normal 
  
  
  
  
Sensation: Intact Coordination: 
Coordination: Within functional limits Functional Mobility: To be assessed next session Functional Measure: To be assessed next session Pain: At rest: 5 Location: back [facility] Aggravating factors: at rest 
 
Activity Tolerance:  
Poor, orthostatic hypotension limited activity at this time Vitals:  
 04/17/19 1018 04/17/19 1020 04/17/19 1025 04/17/19 1036 BP: 106/57 (!) 88/50 130/66 129/56 BP 1 Location: Left arm Left arm Left arm BP Patient Position: Sitting;Post activity Sitting;Post activity Supine Pulse: 62 62 64 (!) 56 Resp:      
Temp:      
SpO2:      
 Weight:      
Height:      
  
After treatment patient left:  
Supine in bed Call light within reach COMMUNICATION/EDUCATION:  
The patient?s plan of care was discussed with: Registered Nurse. Fall prevention education was provided and the patient/caregiver indicated understanding. and Patient/family agree to work toward stated goals and plan of care. Thank you for this referral. 
Britney Camarillo Time Calculation: 17 mins

## 2019-04-17 NOTE — PROGRESS NOTES
OCCUPATIONAL THERAPY EVALUATION WITH DISCHARGE Patient: Ronda Cabral [de-identified][de-identified] y.o. female) Date: 4/17/2019 Primary Diagnosis: Spinal stenosis, lumbar [M48.061] Procedure(s) (LRB): LUMBAR LAMINECTOMY L2-3, L3-4, POSSIBLE L4,5 REVISION LAMINECTOMY (N/A) 1 Day Post-Op Precautions:  Back, Fall ASSESSMENT : 
Based on the objective data described below, patient presents with Supervision upper body ADLs, Supervision lower body ADLs, and Supervision assist functional mobility without AD s/p lumbar laminectomy L2-3, L3-4 POD 1. Nursing cleared for therapy. She lives at home with her  who is handicapped but able to complete basic ADL tasks indep. Hx of RA and use of dressing aides PTA secondary to impaired joint function. Provided ed on back precautions, home safety, and ADL compensatory techniques with excellent understanding. Issued hand out on precautions. BP stable at supine, sit, stand pre activity. Following activity decrease in BP sitting in chair asymptomatic, returned to bed with return to baseline BP. Discussed with nursing. Patient cleared for dc home with  once medically stable. The following are barriers to ADL independence while in acute care:  
- Cognitive and/or behavioral: none - Medical condition: hx of RA, multiple joint replacement   
- Other:    
 
Discharge recommendations: Assist from  and son as needed Equipment recommendations for successful discharge (if) home: has all needed DME PLAN : 
Further skilled acute occupational therapy services are not indicated at this time. SUBJECTIVE:  
Patient stated I love that sock tool.  OBJECTIVE DATA SUMMARY:  
HISTORY:  
Past Medical History:  
Diagnosis Date  Cataracts, bilateral   
 Chronic pain LOWER BACK  Colon cancer ([facility] [address] 2015  
 colectomy with ostomy, then reversed by DR ELVIN Casey Ran  Colon polyps  Depression   
 did not do well on duloxetine  GERD (gastroesophageal reflux disease)  Glaucoma  Heart palpitations  Other ill-defined conditions(799.89) GASTROPARESIS  
 RA (rheumatoid arthritis) ([facility] [address] DR Zahra Ayala  Restless leg syndrome  Spinal stenosis 5/8/2011  Unspecified adverse effect of anesthesia HEADACHE  
 UTI (lower urinary tract infection) Past Surgical History:  
Procedure Laterality Date  HX APPENDECTOMY  2015  HX COLECTOMY  2015 WITH COLOSTOMY AND THEN REOMVAL OF COLOSTOMY  HX COLOSTOMY  2016 REVERSAL  
 HX COLOSTOMY TAKE DOWN    
 HX DILATION AND CURETTAGE    
 HX GI  9/2014 COLONOSCOPY  
 HX KNEE ARTHROSCOPY Left 1979 MENISCUS REPAIR  
 HX KNEE ARTHROSCOPY Left  HX KNEE ARTHROSCOPY Right 2005  HX KNEE REPLACEMENT Left 2005  HX KNEE REPLACEMENT Right 05/09/2018  HX LAP CHOLECYSTECTOMY  5/2005  HX LUMBAR LAMINECTOMY  2011  HX ORTHOPAEDIC Right HAND FX  
 HX TUBAL LIGATION  1979  HX WRIST FRACTURE TX Left  TOTAL HIP ARTHROPLASTY Right 3/2012 Prior Level of Function/Environment/Context: Home with . Indep ADL tasks.  drives. No use of AD for mobility. AD for dressing, has some aides on each floor. Occupations in which the patient is/was successful, what are the barriers preventing that success:  
Performance Patterns (routines, roles, habits, and rituals):  
Personal Interests and/or values:  
Expanded or extensive additional review of patient history: hx RA, multiple joint replacements Home Situation Home Environment: Private residence # Steps to Enter: 0 One/Two Story Residence: Two story # of Interior Steps: 15 Interior Rails: Both Living Alone: No 
Support Systems: Spouse/Significant Other/Partner, Family member(s) Patient Expects to be Discharged to[de-identified] Private residence Current DME Used/Available at Home: Shower chair, Adaptive dressing aides, Walker, rolling, Grab bars, Wheelchair, power Tub or Shower Type: Shower Hand dominance: Right EXAMINATION OF PERFORMANCE DEFICITS: 
Cognitive/Behavioral Status: 
Neurologic State: Alert Hearing: 
  
Vision/Perceptual:   
    
glasses Range of Motion: 
AROM: Generally decreased, functional 
  
   
Strength: 
Strength: Generally decreased, functional 
  
  
  
  
Coordination: 
Coordination: Within functional limits Fine Motor Skills-Upper: Left Intact; Right Intact Gross Motor Skills-Upper: Left Intact; Right Intact Tone & Sensation: 
Tone: Normal 
Sensation: Intact Balance: 
Sitting: Intact Standing: Intact Functional Mobility and Transfers for ADLs: 
Bed Mobility: 
Rolling: Supervision Supine to Sit: Supervision Sit to Supine: Supervision Scooting: Supervision Transfers: 
Sit to Stand: Supervision Stand to Sit: Supervision Bed to Chair: Supervision Bathroom Mobility: Supervision/set up Toilet Transfer : Supervision ADL Assessment: 
Feeding: Independent Oral Facial Hygiene/Grooming: Supervision Bathing: Supervision; Adaptive equipment; Additional time Upper Body Dressing: Supervision Lower Body Dressing: Supervision Toileting: Supervision ADL Intervention and task modifications: 
  
Patient instructed and indicated understanding the benefits of maintaining activity tolerance, functional mobility, and independence with self care tasks during acute stay  to ensure safe return home and to baseline. Encouraged patient to increase frequency and duration OOB, not sitting longer than 30 mins without marching/walking with staff, be out of bed for all meals, perform daily ADLs (as approved by RN/MD regarding bathing etc), and performing functional mobility to/from bathroom.  Patient instruction and indicated understanding on body mechanics, ergonomics and gravitational force on the spine during different body positions to plan activities in prep for return home to complete basic ADLs, instrumental ADLs and back to work safely. Bathing: Patient instructed and indicated understanding when bathing to not submerge wound in water, stand to shower or sponge bathe, and follow MD instructions with return to showering. Dressing brace: Patient instructed on use of brace when OOB, ed on donning/doffing. Dressing lower body: Patient instructed to don brace first and on the benefits to remain seated to don all clothing to increase independence with precautions and pain management. Patient instructed and demonstrated tailor sitting for lower body dressing. Toileting: Patient instructed on the benefits of using flushable wet wipes and toilet tongs if decreased reach or pain for anibal care. Also, the benefits of a reacher to aid in clothing management. Patient instruction and indicated understanding to not strain i.e. holding breath to bear down during a bowel movement, lifting/activity, and sexual activity. Home safety: Patient instructed and indicated understanding on home modifications and safety [raise height of ADL objects (i.e. clothing, sink items, fridge items, items to mouth when grooming), appropriate height of chair surfaces, recliner safety, change of floor surfaces, clear pathways] to increase independence and fall prevention. Standing: Patient instructed and indicated understanding to walk up to sink/counter top/surfaces, step into walker, square off while using objects, slide objects along surfaces, to increase adherence to back precautions and fall prevention. Patient instructed to increase amount of time standing in order to increase independence and tolerance with ADLs. During prolonged standing, can open cabinet door or place foot on stool to decrease spinal pressure/increase pain. Grooming Brushing/Combing Hair: Supervision/set-up Lower Body Dressing Assistance Underpants: Compensatory technique training(use of AD PTA) Socks: Compensatory technique training(use of AD PTA) Toileting Bowel Hygiene: Compensatory technique training Functional Measure: 
Barthel Index: 
 
Bathin Bladder: 10 Bowels: 10 
Groomin Dressing: 10 Feeding: 10 Mobility: 0 Stairs: 0 Toilet Use: 10 Transfer (Bed to Chair and Back): 10 Total: 65/100 Percentage of impairment  
0% 1-19% 20-39% 40-59% 60-79% 80-99% 100% Barthel Score 0-100 100 99-80 79-60 59-40 20-39 1-19 
 0 The Barthel ADL Index: Guidelines 1. The index should be used as a record of what a patient does, not as a record of what a patient could do. 2. The main aim is to establish degree of independence from any help, physical or verbal, however minor and for whatever reason. 3. The need for supervision renders the patient not independent. 4. A patient's performance should be established using the best available evidence. Asking the patient, friends/relatives and nurses are the usual sources, but direct observation and common sense are also important. However direct testing is not needed. 5. Usually the patient's performance over the preceding 24-48 hours is important, but occasionally longer periods will be relevant. 6. Middle categories imply that the patient supplies over 50 per cent of the effort. 7. Use of aids to be independent is allowed. Elyse Loja., Barthel, DArielW. (4439). Functional evaluation: the Barthel Index. [facility] (14)2. STERLING Yarbrough, Иван Alcocer, Yana Dozier, Jacqulynn Buerger, [address] [place] [address] (). Measuring the change indisability after inpatient rehabilitation; comparison of the responsiveness of the Barthel Index and Functional Dale Measure. Journal of Neurology, Neurosurgery, and Psychiatry, 66(4), 079-651.  
CHAUNCEY Sanchez, MONA Helms, Damien Mott MArielA. (2004.) Assessment of post-stroke quality of life in cost-effectiveness studies: The usefulness of the Barthel Index and the EuroQoL-5D. [facility], 13, 401-20 Occupational Therapy Evaluation Charge Determination History Examination Decision-Making LOW Complexity : Brief history review  LOW Complexity : 1-3 performance deficits relating to physical, cognitive , or psychosocial skils that result in activity limitations and / or participation restrictions  LOW Complexity : No comorbidities that affect functional and no verbal or physical assistance needed to complete eval tasks Based on the above components, the patient evaluation is determined to be of the following complexity level: LOW Pain: 
Denies pain No mention of knee pain from patient as seen in chart Activity Tolerance:  
good Please refer to the flowsheet for vital signs taken during this treatment. After treatment patient left:  
Supine in bed Call light within reach RN notified COMMUNICATION/EDUCATION:  
The patients evaluation was discussed with: Physical Therapist and Registered Nurse. Thank you for this referral. 
Earl Carrel, OT Time Calculation: 33 mins

## 2019-04-18 VITALS
SYSTOLIC BLOOD PRESSURE: 151 MMHG | WEIGHT: 181.88 LBS | TEMPERATURE: 97.1 F | OXYGEN SATURATION: 93 % | RESPIRATION RATE: 16 BRPM | BODY MASS INDEX: 36.67 KG/M2 | HEART RATE: 69 BPM | DIASTOLIC BLOOD PRESSURE: 83 MMHG | HEIGHT: 59 IN

## 2019-04-18 PROBLEM — M48.061 LUMBAR STENOSIS: Status: ACTIVE | Noted: 2019-04-18

## 2019-04-18 LAB — HGB BLD-MCNC: 10.3 G/DL (ref 11.5–16)

## 2019-04-18 PROCEDURE — 85018 HEMOGLOBIN: CPT

## 2019-04-18 PROCEDURE — 99218 HC RM OBSERVATION: CPT

## 2019-04-18 PROCEDURE — 65270000029 HC RM PRIVATE

## 2019-04-18 PROCEDURE — 74011636637 HC RX REV CODE- 636/637: Performed by: PHYSICIAN ASSISTANT

## 2019-04-18 PROCEDURE — 74011250637 HC RX REV CODE- 250/637: Performed by: PHYSICIAN ASSISTANT

## 2019-04-18 PROCEDURE — 36415 COLL VENOUS BLD VENIPUNCTURE: CPT

## 2019-04-18 PROCEDURE — 97116 GAIT TRAINING THERAPY: CPT

## 2019-04-18 RX ORDER — OXYCODONE HYDROCHLORIDE 5 MG/1
5-10 TABLET ORAL
Qty: 60 TAB | Refills: 0 | Status: SHIPPED | OUTPATIENT
Start: 2019-04-18 | End: 2019-04-21

## 2019-04-18 RX ORDER — NALOXONE HYDROCHLORIDE 4 MG/.1ML
SPRAY NASAL
Qty: 2 EACH | Refills: 0 | Status: SHIPPED | OUTPATIENT
Start: 2019-04-18 | End: 2020-12-04 | Stop reason: ALTCHOICE

## 2019-04-18 RX ADMIN — PREDNISONE 3 MG: 1 TABLET ORAL at 08:01

## 2019-04-18 RX ADMIN — DULOXETINE HYDROCHLORIDE 30 MG: 30 CAPSULE, DELAYED RELEASE ORAL at 08:01

## 2019-04-18 RX ADMIN — ACETAMINOPHEN 1000 MG: 500 TABLET ORAL at 03:14

## 2019-04-18 RX ADMIN — OXYCODONE HYDROCHLORIDE 10 MG: 5 TABLET ORAL at 10:48

## 2019-04-18 RX ADMIN — ACETAMINOPHEN 1000 MG: 500 TABLET ORAL at 08:01

## 2019-04-18 RX ADMIN — OXYCODONE HYDROCHLORIDE 10 MG: 5 TABLET ORAL at 03:43

## 2019-04-18 RX ADMIN — CALCIUM CARBONATE-VITAMIN D TAB 500 MG-200 UNIT 1 TABLET: 500-200 TAB at 08:01

## 2019-04-18 NOTE — PROGRESS NOTES
Orthopedic Spine Progress Note Post Op day: 2 Days Post-Op 2019 8:01 AM  
Admit Date: 2019 Procedure: Procedure(s): LUMBAR LAMINECTOMY L2-3, L3-4, POSSIBLE L4,5 REVISION LAMINECTOMY Subjective:  
 
Dena Villarreal doing well. No complaints. Pain managed. Wearing quick draw this am. Ambulating independently around room. Tolerating diet. No N/V. Pain Control:  
Pain Assessment Pain Scale 1: Numeric (0 - 10) Pain Intensity 1: 4 Pain Onset 1: post op Pain Location 1: Back Pain Orientation 1: Posterior Pain Description 1: Aching Pain Intervention(s) 1: Medication (see MAR) Objective:  
 
 
  
Physical Exam: 
General:  Alert and oriented. No acute distress. Heart:  Respirations unlabored. Abdomen:  
Extremities: Soft, non-tender. No evidence of cyanosis. Pulses palpable in both upper and lower extremities. Neurologic: 
Musculoskeletal:  No new motor deficits. Neurovascular exam within normal limits. Sensation stable. Motor: unchanged C5-T1 and L2-S1. Rell's sign negative in bilateral lower extremities. Calves soft, nontender upon palpation and with passive twitch. Moves both upper and lower extremities. Incision: clean, dry, and intact. No significant erythema or swelling. No active drainage noted. Vital Signs:   
Blood pressure 146/66, pulse (!) 59, temperature 97.8 °F (36.6 °C), resp. rate 16, height 4' 11\" (1.499 m), weight 82.5 kg (181 lb 14.1 oz), SpO2 93 %. Temp (24hrs), Av.3 °F (36.8 °C), Min:97.8 °F (36.6 °C), Max:98.9 °F (37.2 °C) LAB:   
Recent Labs 19 
9097 HGB 10.3* Lab Results Component Value Date/Time  Sodium 141 2019 03:01 AM  
 Potassium 4.1 2019 03:01 AM  
 Chloride 108 2019 03:01 AM  
 CO2 26 2019 03:01 AM  
 Glucose 138 (H) 2019 03:01 AM  
 BUN 13 2019 03:01 AM  
 Creatinine 0.81 2019 03:01 AM  
 Calcium 8.3 (L) 2019 03:01 AM  
 
 
 Intake/Output:No intake/output data recorded. 04/16 1901 - 04/18 0700 In: 300 [P.O.:300] Out: 1100 [Urine:1100] PT/OT:  
Gait:  Gait Speed/Ania: Slow Step Length: Right shortened, Left shortened Gait Abnormalities: Decreased step clearance Ambulation - Level of Assistance: Contact guard assistance, Assist x1 Distance (ft): 150 Feet (ft) Assistive Device: Brace/Splint, Gait belt Assessment:  
Patient is 2 Days Post-Op s/p Procedure(s): LUMBAR LAMINECTOMY L2-3, L3-4, POSSIBLE L4,5 REVISION LAMINECTOMY Plan: 1. Continue PT/OT 2. Continue established methods of pain control 3. VTE Prophylaxes - TEDS &/or SCDs 4. Encourage ICS 5. Discharge home w/ home care today 6.   
 
 
Signed By: IRAJ Marley

## 2019-04-18 NOTE — PROGRESS NOTES
IV removed. Paid medication filled in hospital.  at side. I have reviewed discharge instructions with the patient. The patient verbalized understanding.

## 2019-04-18 NOTE — PROGRESS NOTES
Cm notified At [address] of discharge for today. No further needs.  
Kishan Guido BSW, ACM

## 2019-04-18 NOTE — PHYSICIAN ADVISORY
Letter of Status Determination:  
Recommend hospitalization status upgraded from OBSERVATION  to INPATIENT  Status Pt Name:  Xavier Esposito MR#  
MELISSA # [id number] / 
[id number] Payor: Mary Jo Tran / Plan: Carole Kurtzry karen / Product Type: Medicare /   
KOWN#  [id number] [facility]  536/[facility]  @ [address] hospital  
Hospitalization date  4/16/2019 11:19 AM  
Current Attending Physician  Andrew Ruiz MD  
Principal diagnosis  Back pain Clinicals  [de-identified] y.o. y.o  female hospitalized with above diagnosis Symptoms include pain, stiffness and decreased range of motion. Symptoms are located in the low back. Onset was gradual. The symptoms occur frequently. The patient describes symptoms as moderate in severity and worsening. Symptoms are exacerbated by weight bearing, back motion and standing. Current treatment includes opioid analgesics. Recently the disease has been unchanged. The patient has a surgical history of back surgery and knee surgery (2005 and 2018). The patient was previously evaluated in this clinic. Past evaluation has included x-ray of the lumbar spine and MRI of the lumbar spine. Past treatment has included epidural injection. Note for \"Chronic lumbar back pain\":She continued to have back pain, with continued concerns of left low back pain with radiation to the hip, groin and occasionally down the leg.  She has had two rounds of epidural injections, which provided her with no significant lasting relief of her pain. She continues to deny weakness in her legs or changes in bowel/bladder function. She feels unsteady in her gait, but denies any falls. She ambulates with a cane. She has a history of colon cancer resulting in a complete colectomy. diagnosed with Lumbar stenosis from radiculopathy, L2-3, L3-4, underwent Revision laminectomy, L2-3 and L3-4. Continued to have knbee pain the next day, mildly anemic, needs post op optimization Poorly controlled BP and Pt bradycardic this morning Milliman (MCG) criteria STATUS DETERMINATION  INPATIENT The final decision of the patient's hospitalization status depends on the attending physician's judgment Additional comments Payor: Agnes Gore / Plan: [facility] / Product Type: Medicare /   
  
 
Beola Siemens MD 
Cell: [phone] Physician Advisor Mehdi Krishnamurthy

## 2019-04-18 NOTE — DISCHARGE SUMMARY
[address] [place] [address]   [address]  [phone]     Discharge Summary       PATIENT ID: Isabel Gonzales  MRN: [id number]   YOB: 1940    DATE OF ADMISSION: 4/16/2019 11:19 AM    DATE OF DISCHARGE: 4/18/2019   PRIMARY CARE PROVIDER: Ghislaine Olsen MD     CONSULTATIONS: None    PROCEDURES/SURGERIES: Procedure(s):  LUMBAR LAMINECTOMY L2-3, L3-4, POSSIBLE L4,5 REVISION LAMINECTOMY    History of Present Illness:  Isabel Gonzales is a [de-identified] y.o. female with a history of prior lumbar laminectomy, L4-5, with progressive left hip and left-sided radicular symptoms. Her imaging revealed lumbar stenosis at L2-3 L3-4. Left-sided disc protrusion at L2-3 causing lateral recess and foraminal stenosis. After failing conservative therapy and a discussion of the risks, benefits, alternatives, perioperative course, and potential complications of surgery, she consented to undergo a Procedure(s):  LUMBAR LAMINECTOMY L2-3, L3-4, POSSIBLE L4,5 REVISION LAMINECTOMY. Hospital Course:  Isabel Gonzales tolerated the procedure well. She was transferred  to the recovery room in stable condition. After a brief stay the patient was then transferred to the Spinal Surgery Unit at [address] [place] [address].  On postoperative day #1, the dressing was clean and dry, she was neurovascularly intact. The patient was afebrile and vital signs were stable. Calves were soft and non-tender bilaterally. The patient was tolerating a regular diet and making great progress with physical therapy. She was tolerating a diet and had a bowel movement postoperatively. Hemoglobin prior to discharge were     Lab Results   Component Value Date/Time    HGB 10.3 (L) 04/18/2019 03:11 AM        Isabel Gonzales made satisfactory progress with physical therapy and was discharged to Home in stable condition on postoperative day 2.   She was provided with routine postoperative instructions and advised to follow up with Wilfredo Correa MD in 2 weeks following discharge from the hospital.  She was instructed to hold her Remicade for at least 2 weeks postoperatively. FOLLOW UP APPOINTMENTS:    Follow-up Information     Follow up With Specialties Details Why Contact Info    Kip Curry MD Internal Medicine   [address] [place] [address]   [address]  [phone]               DISCHARGE MEDICATIONS:  Discharge Medication List as of 4/18/2019  8:31 AM      START taking these medications    Details   oxyCODONE IR (ROXICODONE) 5 mg immediate release tablet Take 1-2 Tabs by mouth every four (4) hours as needed for Pain for up to 3 days. Max Daily Amount: 60 mg., Print, Disp-60 Tab, R-0      naloxone (NARCAN) 4 mg/actuation nasal spray Use 1 spray intranasally, then discard. Repeat with new spray every 2 min as needed for opioid overdose symptoms, alternating nostrils. Indications: Decrease in Rate & Depth of Breathing due to Opioid Drug, Print, Disp-2 Each, R-0         CONTINUE these medications which have NOT CHANGED    Details   DULoxetine (CYMBALTA) 30 mg capsule Historical Med      calcium-cholecalciferol, D3, (CALCIUM 600 + D) tablet Take 1 Tab by mouth daily. , Historical Med      magnesium oxide 500 mg tab Take 1 Tab by mouth every Monday, Wednesday, Friday., Historical Med      pramipexole (MIRAPEX) 1 mg tablet TAKE 1 TABLET EVERY NIGHT, Mail OrderAPPOINTMENT REQUIRED FOR ADDITIONAL REFILLSDisp-90 Tab, R-1      predniSONE (DELTASONE) 1 mg tablet Take 1 mg by mouth daily. Takes 3 tabs (3mg) daily, Historical Med, R-0      evolocumab (REPATHA SURECLICK) pen injection 1 mL by SubCUTAneous route every fourteen (14) days. , Normal, Disp-6 Pen, R-3      INFLIXIMAB (REMICADE IV) by IntraVENous route. EVERY 4 WEEKS.  LAST INFUSION WAS January 31, 2019, Historical Med         STOP taking these medications       HYDROcodone-acetaminophen ([address]) 5-325 mg per tablet Comments:   Reason for Stopping:               PHYSICAL EXAMINATION AT DISCHARGE:  General: Pleasant, alert, cooperative, no distress. Resp: Equal chest expansion. No accessory muscle use. CV:  No edema appreciated in the extremities. Gastrointestinal:  Soft, non-tender, non-distended. Neurological: Follows commands. ORTEGA. Speech clear. Sensation intact to light touch. Motor: unchanged  L2-S1. Musculoskeletal:  Calves soft, non-tender upon palpation or with passive stretch. Skin:  Incision - clean, dry and intact. No significant erythema or swelling. CHRONIC MEDICAL DIAGNOSES:  Problem List as of 4/18/2019 Date Reviewed: 4/16/2019          Codes Class Noted - Resolved    Lumbar stenosis ICD-10-CM: M48.061  ICD-9-CM: 724.02  4/18/2019 - Present        Spinal stenosis, lumbar ICD-10-CM: M48.061  ICD-9-CM: 724.02  4/16/2019 - Present        Pre-operative examination ICD-10-CM: Z01.818  ICD-9-CM: V72.84  2/26/2019 - Present        SARMIENTO (dyspnea on exertion) ICD-10-CM: R06.09  ICD-9-CM: 786.09  2/26/2019 - Present        Mixed hyperlipidemia ICD-10-CM: E78.2  ICD-9-CM: 272.2  2/26/2019 - Present        Severe obesity ([facility] [address] ICD-10-CM: E66.01  ICD-9-CM: 278.01  1/16/2019 - Present        Osteoarthritis of right knee ICD-10-CM: M17.11  ICD-9-CM: 715.96  5/9/2018 - Present        Restless leg syndrome ICD-10-CM: G25.81  ICD-9-CM: 333.94  Unknown - Present        [facility] ICD-10-CM: C18.9  ICD-9-CM: 153.9  1/1/2015 - Present    Overview Signed 2/2/2018  3:33 PM by Sadiq Virk     colectomy with ostomy, then reversed by DR ELVIN Burroughs             Heart palpitations ICD-10-CM: R00.2  ICD-9-CM: 785.1  Unknown - Present        Glaucoma ICD-10-CM: H40.9  ICD-9-CM: 365.9  Unknown - Present        RA (rheumatoid arthritis) ([facility] ICD-10-CM: M06.9  ICD-9-CM: 714.0  Unknown - Present    Overview Addendum 2/2/2018  3:36 PM by Sadiq Ji, on remicade, azathioprine, and prednisone.   S/p L TKR, Right THR, L3-5 fixation             Depression ICD-10-CM: F32.9  ICD-9-CM: 217  5/8/2011 - Present        Impaired hearing ICD-10-CM: H91.90  ICD-9-CM: 389.9  5/8/2011 - Present        RESOLVED: Meniscus tear ICD-10-CM: V41.428N  ICD-9-CM: 836.2  12/10/2014 - 2/2/2018        RESOLVED: Left knee DJD (Chronic) ICD-10-CM: M17.12  ICD-9-CM: 715.96  12/9/2014 - 2/2/2018    Overview Signed 12/9/2014 11:44 PM by Geno Almazan PA-C     Scheduled for LEFT TKR & RIGHT KNEE ARTHROSCOPY on 12-10-14             RESOLVED: Psychiatric disorder ICD-10-CM: F99  ICD-9-CM: 300.9  Unknown - 2/2/2018    Overview Signed 12/9/2014 11:44 PM by Geno Almazan PA-C     DEPRESSION             RESOLVED: GERD (gastroesophageal reflux disease) ICD-10-CM: K21.9  ICD-9-CM: 530.81  Unknown - 2/2/2018        RESOLVED: Chronic pain ICD-10-CM: G89.29  ICD-9-CM: 338.29  Unknown - 2/2/2018        RESOLVED: Other ill-defined conditions(799.89) ICD-10-CM: R69  ICD-9-CM: 799.89  Unknown - 2/2/2018    Overview Signed 12/9/2014 11:44 PM by Geno Almazan PA-C     GASTROPARESIS             RESOLVED: Unspecified adverse effect of anesthesia ICD-10-CM: T41.45XA  ICD-9-CM: 995.22  Unknown - 2/2/2018    Overview Signed 12/9/2014 11:44 PM by Geno Almazan PA-C     HEADACHE             RESOLVED: Arthritis ICD-10-CM: M19.90  ICD-9-CM: 716.90  Unknown - 2/2/2018    Overview Signed 12/9/2014 11:44 PM by Geno Almazan PA-C     OSEO             RESOLVED: Colon polyps ICD-10-CM: J46.0  ICD-9-CM: 211.3  Unknown - 2/2/2018        RESOLVED: UTI (lower urinary tract infection) ICD-10-CM: N39.0  ICD-9-CM: 599.0  Unknown - 2/2/2018        RESOLVED: Cataracts, bilateral ICD-10-CM: H26.9  ICD-9-CM: 366.9  Unknown - 2/2/2018        RESOLVED: Acute medial meniscus tear of right knee ICD-10-CM: H92.825D  ICD-9-CM: 836.0  12/9/2014 - 2/2/2018        RESOLVED: DJD (degenerative joint disease) of hip ICD-10-CM: M16.9  ICD-9-CM: 715.95 3/6/2012 - 2/2/2018    Overview Signed 3/6/2012  4:35 PM by Anayeli Bailey PA-C     Scheduled for RIGHT THR on 03-             RESOLVED: Spinal stenosis ICD-10-CM: M48.00  ICD-9-CM: 724.00  5/8/2011 - 2/2/2018              Signed:   Lokesh Hill NP  4/18/2019  2:55 PM

## 2019-04-18 NOTE — PROGRESS NOTES
Problem: Mobility Impaired (Adult and Pediatric) Goal: *Acute Goals and Plan of Care (Insert Text) Description Physical Therapy Goals Initiated 4/17/2019 1. Patient will move from supine to sit and sit to supine  and roll side to side in bed with independence within 4 days. 2. Patient will perform sit to stand with modified independence within 4 days. 3. Patient will ambulate with modified independence for 150 feet with the least restrictive device within 4 days. 4. Patient will ascend/descend 13 stairs with 2 handrail(s) with modified independence within 4 days. 5. Patient will verbalize and demonstrate understanding of spinal precautions (No bending, lifting greater than 5 lbs, or twisting; log-roll technique; frequent repositioning as instructed) within 4 days. Outcome: Progressing Towards Goal 
PHYSICAL THERAPY NOTE: Spine Patient: Ronda Cabral [de-identified][de-identified] y.o. female) Date: 4/18/2019 Primary Diagnosis: Spinal stenosis, lumbar [M48.061] Lumbar stenosis [M48.061] Procedure(s) (LRB): LUMBAR LAMINECTOMY L2-3, L3-4, POSSIBLE L4,5 REVISION LAMINECTOMY (N/A) 2 Days Post-Op Precautions:  Back, Fall Ronda Cabral was seen for morning PT session. She is walking 325 feet with quick draw brace  and gait belt with CGA-SBA. Patient was instructed in stair management and able to negotiate 12 stairs using both rails with CG assistance. Reviewed activity recommendations and restrictions with patient. Pt able to recall 3/3 spinal precautions. Brace was donned prior to PT arrival while pt in bed. Encouraged pt to remove brace when in bed to maintain skin integrity-pt verbalized understanding. Ronda Cabral is clear for discharge home from a mobility standpoint and RN is aware. Morning session functional mobility: 
Bed Mobility: 
Rolling: Supervision Supine to Sit: Stand-by assistance;Contact guard assistance Sit to Supine: (remained OOB in chair) Scooting: Supervision Transfers: Sit to Stand: Stand-by assistance Stand to Sit: Stand-by assistance Balance:  
Sitting: Intact Standing: Intact Ambulation/Gait Training: 
Distance (ft): 325 Feet (ft) Assistive Device: Gait belt;Walker, rolling Ambulation - Level of Assistance: Stand-by assistance;Contact guard assistance Gait Abnormalities: Altered arm swing;Decreased step clearance Speed/Ania: Pace decreased (<100 feet/min) Step Length: Left shortened;Right shortened Stairs: 
Number of Stairs Trained: 12 
Stairs - Level of Assistance: Contact guard assistance Rail Use: Both Thank you, Reid Gonzalez Time Calculation: 19 mins

## 2019-04-19 ENCOUNTER — PATIENT OUTREACH (OUTPATIENT)
Dept: INTERNAL MEDICINE CLINIC | Age: 79
End: 2019-04-19

## 2019-04-19 NOTE — ACP (ADVANCE CARE PLANNING)
Non-Provider Advance Care Planning (ACP) Note    Date of ACP Conversation: 4/19/2019  Persons included in Conversation: patient  Length of ACP Conversation in minutes: <16 minutes (Non-Billable)    Conversation requested by:   Nurse Navigator    Authorized Decision Maker (if patient is incapable of making informed decisions): This person is:   Other Legally Authorized Decision Maker (e.g. Next of Kin)        General ACP for ALL Patients with Decision Making Capacity:    Advance Directive Conversation with Patients who have not yet planned:  patient reports having this discussion in hospital and is not interested in anything further at this time    Review of Existing Advance Directive: (Select questions covered)  N/A    Interventions Provided:  NN did not persue conversation since patient stated she was not interested

## 2019-04-19 NOTE — PROGRESS NOTES
Hospital Discharge Follow-Up      Date/Time:  2019 10:14 AM    Patient was admitted to [facility] on 19 and discharged on 19 for Lumbar Laminectomy L2-3, L3-4. The physician discharge summary was available at the time of outreach. Patient was contacted within 1 business days of discharge. Top Challenges reviewed with the provider   S/P Lumbar Laminectomy L2-3, L3-4    Elevated lipids-Repatha ordered. Patient waiting on home delivery      3/27/2019 09:15   Triglyceride 283 (H)   Cholesterol, total 351 (H)   HDL Cholesterol 44   LDL, calculated 250 (H)   VLDL, calculated 57 (H)     No ACP        Method of communication with provider :chart routing    Inpatient RRAT score: 12  Was this a readmission? no   Patient stated reason for the readmission: chronic back pain    Nurse Navigator (NN) contacted the patient by telephone to perform post hospital discharge assessment. Verified name and  with patient as identifiers. Provided introduction to self, and explanation of the Nurse Navigator role. Reviewed discharge instructions and red flags with patient who verbalized understanding. Patient given an opportunity to ask questions and does not have any further questions or concerns at this time. The patient agrees to contact the PCP office for questions related to their healthcare. NN provided contact information for future reference. Disease Specific:   N/A    Summary of patient's top problems:  1. S/P Lumbar Laminectomy surgery. Pt is being seen by At [facility] with first visit this afternoon. Encouraged patient call today to schedule FU appointment with surgeon. 2. Pain Management- patient on oxycodone PRN, follow discharge instructions as we reviewed. 3. Infection prevention-dressing clean, dry, intact. Report s/s infection which patient was able to verbalize.     Home Health orders at discharge: [address]: At [address]  Date of initial visit: 4/19/19    Durable Medical Equipment ordered/company: quick draw back brace  Durable Medical Equipment received: yes    Barriers to care? lack of knowledge about disease    Advance Care Planning:   Does patient have an Advance Directive:  patient declined education     Medication(s):   New Medications at Discharge: Oxycodone, Naloxone  Changed Medications at Discharge: Hold Remicade 2 weeks post op  Discontinued Medications at Discharge: [address] 5-325 mg per tablet    Medication reconciliation was performed with patient, who verbalizes understanding of administration of home medications. There were no barriers to obtaining medications identified at this time. Referral to Pharm D needed: no     Current Outpatient Medications   Medication Sig    oxyCODONE IR (ROXICODONE) 5 mg immediate release tablet Take 1-2 Tabs by mouth every four (4) hours as needed for Pain for up to 3 days. Max Daily Amount: 60 mg.    DULoxetine (CYMBALTA) 30 mg capsule     calcium-cholecalciferol, D3, (CALCIUM 600 + D) tablet Take 1 Tab by mouth daily.  magnesium oxide 500 mg tab Take 1 Tab by mouth every Monday, Wednesday, Friday.  pramipexole (MIRAPEX) 1 mg tablet TAKE 1 TABLET EVERY NIGHT (Patient taking differently: TAKE 1.5 TABLET EVERY NIGHT)    INFLIXIMAB (REMICADE IV) by IntraVENous route. EVERY 4 WEEKS. LAST INFUSION WAS January 31, 2019    predniSONE (DELTASONE) 1 mg tablet Take 1 mg by mouth daily. Takes 3 tabs (3mg) daily    naloxone (NARCAN) 4 mg/actuation nasal spray Use 1 spray intranasally, then discard. Repeat with new spray every 2 min as needed for opioid overdose symptoms, alternating nostrils. Indications: Decrease in Rate & Depth of Breathing due to Opioid Drug (Patient not taking: Reported on 4/19/2019)    evolocumab (REPATHA SURECLICK) pen injection 1 mL by SubCUTAneous route every fourteen (14) days. No current facility-administered medications for this visit.         There are no discontinued medications. [facility] follow up appointment(s):   Future Appointments   Date Time Provider Jarocho Collins   7/23/2019 11:15 AM Rosy Coleman MD [address] [facility]      Non-[facility] follow up appointment(s): none scheduled at this time. [facility]:  n/a       Goals      Prevent complications post hospitalization. 4/19/19  -Pt will call surgeon if signs of infection: red;  drainage;  foul odor, fever over 101 degrees  -Nausea or vomiting, severe headache  -Signs of a blood clot in your leg-calf pain, tenderness, redness, swelling of lower leg  Patient will contact PCP if general health problems  Patient will call 911 if experiencing chest pain, SOB, difficulty breathing  Activity  -Patient will walk - short frequent walks throughout the day  -Limit the amount of time you sit to 20-30 minute intervals.  -Patient will not lift anything over 5 pounds  -Patient will not do any straining, twisting or bending  Brace  -Pt has quick draw back brace and will wear at all times when out of bed. Pt not to wear the brace while in bed or showering.  -Pt will not bend or twist when brace is off  Diet  -Pt will resume usual diet; watch  saturated fats & cholesterol  -Pt will resume regular bowel movements. Driving  -patient will not drive or return to work until instructed by surgeon. Pt. may ride in the car for short periods of time. Incision Care  Pt will keep Zipline dressing on for 2 weeks. (ABD and tape) placed over it should be  changed daily, for at least the first several days after surgery. If no incisional drainage,may leave the incision open to air, still leaving the Zipline in place  Pt will take brief showers but not run water directly onto the wound. After shower, blot incision dry and replace dry dressing. No tape to come in contact with the Zipline. Pt will not rub or apply any lotions or ointments to your incision site. Pt will have Zipline dressing removed during two week FU appointment. Pt will contact surgeon if experiencing drainage leaking from underneath Zipline dressing or if dressing peels off before FU appointment  Showering  -Pt will resume showering approximately 4 days after surgery.  -Pt will leave dressing on during shower and pat dry  -Pt will remove brace while showering and will not bend or twist while brace is off.  -Pt will not take a tub bath. Preventing blood clots  -Pt will wear T.E.D. Stockings for 7 days after discharge (on in morning and off at night)  Pt will perform foot pumps to increase circulation and ambulate regularly throughout the day  Pain management  -Pt will take pain medication as prescribed; decrease the amount used as pain lessens  -Pt will not wait until in extreme pain to take your medication.     -NN supplied contact information and will FU in 1 week  Adwoa Westbrook RN

## 2019-04-29 ENCOUNTER — PATIENT OUTREACH (OUTPATIENT)
Dept: INTERNAL MEDICINE CLINIC | Age: 79
End: 2019-04-29

## 2019-04-29 RX ORDER — OXYCODONE HYDROCHLORIDE 5 MG/1
5 TABLET ORAL
Status: ON HOLD | COMMUNITY
End: 2019-05-09 | Stop reason: SDUPTHER

## 2019-04-29 RX ORDER — CEPHALEXIN 500 MG/1
500 CAPSULE ORAL 4 TIMES DAILY
COMMUNITY
End: 2019-05-09

## 2019-04-29 RX ORDER — GABAPENTIN 300 MG/1
300 CAPSULE ORAL
COMMUNITY
End: 2020-04-23 | Stop reason: ALTCHOICE

## 2019-04-29 NOTE — PROGRESS NOTES
Goals      Prevent complications post hospitalization. 4/29/19  -Patient reports having drainage from surgical site since Friday night. Attended office visit with  Dr. Bridget Verdin NP today with follow up appointment scheduled morning of 5/6/19. Encouraged monitor site for redness, swelling, foul odor from drainage, taking temperature at same time of day every day and notify surgeon if temp of 101 or greater or other s/s of infection noticed. Patient verbalized understanding.   -Patient able to verbalize one s/s blood clot. Patient will be able to identify additional 2 s/s within 1 week. Patient is not wearing JASPER hose. Encouraged to wear during the day and off at night  Activity  -Patient reports moving frequently throughout the day, but is not ambulating long distances yet. Encouraged foot pumps and importance of blood circulation in prevention of DVT  Brace  -Patient was told by ortho to start weaning off brace at last Thursday's office visit and today was told it could be stopped. Patient reports feeling no different with or without brace. Dressing  -Connie Kapoor dressing was removed at last weeks ortho office visit on 4/25/19. -ABD dressing in place since zipline removed and is being changed 3x daily, per patient. Patient will notify surgeon if change in drainage amount or odor.   -Keflex and gabapentin started at today's ortho office visit  per patient. Patient is not sure of dosages.  left for pharmacy now. NN will follow up for doses later today. Pain Management  -Patient reports 10/10 pain today following ortho office visit. Encouraged ice 15 min on then give skin rest before applying again, rest, pain medication and report unresolved pain to surgeon. Patient verbalized understanding.    -Patient reports taking oxycodone 5 mg for pain relief. Patient will not take other pain medications in combination with oxycodone as instructed by Dr. Jillian Wade.  Patient verbalized understanding   -NN will follow up in 1 week. Patient has FU appointment with PCP 5/6/19 @ 3:15, Patient aware. 4/19/19  -Pt will call surgeon if signs of infection: red;  drainage;  foul odor, fever over 101 degrees  -Nausea or vomiting, severe headache  -Signs of a blood clot in your leg-calf pain, tenderness, redness, swelling of lower leg  Patient will contact PCP if general health problems  Patient will call 911 if experiencing chest pain, SOB, difficulty breathing  Activity  -Patient will walk - short frequent walks throughout the day  -Limit the amount of time you sit to 20-30 minute intervals.  -Patient will not lift anything over 5 pounds   -Patient will not do any straining, twisting or bending  Brace  -Pt has quick draw back brace and will wear at all times when out of bed. Pt not to wear the brace while in bed or showering.  -Pt will not bend or twist when brace is off  Diet  -Pt will resume usual diet; watch  saturated fats & cholesterol  -Pt will resume regular bowel movements. Driving  -patient will not drive or return to work until instructed by surgeon. Pt. may ride in the car for short periods of time. Incision Care  Pt will keep Zipline dressing on for 2 weeks. (ABD and tape) placed over it should be  changed daily, for at least the first several days after surgery. If no incisional drainage,may leave the incision open to air, still leaving the Zipline in place  Pt will take brief showers but not run water directly onto the wound. After shower, blot incision dry and replace dry dressing. No tape to come in contact with the Zipline. Pt will not rub or apply any lotions or ointments to your incision site. Pt will have Zipline dressing removed during two week FU appointment.    Pt will contact surgeon if experiencing drainage leaking from underneath Zipline dressing or if dressing peels off before FU appointment  Showering  -Pt will resume showering approximately 4 days after surgery.  -Pt will leave dressing on during shower and pat dry  -Pt will remove brace while showering and will not bend or twist while brace is off.  -Pt will not take a tub bath. Preventing blood clots  -Pt will wear T.E.D. Stockings for 7 days after discharge (on in morning and off at night)  Pt will perform foot pumps to increase circulation and ambulate regularly throughout the day  Pain management  -Pt will take pain medication as prescribed; decrease the amount used as pain lessens  -Pt will not wait until in extreme pain to take your medication.     -NN supplied contact information and will FU in 1 week  Sloan Fisher RN

## 2019-05-02 ENCOUNTER — HOSPITAL ENCOUNTER (OUTPATIENT)
Dept: MRI IMAGING | Age: 79
Discharge: HOME | End: 2019-05-02
Attending: ORTHOPAEDIC SURGERY
Payer: MEDICARE

## 2019-05-02 DIAGNOSIS — T81.89XA DRAINING POSTOPERATIVE WOUND: ICD-10-CM

## 2019-05-02 PROCEDURE — 72148 MRI LUMBAR SPINE W/O DYE: CPT

## 2019-05-03 ENCOUNTER — ANESTHESIA EVENT (OUTPATIENT)
Dept: SURGERY | Age: 79
DRG: 858 | End: 2019-05-03
Payer: MEDICARE

## 2019-05-03 NOTE — H&P
Swathi Najera Location: [place] [address] [address] Patient #: [id number] : 1940  / Language: [place] / Emani Patron: Mosesalyssa Dawna Female History of Present Illness The patient is a [de-identified]year old female who presents for a Recheck of Chronic lumbar back pain. The last clinic visit was 2 month(s) ago. Management changes made at the last visit include ordering test(s) (SUSIE). Symptoms include pain, stiffness and decreased range of motion. Symptoms are located in the low back. Onset was gradual. The symptoms occur frequently. The patient describes symptoms as moderate in severity and worsening. Symptoms are exacerbated by weight bearing, back motion and standing. Current treatment includes opioid analgesics. Recently the disease has been unchanged. The patient has a surgical history of back surgery and knee surgery ( and ). The patient was previously evaluated in this clinic. Past evaluation has included x-ray of the lumbar spine and MRI of the lumbar spine. Past treatment has included epidural injection. Note for \"Chronic lumbar back pain\": Katherine Sanchez . 
Ms. Cooper Saul returns today for a follow up , with continued concerns of left low back pain with radiation to the hip, groin and occasionally down the leg.  She has had two rounds of epidural injections, which provided her with no significant lasting relief of her pain. She continues to deny weakness in her legs or changes in bowel/bladder function. She feels unsteady in her gait, but denies any falls. She ambulates with a cane. She has a history of colon cancer resulting in a complete colectomy.  
 
 
Problem List/Past Medical  
OA SHOULDER (715.91)   
RC TEAR - TRAUMA (840.4)   
S/P TKR (total knee replacement) (V43.65  Z96.659)   
S/P TOTAL HIP (V43.64)   
DDD (degenerative disc disease), lumbar (722.52  M51.36)   
REVIEW OF SYSTEMS: Systems were reviewed by the provider.   
MMT, ACUTE (836.0)   
Rheumatoid arthritis (714.0) (714.0  M06.9)   
 Pain, joint, knee, left (719.46  M25.562)   
OA, KNEE (715.96)   
ELBOW PAIN (719.42)   Low back pain with radiation (724.3  M54.40)   
Lumbar stenosis with neurogenic claudication (724.03  M48.062)   
Lumbar post-laminectomy syndrome (722.83  M96.1)   
SHOULDER PAIN (719.41)   Facet arthropathy, lumbar (721.3  M47.816)   
FOLLOW-UP AFTER SURGERY (V67.00)   
CONTUSION (923.11)   Flat-back syndrome (737.29  M40.30)   
LMT, ACUTE (836.1)   Weight above 97th percentile (V49.89  Z78.9)   
Primary osteoarthritis of right knee (715.16  M17.11)   Allergies No Known Drug Allergies   
 
Family History Diabetes Mellitus   
Arthritis   
Heart disease in male family member before age 54   
Hypercholesterolemia   
Hypertension   
Heart disease in female family member before age 72   
 
Social History Caffeine use : Drinks coffee and tea 1-2 times a day. Tobacco use   Former smoker. Alcohol use   : Never drinks. Tobacco / smoke exposure  : No 
HIV risk factors : no 
Exercise  : Walks 3-4 times a week. Illicit drug use : prefer to discuss with physician Sun Exposure   : occasionally Seat Belt Use : always Medication History Medications Reconciled  
 
Past Surgical History Colon Polyp Removal - Colonoscopy   
Gallbladder Surgery   laparoscopic Total Hip Replacement   right Tubal Ligation   
Other Joint Surgery   
Arthroscopy of Knee   left Diagnostic Studies History MRI, Spine   Date: 10/8/2018, Results: Prior laminectomy at L4-5. Lumbar stenosis at L2-3 L3-4. Left-sided disc protrusion at L2-3 causing lateral recess and foraminal stenosis. Other Problems Rheumatoid Arthritis   
Depression   
Arthritis   
Autoimmune disorder   
Unspecified Diagnosis   
Treatment options were discussed with the patient in full.   
 
 
Review of Systems General Present- Appetite Loss. Not Present- Chills, Fatigue, Fever, Night Sweats, Weight Gain and Weight Loss. Musculoskeletal Present- Joint Pain and Joint Stiffness. Not Present- Back Pain and Joint Swelling. Neurological Present- Numbness, Tingling, Unsteadiness and Weakness. Not Present- Fainting, Headaches, Memory Loss, Seizures and Tremor. Physical Exam  
General 
Note:  No increased left groin pain with internal/external rotation Neurologic Sensory Light Touch - Intact - Globally. Overall Assessment of Muscle Strength and Tone reveals Lower Extremities - Right Iliopsoas - 5/5. Left Iliopsoas - 4/5. Right Tibialis Anterior - 5/5. Left Tibialis Anterior - 5/5. Right Gastroc-Soleus - 5/5. Left Gastroc-Soleus - 5/5. Right EHL - 5/5. Left EHL - 5/5. General Assessment of Reflexes Right Ankle - Clonus is not present. Left Ankle - Clonus is not present. Reflexes (Dermatomes) 2/2 Normal - Left Achilles (L5-S2), Left Knee (L2-4), Right Achilles (L5-S2) and Right Knee (L2-4). Musculoskeletal 
Global Assessment Examination of related systems reveals - well-developed, well-nourished, in no acute distress, alert and oriented x 3. Gait and Station - normal gait and station and normal posture. Right Lower Extremity - normal strength and tone, normal range of motion without pain and no instability, subluxation or laxity. Left Lower Extremity - normal strength and tone, normal range of motion without pain and no instability, subluxation or laxity. Spine/Ribs/Pelvis Cervical Spine - Examination of the cervical spine reveals - no tenderness to palpation, no pain, no swelling, edema or erythema, normal cervical spine movements and normal sensation. Thoracic (Dorsal) Spine - Examination of the thoracic spine reveals - no tenderness over thoracic vertebrae, no pain, normal sensation and normal thoracic spine movements. Lumbosacral Spine - Examination of the lumbosacral spine reveals - no known fractures or deformities.  Inspection and Palpation - Tenderness - moderate. Assessment of pain reveals the following findings - The pain is characterized as - moderate. Location - pain refers to lower back bilaterally. ROJM - Trunk Extension - 15 degrees. Lumbar Spine Flexion - . Lumbosacral Spine - Functional Testing - Babinski Test negative, Prone Knee Bending Test negative, Slump Test negative, Straight Leg Raising Test negative. Assessment & Plan Lumbar stenosis with neurogenic claudication (724.03  M48.062) Impression: She is 17 days s/p lumbar laminectomy L2-4. She has intractable back pain related to a postoperative lumbar hematoma. She is a candidate for a lumbar surgical would I&D. The risks and benefits were discussed at length with the patient and the patient has elected to proceed. Indications for surgery include failed conservative treatment. Alternative treatments, risks and the perioperative course were discussed with the patient. All questions were answered. The risks and benefits of the procedure were explained. Benefits include definitive diagnosis, relief of pain, elimination of deformity and improved function. Risks of surgery including bleeding, infection, weakness, numbness, CSF leak, failure to improve symptoms, exacerbation of medical co-morbidities and even death were discussed with the patient. Lumbar post-laminectomy syndrome (722.83  M96.1) Treatment options were discussed with the patient in full.(V65.49) Current Plans Pt Education - How to access health information online: discussed with patient and provided information. Pt Education - Educational materials were provided.: discussed with patient and provided information. Presurgical planning was preformed with the patient today Surgery to be scheduled Procedure: Lumbar I&D Signed by Zac Caldwell MD

## 2019-05-06 ENCOUNTER — ANESTHESIA (OUTPATIENT)
Dept: SURGERY | Age: 79
DRG: 858 | End: 2019-05-06
Payer: MEDICARE

## 2019-05-06 ENCOUNTER — HOSPITAL ENCOUNTER (INPATIENT)
Age: 79
LOS: 1 days | Discharge: HOME HEALTH | DRG: 858 | End: 2019-05-09
Attending: ORTHOPAEDIC SURGERY | Admitting: ORTHOPAEDIC SURGERY
Payer: MEDICARE

## 2019-05-06 DIAGNOSIS — M48.062 SPINAL STENOSIS OF LUMBAR REGION WITH NEUROGENIC CLAUDICATION: Primary | ICD-10-CM

## 2019-05-06 PROBLEM — T81.89XA LUMBAR SURGICAL WOUND FLUID COLLECTION: Status: ACTIVE | Noted: 2019-05-06

## 2019-05-06 LAB
GLUCOSE BLD STRIP.AUTO-MCNC: 102 MG/DL (ref 65–100)
GLUCOSE BLD STRIP.AUTO-MCNC: 99 MG/DL (ref 65–100)
SERVICE CMNT-IMP: ABNORMAL
SERVICE CMNT-IMP: NORMAL

## 2019-05-06 PROCEDURE — 87205 SMEAR GRAM STAIN: CPT

## 2019-05-06 PROCEDURE — 77030032490 HC SLV COMPR SCD KNE COVD -B: Performed by: ORTHOPAEDIC SURGERY

## 2019-05-06 PROCEDURE — 74011250636 HC RX REV CODE- 250/636: Performed by: PHYSICIAN ASSISTANT

## 2019-05-06 PROCEDURE — 87186 SC STD MICRODIL/AGAR DIL: CPT

## 2019-05-06 PROCEDURE — 99218 HC RM OBSERVATION: CPT

## 2019-05-06 PROCEDURE — 0JD70ZZ EXTRACTION OF BACK SUBCUTANEOUS TISSUE AND FASCIA, OPEN APPROACH: ICD-10-PCS | Performed by: ORTHOPAEDIC SURGERY

## 2019-05-06 PROCEDURE — 76210000017 HC OR PH I REC 1.5 TO 2 HR: Performed by: ORTHOPAEDIC SURGERY

## 2019-05-06 PROCEDURE — 74011250637 HC RX REV CODE- 250/637: Performed by: ANESTHESIOLOGY

## 2019-05-06 PROCEDURE — 76060000033 HC ANESTHESIA 1 TO 1.5 HR: Performed by: ORTHOPAEDIC SURGERY

## 2019-05-06 PROCEDURE — 74011250637 HC RX REV CODE- 250/637

## 2019-05-06 PROCEDURE — 82962 GLUCOSE BLOOD TEST: CPT

## 2019-05-06 PROCEDURE — 74011250636 HC RX REV CODE- 250/636

## 2019-05-06 PROCEDURE — 77030018836 HC SOL IRR NACL ICUM -A: Performed by: ORTHOPAEDIC SURGERY

## 2019-05-06 PROCEDURE — 74011000250 HC RX REV CODE- 250: Performed by: ORTHOPAEDIC SURGERY

## 2019-05-06 PROCEDURE — 74011250636 HC RX REV CODE- 250/636: Performed by: ANESTHESIOLOGY

## 2019-05-06 PROCEDURE — 74011000250 HC RX REV CODE- 250

## 2019-05-06 PROCEDURE — 74011250637 HC RX REV CODE- 250/637: Performed by: PHYSICIAN ASSISTANT

## 2019-05-06 PROCEDURE — 77030012406 HC DRN WND PENRS BARD -A: Performed by: ORTHOPAEDIC SURGERY

## 2019-05-06 PROCEDURE — 87075 CULTR BACTERIA EXCEPT BLOOD: CPT

## 2019-05-06 PROCEDURE — 76010000149 HC OR TIME 1 TO 1.5 HR: Performed by: ORTHOPAEDIC SURGERY

## 2019-05-06 PROCEDURE — 74011250636 HC RX REV CODE- 250/636: Performed by: ORTHOPAEDIC SURGERY

## 2019-05-06 PROCEDURE — 87077 CULTURE AEROBIC IDENTIFY: CPT

## 2019-05-06 RX ORDER — GLYCOPYRROLATE 0.2 MG/ML
0.2 INJECTION INTRAMUSCULAR; INTRAVENOUS
Status: DISCONTINUED | OUTPATIENT
Start: 2019-05-06 | End: 2019-05-06 | Stop reason: HOSPADM

## 2019-05-06 RX ORDER — SODIUM CHLORIDE, SODIUM LACTATE, POTASSIUM CHLORIDE, CALCIUM CHLORIDE 600; 310; 30; 20 MG/100ML; MG/100ML; MG/100ML; MG/100ML
INJECTION, SOLUTION INTRAVENOUS
Status: DISCONTINUED | OUTPATIENT
Start: 2019-05-06 | End: 2019-05-06 | Stop reason: HOSPADM

## 2019-05-06 RX ORDER — OXYCODONE HYDROCHLORIDE 5 MG/1
TABLET ORAL
Status: COMPLETED
Start: 2019-05-06 | End: 2019-05-06

## 2019-05-06 RX ORDER — DEXAMETHASONE SODIUM PHOSPHATE 4 MG/ML
INJECTION, SOLUTION INTRA-ARTICULAR; INTRALESIONAL; INTRAMUSCULAR; INTRAVENOUS; SOFT TISSUE AS NEEDED
Status: DISCONTINUED | OUTPATIENT
Start: 2019-05-06 | End: 2019-05-06 | Stop reason: HOSPADM

## 2019-05-06 RX ORDER — LANOLIN ALCOHOL/MO/W.PET/CERES
400 CREAM (GRAM) TOPICAL
Status: DISCONTINUED | OUTPATIENT
Start: 2019-05-06 | End: 2019-05-09 | Stop reason: HOSPADM

## 2019-05-06 RX ORDER — DULOXETIN HYDROCHLORIDE 30 MG/1
30 CAPSULE, DELAYED RELEASE ORAL DAILY
Status: DISCONTINUED | OUTPATIENT
Start: 2019-05-07 | End: 2019-05-09 | Stop reason: HOSPADM

## 2019-05-06 RX ORDER — SODIUM CHLORIDE 9 MG/ML
25 INJECTION, SOLUTION INTRAVENOUS CONTINUOUS
Status: DISCONTINUED | OUTPATIENT
Start: 2019-05-06 | End: 2019-05-06 | Stop reason: HOSPADM

## 2019-05-06 RX ORDER — MIDAZOLAM HYDROCHLORIDE 1 MG/ML
0.5 INJECTION, SOLUTION INTRAMUSCULAR; INTRAVENOUS
Status: DISCONTINUED | OUTPATIENT
Start: 2019-05-06 | End: 2019-05-06 | Stop reason: HOSPADM

## 2019-05-06 RX ORDER — SODIUM CHLORIDE 0.9 % (FLUSH) 0.9 %
5-40 SYRINGE (ML) INJECTION EVERY 8 HOURS
Status: DISCONTINUED | OUTPATIENT
Start: 2019-05-06 | End: 2019-05-09 | Stop reason: HOSPADM

## 2019-05-06 RX ORDER — ONDANSETRON 2 MG/ML
INJECTION INTRAMUSCULAR; INTRAVENOUS AS NEEDED
Status: DISCONTINUED | OUTPATIENT
Start: 2019-05-06 | End: 2019-05-06 | Stop reason: HOSPADM

## 2019-05-06 RX ORDER — PRAMIPEXOLE DIHYDROCHLORIDE 0.25 MG/1
1 TABLET ORAL
Status: DISCONTINUED | OUTPATIENT
Start: 2019-05-06 | End: 2019-05-09 | Stop reason: HOSPADM

## 2019-05-06 RX ORDER — GABAPENTIN 300 MG/1
300 CAPSULE ORAL
Status: DISCONTINUED | OUTPATIENT
Start: 2019-05-06 | End: 2019-05-09 | Stop reason: HOSPADM

## 2019-05-06 RX ORDER — OXYCODONE HYDROCHLORIDE 5 MG/1
10 TABLET ORAL
Status: DISCONTINUED | OUTPATIENT
Start: 2019-05-06 | End: 2019-05-09 | Stop reason: HOSPADM

## 2019-05-06 RX ORDER — NALOXONE HYDROCHLORIDE 0.4 MG/ML
0.4 INJECTION, SOLUTION INTRAMUSCULAR; INTRAVENOUS; SUBCUTANEOUS AS NEEDED
Status: DISCONTINUED | OUTPATIENT
Start: 2019-05-06 | End: 2019-05-09 | Stop reason: HOSPADM

## 2019-05-06 RX ORDER — CEFAZOLIN SODIUM/WATER 2 G/20 ML
2 SYRINGE (ML) INTRAVENOUS EVERY 8 HOURS
Status: COMPLETED | OUTPATIENT
Start: 2019-05-07 | End: 2019-05-07

## 2019-05-06 RX ORDER — ALBUTEROL SULFATE 0.83 MG/ML
2.5 SOLUTION RESPIRATORY (INHALATION) AS NEEDED
Status: DISCONTINUED | OUTPATIENT
Start: 2019-05-06 | End: 2019-05-06 | Stop reason: HOSPADM

## 2019-05-06 RX ORDER — LIDOCAINE HYDROCHLORIDE 20 MG/ML
INJECTION, SOLUTION EPIDURAL; INFILTRATION; INTRACAUDAL; PERINEURAL AS NEEDED
Status: DISCONTINUED | OUTPATIENT
Start: 2019-05-06 | End: 2019-05-06 | Stop reason: HOSPADM

## 2019-05-06 RX ORDER — HYDROMORPHONE HYDROCHLORIDE 1 MG/ML
0.2 INJECTION, SOLUTION INTRAMUSCULAR; INTRAVENOUS; SUBCUTANEOUS
Status: DISCONTINUED | OUTPATIENT
Start: 2019-05-06 | End: 2019-05-06 | Stop reason: HOSPADM

## 2019-05-06 RX ORDER — ONDANSETRON 2 MG/ML
4 INJECTION INTRAMUSCULAR; INTRAVENOUS AS NEEDED
Status: DISCONTINUED | OUTPATIENT
Start: 2019-05-06 | End: 2019-05-06 | Stop reason: HOSPADM

## 2019-05-06 RX ORDER — PROPOFOL 10 MG/ML
INJECTION, EMULSION INTRAVENOUS AS NEEDED
Status: DISCONTINUED | OUTPATIENT
Start: 2019-05-06 | End: 2019-05-06 | Stop reason: HOSPADM

## 2019-05-06 RX ORDER — ROCURONIUM BROMIDE 10 MG/ML
INJECTION, SOLUTION INTRAVENOUS AS NEEDED
Status: DISCONTINUED | OUTPATIENT
Start: 2019-05-06 | End: 2019-05-06 | Stop reason: HOSPADM

## 2019-05-06 RX ORDER — AMOXICILLIN 250 MG
1 CAPSULE ORAL 2 TIMES DAILY
Status: DISCONTINUED | OUTPATIENT
Start: 2019-05-06 | End: 2019-05-09 | Stop reason: HOSPADM

## 2019-05-06 RX ORDER — DIPHENHYDRAMINE HYDROCHLORIDE 50 MG/ML
12.5 INJECTION, SOLUTION INTRAMUSCULAR; INTRAVENOUS AS NEEDED
Status: DISCONTINUED | OUTPATIENT
Start: 2019-05-06 | End: 2019-05-06 | Stop reason: HOSPADM

## 2019-05-06 RX ORDER — HYDROCODONE BITARTRATE AND ACETAMINOPHEN 5; 325 MG/1; MG/1
1 TABLET ORAL AS NEEDED
Status: DISCONTINUED | OUTPATIENT
Start: 2019-05-06 | End: 2019-05-06 | Stop reason: HOSPADM

## 2019-05-06 RX ORDER — SODIUM CHLORIDE 0.9 % (FLUSH) 0.9 %
5-40 SYRINGE (ML) INJECTION AS NEEDED
Status: DISCONTINUED | OUTPATIENT
Start: 2019-05-06 | End: 2019-05-09 | Stop reason: HOSPADM

## 2019-05-06 RX ORDER — KETOROLAC TROMETHAMINE 30 MG/ML
15 INJECTION, SOLUTION INTRAMUSCULAR; INTRAVENOUS EVERY 6 HOURS
Status: COMPLETED | OUTPATIENT
Start: 2019-05-07 | End: 2019-05-07

## 2019-05-06 RX ORDER — CYCLOBENZAPRINE HCL 10 MG
10 TABLET ORAL
Status: DISCONTINUED | OUTPATIENT
Start: 2019-05-06 | End: 2019-05-09 | Stop reason: HOSPADM

## 2019-05-06 RX ORDER — FERROUS SULFATE, DRIED 160(50) MG
1 TABLET, EXTENDED RELEASE ORAL DAILY
Status: DISCONTINUED | OUTPATIENT
Start: 2019-05-07 | End: 2019-05-09 | Stop reason: HOSPADM

## 2019-05-06 RX ORDER — FENTANYL CITRATE 50 UG/ML
50 INJECTION, SOLUTION INTRAMUSCULAR; INTRAVENOUS AS NEEDED
Status: DISCONTINUED | OUTPATIENT
Start: 2019-05-06 | End: 2019-05-06 | Stop reason: HOSPADM

## 2019-05-06 RX ORDER — MORPHINE SULFATE 2 MG/ML
2 INJECTION, SOLUTION INTRAMUSCULAR; INTRAVENOUS
Status: DISPENSED | OUTPATIENT
Start: 2019-05-06 | End: 2019-05-07

## 2019-05-06 RX ORDER — POLYETHYLENE GLYCOL 3350 17 G/17G
17 POWDER, FOR SOLUTION ORAL DAILY
Status: DISCONTINUED | OUTPATIENT
Start: 2019-05-07 | End: 2019-05-09 | Stop reason: HOSPADM

## 2019-05-06 RX ORDER — MIDAZOLAM HYDROCHLORIDE 1 MG/ML
1 INJECTION, SOLUTION INTRAMUSCULAR; INTRAVENOUS AS NEEDED
Status: DISCONTINUED | OUTPATIENT
Start: 2019-05-06 | End: 2019-05-06 | Stop reason: HOSPADM

## 2019-05-06 RX ORDER — OXYCODONE HYDROCHLORIDE 5 MG/1
5 TABLET ORAL
Status: DISCONTINUED | OUTPATIENT
Start: 2019-05-06 | End: 2019-05-09 | Stop reason: HOSPADM

## 2019-05-06 RX ORDER — CEFAZOLIN SODIUM/WATER 2 G/20 ML
2 SYRINGE (ML) INTRAVENOUS ONCE
Status: COMPLETED | OUTPATIENT
Start: 2019-05-06 | End: 2019-05-06

## 2019-05-06 RX ORDER — LIDOCAINE HYDROCHLORIDE 10 MG/ML
0.1 INJECTION, SOLUTION EPIDURAL; INFILTRATION; INTRACAUDAL; PERINEURAL AS NEEDED
Status: DISCONTINUED | OUTPATIENT
Start: 2019-05-06 | End: 2019-05-06 | Stop reason: HOSPADM

## 2019-05-06 RX ORDER — SODIUM CHLORIDE 9 MG/ML
125 INJECTION, SOLUTION INTRAVENOUS CONTINUOUS
Status: DISCONTINUED | OUTPATIENT
Start: 2019-05-06 | End: 2019-05-07

## 2019-05-06 RX ORDER — PREDNISONE 1 MG/1
1 TABLET ORAL DAILY
Status: DISCONTINUED | OUTPATIENT
Start: 2019-05-07 | End: 2019-05-09 | Stop reason: HOSPADM

## 2019-05-06 RX ORDER — SODIUM CHLORIDE, SODIUM LACTATE, POTASSIUM CHLORIDE, CALCIUM CHLORIDE 600; 310; 30; 20 MG/100ML; MG/100ML; MG/100ML; MG/100ML
1000 INJECTION, SOLUTION INTRAVENOUS CONTINUOUS
Status: DISCONTINUED | OUTPATIENT
Start: 2019-05-06 | End: 2019-05-06 | Stop reason: HOSPADM

## 2019-05-06 RX ORDER — FENTANYL CITRATE 50 UG/ML
25 INJECTION, SOLUTION INTRAMUSCULAR; INTRAVENOUS
Status: COMPLETED | OUTPATIENT
Start: 2019-05-06 | End: 2019-05-06

## 2019-05-06 RX ORDER — VANCOMYCIN HYDROCHLORIDE 1 G/20ML
INJECTION, POWDER, LYOPHILIZED, FOR SOLUTION INTRAVENOUS AS NEEDED
Status: DISCONTINUED | OUTPATIENT
Start: 2019-05-06 | End: 2019-05-06 | Stop reason: HOSPADM

## 2019-05-06 RX ORDER — ONDANSETRON 2 MG/ML
4 INJECTION INTRAMUSCULAR; INTRAVENOUS
Status: ACTIVE | OUTPATIENT
Start: 2019-05-06 | End: 2019-05-07

## 2019-05-06 RX ORDER — ACETAMINOPHEN 325 MG/1
650 TABLET ORAL ONCE
Status: COMPLETED | OUTPATIENT
Start: 2019-05-06 | End: 2019-05-06

## 2019-05-06 RX ORDER — SUCCINYLCHOLINE CHLORIDE 20 MG/ML
INJECTION INTRAMUSCULAR; INTRAVENOUS AS NEEDED
Status: DISCONTINUED | OUTPATIENT
Start: 2019-05-06 | End: 2019-05-06 | Stop reason: HOSPADM

## 2019-05-06 RX ORDER — FACIAL-BODY WIPES
10 EACH TOPICAL DAILY PRN
Status: DISCONTINUED | OUTPATIENT
Start: 2019-05-08 | End: 2019-05-09 | Stop reason: HOSPADM

## 2019-05-06 RX ORDER — DIPHENHYDRAMINE HYDROCHLORIDE 50 MG/ML
12.5 INJECTION, SOLUTION INTRAMUSCULAR; INTRAVENOUS
Status: ACTIVE | OUTPATIENT
Start: 2019-05-06 | End: 2019-05-07

## 2019-05-06 RX ORDER — ACETAMINOPHEN 500 MG
1000 TABLET ORAL EVERY 6 HOURS
Status: DISCONTINUED | OUTPATIENT
Start: 2019-05-07 | End: 2019-05-09 | Stop reason: HOSPADM

## 2019-05-06 RX ORDER — MORPHINE SULFATE 10 MG/ML
2 INJECTION, SOLUTION INTRAMUSCULAR; INTRAVENOUS
Status: DISCONTINUED | OUTPATIENT
Start: 2019-05-06 | End: 2019-05-06 | Stop reason: HOSPADM

## 2019-05-06 RX ORDER — ROPIVACAINE HYDROCHLORIDE 5 MG/ML
30 INJECTION, SOLUTION EPIDURAL; INFILTRATION; PERINEURAL AS NEEDED
Status: DISCONTINUED | OUTPATIENT
Start: 2019-05-06 | End: 2019-05-06 | Stop reason: HOSPADM

## 2019-05-06 RX ORDER — FENTANYL CITRATE 50 UG/ML
INJECTION, SOLUTION INTRAMUSCULAR; INTRAVENOUS AS NEEDED
Status: DISCONTINUED | OUTPATIENT
Start: 2019-05-06 | End: 2019-05-06 | Stop reason: HOSPADM

## 2019-05-06 RX ADMIN — SODIUM CHLORIDE, SODIUM LACTATE, POTASSIUM CHLORIDE, AND CALCIUM CHLORIDE 1000 ML: 600; 310; 30; 20 INJECTION, SOLUTION INTRAVENOUS at 16:18

## 2019-05-06 RX ADMIN — LIDOCAINE HYDROCHLORIDE 60 MG: 20 INJECTION, SOLUTION EPIDURAL; INFILTRATION; INTRACAUDAL; PERINEURAL at 18:16

## 2019-05-06 RX ADMIN — MAGNESIUM OXIDE TAB 400 MG (241.3 MG ELEMENTAL MG) 400 MG: 400 (241.3 MG) TAB at 22:28

## 2019-05-06 RX ADMIN — FENTANYL CITRATE 25 MCG: 50 INJECTION INTRAMUSCULAR; INTRAVENOUS at 19:34

## 2019-05-06 RX ADMIN — ONDANSETRON 4 MG: 2 INJECTION INTRAMUSCULAR; INTRAVENOUS at 19:06

## 2019-05-06 RX ADMIN — SODIUM CHLORIDE 125 ML/HR: 900 INJECTION, SOLUTION INTRAVENOUS at 19:26

## 2019-05-06 RX ADMIN — OXYCODONE HYDROCHLORIDE 10 MG: 5 TABLET ORAL at 20:02

## 2019-05-06 RX ADMIN — DEXAMETHASONE SODIUM PHOSPHATE 4 MG: 4 INJECTION, SOLUTION INTRA-ARTICULAR; INTRALESIONAL; INTRAMUSCULAR; INTRAVENOUS; SOFT TISSUE at 18:16

## 2019-05-06 RX ADMIN — Medication 10 ML: at 22:18

## 2019-05-06 RX ADMIN — FENTANYL CITRATE 25 MCG: 50 INJECTION INTRAMUSCULAR; INTRAVENOUS at 19:38

## 2019-05-06 RX ADMIN — PRAMIPEXOLE DIHYDROCHLORIDE 1 MG: 0.25 TABLET ORAL at 22:16

## 2019-05-06 RX ADMIN — SUCCINYLCHOLINE CHLORIDE 140 MG: 20 INJECTION INTRAMUSCULAR; INTRAVENOUS at 18:16

## 2019-05-06 RX ADMIN — ACETAMINOPHEN 1000 MG: 500 TABLET ORAL at 22:16

## 2019-05-06 RX ADMIN — SODIUM CHLORIDE, SODIUM LACTATE, POTASSIUM CHLORIDE, CALCIUM CHLORIDE: 600; 310; 30; 20 INJECTION, SOLUTION INTRAVENOUS at 18:06

## 2019-05-06 RX ADMIN — GABAPENTIN 300 MG: 300 CAPSULE ORAL at 22:16

## 2019-05-06 RX ADMIN — FENTANYL CITRATE 25 MCG: 50 INJECTION INTRAMUSCULAR; INTRAVENOUS at 19:46

## 2019-05-06 RX ADMIN — ACETAMINOPHEN 650 MG: 325 TABLET ORAL at 16:18

## 2019-05-06 RX ADMIN — FENTANYL CITRATE 25 MCG: 50 INJECTION INTRAMUSCULAR; INTRAVENOUS at 19:29

## 2019-05-06 RX ADMIN — KETOROLAC TROMETHAMINE 15 MG: 30 INJECTION, SOLUTION INTRAMUSCULAR; INTRAVENOUS at 22:17

## 2019-05-06 RX ADMIN — Medication 2 G: at 18:23

## 2019-05-06 RX ADMIN — HYDROMORPHONE HYDROCHLORIDE 0.2 MG: 1 INJECTION, SOLUTION INTRAMUSCULAR; INTRAVENOUS; SUBCUTANEOUS at 19:49

## 2019-05-06 RX ADMIN — PROPOFOL 120 MG: 10 INJECTION, EMULSION INTRAVENOUS at 18:16

## 2019-05-06 RX ADMIN — FENTANYL CITRATE 50 MCG: 50 INJECTION, SOLUTION INTRAMUSCULAR; INTRAVENOUS at 18:12

## 2019-05-06 RX ADMIN — FENTANYL CITRATE 50 MCG: 50 INJECTION, SOLUTION INTRAMUSCULAR; INTRAVENOUS at 18:16

## 2019-05-06 RX ADMIN — ROCURONIUM BROMIDE 5 MG: 10 INJECTION, SOLUTION INTRAVENOUS at 18:16

## 2019-05-06 NOTE — ANESTHESIA POSTPROCEDURE EVALUATION
Post-Anesthesia Evaluation and Assessment Patient: John Chan MRN: [id number]  SSN: [id number] YOB: 1940  Age: [de-identified] y.o. Sex: female I have evaluated the patient and they are stable and ready for discharge from the PACU. Cardiovascular Function/Vital Signs Visit Vitals /85 Pulse 76 Temp 36.7 °C (98 °F) Resp 20 SpO2 98% Patient is status post General anesthesia for Procedure(s): LUMBAR WOUND INCISION AND DRAINAGE. Nausea/Vomiting: None Postoperative hydration reviewed and adequate. Pain: 
Pain Scale 1: Visual (05/06/19 1917) Pain Intensity 1: 0 (05/06/19 1917) Managed Neurological Status:  
Neuro (WDL): Within Defined Limits (05/06/19 1917) At baseline Mental Status, Level of Consciousness: Alert and  oriented to person, place, and time Pulmonary Status:  
O2 Device: Nasal cannula (05/06/19 1917) Adequate oxygenation and airway patent Complications related to anesthesia: None Post-anesthesia assessment completed. No concerns Signed By: Demi Murpyh MD   
 May 6, 2019 Procedure(s): LUMBAR WOUND INCISION AND DRAINAGE. general 
 
<BSHSIANPOST> Vitals Value Taken Time /65 5/6/2019  7:30 PM  
Temp 36.7 °C (98 °F) 5/6/2019  7:17 PM  
Pulse 65 5/6/2019  7:33 PM  
Resp 17 5/6/2019  7:33 PM  
SpO2 98 % 5/6/2019  7:33 PM  
Vitals shown include unvalidated device data.

## 2019-05-06 NOTE — ANESTHESIA PREPROCEDURE EVALUATION
Relevant Problems No relevant active problems Anesthetic History No history of anesthetic complications Review of Systems / Medical History Patient summary reviewed, nursing notes reviewed and pertinent labs reviewed Pulmonary Within defined limits Neuro/Psych Psychiatric history Cardiovascular Within defined limits GI/Hepatic/Renal 
  
GERD Endo/Other Obesity, arthritis and cancer Other Findings Physical Exam 
 
Airway Mallampati: II 
TM Distance: > 6 cm Neck ROM: normal range of motion Mouth opening: Normal 
 
 Cardiovascular Regular rate and rhythm,  S1 and S2 normal,  no murmur, click, rub, or gallop Dental 
No notable dental hx Pulmonary Breath sounds clear to auscultation Abdominal 
GI exam deferred Other Findings Anesthetic Plan ASA: 3 Anesthesia type: general 
 
 
 
 
Induction: Intravenous Anesthetic plan and risks discussed with: Patient

## 2019-05-07 LAB
ANION GAP SERPL CALC-SCNC: 6 MMOL/L (ref 5–15)
BASOPHILS # BLD: 0 K/UL (ref 0–0.1)
BASOPHILS NFR BLD: 0 % (ref 0–1)
BUN SERPL-MCNC: 14 MG/DL (ref 6–20)
BUN/CREAT SERPL: 18 (ref 12–20)
CALCIUM SERPL-MCNC: 8.3 MG/DL (ref 8.5–10.1)
CHLORIDE SERPL-SCNC: 104 MMOL/L (ref 97–108)
CO2 SERPL-SCNC: 28 MMOL/L (ref 21–32)
CREAT SERPL-MCNC: 0.8 MG/DL (ref 0.55–1.02)
CRP SERPL-MCNC: 2.99 MG/DL (ref 0–0.6)
DIFFERENTIAL METHOD BLD: ABNORMAL
EOSINOPHIL # BLD: 0.1 K/UL (ref 0–0.4)
EOSINOPHIL NFR BLD: 1 % (ref 0–7)
ERYTHROCYTE [DISTWIDTH] IN BLOOD BY AUTOMATED COUNT: 13.4 % (ref 11.5–14.5)
ERYTHROCYTE [SEDIMENTATION RATE] IN BLOOD: 65 MM/HR (ref 0–30)
GLUCOSE BLD STRIP.AUTO-MCNC: 107 MG/DL (ref 65–100)
GLUCOSE BLD STRIP.AUTO-MCNC: 87 MG/DL (ref 65–100)
GLUCOSE BLD STRIP.AUTO-MCNC: 95 MG/DL (ref 65–100)
GLUCOSE BLD STRIP.AUTO-MCNC: 97 MG/DL (ref 65–100)
GLUCOSE SERPL-MCNC: 95 MG/DL (ref 65–100)
HCT VFR BLD AUTO: 32.5 % (ref 35–47)
HGB BLD-MCNC: 10.1 G/DL (ref 11.5–16)
HGB BLD-MCNC: 10.6 G/DL (ref 11.5–16)
IMM GRANULOCYTES # BLD AUTO: 0 K/UL (ref 0–0.04)
IMM GRANULOCYTES NFR BLD AUTO: 1 % (ref 0–0.5)
LYMPHOCYTES # BLD: 1.5 K/UL (ref 0.8–3.5)
LYMPHOCYTES NFR BLD: 19 % (ref 12–49)
MCH RBC QN AUTO: 29.8 PG (ref 26–34)
MCHC RBC AUTO-ENTMCNC: 31.1 G/DL (ref 30–36.5)
MCV RBC AUTO: 95.9 FL (ref 80–99)
MONOCYTES # BLD: 0.5 K/UL (ref 0–1)
MONOCYTES NFR BLD: 7 % (ref 5–13)
NEUTS SEG # BLD: 5.6 K/UL (ref 1.8–8)
NEUTS SEG NFR BLD: 72 % (ref 32–75)
NRBC # BLD: 0 K/UL (ref 0–0.01)
NRBC BLD-RTO: 0 PER 100 WBC
PLATELET # BLD AUTO: 312 K/UL (ref 150–400)
PMV BLD AUTO: 9.7 FL (ref 8.9–12.9)
POTASSIUM SERPL-SCNC: 4.6 MMOL/L (ref 3.5–5.1)
RBC # BLD AUTO: 3.39 M/UL (ref 3.8–5.2)
SERVICE CMNT-IMP: ABNORMAL
SERVICE CMNT-IMP: NORMAL
SODIUM SERPL-SCNC: 138 MMOL/L (ref 136–145)
WBC # BLD AUTO: 7.7 K/UL (ref 3.6–11)

## 2019-05-07 PROCEDURE — 97161 PT EVAL LOW COMPLEX 20 MIN: CPT

## 2019-05-07 PROCEDURE — 74011250637 HC RX REV CODE- 250/637: Performed by: PHYSICIAN ASSISTANT

## 2019-05-07 PROCEDURE — 74011636637 HC RX REV CODE- 636/637: Performed by: PHYSICIAN ASSISTANT

## 2019-05-07 PROCEDURE — 85652 RBC SED RATE AUTOMATED: CPT

## 2019-05-07 PROCEDURE — 36415 COLL VENOUS BLD VENIPUNCTURE: CPT

## 2019-05-07 PROCEDURE — 86140 C-REACTIVE PROTEIN: CPT

## 2019-05-07 PROCEDURE — 97165 OT EVAL LOW COMPLEX 30 MIN: CPT

## 2019-05-07 PROCEDURE — 85025 COMPLETE CBC W/AUTO DIFF WBC: CPT

## 2019-05-07 PROCEDURE — A9270 NON-COVERED ITEM OR SERVICE: HCPCS | Performed by: PHYSICIAN ASSISTANT

## 2019-05-07 PROCEDURE — 74011250636 HC RX REV CODE- 250/636: Performed by: INTERNAL MEDICINE

## 2019-05-07 PROCEDURE — 74011250636 HC RX REV CODE- 250/636: Performed by: PHYSICIAN ASSISTANT

## 2019-05-07 PROCEDURE — 99218 HC RM OBSERVATION: CPT

## 2019-05-07 PROCEDURE — 85018 HEMOGLOBIN: CPT

## 2019-05-07 PROCEDURE — 74011250637 HC RX REV CODE- 250/637: Performed by: NURSE PRACTITIONER

## 2019-05-07 PROCEDURE — 82962 GLUCOSE BLOOD TEST: CPT

## 2019-05-07 PROCEDURE — 97535 SELF CARE MNGMENT TRAINING: CPT

## 2019-05-07 PROCEDURE — 80048 BASIC METABOLIC PNL TOTAL CA: CPT

## 2019-05-07 PROCEDURE — 97116 GAIT TRAINING THERAPY: CPT

## 2019-05-07 RX ORDER — CALCIUM CARBONATE 200(500)MG
200 TABLET,CHEWABLE ORAL
Status: DISCONTINUED | OUTPATIENT
Start: 2019-05-07 | End: 2019-05-09 | Stop reason: HOSPADM

## 2019-05-07 RX ORDER — VANCOMYCIN 2 GRAM/500 ML IN 0.9 % SODIUM CHLORIDE INTRAVENOUS
2000 ONCE
Status: COMPLETED | OUTPATIENT
Start: 2019-05-07 | End: 2019-05-08

## 2019-05-07 RX ORDER — HYDRALAZINE HYDROCHLORIDE 10 MG/1
10 TABLET, FILM COATED ORAL
Status: DISCONTINUED | OUTPATIENT
Start: 2019-05-07 | End: 2019-05-09 | Stop reason: HOSPADM

## 2019-05-07 RX ADMIN — VANCOMYCIN HYDROCHLORIDE 2000 MG: 10 INJECTION, POWDER, LYOPHILIZED, FOR SOLUTION INTRAVENOUS at 22:38

## 2019-05-07 RX ADMIN — Medication 2 G: at 04:34

## 2019-05-07 RX ADMIN — Medication 10 ML: at 20:38

## 2019-05-07 RX ADMIN — OXYCODONE HYDROCHLORIDE 10 MG: 5 TABLET ORAL at 00:21

## 2019-05-07 RX ADMIN — Medication 10 ML: at 04:35

## 2019-05-07 RX ADMIN — OXYCODONE HYDROCHLORIDE 5 MG: 5 TABLET ORAL at 09:35

## 2019-05-07 RX ADMIN — MORPHINE SULFATE 2 MG: 2 INJECTION, SOLUTION INTRAMUSCULAR; INTRAVENOUS at 20:55

## 2019-05-07 RX ADMIN — GABAPENTIN 300 MG: 300 CAPSULE ORAL at 20:36

## 2019-05-07 RX ADMIN — KETOROLAC TROMETHAMINE 15 MG: 30 INJECTION, SOLUTION INTRAMUSCULAR; INTRAVENOUS at 04:35

## 2019-05-07 RX ADMIN — Medication 2 G: at 11:05

## 2019-05-07 RX ADMIN — SODIUM CHLORIDE 125 ML/HR: 900 INJECTION, SOLUTION INTRAVENOUS at 04:34

## 2019-05-07 RX ADMIN — CALCIUM CARBONATE (ANTACID) CHEW TAB 500 MG 200 MG: 500 CHEW TAB at 20:36

## 2019-05-07 RX ADMIN — ACETAMINOPHEN 1000 MG: 500 TABLET ORAL at 04:34

## 2019-05-07 RX ADMIN — DULOXETINE HYDROCHLORIDE 30 MG: 30 CAPSULE, DELAYED RELEASE ORAL at 08:04

## 2019-05-07 RX ADMIN — Medication 10 ML: at 14:33

## 2019-05-07 RX ADMIN — ACETAMINOPHEN 1000 MG: 500 TABLET ORAL at 18:27

## 2019-05-07 RX ADMIN — KETOROLAC TROMETHAMINE 15 MG: 30 INJECTION, SOLUTION INTRAMUSCULAR; INTRAVENOUS at 12:41

## 2019-05-07 RX ADMIN — KETOROLAC TROMETHAMINE 15 MG: 30 INJECTION, SOLUTION INTRAMUSCULAR; INTRAVENOUS at 18:26

## 2019-05-07 RX ADMIN — ACETAMINOPHEN 1000 MG: 500 TABLET ORAL at 22:38

## 2019-05-07 RX ADMIN — PRAMIPEXOLE DIHYDROCHLORIDE 1 MG: 0.25 TABLET ORAL at 20:36

## 2019-05-07 RX ADMIN — ACETAMINOPHEN 1000 MG: 500 TABLET ORAL at 12:41

## 2019-05-07 RX ADMIN — OXYCODONE HYDROCHLORIDE 5 MG: 5 TABLET ORAL at 19:13

## 2019-05-07 RX ADMIN — PREDNISONE 1 MG: 1 TABLET ORAL at 08:04

## 2019-05-07 RX ADMIN — CALCIUM CARBONATE-VITAMIN D TAB 500 MG-200 UNIT 1 TABLET: 500-200 TAB at 08:03

## 2019-05-07 NOTE — PROGRESS NOTES
Problem: Pain - Acute Goal: *Control of acute pain Description Patient will describe pain using 0-10 numeric pain scale, as a score of 3 or less. Outcome: Progressing Towards Goal 
 
Pt reports relief from pain with combined use of PRN and scheduled medications. -- 
Pt admitted to floor with hemovac, reporting pain and denying nausea. Medicated with PRNs as ordered. Pt uses bedpan multiple times, log-rolling with 1 one person assist. Dressing to posterior is clean, dry, intact with no drainage to superficial layer. Accompanied by family/friend for duration of shift. No further changes.

## 2019-05-07 NOTE — PROGRESS NOTES
Problem: Mobility Impaired (Adult and Pediatric) Goal: *Acute Goals and Plan of Care (Insert Text) Description Physical Therapy Goals Initiated 5/7/2019 1. Patient will move from supine to sit and sit to supine  in bed with modified independence within 7 day(s). 2.  Patient will transfer from bed to chair and chair to bed with modified independence using the least restrictive device within 7 day(s). 3.  Patient will perform sit to stand with modified independence within 7 day(s). 4.  Patient will ambulate with modified independence for 300 feet with the least restrictive device within 7 day(s). 5.  Patient will ascend/descend 13 stairs with single handrail(s) with supervision/set-up within 7 day(s). Outcome: Not Met PHYSICAL THERAPY EVALUATION Patient: Jeannette Lu [de-identified][de-identified] y.o. female) Date: 5/7/2019 Primary Diagnosis: Lumbar surgical wound fluid collection Olga Dailey Procedure(s) (LRB): LUMBAR WOUND INCISION AND DRAINAGE (N/A) 1 Day Post-Op Precautions:   Back, Fall, LSO 
 
ASSESSMENT : 
Based on the objective data described below, the patient presents near baseline LOF with mobility s/p I+D of lumbar surgical wound POD#1. Pt mobilized with modified indep for bed mob and transfers. Ambulated on unit without device and supervision. Stair training deferred due to IV, will assess next session, anticipate pt cleared for d/c home next session. Recommend follow up [facility] PT. Patient will benefit from skilled intervention to address the above impairments. Patient?s rehabilitation potential is considered to be Excellent Factors which may influence rehabilitation potential include:  
? None noted ? Mental ability/status ? Medical condition ? Home/family situation and support systems ? Safety awareness 
? Pain tolerance/management 
? Other: PLAN : 
Recommendations and Planned Interventions: ?           Bed Mobility Training             ? Neuromuscular Re-Education ? Transfer Training                   ? Orthotic/Prosthetic Training 
? Gait Training                         ? Modalities ? Therapeutic Exercises           ? Edema Management/Control ? Therapeutic Activities            ? Patient and Family Training/Education ? Other (comment): Frequency/Duration: Patient will be followed by physical therapy  daily to address goals. Discharge Recommendations: Home Health Further Equipment Recommendations for Discharge: none SUBJECTIVE:  
Patient stated \"That pain is much better? OBJECTIVE DATA SUMMARY:  
HISTORY:   
Past Medical History:  
Diagnosis Date Cataracts, bilateral   
 Chronic pain LOWER BACK [facility]  
 colectomy with ostomy, then reversed by DR ELVIN Sanchez Colon polyps Depression   
 did not do well on duloxetine GERD (gastroesophageal reflux disease) Glaucoma Heart palpitations Other ill-defined conditions(799.89) GASTROPARESIS  
 RA (rheumatoid arthritis) ([facility] DR Emir Topete Restless leg syndrome Spinal stenosis 5/8/2011 Unspecified adverse effect of anesthesia HEADACHE  
 UTI (lower urinary tract infection) Past Surgical History:  
Procedure Laterality Date HX APPENDECTOMY  2015 HX COLECTOMY  2015 WITH COLOSTOMY AND THEN REOMVAL OF COLOSTOMY HX COLOSTOMY  2016 REVERSAL  
 HX COLOSTOMY TAKE DOWN    
 HX DILATION AND CURETTAGE    
 HX GI  9/2014 COLONOSCOPY HX KNEE ARTHROSCOPY Left 1979 MENISCUS REPAIR  
 HX KNEE ARTHROSCOPY Left HX KNEE ARTHROSCOPY Right 2005 HX KNEE REPLACEMENT Left 2005 HX KNEE REPLACEMENT Right 05/09/2018 HX LAP CHOLECYSTECTOMY  5/2005 HX LUMBAR LAMINECTOMY  2011 HX ORTHOPAEDIC Right HAND FX  
 HX TUBAL LIGATION  1979 HX WRIST FRACTURE TX Left TOTAL HIP ARTHROPLASTY Right 3/2012 Prior Level of Function/Home Situation: pt lives with  in 2 level home with no TOSHIA. Indep with mobility, no use of device. Receiving [facility] PT. 
Personal factors and/or comorbidities impacting plan of care: none Home Situation Home Environment: Private residence # Steps to Enter: 1 Wheelchair Ramp: Yes One/Two Story Residence: Two story # of Interior Steps: 15 Interior Rails: Both Support Systems: Spouse/Significant Other/Partner Patient Expects to be Discharged to[de-identified] Rehabilitation facility Current DME Used/Available at Home: Shower chair, Walker, rolling, Cane, straight, Raised toilet seat, Grab bars, Adaptive dressing aides Tub or Shower Type: Shower EXAMINATION/PRESENTATION/DECISION MAKING:  
Critical Behavior: 
Neurologic State: Alert Orientation Level: Oriented X4 Cognition: Appropriate for age attention/concentration Safety/Judgement: Decreased insight into deficits, Decreased awareness of need for safety Hearing: Auditory Auditory Impairment: None Skin:  dressing c/d/i at lumbar spine,hemovac drain in place Range Of Motion: 
AROM: Within functional limits Strength:   
Strength: Within functional limits Tone & Sensation:  
Tone: Normal 
  
  
  
  
Sensation: Intact Coordination: 
Coordination: Within functional limits Vision:  
Corrective Lenses: Glasses Functional Mobility: 
Bed Mobility: 
Rolling: Modified independent Supine to Sit: Supervision(cues for spinal precautions) Scooting: Modified independent Transfers: 
Sit to Stand: Modified independent Stand to Sit: Modified independent Balance:  
Sitting: Intact Standing: Intact Ambulation/Gait Training: 
Distance (ft): 300 Feet (ft) Assistive Device: Brace/Splint;Gait belt Ambulation - Level of Assistance: Supervision Gait Abnormalities: Decreased step clearance Speed/Ania: Pace decreased (<100 feet/min) Functional Measure: 
Tinetti test: 
 
Sitting Balance: 1 Arises: 1 Attempts to Rise: 2 Immediate Standing Balance: 2 Standing Balance: 2 Nudged: 2 Eyes Closed: 1 Turn 360 Degrees - Continuous/Discontinuous: 1 Turn 360 Degrees - Steady/Unsteady: 1 Sitting Down: 1 Balance Score: 14 Indication of Gait: 1 
R Step Length/Height: 1 L Step Length/Height: 1 
R Foot Clearance: 1 L Foot Clearance: 1 Step Symmetry: 1 Step Continuity: 1 Path: 2 Trunk: 2 Walking Time: 1 Gait Score: 12 Total Score: 26 Tinetti Tool Score Risk of Falls 
<19 = High Fall Risk 19-24 = Moderate Fall Risk 25-28 = Low Fall Risk Tinetti ME. Performance-Oriented Assessment of Mobility Problems in Elderly Patients. [facility] 66; [id number]. (Scoring Description: PT Bulletin Feb. 10, 1993) Older adults: Sylvester Mitchell et al, 2009; n = 1601 S [employer] elderly evaluated with ABC, REJI, ADL, and IADL) · Mean REJI score for males aged 69-68 years = 26.21(3.40) · Mean REJI score for females age 69-68 years = 25.16(4.30) · Mean REJI score for males over 80 years = 23.29(6.02) · Mean REJI score for females over 80 years = 17.20(8.32) Physical Therapy Evaluation Charge Determination History Examination Presentation Decision-Making MEDIUM  Complexity : 1-2 comorbidities / personal factors will impact the outcome/ POC  LOW Complexity : 1-2 Standardized tests and measures addressing body structure, function, activity limitation and / or participation in recreation  LOW Complexity : Stable, uncomplicated  LOW Complexity : FOTO score of  Based on the above components, the patient evaluation is determined to be of the following complexity level: LOW Pain: 
Pain Scale 1: Numeric (0 - 10) Pain Intensity 1: 1 Pain Location 1: Back Pain Orientation 1: Lower Pain Description 1: Reid Marley Pain Intervention(s) 1: Medication (see MAR) Activity Tolerance: Pt tolerated activity without c/o this date Please refer to the flowsheet for vital signs taken during this treatment. After treatment:  
?         Patient left in no apparent distress sitting up in chair ? Patient left in no apparent distress in bed 
? Call bell left within reach ? Nursing notified ? Caregiver present ? Bed alarm activated COMMUNICATION/EDUCATION:  
The patient?s plan of care was discussed with: Occupational Therapist and Registered Nurse. ?         Fall prevention education was provided and the patient/caregiver indicated understanding. ? Patient/family have participated as able in goal setting and plan of care. ?         Patient/family agree to work toward stated goals and plan of care. ?         Patient understands intent and goals of therapy, but is neutral about his/her participation. ? Patient is unable to participate in goal setting and plan of care. Thank you for this referral. 
Radha Rivero, PT Time Calculation: 27 mins

## 2019-05-07 NOTE — PROGRESS NOTES
Reason for Admission:   Lumbar surgical wound collection. RRAT Score:     16 Do you (patient/family) have any concerns for transition/discharge? No  
           
Plan for utilizing home health:   Yes. [facility] Current Advanced Directive/Advance Care Plan:  Not on file. Likelihood of readmission? medium Transition of Care Plan:     CM met with pt to introduce her to the role of CM and offer support. Pt stated that she lives at home with her , who is disabled. She stated that she is fairly independent at home. She added that she was actively receiving home health from [facility] prior to admission. ABBE was informed by Dr. Patel NP that this pt will likely need home IV antibx at d/c. ID has been consulted. CM sent a referral to [facility] to check her benefits for Home IV's and sent a referral to [facility] since pt was an active pt with them prior to admission. Will follow. OBS and CORONADO letters signed. Tamiko Somers Care Management Interventions PCP Verified by CM: Yes 
Palliative Care Criteria Met (RRAT>21 & CHF Dx)?: No 
Transition of Care Consult (CM Consult): Discharge Planning MyChart Signup: No 
Discharge Durable Medical Equipment: No 
Physical Therapy Consult: Yes Occupational Therapy Consult: Yes Speech Therapy Consult: No 
Current Support Network: Lives with Spouse Confirm Follow Up Transport: Family Plan discussed with Pt/Family/Caregiver: Yes Freedom of Choice Offered: Yes [employer] Information Provided?: No

## 2019-05-07 NOTE — PERIOP NOTES
TRANSFER - OUT REPORT: 
 
Verbal report given to Roxy Zhao RN(name) on [facility]  being transferred to [facility](unit) for routine post - op Report consisted of patients Situation, Background, Assessment and  
Recommendations(SBAR). Information from the following report(s) SBAR, Kardex, OR Summary, Procedure Summary, Intake/Output and Cardiac Rhythm Sinus Londell Yana was reviewed with the receiving nurse. Lines:  
Peripheral IV 05/06/19 Left;Posterior Hand (Active) Site Assessment Clean, dry, & intact 5/6/2019  7:17 PM  
Phlebitis Assessment 0 5/6/2019  7:17 PM  
Infiltration Assessment 0 5/6/2019  7:17 PM  
Dressing Status Clean, dry, & intact 5/6/2019  7:17 PM  
Dressing Type Transparent;Tape 5/6/2019  7:17 PM  
Hub Color/Line Status Pink; Infusing 5/6/2019  7:17 PM  
Action Taken Open ports on tubing capped 5/6/2019  7:17 PM  
Alcohol Cap Used Yes 5/6/2019  7:17 PM  
  
 
Opportunity for questions and clarification was provided. Patient transported with: 
 O2 @ 2 liters Tech

## 2019-05-07 NOTE — DISCHARGE INSTRUCTIONS
[facility] Dr. Deana Gitelman  [phone]    After Hospital Care Plan:  Discharge Instructions Lumbar Laminectomy Surgery     Patient Name: Alex Guzman    Date of procedure: 5/6/2019  Date of discharge:     Procedure: Procedure(s):  LUMBAR WOUND INCISION AND DRAINAGE  PCP: Selma Mcghee MD    Follow up appointments  -Follow up with Dr. Deana Gitelman in 2 weeks. Call [phone] to make an appointment as soon as you get home from the hospital.    [address] [place], [place] [address]y: _________________________   phone: ___________________  The agency will contact you to arrange dates/times for visits. Please call them if you do not hear from them within 24 hours after you are discharged  Physical therapy 3 times a week for 3 weeks  Nursing-initial assessment and as needed    When to call your Orthopaedic Surgeon:  -Signs of infection-if your incision is red; continues to have drainage; drainage has a foul odor or if you have a persistent fever over 101 degrees for 24 hours  -Nausea or vomiting, severe headache  -Loss of bowel or bladder function, inability to urinate  -Changes in sensation in your arms or legs (numbness, tingling, loss of color)  -Increased weakness-greater than before your surgery  -Severe pain or pain not relieved by medications  -Signs of a blood clot in your leg-calf pain, tenderness, redness, swelling of lower leg    When to call your Primary Care Physician:  -Concerns about medical conditions such as diabetes, high blood pressure, asthma, congestive heart failure  -Call if blood sugars are elevated, persistent headache or dizziness, coughing or congestion, constipation or diarrhea, burning with urination, abnormal heart rate    When to call 911 and go to the nearest emergency room:  -Acute onset of chest pain, shortness of breath, difficulty breathing    Activity  - You are going home a well person, be as active as possible. Your only exercise should be walking.   Start with short frequent walks and increase your walking distance each day.  -Limit the amount of time you sit to 20-30 minute intervals. Sitting for prolonged periods of time will be uncomfortable for you following surgery.  -Do NOT lift anything over 5 pounds  -Do NOT do any straining, twisting or bending  -When you are in bed, you may lay on your back or on either side. Do NOT lie on your stomach    Brace  -If you have a back brace, you should wear your brace at all times when you are out of bed. Do not wear the brace while in bed or showering.  -Remember to always wear a cotton t-shirt underneath your brace.  -Do not bend or twist when your brace is off    Diet  -Resume usual diet; drink plenty of fluids; eat foods high in fiber  -It is important to have regular bowel movements. Pain medications may cause constipation. You may want to take a stool softener (such as Senokot-S or Colace) to prevent constipation.  -If constipation occurs, take a laxative (such as Dulcolax tablets, Milk of Magnesia, or a suppository). Laxatives should only be used if the above preventable measures have failed and you still have not had a bowel movement after three days. Driving  -You may not drive or return to work until instructed by your physician. However, you may ride in the car for short periods of time. Incision Care  Your incision has been closed with absorbable sutures and the Zipline skin closure system. This will assist with healing. The [place] is to remain on your incision for 2 weeks. A dry dressing (ABD and tape) will be placed over it and should be changed daily, for at least the first several days after your surgery. If you have no incisional drainage, you may leave the incision open to air if you wish, still leaving the Zipline in place. Please make sure to wash your hands prior to touching your dressing. You may take brief showers but do not run the water directly onto the wound.  After your shower, blot your incision dry with a soft towel and replace the dry dressing. Do not allow the tape to come in contact with the Zipline. Do not rub or apply any lotions or ointments to your incision site. Do not soak or scrub your wound. The Zipline dressing will be removed during your two week follow-up appointment. If you experience drainage leaking from underneath the Zipline or if it peels off before 2 weeks, please contact your orthopedic surgeons office. Showering  -You may shower in approximately 4 days after your surgery.    -Leave the dressing on during your shower. Do NOT allow the water to run directly onto your dressing. Once you get out of the shower, gently pat the dressing dry. -Reminder- your brace can be removed while showering. Remember to not bend or twist while your brace is off.    -Do not take a tub bath. Preventing blood clots  -You have been given T.E.D. stockings to wear. Continue to wear these for 7 days after your discharge. Put them on in the morning and take them off at night.    -They are used to increase your circulation and prevent blood clots from forming in your legs  -T. E.D. stockings can be machine washed, temperature not to exceed 160° F (71°C) and machine dried for 15 to 20 minutes, temperature not to exceed 250° F (121°C). Pain management  -Take pain medication as prescribed; decrease the amount you use as your pain lessens  -Do not wait until you are in extreme pain to take your medication.  -Avoid alcoholic beverages while taking pain medication    Pain Medication Safety  DO:  -Read the Medication Guide   -Take your medicine exactly as prescribed   -Store your medicine away from children and in a safe place   -Flush unused medicine down the toilet   -Call your healthcare provider for medical advice about side effects. You may report side effects to FDA at 4-665-FDA-7256.   -Please be aware that many medications contain Tylenol.   We do not want you to over medicate so please read the information below as a guide. Do not take more than 4 Grams of Tylenol in a 24 hour period. (There are 1000 milligrams in one Gram)                                                                                                                                                                                                                           Percocet contains 325 mg of Tylenol per tablet (do not take more than 12 tablets in 24 hours)  Lortab contains 500 mg of Tylenol per tablet (do not take more than 8 tablets in 24 hours)  Norco contains 325 mg of Tylenol per tablet (do not take more than 12 tablets in 24 hours). DO NOT:  -Do not give your medicine to others   -Do not take medicine unless it was prescribed for you   -Do not stop taking your medicine without talking to your healthcare provider   -Do not break, chew, crush, dissolve, or inject your medicine. If you cannot  swallow your medicine whole, talk to your healthcare provider.  -Do not drink alcohol while taking this medicine  -Do not take anti-inflammatory medications or aspirin unless instructed by your physician.

## 2019-05-07 NOTE — PROGRESS NOTES
Occupational Therapy EVALUATION with discharge Patient: Kadi High [de-identified][de-identified] y.o. female) Date: 5/7/2019 Primary Diagnosis: Lumbar surgical wound fluid collection Verlene Purple Procedure(s) (LRB): LUMBAR WOUND INCISION AND DRAINAGE (N/A) 1 Day Post-Op Precautions: corset brace when OOB, Back, Fall ASSESSMENT : 
Based on the objective data described below, the patient presents with largely supervision for ADL transfers and tasks s/p lumbar I&D POD 1 with newly acquired spinal precautions. PTA, patient was largely MOD I for ADL transfers and tasks using AE for LB ADLs due to prior spine sx. Patient reporting no issues with ADLs and was educated further on home safety and HEP. Recommend HHOT services to maximize patient safety and independence with ADL transfers and tasks. No further skilled acute intervention required at this time. Discharge Recommendations: Home Health Further Equipment Recommendations for Discharge: toilet tongs SUBJECTIVE:  
Patient stated I didn't use toilet tongs my last surgery.  OBJECTIVE DATA SUMMARY:  
HISTORY:  
Past Medical History:  
Diagnosis Date  Cataracts, bilateral   
 Chronic pain LOWER BACK  Colon cancer ([facility] [address] 2015  
 colectomy with ostomy, then reversed by DR ELVIN Villatoro  Colon polyps  Depression   
 did not do well on duloxetine  GERD (gastroesophageal reflux disease)  Glaucoma  Heart palpitations  Other ill-defined conditions(799.89) GASTROPARESIS  
 RA (rheumatoid arthritis) ([facility] [address] DR Elva Thompson  Restless leg syndrome  Spinal stenosis 5/8/2011  Unspecified adverse effect of anesthesia HEADACHE  
 UTI (lower urinary tract infection) Past Surgical History:  
Procedure Laterality Date  HX APPENDECTOMY  2015  HX COLECTOMY  2015 WITH COLOSTOMY AND THEN REOMVAL OF COLOSTOMY  HX COLOSTOMY  2016  REVERSAL  
 HX COLOSTOMY TAKE DOWN    
 HX DILATION AND CURETTAGE    
  HX GI  9/2014 COLONOSCOPY  
 HX KNEE ARTHROSCOPY Left 1979 MENISCUS REPAIR  
 HX KNEE ARTHROSCOPY Left  HX KNEE ARTHROSCOPY Right 2005  HX KNEE REPLACEMENT Left 2005  HX KNEE REPLACEMENT Right 05/09/2018  HX LAP CHOLECYSTECTOMY  5/2005  HX LUMBAR LAMINECTOMY  2011  HX ORTHOPAEDIC Right HAND FX  
 HX TUBAL LIGATION  1979  HX WRIST FRACTURE TX Left  TOTAL HIP ARTHROPLASTY Right 3/2012 Prior Level of Function/Environment/Context: PTA, patient was largely MOD I for ADL transfers and tasks living with her . Expanded or extensive additional review of patient history:  
 
Home Situation Home Environment: Private residence # Steps to Enter: 1 Wheelchair Ramp: Yes One/Two Story Residence: Two story # of Interior Steps: 15 Interior Rails: Both Living Alone: No 
Support Systems: Spouse/Significant Other/Partner Patient Expects to be Discharged to[de-identified] Rehabilitation facility Current DME Used/Available at Home: Shower chair Tub or Shower Type: Shower Hand dominance: Right EXAMINATION OF PERFORMANCE DEFICITS: 
Cognitive/Behavioral Status: 
Neurologic State: Alert Orientation Level: Oriented X4 Cognition: Appropriate for age attention/concentration Perception: Appears intact Perseveration: No perseveration noted Safety/Judgement: Decreased insight into deficits; Decreased awareness of need for safety Skin: exposed areas grossly intact Edema: none noted Hearing: Auditory Auditory Impairment: None Vision/Perceptual:   
    
    
    
  
    
    
Corrective Lenses: Glasses Range of Motion: BUE 
AROM: Within functional limits Strength: BUE Strength: Within functional limits Coordination: 
Coordination: Within functional limits Fine Motor Skills-Upper: Left Intact; Right Intact(has rheumatoid arthritis ) Gross Motor Skills-Upper: Left Intact; Right Intact Tone & Sensation: BUE Tone: Normal 
 Sensation: Intact Balance: 
Sitting: Intact Standing: Intact Functional Mobility and Transfers for ADLs:Bed Mobility: 
Rolling: Modified independent Supine to Sit: Supervision(cues for spinal precautions) Scooting: Modified independent Transfers: 
Sit to Stand: Modified independent Stand to Sit: Modified independent ADL Assessment: 
Feeding: Modified independent(increased time and AE for opening containers) Oral Facial Hygiene/Grooming: Supervision(infer 2* spinal precautions) Bathing: Supervision(infer 2* spinal precautions) Upper Body Dressing: Modified independent(increased time for brace management) Lower Body Dressing: Supervision(infer with use of AE; verbalized to OT) Toileting: Supervision(infer with use of toilet tongs) ADL Intervention and task modifications: Toileting Adaptive Equipment: (edu on toilet tongs) Cognitive Retraining Safety/Judgement: Decreased insight into deficits; Decreased awareness of need for safety Patient instructed and indicated understanding the benefits of maintaining activity tolerance, functional mobility, and independence with self care tasks during acute stay  to ensure safe return home and to baseline. Encouraged patient to increase frequency and duration OOB, not sitting longer than 30 mins without marching/walking with staff, be out of bed for all meals, perform daily ADLs (as approved by RN/MD regarding bathing etc), and performing functional mobility to/from bathroom. Patient instruction and indicated understanding on body mechanics, ergonomics and gravitational force on the spine during different body positions to plan activities in prep for return home to complete basic ADLs, instrumental ADLs and back to work safely. Dressing brace: Patient instructed and demonstrated to don/doff velcro on brace using dominant side, keeping non-dominant side intact.  Patient instructed and demonstrated in meantime of being able to stand with back against wall to don/doff brace, to don/doff seated using lap and bed/chair surface to support brace while manipulating. Dressing lower body: Patient instructed to don brace first and on the benefits to remain seated to don all clothing to increase independence with precautions and pain management. Patient unable to tailor sit however reporting use of AE. Toileting: Patient instructed on the benefits of using flushable wet wipes and toilet tongs if decreased reach or pain for anibal care. Also, the benefits of a reacher to aid in clothing management. Patient instruction and indicated understanding to not strain i.e. holding breath to bear down during a bowel movement, lifting/activity, and sexual activity. Home safety: Patient instructed and indicated understanding on home modifications and safety [raise height of ADL objects (i.e. clothing, sink items, fridge items, items to mouth when grooming), appropriate height of chair surfaces, recliner safety, change of floor surfaces, clear pathways] to increase independence and fall prevention. Standing: Patient instructed and indicated understanding to walk up to sink/counter top/surfaces, step into walker, square off while using objects, slide objects along surfaces, to increase adherence to back precautions and fall prevention. Patient instructed to increase amount of time standing in order to increase independence and tolerance with ADLs. During prolonged standing, can open cabinet door or place foot on stool to decrease spinal pressure/increase pain. Functional Measure: 
Barthel Index: 
 
Bathin Bladder: 10 Bowels: 5 Groomin Dressing: 10 Feeding: 10 Mobility: 10 Stairs: 5 Toilet Use: 10 Transfer (Bed to Chair and Back): 10 Total: 75/100 Percentage of impairment  
0% 1-19% 20-39% 40-59% 60-79% 80-99% 100% Barthel Score 0-100 100 99-80 79-60 59-40 20-39 1-19 
 0 The Barthel ADL Index: Guidelines 1. The index should be used as a record of what a patient does, not as a record of what a patient could do. 2. The main aim is to establish degree of independence from any help, physical or verbal, however minor and for whatever reason. 3. The need for supervision renders the patient not independent. 4. A patient's performance should be established using the best available evidence. Asking the patient, friends/relatives and nurses are the usual sources, but direct observation and common sense are also important. However direct testing is not needed. 5. Usually the patient's performance over the preceding 24-48 hours is important, but occasionally longer periods will be relevant. 6. Middle categories imply that the patient supplies over 50 per cent of the effort. 7. Use of aids to be independent is allowed. Joanna Chu., Barthel, D.W. (1480). Functional evaluation: the Barthel Index. [address] [facility] (14)2. Smitha Rodriguez teena STERLING [place], Coral Dominguez., Dave Bahena., [place], [address] [facility] (1999). Measuring the change indisability after inpatient rehabilitation; comparison of the responsiveness of the Barthel Index and Functional [place] Measure. Journal of Neurology, Neurosurgery, and Psychiatry, 66(4), 229-428. CHAUNCEY Raymond, MONA Helms, & Krystina Salinas, M.A. (2004.) Assessment of post-stroke quality of life in cost-effectiveness studies: The usefulness of the Barthel Index and the EuroQoL-5D. [facility], 13, 105-60 Occupational Therapy Evaluation Charge Determination History Examination Decision-Making LOW Complexity : Brief history review  LOW Complexity : 1-3 performance deficits relating to physical, cognitive , or psychosocial skils that result in activity limitations and / or participation restrictions  LOW Complexity : No comorbidities that affect functional and no verbal or physical assistance needed to complete eval tasks Based on the above components, the patient evaluation is determined to be of the following complexity level: LOW Pain: 
Pain Scale 1: Numeric (0 - 10) Pain Intensity 1: 3 Pain Location 1: Back Pain Orientation 1: Lower Pain Description 1: Cassandra Hendrickson Pain Intervention(s) 1: Medication (see MAR) Activity Tolerance: VSS Please refer to the flowsheet for vital signs taken during this treatment. After treatment:  
[x] Patient left in no apparent distress sitting up in chair 
[] Patient left in no apparent distress in bed 
[x] Call bell left within reach [x] Nursing notified 
[] Caregiver present 
[] Bed alarm activated COMMUNICATION/EDUCATION:  
The patients plan of care was discussed with: Physical Therapist and Registered Nurse. [x] Home safety education was provided and the patient/caregiver indicated understanding. [x] Patient/family have participated as able in goal setting and plan of care. [x] Patient/family agree to work toward stated goals and plan of care. [] Patient understands intent and goals of therapy, but is neutral about his/her participation. [] Patient is unable to participate in goal setting and plan of care. This patients plan of care is appropriate for delegation to [facility]. Thank you for this referral. 
Gena Thornton Time Calculation: 12 mins

## 2019-05-07 NOTE — PROGRESS NOTES
Orthopedic Spine Progress Note Post Op day: 1 Day Post-Op May 7, 2019 8:59 AM  
Admit Date: 2019 Procedure: Procedure(s): LUMBAR WOUND INCISION AND DRAINAGE Subjective:  
 
Ramírez Borges appears well. She is seated at the edge of the bed. Pain has improved. Afebrile/VSS.  is at the bedside. No N/V 
Voiding Drain in place Pain Control:  
Pain Assessment Pain Scale 1: Numeric (0 - 10) Pain Intensity 1: 1 Pain Onset 1: postop Pain Location 1: Back Pain Orientation 1: Lower Pain Description 1: Dull Objective:  
 
 
  
Physical Exam: 
General:  Alert and oriented. No acute distress. Heart:  Respirations unlabored. Abdomen:  
Extremities: Soft, non-tender. No evidence of cyanosis. Pulses palpable in both upper and lower extremities. Neurologic: 
Musculoskeletal:  No new motor deficits. Neurovascular exam within normal limits. Sensation stable. Motor: unchanged C5-T1 and L2-S1. Rell's sign negative in bilateral lower extremities. Calves soft, nontender upon palpation and with passive twitch. Moves both upper and lower extremities. Incision: clean, dry, and intact. No significant erythema or swelling. No active drainage noted. Vital Signs:   
Blood pressure 124/64, pulse 67, temperature 98.4 °F (36.9 °C), resp. rate 18, SpO2 93 %. Temp (24hrs), Av.7 °F (36.5 °C), Min:97 °F (36.1 °C), Max:98.5 °F (36.9 °C) LAB:   
Recent Labs 19 
2274 HGB 10.6* Lab Results Component Value Date/Time Sodium 138 2019 04:56 AM  
 Potassium 4.6 2019 04:56 AM  
 Chloride 104 2019 04:56 AM  
 CO2 28 2019 04:56 AM  
 Glucose 95 2019 04:56 AM  
 BUN 14 2019 04:56 AM  
 Creatinine 0.80 2019 04:56 AM  
 Calcium 8.3 (L) 2019 04:56 AM  
 
 
Intake/Output:701 -  190 In: -  
Out: 750 [Urine:750] 1901 -  0700 In: 800 [I.V.:800] Out: 76 [Urine:25; Drains:50] Assessment: Patient is 1 Day Post-Op s/p Procedure(s): LUMBAR WOUND INCISION AND DRAINAGE Plan: 1. PT/OT 2. Continue established methods of pain control 3. VTE Prophylaxes - TEDS & SCDs 4. Encouraged use of ICS 5. Keep drain in until tomorrow per Dr. Nataly Hatch 
6. Wound cultures pending. ID has been consulted - Thank you for your assistance 7. Discharge pending plan with ID Signed By: Tal Dominguez PA-C

## 2019-05-08 PROBLEM — A49.02 MRSA INFECTION: Status: ACTIVE | Noted: 2019-05-08

## 2019-05-08 LAB
ANION GAP SERPL CALC-SCNC: 4 MMOL/L (ref 5–15)
BACTERIA SPEC CULT: ABNORMAL
BACTERIA SPEC CULT: NORMAL
BUN SERPL-MCNC: 14 MG/DL (ref 6–20)
BUN/CREAT SERPL: 20 (ref 12–20)
CALCIUM SERPL-MCNC: 8.8 MG/DL (ref 8.5–10.1)
CHLORIDE SERPL-SCNC: 107 MMOL/L (ref 97–108)
CO2 SERPL-SCNC: 29 MMOL/L (ref 21–32)
CREAT SERPL-MCNC: 0.69 MG/DL (ref 0.55–1.02)
GLUCOSE BLD STRIP.AUTO-MCNC: 113 MG/DL (ref 65–100)
GLUCOSE BLD STRIP.AUTO-MCNC: 125 MG/DL (ref 65–100)
GLUCOSE BLD STRIP.AUTO-MCNC: 88 MG/DL (ref 65–100)
GLUCOSE BLD STRIP.AUTO-MCNC: 96 MG/DL (ref 65–100)
GLUCOSE SERPL-MCNC: 96 MG/DL (ref 65–100)
GRAM STN SPEC: ABNORMAL
GRAM STN SPEC: ABNORMAL
HGB BLD-MCNC: 10.4 G/DL (ref 11.5–16)
POTASSIUM SERPL-SCNC: 3.8 MMOL/L (ref 3.5–5.1)
SERVICE CMNT-IMP: ABNORMAL
SERVICE CMNT-IMP: NORMAL
SODIUM SERPL-SCNC: 140 MMOL/L (ref 136–145)

## 2019-05-08 PROCEDURE — 36415 COLL VENOUS BLD VENIPUNCTURE: CPT

## 2019-05-08 PROCEDURE — 74011250637 HC RX REV CODE- 250/637: Performed by: PHYSICIAN ASSISTANT

## 2019-05-08 PROCEDURE — 77030020365 HC SOL INJ SOD CL 0.9% 50ML

## 2019-05-08 PROCEDURE — A9270 NON-COVERED ITEM OR SERVICE: HCPCS | Performed by: PHYSICIAN ASSISTANT

## 2019-05-08 PROCEDURE — 74011636637 HC RX REV CODE- 636/637: Performed by: PHYSICIAN ASSISTANT

## 2019-05-08 PROCEDURE — 80048 BASIC METABOLIC PNL TOTAL CA: CPT

## 2019-05-08 PROCEDURE — 97116 GAIT TRAINING THERAPY: CPT

## 2019-05-08 PROCEDURE — 36573 INSJ PICC RS&I 5 YR+: CPT | Performed by: INTERNAL MEDICINE

## 2019-05-08 PROCEDURE — 02HV33Z INSERTION OF INFUSION DEVICE INTO SUPERIOR VENA CAVA, PERCUTANEOUS APPROACH: ICD-10-PCS | Performed by: INTERNAL MEDICINE

## 2019-05-08 PROCEDURE — 77030020847 HC STATLOK BARD -A

## 2019-05-08 PROCEDURE — 99218 HC RM OBSERVATION: CPT

## 2019-05-08 PROCEDURE — 76937 US GUIDE VASCULAR ACCESS: CPT

## 2019-05-08 PROCEDURE — C1751 CATH, INF, PER/CENT/MIDLINE: HCPCS

## 2019-05-08 PROCEDURE — 85018 HEMOGLOBIN: CPT

## 2019-05-08 PROCEDURE — 74011250636 HC RX REV CODE- 250/636: Performed by: INTERNAL MEDICINE

## 2019-05-08 PROCEDURE — 82962 GLUCOSE BLOOD TEST: CPT

## 2019-05-08 PROCEDURE — 77030018719 HC DRSG PTCH ANTIMIC J&J -A

## 2019-05-08 RX ORDER — VANCOMYCIN/0.9 % SOD CHLORIDE 1.5G/250ML
1500 PLASTIC BAG, INJECTION (ML) INTRAVENOUS EVERY 24 HOURS
Status: DISCONTINUED | OUTPATIENT
Start: 2019-05-08 | End: 2019-05-09

## 2019-05-08 RX ADMIN — ACETAMINOPHEN 1000 MG: 500 TABLET ORAL at 05:38

## 2019-05-08 RX ADMIN — PREDNISONE 1 MG: 1 TABLET ORAL at 08:16

## 2019-05-08 RX ADMIN — CALCIUM CARBONATE-VITAMIN D TAB 500 MG-200 UNIT 1 TABLET: 500-200 TAB at 08:16

## 2019-05-08 RX ADMIN — Medication 5 ML: at 05:56

## 2019-05-08 RX ADMIN — ACETAMINOPHEN 1000 MG: 500 TABLET ORAL at 18:27

## 2019-05-08 RX ADMIN — VANCOMYCIN HYDROCHLORIDE 1500 MG: 10 INJECTION, POWDER, LYOPHILIZED, FOR SOLUTION INTRAVENOUS at 23:43

## 2019-05-08 RX ADMIN — PRAMIPEXOLE DIHYDROCHLORIDE 1 MG: 0.25 TABLET ORAL at 20:53

## 2019-05-08 RX ADMIN — Medication 10 ML: at 09:35

## 2019-05-08 RX ADMIN — GABAPENTIN 300 MG: 300 CAPSULE ORAL at 20:53

## 2019-05-08 RX ADMIN — ACETAMINOPHEN 1000 MG: 500 TABLET ORAL at 13:11

## 2019-05-08 RX ADMIN — Medication 10 ML: at 13:11

## 2019-05-08 RX ADMIN — OXYCODONE HYDROCHLORIDE 10 MG: 5 TABLET ORAL at 04:43

## 2019-05-08 RX ADMIN — OXYCODONE HYDROCHLORIDE 5 MG: 5 TABLET ORAL at 13:11

## 2019-05-08 RX ADMIN — OXYCODONE HYDROCHLORIDE 5 MG: 5 TABLET ORAL at 09:35

## 2019-05-08 RX ADMIN — DULOXETINE HYDROCHLORIDE 30 MG: 30 CAPSULE, DELAYED RELEASE ORAL at 08:16

## 2019-05-08 RX ADMIN — MAGNESIUM OXIDE TAB 400 MG (241.3 MG ELEMENTAL MG) 400 MG: 400 (241.3 MG) TAB at 20:53

## 2019-05-08 RX ADMIN — OXYCODONE HYDROCHLORIDE 5 MG: 5 TABLET ORAL at 18:27

## 2019-05-08 RX ADMIN — Medication 10 ML: at 20:54

## 2019-05-08 NOTE — CONSULTS
[address]    Name:  April Sosa  MR#:  [id number]  :  1940  ACCOUNT #:  [de-identified]  DATE OF SERVICE:  2019      REQUESTING PHYSICIAN:  Zac Caldwell MD    REASON FOR CONSULTATION:  Lumbar wound infection. HISTORY OF PRESENT ILLNESS:  The patient is an 55-year-old woman, whose medical history is significant for rheumatoid arthritis and colon cancer. She had revision laminectomy at L2-L3, L3-L4 on 2019 by Dr. Rhina Anthony. Two weeks after the surgery, she developed fever, nausea and vomiting. This was associated with intense pain in the lumbar area. This radiated to both legs, but it was worse on the left. Bearing weight made it worse and resting made it feel better. She then noticed that there was a drainage coming from the incision. Initially it was sanguineous, but it eventually turned an orange color. She does not know whether there was any smell. [facility] and she was given oral antibiotics, specifically Keflex, but this did not relieve her symptoms. It was then decided to admit her and so that she could have an incision and drainage. This was done yesterday and the cultures are now growing Staphylococcus aureus. She is currently not on any antibiotics. PAST MEDICAL HISTORY:  1. Colon cancer. 2.  Depression. 3.  Rheumatoid arthritis. CURRENT MEDICATIONS:  1. Tylenol. 2.  Calcium. 3.  Duloxetine. 4.  Neurontin. 5.  Magnesium. 6.  MiraLax. 7.  Mirapex. 8.  Deltasone. 9.  Ayana-Colace. ALLERGIES:  ADHESIVES AND STATINS. PAST SURGICAL HISTORY:  1.  Lumbar laminectomy in .  2.  Colectomy with colostomy formation. 3.  Revision laminectomy. 4.  Bilateral total knee replacement. 5.  Right hip replacement. SOCIAL HISTORY:  She is a former smoker. She does not drink alcohol or engage in recreational drug use.     FAMILY HISTORY:  Diabetes, arthritis, heart disease, hyperlipidemia, hypertension. REVIEW OF SYSTEMS:  She does not have any headache, sore throat, anxiety, vision or hearing issues, shortness of breath, chest pain, abdominal pain, hematochezia or hematuria. Aside from the things mentioned previously, the rest of the review of systems is negative. PHYSICAL EXAMINATION:  GENERAL:  She is not in respiratory distress. VITAL SIGNS:  Temperature is 98.3, pulse 71, blood pressure 154/64, saturating at 96%, respiratory rate is 18. HEENT AND NECK:  She has pink conjunctivae, anicteric sclerae. No JVD. No cervical lymphadenopathy. External ears are normal.  LUNGS:  No accessory muscle use. The lung fields are clear to auscultation. No rales, wheeze or rhonchi. HEART:  Regular rate and rhythm. No murmurs, rubs, or clicks. No heaves. ABDOMEN:  Positive bowel sounds. Her abdomen is soft, nontender. No guarding or rebound. GENITOURINARY:  No flank or CVA tenderness. She does not have a Parekh catheter. MUSCULOSKELETAL:  Knees, ankles, wrist, and elbows are not warm and are nontender. INTEGUMENTARY:  Back incision is clean. I do not see any rash. PSYCHIATRIC:  No disturbance in thought. Mood and affect are appropriate. NEUROLOGIC:  She has full use of her extraocular muscles. Tongue midline. No facial asymmetry. Muscle strength is 5/5. LABORATORY DATA:  White blood cell count 7.7, hemoglobin 10.1, platelet 471. ESR 65. Creatinine is 0.8. Her CRP 2.99. Wound culture growing Staph aureus. IMPRESSION:  1. Postoperative lumbar wound infection. 2.  Rheumatoid arthritis. 3.  History of colon cancer. 4.  Depression. PLAN:  The operative note is not yet available, but this is probably a deep lumbar wound infection. She will more than likely need a prolonged antibiotic course. Her cultures are growing Staph aureus. I will start her on vancomycin. Thank you for the consult.       MD MARIO [email or web address]  D:  05/07/2019 19:37  T: 05/07/2019 22:28  JOB #:  [id number]

## 2019-05-08 NOTE — PROGRESS NOTES
Problem: Mobility Impaired (Adult and Pediatric) Goal: *Acute Goals and Plan of Care (Insert Text) Description Physical Therapy Goals Initiated 5/7/2019 1. Patient will move from supine to sit and sit to supine  in bed with modified independence within 7 day(s). 2.  Patient will transfer from bed to chair and chair to bed with modified independence using the least restrictive device within 7 day(s). 3.  Patient will perform sit to stand with modified independence within 7 day(s). 4.  Patient will ambulate with modified independence for 300 feet with the least restrictive device within 7 day(s). 5.  Patient will ascend/descend 13 stairs with single handrail(s) with supervision/set-up within 7 day(s). Outcome: Progressing Towards Goal 
PHYSICAL THERAPY TREATMENT Patient: Livan Chatterjee [de-identified][de-identified] y.o. female) Date: 5/8/2019 Diagnosis: Lumbar surgical wound fluid collection [T81.89XA] <principal problem not specified> Procedure(s) (LRB): LUMBAR WOUND INCISION AND DRAINAGE (N/A) 2 Days Post-Op Precautions: Back, Fall Chart, physical therapy assessment, plan of care and goals were reviewed. ASSESSMENT: 
Pt received sitting up in chair w/brace donned and visiting w/ . Pt agreeable to PT at this time. Demonstrated Mod I for transfer in/out of chair and Supervision for gait x 150 Ft w/o use of AD. Pt navigated 13 steps using single rail and step over step pattern w/ CGA. Pt mobility close to baseline mobility of Mod I. Pt cleared for d/c home w/ HHPT once medically cleared. RN aware. Progression toward goals: 
?    Improving appropriately and progressing toward goals ? Improving slowly and progressing toward goals ? Not making progress toward goals and plan of care will be adjusted PLAN: 
Patient continues to benefit from skilled intervention to address the above impairments. Continue treatment per established plan of care. Discharge Recommendations:  Home Health Further Equipment Recommendations for Discharge:  None SUBJECTIVE:  
Patient stated ? I like to stay active. ? OBJECTIVE DATA SUMMARY:  
Critical Behavior: 
Neurologic State: Alert Orientation Level: Oriented X4, Appropriate for age Cognition: Follows commands, Appropriate decision making, Appropriate for age attention/concentration, Appropriate safety awareness Safety/Judgement: Decreased insight into deficits, Decreased awareness of need for safety Functional Mobility Training: 
Bed Mobility: 
Rolling: (received OOB in chair) Supine to Sit: (remained OOB in chair) Transfers: 
Sit to Stand: Modified independent Stand to Sit: Modified independent Balance: 
Sitting: Intact Standing: Intact Ambulation/Gait Training: 
Distance (ft): 150 Feet (ft) Assistive Device: Gait belt;Walker, rolling Ambulation - Level of Assistance: Supervision Gait Abnormalities: Decreased step clearance Speed/Ania: Pace decreased (<100 feet/min) Step Length: Left shortened;Right shortened Stairs: 
Number of Stairs Trained: 13 Stairs - Level of Assistance: Contact guard assistance Rail Use: Left Pain: 
Pain Scale 1: Numeric (0 - 10) Pain Intensity 1: 1 Pain Location 1: Back Pain Orientation 1: Lower;Mid 
Pain Description 1: Aching Pain Intervention(s) 1: Position Activity Tolerance:  
Good Please refer to the flowsheet for vital signs taken during this treatment. After treatment:  
?    Patient left in no apparent distress sitting up in chair ? Patient left in no apparent distress in bed 
? Call bell left within reach ? Nursing notified ? Caregiver present ? Bed alarm activated COMMUNICATION/COLLABORATION:  
The patient?s plan of care was discussed with: Registered Nurse Genevieve Mark PTA Time Calculation: 14 mins

## 2019-05-08 NOTE — PROGRESS NOTES
CM received a message from [employer] that they will be unable to calculate pt's co-pay until we have the [place] [id number] [place] [address] [place] [address] notified. Also, CM fax attached pt's home health PT, OT and Sn orders in [employer] and sent this referral to [facility] in [employer]. Jillian Vu

## 2019-05-08 NOTE — OP NOTES
[address] [facility]  OPERATIVE REPORT    Name:  Milagros Post  MR#:  [id number]  :  1940  ACCOUNT #:  [de-identified]  DATE OF SERVICE:  2019    PREOPERATIVE DIAGNOSES:  Status post lumbar laminectomy, L3-4, L4-5. Postoperative wound collection. POSTOPERATIVE DIAGNOSES:  Status post lumbar laminectomy, L3-4, L4-5. Postoperative wound collection. Lumbar wound infection. PROCEDURE PERFORMED:  Lumbar wound irrigation and debridement. SURGEON:  Dilan Escoto MD    ASSISTANT:  Cece Armstrong, [facility]    ANESTHESIA:  General.    COMPLICATIONS:  None. SPECIMENS REMOVED:  None. IMPLANTS:  NOne. ESTIMATED BLOOD LOSS:  Minimal.    INDICATIONS:  This is a pleasant 78-year-old female who is 2-1/2 weeks out from a lumbar laminectomy, was doing well until acutely worse over the last week. Some increased drainage. Increased pain and general malaise. MRI shows fluid collection posteriorly. The patient for I and D. PROCEDURE:  The patient was identified, brought to the operative suite, and underwent general anesthesia without difficulty. She was placed in the prone position on the Josiah frame with all bony prominences well padded. Back was prepped and draped sterilely. Time-out confirmed. Midline incision was made. It was intact, but there was purulence noted subfascial.  We cultured this with anaerobic and aerobic cultures. We then opened up the entire wound including the fascia Vicryl sutures. Then did pulse lavage irrigation with 3000 mL of Bacitracin, followed by placement of vancomycin powder into the deep wound, followed by interrupted 0 Prolene on the fascial layer, followed by 2-0 Prolene in the subcutaneous layer and nylons on the skin. The patient also had a medium Hemovac placed prior to closure.         Beatriz Sena MD      [email or web address]  D:  2019 7:33  T:  2019 12:58  JOB #:  [id number]  CC:

## 2019-05-08 NOTE — PROGRESS NOTES
Physical Therapy 5/8/2019 Chart reviewed. Attempted to see pt this AM for PT, however pt reports she just returned to bed and would like to rest at this time. Will check back w/ pt at later time as able and appropriate.   
 
Recommend pt continue to be OOB in chair at least 3x/day w/all meals and amb to/from BR, and in murrell w/ NSG assist.  
 
Terrence & Yulia, PTA

## 2019-05-08 NOTE — PROGRESS NOTES
Pharmacist Note - Vancomycin Dosing Consult provided for this [de-identified] y.o. female for indication of lumbar wound infection, s/p I&D on . Antibiotic regimen(s): Vanc Recent Labs 19 
1437 19 
0456 WBC 7.7  --   
CREA  --  0.80 BUN  --  14 Frequency of BMP: daily x 3 Height: 149.9 cm Weight: 84 kg Est CrCl: 55.7 ml/min; UO: -- ml/kg/hr Temp (24hrs), Av.7 °F (36.5 °C), Min:97 °F (36.1 °C), Max:98.5 °F (36.9 °C) Cultures: 
 wound - moderate possible staph aureus Goal trough = 15 - 20 mcg/mL Therapy will be initiated with a loading dose of 2000 mg IV x 1 to be followed by a maintenance dose of 1000 mg IV every 18 hours. Pharmacy to follow patient daily and order levels / make dose adjustments as appropriate.

## 2019-05-08 NOTE — PROGRESS NOTES
ID Progress Note 2019 Subjective: Afebrile. No dyspnea, abdominal pain. Objective:  
 
Vitals:  
Visit Vitals /61 (BP 1 Location: Right arm, BP Patient Position: At rest;Sitting) Pulse 72 Temp 98.2 °F (36.8 °C) Resp 12 Wt 84 kg (185 lb 3.2 oz) SpO2 96% BMI 37.41 kg/m² Tmax:  Temp (24hrs), Av.1 °F (36.7 °C), Min:97.7 °F (36.5 °C), Max:98.5 °F (36.9 °C) Exam: Not in dsitress Abdomen: soft, nontender Bandage has some bloody discharge. Labs:  
Lab Results Component Value Date/Time WBC 7.7 2019 02:37 PM  
 HGB 10.4 (L) 2019 05:47 AM  
 HCT 32.5 (L) 2019 02:37 PM  
 PLATELET 096  02:37 PM  
 MCV 95.9 2019 02:37 PM  
 
Lab Results Component Value Date/Time Sodium 140 2019 05:47 AM  
 Potassium 3.8 2019 05:47 AM  
 Chloride 107 2019 05:47 AM  
 CO2 29 2019 05:47 AM  
 Anion gap 4 (L) 2019 05:47 AM  
 Glucose 96 2019 05:47 AM  
 BUN 14 2019 05:47 AM  
 Creatinine 0.69 2019 05:47 AM  
 BUN/Creatinine ratio 20 2019 05:47 AM  
 GFR est AA >60 2019 05:47 AM  
 GFR est non-AA >60 2019 05:47 AM  
 Calcium 8.8 2019 05:47 AM  
 Bilirubin, total 0.6 2012 01:20 PM  
 AST (SGOT) 12 (L) 2012 01:20 PM  
 Alk. phosphatase 104 2012 01:20 PM  
 Protein, total 6.9 2012 01:20 PM  
 Albumin 3.1 (L) 2012 01:20 PM  
 Globulin 3.8 2012 01:20 PM  
 A-G Ratio 0.8 (L) 2012 01:20 PM  
 ALT (SGPT) 29 2012 01:20 PM  
 
 
 
Assessment: 1. Postoperative lumbar wound infection. 2.  Rheumatoid arthritis. 3.  History of colon cancer. 4.  Depression. 
  
 
 
 
 
Recommendations:  
 
 will need 6 weeks of vanc. I will order PICC. Analisa Balderas MD

## 2019-05-08 NOTE — PROGRESS NOTES
Problem: Falls - Risk of 
Goal: *Absence of Falls Description Document Sonia Tam Fall Risk and appropriate interventions in the flowsheet.  
Outcome: Progressing Towards Goal

## 2019-05-08 NOTE — PROGRESS NOTES
Orthopedic Spine Progress Note Post Op day: 2 Days Post-Op May 8, 2019 8:25 AM  
Admit Date: 2019 Procedure: Procedure(s): LUMBAR WOUND INCISION AND DRAINAGE Subjective:  
 
Glo Worthington appears well. She is seated in the chair just having finished breakfast. Pain is managed. She ambulated a good distance with physical therapy yesterday. Afebrile/VSS. Tolerating diet No N/V 
Voiding Pain Control:  
Pain Assessment Pain Scale 1: Numeric (0 - 10) Pain Intensity 1: 1 Pain Onset 1: postop Pain Location 1: Back Pain Orientation 1: Lower, Mid 
Pain Description 1: Aching Pain Intervention(s) 1: Position Objective:  
 
 
  
Physical Exam: 
General:  Alert and oriented. No acute distress. Heart:  Respirations unlabored. Abdomen:  
Extremities: Soft, non-tender. No evidence of cyanosis. Pulses palpable in both upper and lower extremities. Neurologic: 
Musculoskeletal:  No new motor deficits. Neurovascular exam within normal limits. Sensation stable. Motor: unchanged C5-T1 and L2-S1. Rell's sign negative in bilateral lower extremities. Calves soft, nontender upon palpation and with passive twitch. Moves both upper and lower extremities. Incision: clean, dry, and intact. No significant erythema or swelling. No active drainage noted. Vital Signs:   
Blood pressure 138/61, pulse 72, temperature 98.2 °F (36.8 °C), resp. rate 12, weight 84 kg (185 lb 3.2 oz), SpO2 96 %. Temp (24hrs), Av.1 °F (36.7 °C), Min:97.7 °F (36.5 °C), Max:98.5 °F (36.9 °C) LAB:   
Recent Labs 19 
030 70 25 13 19 
1437 HCT  --  32.5* HGB 10.4* 10.1* PLT  --  312 Lab Results Component Value Date/Time  Sodium 140 2019 05:47 AM  
 Potassium 3.8 2019 05:47 AM  
 Chloride 107 2019 05:47 AM  
 CO2 29 2019 05:47 AM  
 Glucose 96 2019 05:47 AM  
 BUN 14 2019 05:47 AM  
 Creatinine 0.69 2019 05:47 AM  
 Calcium 8.8 05/08/2019 05:47 AM  
 
 
Intake/Output:No intake/output data recorded. 05/06 1901 - 05/08 0700 In: 800 [I.V.:800] Out: 825 [Urine:775; Drains:50] PT/OT:  
Gait:  Gait Speed/Ania: Pace decreased (<100 feet/min) Gait Abnormalities: Decreased step clearance Ambulation - Level of Assistance: Supervision Distance (ft): 300 Feet (ft) Assistive Device: Brace/Splint, Gait belt Assessment:  
Patient is 2 Days Post-Op s/p Procedure(s): LUMBAR WOUND INCISION AND DRAINAGE Plan: 1. Continue PT/OT 2. Continue established methods of pain control 3. VTE Prophylaxes - TEDS & SCDs 4. Encouraged use of ICS 5. Moderate possible staph aureus - ID has started vanc. Thank you for your assistance with this patient 6. Discharge pending Signed By: Kat Gates PA-C

## 2019-05-08 NOTE — PROCEDURES
PICC Placement Note : Order received and consent obtained from patient after explaining the reason for  PICC placement, the procedure,risks,benefits and potential complications. An opportunity was provided to ask questions and relay concerns. 4 fr Power Solo PICC was placed using sterile technique and max barrier precautions at patients bedside. Modified Seldinger Technique with ultrasound guidance used to locate the vein. Vein accessed with 21G Introducer Safety Needle and guidewire easily thread. [employer] PICC tip  device used to determine PICC tip direction. PRE-PROCEDURE VERIFICATION  Correct Procedure: yes  Correct Site:  yes  Temperature: Temp: 97.2 °F (36.2 °C), Temperature Source: Temp Source: Oral  Recent Labs     05/08/19  0547 05/07/19  1437   BUN 14  --    CREA 0.69  --    PLT  --  312   WBC  --  7.7     Allergies: Adhesive tape-silicones and Statins-hmg-coa reductase inhibitors  Education materials, including PICC Booklet, for PICC Care given to patient: yes. See Patient Education activity for further details. PROCEDURE DETAIL  A single lumen PICC line was started for antibiotic therapy and desire for reliable access. The following documentation is in addition to the PICC properties in the lines/airways flowsheet :  Lot #: [id number]  Was xylocaine 1% used intradermally:  yes 1 ml used. Catheter Length: 37 (cm) Circumference 31cm  Vein Selection for PICC:right basilic  Central Line Bundle followed yes  Complication Related to Insertion: none    The placement was verified by ECG technology. PICC is in the SVC per ECG waveform. Waveform documented in the paper chart. Handoff done with Vandana Menendez RN and PICC placement discussed.     Line is okay to use: yes    Kuldip Steele RN

## 2019-05-09 VITALS
WEIGHT: 185.2 LBS | OXYGEN SATURATION: 96 % | DIASTOLIC BLOOD PRESSURE: 78 MMHG | BODY MASS INDEX: 37.41 KG/M2 | RESPIRATION RATE: 16 BRPM | SYSTOLIC BLOOD PRESSURE: 164 MMHG | TEMPERATURE: 97.8 F | HEART RATE: 84 BPM

## 2019-05-09 LAB
ANION GAP SERPL CALC-SCNC: 5 MMOL/L (ref 5–15)
BUN SERPL-MCNC: 13 MG/DL (ref 6–20)
BUN/CREAT SERPL: 18 (ref 12–20)
CALCIUM SERPL-MCNC: 8.6 MG/DL (ref 8.5–10.1)
CHLORIDE SERPL-SCNC: 105 MMOL/L (ref 97–108)
CK SERPL-CCNC: 28 U/L (ref 26–192)
CO2 SERPL-SCNC: 31 MMOL/L (ref 21–32)
CREAT SERPL-MCNC: 0.71 MG/DL (ref 0.55–1.02)
GLUCOSE SERPL-MCNC: 100 MG/DL (ref 65–100)
POTASSIUM SERPL-SCNC: 3.8 MMOL/L (ref 3.5–5.1)
SODIUM SERPL-SCNC: 141 MMOL/L (ref 136–145)

## 2019-05-09 PROCEDURE — 74011000258 HC RX REV CODE- 258: Performed by: NURSE PRACTITIONER

## 2019-05-09 PROCEDURE — 36415 COLL VENOUS BLD VENIPUNCTURE: CPT

## 2019-05-09 PROCEDURE — 99218 HC RM OBSERVATION: CPT

## 2019-05-09 PROCEDURE — 74011250636 HC RX REV CODE- 250/636: Performed by: NURSE PRACTITIONER

## 2019-05-09 PROCEDURE — A9270 NON-COVERED ITEM OR SERVICE: HCPCS | Performed by: PHYSICIAN ASSISTANT

## 2019-05-09 PROCEDURE — 80048 BASIC METABOLIC PNL TOTAL CA: CPT

## 2019-05-09 PROCEDURE — 74011636637 HC RX REV CODE- 636/637: Performed by: PHYSICIAN ASSISTANT

## 2019-05-09 PROCEDURE — 74011250637 HC RX REV CODE- 250/637: Performed by: PHYSICIAN ASSISTANT

## 2019-05-09 PROCEDURE — 82550 ASSAY OF CK (CPK): CPT

## 2019-05-09 PROCEDURE — 65270000029 HC RM PRIVATE

## 2019-05-09 RX ORDER — OXYCODONE HYDROCHLORIDE 5 MG/1
5-10 TABLET ORAL
Qty: 60 TAB | Refills: 0 | Status: SHIPPED | OUTPATIENT
Start: 2019-05-09 | End: 2019-05-17

## 2019-05-09 RX ADMIN — OXYCODONE HYDROCHLORIDE 5 MG: 5 TABLET ORAL at 18:21

## 2019-05-09 RX ADMIN — Medication 10 ML: at 15:27

## 2019-05-09 RX ADMIN — DULOXETINE HYDROCHLORIDE 30 MG: 30 CAPSULE, DELAYED RELEASE ORAL at 08:31

## 2019-05-09 RX ADMIN — ACETAMINOPHEN 1000 MG: 500 TABLET ORAL at 11:58

## 2019-05-09 RX ADMIN — ACETAMINOPHEN 1000 MG: 500 TABLET ORAL at 18:21

## 2019-05-09 RX ADMIN — ACETAMINOPHEN 1000 MG: 500 TABLET ORAL at 00:33

## 2019-05-09 RX ADMIN — OXYCODONE HYDROCHLORIDE 10 MG: 5 TABLET ORAL at 05:17

## 2019-05-09 RX ADMIN — DAPTOMYCIN 500 MG: 500 INJECTION, POWDER, LYOPHILIZED, FOR SOLUTION INTRAVENOUS at 16:19

## 2019-05-09 RX ADMIN — OXYCODONE HYDROCHLORIDE 10 MG: 5 TABLET ORAL at 00:33

## 2019-05-09 RX ADMIN — CALCIUM CARBONATE-VITAMIN D TAB 500 MG-200 UNIT 1 TABLET: 500-200 TAB at 08:31

## 2019-05-09 RX ADMIN — PREDNISONE 1 MG: 1 TABLET ORAL at 08:31

## 2019-05-09 RX ADMIN — Medication 10 ML: at 05:18

## 2019-05-09 RX ADMIN — OXYCODONE HYDROCHLORIDE 5 MG: 5 TABLET ORAL at 10:35

## 2019-05-09 NOTE — PROGRESS NOTES
ID Progress Note 2019 Subjective: Afebrile. No dyspnea, abdominal pain. Objective:  
 
Vitals:  
Visit Vitals /78 Pulse 84 Temp 97.8 °F (36.6 °C) Resp 16 Wt 84 kg (185 lb 3.2 oz) SpO2 96% BMI 37.41 kg/m² Tmax:  Temp (24hrs), Av °F (36.7 °C), Min:97.6 °F (36.4 °C), Max:98.7 °F (37.1 °C) Exam: Not in dsitress No cervical lymphadenopathy Abdomen: soft, nontender Labs:  
Lab Results Component Value Date/Time WBC 7.7 2019 02:37 PM  
 HGB 10.4 (L) 2019 05:47 AM  
 HCT 32.5 (L) 2019 02:37 PM  
 PLATELET 307 4192 02:37 PM  
 MCV 95.9 2019 02:37 PM  
 
Lab Results Component Value Date/Time Sodium 141 2019 01:57 AM  
 Potassium 3.8 2019 01:57 AM  
 Chloride 105 2019 01:57 AM  
 CO2 31 2019 01:57 AM  
 Anion gap 5 2019 01:57 AM  
 Glucose 100 2019 01:57 AM  
 BUN 13 2019 01:57 AM  
 Creatinine 0.71 2019 01:57 AM  
 BUN/Creatinine ratio 18 2019 01:57 AM  
 GFR est AA >60 2019 01:57 AM  
 GFR est non-AA >60 2019 01:57 AM  
 Calcium 8.6 2019 01:57 AM  
 Bilirubin, total 0.6 2012 01:20 PM  
 AST (SGOT) 12 (L) 2012 01:20 PM  
 Alk. phosphatase 104 2012 01:20 PM  
 Protein, total 6.9 2012 01:20 PM  
 Albumin 3.1 (L) 2012 01:20 PM  
 Globulin 3.8 2012 01:20 PM  
 A-G Ratio 0.8 (L) 2012 01:20 PM  
 ALT (SGPT) 29 2012 01:20 PM  
 
 
 
Assessment: 1. Postoperative lumbar wound infection. 2.  Rheumatoid arthritis. 3.  History of colon cancer. 4.  Depression. 
  
 
 
 
 
Recommendations:  
 
 will need 6 weeks of daptomycin. She could not afford vanc at home. She will thus need to go to the infusion center Kavita Temple MD

## 2019-05-09 NOTE — PROGRESS NOTES
ABBE obtained IV orders from Dr. Judith Holocmb and faxed them to the [facility]. ABBE spoke with Andrei Woodson [phone]) with the Infusion center and she scheduled this pt at [facility] tomorrow at 4pm.Per Shilpi, they will schedule subsequent appointments tomorrow for this pt. Abbe questioned insurance coverage for this and she stated that as long as this is medically necessary for this pt, then it should be covered. She also informed this CM that this pt will need to come to the [facility] at [facility] [place] on the weekends to receive her infusions, as [facility] is closed on the weekends. ABBE met with pt and husbnad to inform them of above and they are in agreement with plan. ABBE called Melissa Rudd with [facility] to cancel the Home IV therapy with her. CM also call Leydi Lizarraga with [facility] [address] to cancel the nursing. She stated that PT and OT can still work with pt at home. Mary Jones

## 2019-05-09 NOTE — PROGRESS NOTES
Physical Therapy 5/9/2019 Pt cleared yesterday for discharge. Waiting on antibiotic plan at this time. Spoke with pt and her  who both report no therapy needs at this time. She has been up ad anya in the room. Will follow up tomorrow if not discharged.  
 
Thank Jes Julien, PT, DPT

## 2019-05-09 NOTE — PROGRESS NOTES
Patient unable to afford antibiotics at home and CM has scheduled patient with [facility] at [facility]. Discussed with Dr. Codey Moody and we will need to switch patient to Daptomycin due to variable dosing schedules with Vancomycin depending on serum trough levels. . Will order 1 dose of 6mg/kg daptomycin and observe for patient tolerability. If no ADR patient can discharge later this afternoon. Plan discussed with Dr. Rohan Badillo team. 
 
BERTHA Moon-BC Orthopedic and Neurosurgery Nurse Practitioner [facility] 
Work Cell: [phone] Signed By: Brian Lares NP

## 2019-05-09 NOTE — PROGRESS NOTES
Orthopedic Spine Progress Note Post Op day: 3 Days Post-Op May 9, 2019 8:29 AM  
Admit Date: 2019 Procedure: Procedure(s): LUMBAR WOUND INCISION AND DRAINAGE Subjective:  
 
Martha Lam has no complaints. Pain is well controlled. Tolerating diet. No N/V. Pain Control:  
Pain Assessment Pain Scale 1: Numeric (0 - 10) Pain Intensity 1: 4 Pain Onset 1: post op Pain Location 1: Back Pain Orientation 1: Lower Pain Description 1: Aching Pain Intervention(s) 1: Rest 
 
Objective:  
 
 
  
Physical Exam: 
General:  Alert and oriented. No acute distress. Heart:  Respirations unlabored. Abdomen:  
Extremities: Soft, non-tender. No evidence of cyanosis. Pulses palpable in both upper and lower extremities. Neurologic: 
Musculoskeletal:  No new motor deficits. Neurovascular exam within normal limits. Sensation stable. Motor: unchanged C5-T1 and L2-S1. Rell's sign negative in bilateral lower extremities. Calves soft, nontender upon palpation and with passive twitch. Moves both upper and lower extremities. Incision: clean, dry, and intact. Moderate serosanguineous drainage on bandage. Vital Signs:   
Blood pressure 157/68, pulse 62, temperature 97.6 °F (36.4 °C), resp. rate 16, weight 84 kg (185 lb 3.2 oz), SpO2 94 %. Temp (24hrs), Av.5 °F (36.4 °C), Min:97.2 °F (36.2 °C), Max:97.8 °F (36.6 °C) LAB:   
Recent Labs 19 
030 70 25 13 19 
1437 HCT  --  32.5* HGB 10.4* 10.1* PLT  --  312 Lab Results Component Value Date/Time Sodium 141 2019 01:57 AM  
 Potassium 3.8 2019 01:57 AM  
 Chloride 105 2019 01:57 AM  
 CO2 31 2019 01:57 AM  
 Glucose 100 2019 01:57 AM  
 BUN 13 2019 01:57 AM  
 Creatinine 0.71 2019 01:57 AM  
 Calcium 8.6 2019 01:57 AM  
 
 
Intake/Output:No intake/output data recorded.  1901 -  0700 In: -  
Out: 5 [Drains:5] PT/OT:  
Gait:  Gait Speed/Ania: Pace decreased (<100 feet/min) Step Length: Left shortened, Right shortened Gait Abnormalities: Decreased step clearance Ambulation - Level of Assistance: Supervision Distance (ft): 150 Feet (ft) Assistive Device: Gait belt, Walker, rolling Rail Use: Left Stairs - Level of Assistance: Contact guard assistance Number of Stairs Trained: 13 
            
 
Assessment:  
Patient is 3 Days Post-Op s/p Procedure(s): LUMBAR WOUND INCISION AND DRAINAGE Plan: 1. Continue PT/OT 2. Continue established methods of pain control 3. VTE Prophylaxes - TEDS &/or SCDs 4. MRSA positive post operative wound infection-drainage overnight. Continue to monitor. Continue with IV  abx and recommendations per Dr. Harpal Mejia. Pt has PICC and ready after final abx orders and sign of per ID. 5. Discharge to home with home health today after above.  
6.  
 
 
Signed By: IRAJ Ayala

## 2019-05-09 NOTE — PROGRESS NOTES
This pt is being discharged to home today. ABBE spoke with Harini Garcia with [facility] [address] and she confirmed that this pt will be opened for services tomorrow. ABBE also spoke with Cherelle Albrecht with [facility], and she has accepted this pt. She will be here around noon to work with pt and her  on teaching. Harrel Schirmer

## 2019-05-09 NOTE — PROGRESS NOTES
Home IV Orders1. Diagnosis: Deep lumbar infection with MRSA 2. Routine PICC  Care 3. Antibiotic:  Vancomycin 1.5 grams q24 hours IV 4. Lab each Monday: CBC/diff/platelets CMP 
 ESR 
 CRP Trough Vancomycin level 5. Lab each Thursday: CBC/diff/platelets BUN Creatinine Trough Vancomycin level 6. Fax lab to Dr. Codey Moody @ [phone]. 
7.  Call Dr. Codey Moody @ [phone] for fever >100.5, infection at PICC site, diarrhea, WBC > 15 or < 3, cr > 1.8 8. Duration of therapy: last day of therapy should be June 17, 2019 Please call Dr. Codey Moody @ [phone] before stopping therapy. 9.  Allergies: Allergies Allergen Reactions  Adhesive Tape-Silicones Other (comments) \"Left skin raw\"  Statins-Hmg-Coa Reductase Inhibitors Myalgia Intolerant to lipitor, crestor and pravastatin 10. Pharmacy Consult for Vancomycin dosing. Maintain trough between 15-20. 11. Make appointment in 3 weeks.   
 
Clive Anderson MD

## 2019-05-09 NOTE — PROGRESS NOTES
Pt now would like to go to the Infusion center at [address] instead of [address] [facility]. Once this CM gets the RX for the IV antibiotics, CM will fax this to the [facility] for [employer]. Kristel Cifuentes, VENICE-KILLIAN

## 2019-05-09 NOTE — PROGRESS NOTES
Abbe spoke with Kacey Cadena from [facility] and she stated that pt's co-pay would be about 55-60 dollars per day. She met with the pt and  about this and they are stating that they cannot afford this. ABBE discussed alternatives with them. Candance Huger KATIE-Going to SNF rehab while getting IV antibiotics and going to an infusion center for the IV antibx at [facility]. This pt stated that she lives about 7 miles from [facility] and asked if we could try to get this pt in there. ABBE called [facility] and spoke with Michael Hancock in the Infusion center. She stated that they would not be able to accommodate this pt since she has MRSA. ABBE then called Kathy at [address] [phone]) to discuss. She stated that she could accept this pt if we can send the order form to her. ABBE alerted NP Janeth of the above issues. She contacted Dr. April Malcolm and he is going to complete the prescription. ABBE will follow. John Mckenna

## 2019-05-09 NOTE — PROGRESS NOTES
CM received final IV anit bx orders and PICC line report. CM fax attached this information to the referral to [facility] and informed them that pt will be d/c'd today. CM also updated referral to [facility] with same information. Nicole Chauhan

## 2019-05-09 NOTE — PROGRESS NOTES
Hospital follow-up PCP transitional care appointment has been scheduled with Dr. Gala Banuelos for Friday, 5/17/19 at 2:00 p.m. Pending patient discharge.   Annalee Guzman, Care Management Specialist.

## 2019-05-10 ENCOUNTER — HOSPITAL ENCOUNTER (OUTPATIENT)
Dept: INFUSION THERAPY | Age: 79
Discharge: HOME | End: 2019-05-10
Payer: MEDICARE

## 2019-05-10 ENCOUNTER — PATIENT OUTREACH (OUTPATIENT)
Dept: INTERNAL MEDICINE CLINIC | Age: 79
End: 2019-05-10

## 2019-05-10 VITALS
TEMPERATURE: 97.9 F | OXYGEN SATURATION: 97 % | DIASTOLIC BLOOD PRESSURE: 79 MMHG | RESPIRATION RATE: 18 BRPM | HEART RATE: 82 BPM | SYSTOLIC BLOOD PRESSURE: 138 MMHG

## 2019-05-10 PROCEDURE — 74011000250 HC RX REV CODE- 250: Performed by: INTERNAL MEDICINE

## 2019-05-10 PROCEDURE — 74011250636 HC RX REV CODE- 250/636: Performed by: INTERNAL MEDICINE

## 2019-05-10 PROCEDURE — 96374 THER/PROPH/DIAG INJ IV PUSH: CPT

## 2019-05-10 RX ORDER — HEPARIN 100 UNIT/ML
500 SYRINGE INTRAVENOUS AS NEEDED
Status: DISCONTINUED | OUTPATIENT
Start: 2019-05-10 | End: 2019-05-11 | Stop reason: HOSPADM

## 2019-05-10 RX ORDER — SODIUM CHLORIDE 0.9 % (FLUSH) 0.9 %
5-10 SYRINGE (ML) INJECTION AS NEEDED
Status: DISCONTINUED | OUTPATIENT
Start: 2019-05-10 | End: 2019-05-11 | Stop reason: HOSPADM

## 2019-05-10 RX ADMIN — Medication 10 ML: at 16:41

## 2019-05-10 RX ADMIN — Medication 10 ML: at 16:45

## 2019-05-10 RX ADMIN — Medication 500 UNITS: at 16:45

## 2019-05-10 RX ADMIN — DAPTOMYCIN 500 MG: 500 INJECTION, POWDER, LYOPHILIZED, FOR SOLUTION INTRAVENOUS at 16:42

## 2019-05-10 NOTE — PROGRESS NOTES
0033LE arrived at [facility] ambulatory and in no distress for IV abx. Assessment completed, no new complaints voiced. Pt had recent back surgery, in back brace. SL PICC intact with positive blood return. Labs due Monday. Visit Vitals /79 Pulse 82 Temp 97.9 °F (36.6 °C) Resp 18 SpO2 97% Medications received: 
Dapto [address] well, no adverse reaction noted. PICC flushed and capped. D/Cd from [facility] ambulatory and in no distress accompanied by family. Next appt 5/11 @ [facility].

## 2019-05-10 NOTE — DISCHARGE SUMMARY
[address] [place], [place] [address] [address]  [phone]     Discharge Summary       PATIENT ID: Kehinde Summers  MRN: [id number]   YOB: 1940    DATE OF ADMISSION: 5/6/2019  3:00 PM    DATE OF DISCHARGE: 5/9/2019  PRIMARY CARE PROVIDER: Elizabeth Sanches MD     CONSULTATIONS: IP CONSULT TO INFECTIOUS DISEASES    PROCEDURES/SURGERIES: Procedure(s):  LUMBAR WOUND INCISION AND DRAINAGE    History of Present Illness:  Kehinde Summers is a [de-identified] y.o. female with history of rheumatoid arthritis on TNF inhibitor therapy and daily prenisone with history of chronic lumbar radiculopathy who underwent a L2-3, L3-4 laminectomy on April 16th with Dr. Marquise Childers. She was seen for her 2 week follow-up in the clinic and was doing exceptionally well. Her wound was healing nicely. A few days following her appointment she developed increased pain, malaise and drainage from her lumbar incision. MRI was obtained on 5/2 revealing 1.9 x 2.4 x 3.4 cm complex collection posterior to L2 and L3 compressing the thecal sac. After a discussion of the risks, benefits, alternatives, perioperative course, and potential complications of surgery, she consented to undergo a Procedure(s):  LUMBAR WOUND INCISION AND DRAINAGE. Hospital Course:  Kehinde Summers tolerated the procedure well. She was transferred  to the recovery room in stable condition. After a brief stay the patient was then transferred to the Spinal Surgery Unit at [address] [place] [address].  On postoperative day #1, the dressing was clean and dry, she was neurovascularly intact. The patient was afebrile and vital signs were stable. Calves were soft and non-tender bilaterally. Her surgical cultures grew MRSA. The infectious disease service was consulted and she was started on IV Vancomycin.  The patient was tolerating a regular diet and making great progress with physical therapy. She could not afford home IV antibiotics with home health and was set up for the infusion center. She was switched to IV daptomycin for ease of dosing. Hemoglobin prior to discharge were   Lab Results   Component Value Date/Time    HGB 10.4 (L) 05/08/2019 05:47 AM        Glo Worthington made satisfactory progress with physical therapy and was discharged to Home with home health in stable condition on postoperative day 3. She will receive her IV daptomycin at [facility] infusion center Monday through Friday and at [facility] on the weekends. She was provided with routine postoperative instructions and advised to follow up with Uche Deal MD in 2 weeks following discharge from the hospital.  She was advised to follow-up with Dr. Stacey Waldron in two weeks. FOLLOW UP APPOINTMENTS:    Follow-up Information     Follow up With Specialties Details Why Contact Info    Kody Golden MD Internal Medicine On 5/17/2019 Hospital f/u PCP appointment Friday, 5/17/19 @ 2:00 p.m. [address] [place] [address]   [address]  [phone]      Outpatient [facility] at [facility] appointment is Friday, May 10, 2019 at 4pm. They will schedule the rest of your appointments tomorrow [facility]  MOB [address], [address]  [phone]    AT [address] [place] [address] PT and OT [address] [place] [address]  [phone]           DISCHARGE MEDICATIONS:  Discharge Medication List as of 5/9/2019  5:54 PM      CONTINUE these medications which have CHANGED    Details   oxyCODONE IR (ROXICODONE) 5 mg immediate release tablet Take 1-2 Tabs by mouth every four (4) hours as needed for Pain for up to 7 days.  Max Daily Amount: 60 mg., Print, Disp-60 Tab, R-0         CONTINUE these medications which have NOT CHANGED    Details   gabapentin (NEURONTIN) 300 mg capsule Take 300 mg by mouth nightly., Historical Med      naloxone (NARCAN) 4 mg/actuation nasal spray Use 1 spray intranasally, then discard. Repeat with new spray every 2 min as needed for opioid overdose symptoms, alternating nostrils. Indications: Decrease in Rate & Depth of Breathing due to Opioid Drug, Print, Disp-2 Each, R-0      DULoxetine (CYMBALTA) 30 mg capsule Historical Med      calcium-cholecalciferol, D3, (CALCIUM 600 + D) tablet Take 1 Tab by mouth daily. , Historical Med      magnesium oxide 500 mg tab Take 1 Tab by mouth every Monday, Wednesday, Friday., Historical Med      pramipexole (MIRAPEX) 1 mg tablet TAKE 1 TABLET EVERY NIGHT, Mail OrderAPPOINTMENT REQUIRED FOR ADDITIONAL REFILLSDisp-90 Tab, R-1      predniSONE (DELTASONE) 1 mg tablet Take 1 mg by mouth daily. Takes 3 tabs (3mg) daily, Historical Med, R-0         STOP taking these medications       cephALEXin (KEFLEX) 500 mg capsule Comments:   Reason for Stopping:         evolocumab (Bam Musty) pen injection Comments:   Reason for Stopping:         INFLIXIMAB (REMICADE IV) Comments:   Reason for Stopping:               PHYSICAL EXAMINATION AT DISCHARGE:  General: Pleasant, alert, cooperative, no distress. Resp: Equal chest expansion. No accessory muscle use. CV:  No edema appreciated in the extremities. Gastrointestinal:  Soft, non-tender, non-distended. Neurological: Follows commands. ORTEGA. Speech clear. Sensation intact to light touch. Motor: unchanged  L2-S1. Musculoskeletal:  Calves soft, non-tender upon palpation or with passive stretch. Skin:  Incision - clean, dry and intact. No significant erythema or swelling.       CHRONIC MEDICAL DIAGNOSES:  Problem List as of 5/9/2019 Date Reviewed: 5/6/2019          Codes Class Noted - Resolved    Lumbar surgical wound fluid collection ICD-10-CM: T81.89XA  ICD-9-CM: 998.89  5/6/2019 - Present        Spinal stenosis, lumbar ICD-10-CM: M48.061  ICD-9-CM: 724.02  4/16/2019 - Present        Pre-operative examination ICD-10-CM: X72.241  ICD-9-CM: V72.84  2/26/2019 - Present SARMIENTO (dyspnea on exertion) ICD-10-CM: R06.09  ICD-9-CM: 786.09  2/26/2019 - Present        Mixed hyperlipidemia ICD-10-CM: E78.2  ICD-9-CM: 272.2  2/26/2019 - Present        Severe obesity ([facility] ICD-10-CM: E66.01  ICD-9-CM: 278.01  1/16/2019 - Present        Osteoarthritis of right knee ICD-10-CM: M17.11  ICD-9-CM: 715.96  5/9/2018 - Present        Restless leg syndrome ICD-10-CM: G25.81  ICD-9-CM: 333.94  Unknown - Present        Colon cancer ([facility].) ICD-10-CM: C18.9  ICD-9-CM: 153.9  1/1/2015 - Present    Overview Signed 2/2/2018  3:33 PM by Trevor Cotto     colectomy with ostomy, then reversed by DR ELVIN Burroughs             Heart palpitations ICD-10-CM: R00.2  ICD-9-CM: 785.1  Unknown - Present        Glaucoma ICD-10-CM: H40.9  ICD-9-CM: 365.9  Unknown - Present        RA (rheumatoid arthritis) ([facility].) ICD-10-CM: M06.9  ICD-9-CM: 714.0  Unknown - Present    Overview Addendum 2/2/2018  3:36 PM by Trevor Colin, on remicade, azathioprine, and prednisone.   S/p L TKR, Right THR, L3-5 fixation             Depression ICD-10-CM: F32.9  ICD-9-CM: 345  5/8/2011 - Present        Impaired hearing ICD-10-CM: H91.90  ICD-9-CM: 389.9  5/8/2011 - Present        RESOLVED: Meniscus tear ICD-10-CM: R29.200N  ICD-9-CM: 836.2  12/10/2014 - 2/2/2018        RESOLVED: Left knee DJD (Chronic) ICD-10-CM: M17.12  ICD-9-CM: 715.96  12/9/2014 - 2/2/2018    Overview Signed 12/9/2014 11:44 PM by Christianne Hi PA-C     Scheduled for LEFT TKR & RIGHT KNEE ARTHROSCOPY on 12-10-14             RESOLVED: Psychiatric disorder ICD-10-CM: F99  ICD-9-CM: 300.9  Unknown - 2/2/2018    Overview Signed 12/9/2014 11:44 PM by Christianne Hi PA-C     DEPRESSION             RESOLVED: GERD (gastroesophageal reflux disease) ICD-10-CM: K21.9  ICD-9-CM: 530.81  Unknown - 2/2/2018        RESOLVED: Chronic pain ICD-10-CM: L77.12  ICD-9-CM: 338.29  Unknown - 2/2/2018        RESOLVED: Other ill-defined conditions(799.89) ICD-10-CM: C79  ICD-9-CM: 799.89  Unknown - 2/2/2018    Overview Signed 12/9/2014 11:44 PM by Teresa Arcos PA-C     GASTROPARESIS             RESOLVED: Unspecified adverse effect of anesthesia ICD-10-CM: T41.45XA  ICD-9-CM: 995.22  Unknown - 2/2/2018    Overview Signed 12/9/2014 11:44 PM by Teresa Arcos PA-C     HEADACHE             RESOLVED: Arthritis ICD-10-CM: M19.90  ICD-9-CM: 716.90  Unknown - 2/2/2018    Overview Signed 12/9/2014 11:44 PM by Teresa Arcos PA-C     OSEO             RESOLVED: Colon polyps ICD-10-CM: D10.3  ICD-9-CM: 211.3  Unknown - 2/2/2018        RESOLVED: UTI (lower urinary tract infection) ICD-10-CM: N39.0  ICD-9-CM: 599.0  Unknown - 2/2/2018        RESOLVED: Cataracts, bilateral ICD-10-CM: H26.9  ICD-9-CM: 366.9  Unknown - 2/2/2018        RESOLVED: Acute medial meniscus tear of right knee ICD-10-CM: P67.122A  ICD-9-CM: 836.0  12/9/2014 - 2/2/2018        RESOLVED: DJD (degenerative joint disease) of hip ICD-10-CM: M16.9  ICD-9-CM: 715.95  3/6/2012 - 2/2/2018    Overview Signed 3/6/2012  4:35 PM by Teresa Arcos PA-C     Scheduled for RIGHT THR on 03-             RESOLVED: Spinal stenosis ICD-10-CM: M48.00  ICD-9-CM: 724.00  5/8/2011 - 2/2/2018        MRSA infection ICD-10-CM: A49.02  ICD-9-CM: 041.12  5/8/2019 - Present        Lumbar stenosis ICD-10-CM: M48.061  ICD-9-CM: 724.02  4/18/2019 - Present              Signed:   Michael Spencer NP  5/10/2019  12:20 PM

## 2019-05-10 NOTE — PROGRESS NOTES
Hospital Discharge Follow-Up      Date/Time:  5/10/2019 10:23 AM    Patient was admitted to [facility] on 19 and discharged on 19 for Incision and Drainage s/p Lumbar Laminectomy L2-3, L3-4. The physician discharge summary was not available at the time of outreach. Patient was contacted within 1 business days of discharge. Top Challenges reviewed with the provider    2019 04:56 2019 14:37 2019 05:47   HGB 10.6 (L) 10.1 (L) 10.4 (L)   HCT  32.5 (L)      Patient has PICC & being seen at [address] [facility] for IV Daptomycin M-F & [address] [facility] Sat[address] & Sun. At [facility] PT/OT continues. SN discontinued due to [facility] order       Method of communication with provider :chart routing    Inpatient RRAT score: 23  Was this a readmission? yes   Patient stated reason for the readmission: surgical site infection    Nurse Navigator (NN) contacted the patient by telephone to perform post hospital discharge assessment. Verified name and  with patient as identifiers. Provided introduction to self, and explanation of the Nurse Navigator role. Reviewed discharge instructions and red flags with patient who verbalized understanding. Patient given an opportunity to ask questions and does not have any further questions or concerns at this time. The patient agrees to contact the PCP office for questions related to their healthcare. NN provided contact information for future reference. Disease Specific:   N/A    Summary of patient's top problems:  1. I & D Lumbar Laminectomy surgical site. S/p Lum Nichole 19. Culture showed growing staph aureus. Started on Vancomycin. PICC placed.  DIscharged home with orders for 6 weeks of DAPTOMYCIN administered at [address] M-F and [address] [facility] [address] [facility] Sat. & Sun. [facility] SN discontinued due to [facility] order      [address] orders at discharge: PT, [address] [facility] [address]: At [address]  Date of initial visit: 5/10/19    Durable Medical Equipment ordered/company: none  [address] received: none    Barriers to care? financial, medication management    Advance Care Planning:   Does patient have an Advance Directive:  not on file     Medication(s):   New Medications at Discharge: none  Changed Medications at Discharge: oxycodone   Discontinued Medications at Discharge: Edwin Mora IV    Medication reconciliation was performed with patient, who verbalizes understanding of administration of home medications. There were no barriers to obtaining medications identified at this time. Referral to Pharm D needed: no     Current Outpatient Medications   Medication Sig    oxyCODONE IR (ROXICODONE) 5 mg immediate release tablet Take 1-2 Tabs by mouth every four (4) hours as needed for Pain for up to 7 days. Max Daily Amount: 60 mg.    gabapentin (NEURONTIN) 300 mg capsule Take 300 mg by mouth nightly.  naloxone (NARCAN) 4 mg/actuation nasal spray Use 1 spray intranasally, then discard. Repeat with new spray every 2 min as needed for opioid overdose symptoms, alternating nostrils. Indications: Decrease in Rate & Depth of Breathing due to Opioid Drug (Patient not taking: Reported on 4/19/2019)    DULoxetine (CYMBALTA) 30 mg capsule     calcium-cholecalciferol, D3, (CALCIUM 600 + D) tablet Take 1 Tab by mouth daily.  magnesium oxide 500 mg tab Take 1 Tab by mouth every Monday, Wednesday, Friday.  pramipexole (MIRAPEX) 1 mg tablet TAKE 1 TABLET EVERY NIGHT (Patient taking differently: TAKE 1.5 TABLET EVERY NIGHT)    predniSONE (DELTASONE) 1 mg tablet Take 1 mg by mouth daily. Takes 3 tabs (3mg) daily     No current facility-administered medications for this visit.       Facility-Administered Medications Ordered in Other Visits   Medication Dose Route Frequency    [START ON 5/11/2019] DAPTOmycin (CUBICIN) 500 mg in sterile water (preservative free) 10 mL IV syringe RF formulation  500 mg IntraVENous ONCE    [START ON 5/12/2019] DAPTOmycin (CUBICIN) 500 mg in sterile water (preservative free) 10 mL IV syringe RF formulation  500 mg IntraVENous ONCE    DAPTOmycin (CUBICIN) 500 mg in water, bacteriostatic (WATER) 10 mL IV syringe RF formulation  500 mg IntraVENous ONCE    [START ON 5/13/2019] DAPTOmycin (CUBICIN) 500 mg in water, bacteriostatic (WATER) 10 mL IV syringe RF formulation  500 mg IntraVENous ONCE    [START ON 5/14/2019] DAPTOmycin (CUBICIN) 500 mg in water, bacteriostatic (WATER) 10 mL IV syringe RF formulation  500 mg IntraVENous ONCE    [START ON 5/15/2019] DAPTOmycin (CUBICIN) 500 mg in water, bacteriostatic (WATER) 10 mL IV syringe RF formulation  500 mg IntraVENous ONCE       There are no discontinued medications. [facility] follow up appointment(s):   Future Appointments   Date Time Provider Jarocho Collins   5/10/2019  4:00 PM CHAIR 3 [address] REG   5/11/2019 12:00 PM [facility]   5/12/2019 12:00 PM [facility]   5/13/2019  4:00 PM CHAIR 3 [place] [address] REG   5/14/2019  4:00 PM CHAIR 3 [place] [address] REG   5/15/2019  4:00 PM CHAIR 3 [place] [address] REG   5/16/2019  4:00 PM CHAIR 3 [place] [address] REG   5/17/2019  2:00 PM Jazmyn King MD [address] [facility]   5/17/2019  4:00 PM CHAIR 3 [address] REG   7/23/2019 11:15 AM Ryan Mathews Ra, MD [address] [facility]      Non-[facility] follow up appointment(s): patient to schedule FU with Dr. Lisy Vazquez:  out of service area       Goals      Prevent complications post hospitalization. 5/10/19  Readmission I&D 5/6-5/9/19   -Patient will ambulate frequently and perform foot pumps while sitting to prevent DVT. Patient reports having compression stockings but will not be wearing them because she is moving around a lot, working with [facility] and doing leg exercises throughout the day.   -Patient is able to verbalize 3 s/s infection:N/V and fever. Patient reports unable to see surgical site, but will have  check.  reports gauze bandage C,D&I. PICC site is monitored daily by [facility]. -Patient will manage pain level with oxycodone and tylenol PRN. Patient reports 3/10 pain today after medication. No SE noted. Patient had colectomy. Never has constipation.   -Encouraged use of incentive spirometer for prevention of pneumonia. Patient verbalized understanding  -Patient will schedule follow up appointment with Dr. Manisha Glaarza (ortho surgeon). Patient reports she will call to set up today. -NN contact information provided and will follow up in 1 week  Lisandro Killian RN      4/29/19 call placed to pharmacy to verify dosage and instructions on Keflex and gabapentin  Keflex 500 mg take 1 4 times daily for 7 days and gabapentin 300 mg take HS verified. ST    4/29/19  -Patient reports having drainage from surgical site since Friday night. Attended office visit with  Dr. Go Mahoney NP today with follow up appointment scheduled morning of 5/6/19. Encouraged monitor site for redness, swelling, foul odor from drainage, taking temperature at same time of day every day and notify surgeon if temp of 101 or greater or other s/s of infection noticed. Patient verbalized understanding.   -Patient able to verbalize one s/s blood clot. Patient will be able to identify additional 2 s/s within 1 week. Patient is not wearing JASPER hose. Encouraged to wear during the day and off at night  Activity  -Patient reports moving frequently throughout the day, but is not ambulating long distances yet. Encouraged foot pumps and importance of blood circulation in prevention of DVT  Brace  -Patient was told by ortho to start weaning off brace at last Thursday's office visit and today was told it could be stopped. Patient reports feeling no different with or without brace. Dressing  -[facility] dressing was removed at last weeks ortho office visit on 4/25/19.    -ABD dressing in place since zipline removed and is being changed 3x daily, per patient. Patient will notify surgeon if change in drainage amount or odor.   -Keflex and gabapentin started at today's ortho office visit  per patient. Patient is not sure of dosages.  left for pharmacy now. NN will follow up for doses later today. Pain Management  -Patient reports 10/10 pain today following ortho office visit. Encouraged ice 15 min on then give skin rest before applying again, rest, pain medication and report unresolved pain to surgeon. Patient verbalized understanding.    -Patient reports taking oxycodone 5 mg for pain relief. Patient will not take other pain medications in combination with oxycodone as instructed by Dr. Ed Mark. Patient verbalized understanding   -NN will follow up in 1 week. Patient has FU appointment with PCP 5/6/19 @ 3:15, Patient aware. 4/19/19  -Pt will call surgeon if signs of infection: red;  drainage;  foul odor, fever over 101 degrees  -Nausea or vomiting, severe headache  -Signs of a blood clot in your leg-calf pain, tenderness, redness, swelling of lower leg  Patient will contact PCP if general health problems  Patient will call 911 if experiencing chest pain, SOB, difficulty breathing  Activity  -Patient will walk - short frequent walks throughout the day  -Limit the amount of time you sit to 20-30 minute intervals.  -Patient will not lift anything over 5 pounds   -Patient will not do any straining, twisting or bending  Brace  -Pt has quick draw back brace and will wear at all times when out of bed. Pt not to wear the brace while in bed or showering.  -Pt will not bend or twist when brace is off  Diet  -Pt will resume usual diet; watch  saturated fats & cholesterol  -Pt will resume regular bowel movements. Driving  -patient will not drive or return to work until instructed by surgeon. Pt. may ride in the car for short periods of time. Incision Care  Pt will keep Zipline dressing on for 2 weeks.   (ABD and tape) placed over it should be  changed daily, for at least the first several days after surgery. If no incisional drainage,may leave the incision open to air, still leaving the Zipline in place  Pt will take brief showers but not run water directly onto the wound. After shower, blot incision dry and replace dry dressing. No tape to come in contact with the Zipline. Pt will not rub or apply any lotions or ointments to your incision site. Pt will have Zipline dressing removed during two week FU appointment. Pt will contact surgeon if experiencing drainage leaking from underneath Zipline dressing or if dressing peels off before FU appointment  Showering  -Pt will resume showering approximately 4 days after surgery.  -Pt will leave dressing on during shower and pat dry  -Pt will remove brace while showering and will not bend or twist while brace is off.  -Pt will not take a tub bath. Preventing blood clots  -Pt will wear T.E.D. Stockings for 7 days after discharge (on in morning and off at night)  Pt will perform foot pumps to increase circulation and ambulate regularly throughout the day  Pain management  -Pt will take pain medication as prescribed; decrease the amount used as pain lessens  -Pt will not wait until in extreme pain to take your medication.     -NN supplied contact information and will FU in 1 week  Melyssa Russo RN

## 2019-05-11 ENCOUNTER — HOSPITAL ENCOUNTER (OUTPATIENT)
Dept: INFUSION THERAPY | Age: 79
Discharge: HOME | End: 2019-05-11
Payer: MEDICARE

## 2019-05-11 VITALS
DIASTOLIC BLOOD PRESSURE: 78 MMHG | HEART RATE: 80 BPM | RESPIRATION RATE: 18 BRPM | SYSTOLIC BLOOD PRESSURE: 158 MMHG | OXYGEN SATURATION: 94 % | TEMPERATURE: 97.2 F

## 2019-05-11 PROCEDURE — 74011000250 HC RX REV CODE- 250: Performed by: INTERNAL MEDICINE

## 2019-05-11 PROCEDURE — 96374 THER/PROPH/DIAG INJ IV PUSH: CPT

## 2019-05-11 PROCEDURE — 74011250636 HC RX REV CODE- 250/636: Performed by: INTERNAL MEDICINE

## 2019-05-11 RX ADMIN — DAPTOMYCIN 500 MG: 500 INJECTION, POWDER, LYOPHILIZED, FOR SOLUTION INTRAVENOUS at 11:24

## 2019-05-11 NOTE — PROGRESS NOTES
Outpatient Infusion Center Short Visit Progress Note 1120 Patient admitted to [facility] for Daptomycin ambulatory in stable condition. Assessment completed. No new concerns voiced. PICC flushed well with positive blood return. Visit Vitals /78 Pulse 80 Temp 97.2 °F (36.2 °C) Resp 18 SpO2 94% Medications: 
Dapto IVP 
 
1135 Patient tolerated treatment well. PICC flushed and capped. Patient discharged from [place] [address] ambulatory in no distress at 1135. Patient aware of next appointment scheduled for 5/12/19.

## 2019-05-12 ENCOUNTER — HOSPITAL ENCOUNTER (OUTPATIENT)
Dept: INFUSION THERAPY | Age: 79
Discharge: HOME | End: 2019-05-12
Payer: MEDICARE

## 2019-05-12 VITALS
HEART RATE: 78 BPM | RESPIRATION RATE: 18 BRPM | SYSTOLIC BLOOD PRESSURE: 153 MMHG | DIASTOLIC BLOOD PRESSURE: 80 MMHG | TEMPERATURE: 97 F

## 2019-05-12 PROCEDURE — 74011250636 HC RX REV CODE- 250/636: Performed by: INTERNAL MEDICINE

## 2019-05-12 PROCEDURE — 96374 THER/PROPH/DIAG INJ IV PUSH: CPT

## 2019-05-12 PROCEDURE — 74011000250 HC RX REV CODE- 250: Performed by: INTERNAL MEDICINE

## 2019-05-12 RX ADMIN — DAPTOMYCIN 500 MG: 500 INJECTION, POWDER, LYOPHILIZED, FOR SOLUTION INTRAVENOUS at 10:34

## 2019-05-12 NOTE — PROGRESS NOTES
1030: Pt arrived ambulatory and in no distress for dapto. Assessment completed. No new complaints voiced. Right picc flushed with positive blood return. Vital Signs Visit Vitals /80 Pulse 78 Temp 97 °F (36.1 °C) Resp 18 Medications: 
Dapto IVP 
 
1040:  Discharged home ambulatory and in no distress, accompanied by .  Next appointment 5/13/19 2pm.

## 2019-05-13 ENCOUNTER — HOSPITAL ENCOUNTER (OUTPATIENT)
Dept: INFUSION THERAPY | Age: 79
Discharge: HOME | End: 2019-05-13
Payer: MEDICARE

## 2019-05-13 VITALS
RESPIRATION RATE: 18 BRPM | HEART RATE: 72 BPM | DIASTOLIC BLOOD PRESSURE: 82 MMHG | OXYGEN SATURATION: 97 % | SYSTOLIC BLOOD PRESSURE: 161 MMHG | TEMPERATURE: 98 F

## 2019-05-13 LAB
ALBUMIN SERPL-MCNC: 2.8 G/DL (ref 3.5–5)
ALBUMIN/GLOB SERPL: 0.6 {RATIO} (ref 1.1–2.2)
ALP SERPL-CCNC: 97 U/L (ref 45–117)
ALT SERPL-CCNC: 19 U/L (ref 12–78)
ANION GAP SERPL CALC-SCNC: 6 MMOL/L (ref 5–15)
AST SERPL-CCNC: 21 U/L (ref 15–37)
BASOPHILS # BLD: 0 K/UL (ref 0–0.1)
BASOPHILS NFR BLD: 1 % (ref 0–1)
BILIRUB DIRECT SERPL-MCNC: 0.1 MG/DL (ref 0–0.2)
BILIRUB SERPL-MCNC: 0.4 MG/DL (ref 0.2–1)
BUN SERPL-MCNC: 17 MG/DL (ref 6–20)
BUN/CREAT SERPL: 25 (ref 12–20)
CALCIUM SERPL-MCNC: 9.1 MG/DL (ref 8.5–10.1)
CHLORIDE SERPL-SCNC: 102 MMOL/L (ref 97–108)
CK SERPL-CCNC: 57 U/L (ref 26–192)
CO2 SERPL-SCNC: 28 MMOL/L (ref 21–32)
CREAT SERPL-MCNC: 0.69 MG/DL (ref 0.55–1.02)
CRP SERPL-MCNC: 2.14 MG/DL (ref 0–0.6)
DIFFERENTIAL METHOD BLD: ABNORMAL
EOSINOPHIL # BLD: 0.2 K/UL (ref 0–0.4)
EOSINOPHIL NFR BLD: 2 % (ref 0–7)
ERYTHROCYTE [DISTWIDTH] IN BLOOD BY AUTOMATED COUNT: 13.4 % (ref 11.5–14.5)
ERYTHROCYTE [SEDIMENTATION RATE] IN BLOOD: 71 MM/HR (ref 0–30)
GLOBULIN SER CALC-MCNC: 4.6 G/DL (ref 2–4)
GLUCOSE SERPL-MCNC: 100 MG/DL (ref 65–100)
HCT VFR BLD AUTO: 35.1 % (ref 35–47)
HGB BLD-MCNC: 11.5 G/DL (ref 11.5–16)
IMM GRANULOCYTES # BLD AUTO: 0 K/UL (ref 0–0.04)
IMM GRANULOCYTES NFR BLD AUTO: 1 % (ref 0–0.5)
LYMPHOCYTES # BLD: 1.3 K/UL (ref 0.8–3.5)
LYMPHOCYTES NFR BLD: 17 % (ref 12–49)
MCH RBC QN AUTO: 29.9 PG (ref 26–34)
MCHC RBC AUTO-ENTMCNC: 32.8 G/DL (ref 30–36.5)
MCV RBC AUTO: 91.2 FL (ref 80–99)
MONOCYTES # BLD: 0.6 K/UL (ref 0–1)
MONOCYTES NFR BLD: 7 % (ref 5–13)
NEUTS SEG # BLD: 5.5 K/UL (ref 1.8–8)
NEUTS SEG NFR BLD: 72 % (ref 32–75)
NRBC # BLD: 0 K/UL (ref 0–0.01)
NRBC BLD-RTO: 0 PER 100 WBC
PLATELET # BLD AUTO: 317 K/UL (ref 150–400)
PMV BLD AUTO: 9.5 FL (ref 8.9–12.9)
POTASSIUM SERPL-SCNC: 4.1 MMOL/L (ref 3.5–5.1)
PROT SERPL-MCNC: 7.4 G/DL (ref 6.4–8.2)
RBC # BLD AUTO: 3.85 M/UL (ref 3.8–5.2)
SODIUM SERPL-SCNC: 136 MMOL/L (ref 136–145)
WBC # BLD AUTO: 7.6 K/UL (ref 3.6–11)

## 2019-05-13 PROCEDURE — 74011250636 HC RX REV CODE- 250/636: Performed by: INTERNAL MEDICINE

## 2019-05-13 PROCEDURE — 80076 HEPATIC FUNCTION PANEL: CPT

## 2019-05-13 PROCEDURE — 85025 COMPLETE CBC W/AUTO DIFF WBC: CPT

## 2019-05-13 PROCEDURE — 74011000250 HC RX REV CODE- 250: Performed by: INTERNAL MEDICINE

## 2019-05-13 PROCEDURE — 96374 THER/PROPH/DIAG INJ IV PUSH: CPT

## 2019-05-13 PROCEDURE — 80048 BASIC METABOLIC PNL TOTAL CA: CPT

## 2019-05-13 PROCEDURE — 36415 COLL VENOUS BLD VENIPUNCTURE: CPT

## 2019-05-13 PROCEDURE — 86140 C-REACTIVE PROTEIN: CPT

## 2019-05-13 PROCEDURE — 82550 ASSAY OF CK (CPK): CPT

## 2019-05-13 PROCEDURE — 85652 RBC SED RATE AUTOMATED: CPT

## 2019-05-13 RX ORDER — SODIUM CHLORIDE 0.9 % (FLUSH) 0.9 %
10-40 SYRINGE (ML) INJECTION AS NEEDED
Status: DISCONTINUED | OUTPATIENT
Start: 2019-05-13 | End: 2019-05-14 | Stop reason: HOSPADM

## 2019-05-13 RX ORDER — HEPARIN 100 UNIT/ML
500 SYRINGE INTRAVENOUS AS NEEDED
Status: DISCONTINUED | OUTPATIENT
Start: 2019-05-13 | End: 2019-05-14 | Stop reason: HOSPADM

## 2019-05-13 RX ADMIN — Medication 10 ML: at 13:56

## 2019-05-13 RX ADMIN — Medication 500 UNITS: at 14:03

## 2019-05-13 RX ADMIN — Medication 10 ML: at 14:02

## 2019-05-13 RX ADMIN — DAPTOMYCIN 500 MG: 500 INJECTION, POWDER, LYOPHILIZED, FOR SOLUTION INTRAVENOUS at 13:59

## 2019-05-13 RX ADMIN — Medication 10 ML: at 13:58

## 2019-05-13 NOTE — PROGRESS NOTES
[facility] Short Visit Note: 
 
6833  Pt arrived to [facility] ambulatory and in no distress for Daptomycin. Single lumen PICC intact to R upper arm, flushed with positive blood return noted. No new complaints voiced. Visit Vitals /82 (BP 1 Location: Left arm, BP Patient Position: At rest) Pulse 72 Temp 98 °F (36.7 °C) Resp 18 SpO2 97% Labs:  
Recent Results (from the past 12 hour(s)) CBC WITH AUTOMATED DIFF Collection Time: 05/13/19  1:55 PM  
Result Value Ref Range WBC 7.6 3.6 - 11.0 K/uL  
 RBC 3.85 3.80 - 5.20 M/uL  
 HGB 11.5 11.5 - 16.0 g/dL HCT 35.1 35.0 - 47.0 % MCV 91.2 80.0 - 99.0 FL  
 MCH 29.9 26.0 - 34.0 PG  
 MCHC 32.8 30.0 - 36.5 g/dL  
 RDW 13.4 11.5 - 14.5 % PLATELET 418 146 - 718 K/uL MPV 9.5 8.9 - 12.9 FL  
 NRBC 0.0 0  WBC ABSOLUTE NRBC 0.00 0.00 - 0.01 K/uL NEUTROPHILS 72 32 - 75 % LYMPHOCYTES 17 12 - 49 % MONOCYTES 7 5 - 13 % EOSINOPHILS 2 0 - 7 % BASOPHILS 1 0 - 1 % IMMATURE GRANULOCYTES 1 (H) 0.0 - 0.5 % ABS. NEUTROPHILS 5.5 1.8 - 8.0 K/UL  
 ABS. LYMPHOCYTES 1.3 0.8 - 3.5 K/UL  
 ABS. MONOCYTES 0.6 0.0 - 1.0 K/UL  
 ABS. EOSINOPHILS 0.2 0.0 - 0.4 K/UL  
 ABS. BASOPHILS 0.0 0.0 - 0.1 K/UL  
 ABS. IMM. GRANS. 0.0 0.00 - 0.04 K/UL  
 DF AUTOMATED METABOLIC PANEL, BASIC Collection Time: 05/13/19  1:55 PM  
Result Value Ref Range Sodium 136 136 - 145 mmol/L Potassium 4.1 3.5 - 5.1 mmol/L Chloride 102 97 - 108 mmol/L  
 CO2 28 21 - 32 mmol/L Anion gap 6 5 - 15 mmol/L Glucose 100 65 - 100 mg/dL BUN 17 6 - 20 MG/DL Creatinine 0.69 0.55 - 1.02 MG/DL  
 BUN/Creatinine ratio 25 (H) 12 - 20 GFR est AA >60 >60 ml/min/1.73m2 GFR est non-AA >60 >60 ml/min/1.73m2 Calcium 9.1 8.5 - 10.1 MG/DL  
HEPATIC FUNCTION PANEL Collection Time: 05/13/19  1:55 PM  
Result Value Ref Range Protein, total 7.4 6.4 - 8.2 g/dL Albumin 2.8 (L) 3.5 - 5.0 g/dL Globulin 4.6 (H) 2.0 - 4.0 g/dL A-G Ratio 0.6 (L) 1.1 - 2.2 Bilirubin, total 0.4 0.2 - 1.0 MG/DL Bilirubin, direct 0.1 0.0 - 0.2 MG/DL Alk. phosphatase 97 45 - 117 U/L  
 AST (SGOT) 21 15 - 37 U/L  
 ALT (SGPT) 19 12 - 78 U/L  
SED RATE (ESR) Collection Time: 05/13/19  1:55 PM  
Result Value Ref Range Sed rate, automated 71 (H) 0 - 30 mm/hr CK Collection Time: 05/13/19  1:55 PM  
Result Value Ref Range CK 57 26 - 192 U/L Medications received: 
Daptomycin IVP 
NS Flush Heparin Flush 1405 Pt tolerated treatment well, no adverse reaction noted. PICC flushed per protocol and end cap applied. Discharged from [facility] ambulatory and in no acute distress accompanied by family. Next appt 5/14/19.

## 2019-05-14 ENCOUNTER — TELEPHONE (OUTPATIENT)
Dept: CARDIOLOGY CLINIC | Age: 79
End: 2019-05-14

## 2019-05-14 ENCOUNTER — HOSPITAL ENCOUNTER (OUTPATIENT)
Dept: INFUSION THERAPY | Age: 79
Discharge: HOME | End: 2019-05-14
Payer: MEDICARE

## 2019-05-14 VITALS
TEMPERATURE: 98.4 F | SYSTOLIC BLOOD PRESSURE: 162 MMHG | RESPIRATION RATE: 18 BRPM | OXYGEN SATURATION: 96 % | DIASTOLIC BLOOD PRESSURE: 73 MMHG | HEART RATE: 67 BPM

## 2019-05-14 PROCEDURE — 74011250636 HC RX REV CODE- 250/636: Performed by: INTERNAL MEDICINE

## 2019-05-14 PROCEDURE — 96374 THER/PROPH/DIAG INJ IV PUSH: CPT

## 2019-05-14 PROCEDURE — 74011000250 HC RX REV CODE- 250: Performed by: INTERNAL MEDICINE

## 2019-05-14 RX ORDER — HEPARIN 100 UNIT/ML
500 SYRINGE INTRAVENOUS AS NEEDED
Status: DISCONTINUED | OUTPATIENT
Start: 2019-05-14 | End: 2019-05-15 | Stop reason: HOSPADM

## 2019-05-14 RX ORDER — SODIUM CHLORIDE 0.9 % (FLUSH) 0.9 %
10-40 SYRINGE (ML) INJECTION AS NEEDED
Status: DISCONTINUED | OUTPATIENT
Start: 2019-05-14 | End: 2019-05-15 | Stop reason: HOSPADM

## 2019-05-14 RX ADMIN — DAPTOMYCIN 500 MG: 500 INJECTION, POWDER, LYOPHILIZED, FOR SOLUTION INTRAVENOUS at 11:33

## 2019-05-14 RX ADMIN — Medication 10 ML: at 11:39

## 2019-05-14 RX ADMIN — Medication 10 ML: at 11:33

## 2019-05-14 RX ADMIN — Medication 500 UNITS: at 11:39

## 2019-05-14 NOTE — TELEPHONE ENCOUNTER
Patient said, at last visit she was advised there was an injection she could get and it would be sent to her home. She has not received anything, but has been in the hospital.  Please call her to further discuss. Thanks!

## 2019-05-14 NOTE — PROGRESS NOTES
[id number] [facility]. Short Visit Note: 
 
3212  Pt arrived to [facility] and in no distress for Daptomycin. Single  lumen PICC intact to R upper arm, flushed with positive blood return noted. No new complaints voiced. Visit Vitals /73 (BP 1 Location: Left arm, BP Patient Position: At rest) Pulse 67 Temp 98.4 °F (36.9 °C) Resp 18 SpO2 96% Medications received: 
Daptomycin NS Flush Heparin Flush 
 
1140 Pt tolerated treatment well, no adverse reaction noted. PICC flushed per protocol and end cap applied. Discharged from [facility] and in no acute distress accompanied by Spouse. Next appt 5/15/19.

## 2019-05-15 ENCOUNTER — HOSPITAL ENCOUNTER (OUTPATIENT)
Dept: INFUSION THERAPY | Age: 79
Discharge: HOME | End: 2019-05-15
Payer: MEDICARE

## 2019-05-15 VITALS
SYSTOLIC BLOOD PRESSURE: 163 MMHG | HEART RATE: 78 BPM | RESPIRATION RATE: 18 BRPM | DIASTOLIC BLOOD PRESSURE: 82 MMHG | OXYGEN SATURATION: 96 % | TEMPERATURE: 97.8 F

## 2019-05-15 PROCEDURE — 74011250636 HC RX REV CODE- 250/636: Performed by: INTERNAL MEDICINE

## 2019-05-15 PROCEDURE — 96374 THER/PROPH/DIAG INJ IV PUSH: CPT

## 2019-05-15 PROCEDURE — 74011000250 HC RX REV CODE- 250: Performed by: INTERNAL MEDICINE

## 2019-05-15 PROCEDURE — 77030020847 HC STATLOK BARD -A

## 2019-05-15 RX ORDER — HEPARIN 100 UNIT/ML
500 SYRINGE INTRAVENOUS AS NEEDED
Status: DISCONTINUED | OUTPATIENT
Start: 2019-05-15 | End: 2019-05-19 | Stop reason: HOSPADM

## 2019-05-15 RX ORDER — SODIUM CHLORIDE 0.9 % (FLUSH) 0.9 %
10 SYRINGE (ML) INJECTION AS NEEDED
Status: DISCONTINUED | OUTPATIENT
Start: 2019-05-15 | End: 2019-05-16 | Stop reason: HOSPADM

## 2019-05-15 RX ORDER — PRAMIPEXOLE DIHYDROCHLORIDE 1 MG/1
TABLET ORAL
Qty: 135 TAB | Refills: 1 | Status: SHIPPED | OUTPATIENT
Start: 2019-05-15 | End: 2019-09-19 | Stop reason: ALTCHOICE

## 2019-05-15 RX ADMIN — Medication 500 UNITS: at 14:10

## 2019-05-15 RX ADMIN — DAPTOMYCIN 500 MG: 500 INJECTION, POWDER, LYOPHILIZED, FOR SOLUTION INTRAVENOUS at 14:05

## 2019-05-15 RX ADMIN — Medication 10 ML: at 14:09

## 2019-05-15 NOTE — PROGRESS NOTES
[address] [facility] VISIT NOTE 
  
9333 Patient arrives for Daptomycin 500mg IVP & sterile RUE SL PICC line dressing change without acute problems. Please see connect care for complete assessment and education provided. Vital signs stable throughout and prior to discharge, Pt. Tolerated treatment well and discharged without incident. Patient is aware of next [facility] appointment on 05/16/2019. Appointment card given to patient. Medications via Micromedex: 1. Daptomycin 500 mg IVP 2. NS flush IV 3. Heparin 500 units IV 
 
 VITAL SIGNS Patient Vitals for the past 12 hrs: 
 Temp Pulse Resp BP SpO2  
05/15/19 1400     96 % 05/15/19 1355 97.8 °F (36.6 °C) 78 18 163/82 96 %

## 2019-05-16 ENCOUNTER — HOSPITAL ENCOUNTER (OUTPATIENT)
Dept: INFUSION THERAPY | Age: 79
Discharge: HOME | End: 2019-05-16
Payer: MEDICARE

## 2019-05-16 VITALS
OXYGEN SATURATION: 95 % | DIASTOLIC BLOOD PRESSURE: 78 MMHG | TEMPERATURE: 97.2 F | HEART RATE: 73 BPM | SYSTOLIC BLOOD PRESSURE: 164 MMHG | RESPIRATION RATE: 18 BRPM

## 2019-05-16 LAB
ALBUMIN SERPL-MCNC: 2.9 G/DL (ref 3.5–5)
ALBUMIN/GLOB SERPL: 0.6 {RATIO} (ref 1.1–2.2)
ALP SERPL-CCNC: 108 U/L (ref 45–117)
ALT SERPL-CCNC: 21 U/L (ref 12–78)
ANION GAP SERPL CALC-SCNC: 6 MMOL/L (ref 5–15)
AST SERPL-CCNC: 18 U/L (ref 15–37)
BASOPHILS # BLD: 0 K/UL (ref 0–0.1)
BASOPHILS NFR BLD: 1 % (ref 0–1)
BILIRUB DIRECT SERPL-MCNC: 0.1 MG/DL (ref 0–0.2)
BILIRUB SERPL-MCNC: 0.3 MG/DL (ref 0.2–1)
BUN SERPL-MCNC: 15 MG/DL (ref 6–20)
BUN/CREAT SERPL: 21 (ref 12–20)
CALCIUM SERPL-MCNC: 9.1 MG/DL (ref 8.5–10.1)
CHLORIDE SERPL-SCNC: 104 MMOL/L (ref 97–108)
CK SERPL-CCNC: 54 U/L (ref 26–192)
CO2 SERPL-SCNC: 29 MMOL/L (ref 21–32)
CREAT SERPL-MCNC: 0.72 MG/DL (ref 0.55–1.02)
CRP SERPL-MCNC: 1.8 MG/DL (ref 0–0.6)
DIFFERENTIAL METHOD BLD: ABNORMAL
EOSINOPHIL # BLD: 0.1 K/UL (ref 0–0.4)
EOSINOPHIL NFR BLD: 1 % (ref 0–7)
ERYTHROCYTE [DISTWIDTH] IN BLOOD BY AUTOMATED COUNT: 13.6 % (ref 11.5–14.5)
ERYTHROCYTE [SEDIMENTATION RATE] IN BLOOD: 71 MM/HR (ref 0–30)
GLOBULIN SER CALC-MCNC: 4.6 G/DL (ref 2–4)
GLUCOSE SERPL-MCNC: 114 MG/DL (ref 65–100)
HCT VFR BLD AUTO: 34.8 % (ref 35–47)
HGB BLD-MCNC: 11.3 G/DL (ref 11.5–16)
IMM GRANULOCYTES # BLD AUTO: 0 K/UL (ref 0–0.04)
IMM GRANULOCYTES NFR BLD AUTO: 0 % (ref 0–0.5)
LYMPHOCYTES # BLD: 0.9 K/UL (ref 0.8–3.5)
LYMPHOCYTES NFR BLD: 11 % (ref 12–49)
MCH RBC QN AUTO: 29.7 PG (ref 26–34)
MCHC RBC AUTO-ENTMCNC: 32.5 G/DL (ref 30–36.5)
MCV RBC AUTO: 91.3 FL (ref 80–99)
MONOCYTES # BLD: 0.4 K/UL (ref 0–1)
MONOCYTES NFR BLD: 5 % (ref 5–13)
NEUTS SEG # BLD: 6.5 K/UL (ref 1.8–8)
NEUTS SEG NFR BLD: 82 % (ref 32–75)
NRBC # BLD: 0 K/UL (ref 0–0.01)
NRBC BLD-RTO: 0 PER 100 WBC
PLATELET # BLD AUTO: 304 K/UL (ref 150–400)
PMV BLD AUTO: 9.9 FL (ref 8.9–12.9)
POTASSIUM SERPL-SCNC: 4.1 MMOL/L (ref 3.5–5.1)
PROT SERPL-MCNC: 7.5 G/DL (ref 6.4–8.2)
RBC # BLD AUTO: 3.81 M/UL (ref 3.8–5.2)
SODIUM SERPL-SCNC: 139 MMOL/L (ref 136–145)
WBC # BLD AUTO: 8 K/UL (ref 3.6–11)

## 2019-05-16 PROCEDURE — 74011250636 HC RX REV CODE- 250/636: Performed by: INTERNAL MEDICINE

## 2019-05-16 PROCEDURE — 96374 THER/PROPH/DIAG INJ IV PUSH: CPT

## 2019-05-16 PROCEDURE — 85025 COMPLETE CBC W/AUTO DIFF WBC: CPT

## 2019-05-16 PROCEDURE — 82550 ASSAY OF CK (CPK): CPT

## 2019-05-16 PROCEDURE — 85652 RBC SED RATE AUTOMATED: CPT

## 2019-05-16 PROCEDURE — 74011000250 HC RX REV CODE- 250: Performed by: INTERNAL MEDICINE

## 2019-05-16 PROCEDURE — 86140 C-REACTIVE PROTEIN: CPT

## 2019-05-16 PROCEDURE — 80048 BASIC METABOLIC PNL TOTAL CA: CPT

## 2019-05-16 PROCEDURE — 80076 HEPATIC FUNCTION PANEL: CPT

## 2019-05-16 PROCEDURE — 36415 COLL VENOUS BLD VENIPUNCTURE: CPT

## 2019-05-16 RX ORDER — HEPARIN 100 UNIT/ML
500 SYRINGE INTRAVENOUS AS NEEDED
Status: DISCONTINUED | OUTPATIENT
Start: 2019-05-16 | End: 2019-05-17 | Stop reason: HOSPADM

## 2019-05-16 RX ORDER — SODIUM CHLORIDE 0.9 % (FLUSH) 0.9 %
10-40 SYRINGE (ML) INJECTION AS NEEDED
Status: DISCONTINUED | OUTPATIENT
Start: 2019-05-16 | End: 2019-05-17 | Stop reason: HOSPADM

## 2019-05-16 RX ADMIN — Medication 10 ML: at 11:41

## 2019-05-16 RX ADMIN — Medication 10 ML: at 11:40

## 2019-05-16 RX ADMIN — Medication 500 UNITS: at 11:55

## 2019-05-16 RX ADMIN — Medication 10 ML: at 11:55

## 2019-05-16 RX ADMIN — DAPTOMYCIN 500 MG: 500 INJECTION, POWDER, LYOPHILIZED, FOR SOLUTION INTRAVENOUS at 11:52

## 2019-05-16 NOTE — PROGRESS NOTES
Outpatient Infusion Center Short Visit Progress Note 36 Pt admit to [facility] for daily antibiotics ambulatory in stable condition. Assessment completed. No new concerns voiced, only complaint is fatigue. Patient Vitals for the past 12 hrs: 
 Temp Pulse Resp BP SpO2  
05/16/19 1131 97.2 °F (36.2 °C) 73 18 164/78 95 % Right arm SL PICC line flushed with positive blood return, labs drawn as ordered. Medications: 
Daptomycin IVP 
 
1155 Pt tolerated treatment well. PICC flushed and secured for use at tomorrow's appt. D/c home ambulatory in no distress.  Pt aware of next appointment scheduled for 5/17/19 at 3 pm.

## 2019-05-17 ENCOUNTER — OFFICE VISIT (OUTPATIENT)
Dept: INTERNAL MEDICINE CLINIC | Age: 79
End: 2019-05-17

## 2019-05-17 ENCOUNTER — HOSPITAL ENCOUNTER (OUTPATIENT)
Dept: INFUSION THERAPY | Age: 79
Discharge: HOME | End: 2019-05-17
Payer: MEDICARE

## 2019-05-17 VITALS
SYSTOLIC BLOOD PRESSURE: 118 MMHG | HEART RATE: 71 BPM | HEIGHT: 59 IN | DIASTOLIC BLOOD PRESSURE: 68 MMHG | TEMPERATURE: 98.1 F | OXYGEN SATURATION: 98 % | WEIGHT: 177 LBS | BODY MASS INDEX: 35.68 KG/M2 | RESPIRATION RATE: 16 BRPM

## 2019-05-17 VITALS
RESPIRATION RATE: 18 BRPM | SYSTOLIC BLOOD PRESSURE: 137 MMHG | HEART RATE: 74 BPM | DIASTOLIC BLOOD PRESSURE: 70 MMHG | TEMPERATURE: 97.4 F | OXYGEN SATURATION: 97 %

## 2019-05-17 DIAGNOSIS — T81.89XD LUMBAR SURGICAL WOUND FLUID COLLECTION, SUBSEQUENT ENCOUNTER: ICD-10-CM

## 2019-05-17 DIAGNOSIS — A49.02 MRSA INFECTION: Primary | ICD-10-CM

## 2019-05-17 DIAGNOSIS — E78.2 MIXED HYPERLIPIDEMIA: ICD-10-CM

## 2019-05-17 DIAGNOSIS — M05.79 RHEUMATOID ARTHRITIS INVOLVING MULTIPLE SITES WITH POSITIVE RHEUMATOID FACTOR (HCC): ICD-10-CM

## 2019-05-17 PROCEDURE — 74011250636 HC RX REV CODE- 250/636: Performed by: INTERNAL MEDICINE

## 2019-05-17 PROCEDURE — 96374 THER/PROPH/DIAG INJ IV PUSH: CPT

## 2019-05-17 PROCEDURE — 74011000250 HC RX REV CODE- 250: Performed by: INTERNAL MEDICINE

## 2019-05-17 RX ORDER — HEPARIN 100 UNIT/ML
500 SYRINGE INTRAVENOUS AS NEEDED
Status: DISCONTINUED | OUTPATIENT
Start: 2019-05-17 | End: 2019-05-18 | Stop reason: HOSPADM

## 2019-05-17 RX ORDER — SODIUM CHLORIDE 9 MG/ML
25 INJECTION, SOLUTION INTRAVENOUS AS NEEDED
Status: DISCONTINUED | OUTPATIENT
Start: 2019-05-17 | End: 2019-05-18 | Stop reason: HOSPADM

## 2019-05-17 RX ORDER — SODIUM CHLORIDE 0.9 % (FLUSH) 0.9 %
10-40 SYRINGE (ML) INJECTION AS NEEDED
Status: DISCONTINUED | OUTPATIENT
Start: 2019-05-17 | End: 2019-05-18 | Stop reason: HOSPADM

## 2019-05-17 RX ADMIN — Medication 10 ML: at 15:34

## 2019-05-17 RX ADMIN — DAPTOMYCIN 500 MG: 500 INJECTION, POWDER, LYOPHILIZED, FOR SOLUTION INTRAVENOUS at 15:29

## 2019-05-17 RX ADMIN — Medication 500 UNITS: at 15:35

## 2019-05-17 RX ADMIN — Medication 10 ML: at 15:29

## 2019-05-17 NOTE — PROGRESS NOTES
[address] [facility]. Short Visit Note: 
 
9016 Pt arrived to [facility] ambulatory and in no distress for Daptomycin. Single lumen PICC intact to R upper arm, flushed with positive blood return noted. No new complaints voiced. Visit Vitals /70 (BP 1 Location: Left arm, BP Patient Position: At rest) Pulse 74 Temp 97.4 °F (36.3 °C) Resp 18 SpO2 97% Medications received: 
Daptomycin IVP 
NS Flush Heparin Flush 1535 Pt tolerated treatment well, no adverse reaction noted. PICC flushed per protocol and end cap applied. Discharged from [facility] ambulatory and in no acute distress accompanied by Spouse.   Next appt 5/20/19 @ 2 pm.

## 2019-05-17 NOTE — TELEPHONE ENCOUNTER
You  Reid, Josh Garcia MD 3 days ago      Can you resend the Repatha . Looks like some one D/C the medication. Thanks. Message   Received: 2 days ago   Message Contents   MD Greyson Titus LPN   Caller: Unspecified (3 days ago,  4:29 PM)             Done. Called pt,verified pt with two pt identifiers, told pt that the Patsy Gardnermino had be D/C probably while she was in the hospital. She advised that it was because the dr do not want her on it right now . She is on a lot of antibiotics due to she has MRSA. She advised she will be following up with the dr soon and they will advise her. I advised that the Patsy Looney has been sent to the pharmacy again. Advised it will go thru a prior auth process and can be costly but we can work on that once it is approved. Pt verbalized understanding.

## 2019-05-18 ENCOUNTER — HOSPITAL ENCOUNTER (OUTPATIENT)
Dept: INFUSION THERAPY | Age: 79
Discharge: HOME | End: 2019-05-18
Payer: MEDICARE

## 2019-05-18 VITALS
TEMPERATURE: 97.7 F | HEART RATE: 69 BPM | DIASTOLIC BLOOD PRESSURE: 78 MMHG | OXYGEN SATURATION: 92 % | SYSTOLIC BLOOD PRESSURE: 137 MMHG | RESPIRATION RATE: 18 BRPM

## 2019-05-18 PROCEDURE — 74011250636 HC RX REV CODE- 250/636: Performed by: INTERNAL MEDICINE

## 2019-05-18 PROCEDURE — 74011000250 HC RX REV CODE- 250: Performed by: INTERNAL MEDICINE

## 2019-05-18 PROCEDURE — 96374 THER/PROPH/DIAG INJ IV PUSH: CPT

## 2019-05-18 RX ADMIN — DAPTOMYCIN 500 MG: 500 INJECTION, POWDER, LYOPHILIZED, FOR SOLUTION INTRAVENOUS at 10:14

## 2019-05-18 NOTE — PROGRESS NOTES
Outpatient Infusion Center Short Visit Progress Note 1010 Pt admit to [facility] for antibiotics IVP ambulatory in stable condition. Assessment completed. No new concerns voiced. Patient Vitals for the past 12 hrs: 
 Temp Pulse Resp BP SpO2  
05/18/19 1009 97.7 °F (36.5 °C) 69 18 137/78 92 % PICC with positive blood return. Lab drawn, flushed, heparinized and de-accessed per protocol. Medications: 
Daptomycin IVP 
 
1020 Pt tolerated treatment well. D/c home ambulatory in no distress. Pt aware of next appointment scheduled for 5/19/19.

## 2019-05-18 NOTE — PROGRESS NOTES
HPI:  Nii Lopez is a [de-identified]y.o. year old female who is here for a routine visit:    Presents for a transitional care visit after recent admission. She was admitted on May 6 through May 9 with a lumbar surgical site infection associated with recent lumbar laminectomy. She grew MRSA out of the wound. She is currently on IV antibiotics for the next month or more. She is seeing the surgeon next week. She did require surgical wound in the hospital.  She is getting wound care on a daily basis as well. She is been feeling much better. Her wound pain has improved. Denies any fevers or chills. Denies any nausea or vomiting. Denies any radicular pain. She has been off her Repatha as well as medications for her rheumatoid arthritis due to the infection. Past Medical History:   Diagnosis Date    Cataracts, bilateral     Chronic pain     LOWER BACK    Colon cancer ([facility] [address] 2015    colectomy with ostomy, then reversed by DR ELVIN Jackson Colon polyps     Depression     did not do well on duloxetine    GERD (gastroesophageal reflux disease)     Glaucoma     Heart palpitations     MRSA infection 5/8/2019    Other ill-defined conditions(799.89)     GASTROPARESIS    RA (rheumatoid arthritis) ([facility])     DR Onur Albrecht, remicade    Restless leg syndrome     Spinal stenosis 5/8/2011    Unspecified adverse effect of anesthesia     HEADACHE    UTI (lower urinary tract infection)        Past Surgical History:   Procedure Laterality Date    HX APPENDECTOMY  2015    HX COLECTOMY  2015    WITH COLOSTOMY AND THEN REOMVAL OF COLOSTOMY    HX COLOSTOMY  2016    REVERSAL    HX COLOSTOMY TAKE DOWN      HX DILATION AND CURETTAGE      HX GI  9/2014    COLONOSCOPY    HX KNEE ARTHROSCOPY Left 1979    MENISCUS REPAIR    HX KNEE ARTHROSCOPY Left     HX KNEE ARTHROSCOPY Right 2005    HX KNEE REPLACEMENT Left 2005    HX KNEE REPLACEMENT Right 05/09/2018    HX LAP CHOLECYSTECTOMY  5/2005    HX LUMBAR LAMINECTOMY 2011    HX ORTHOPAEDIC Right     HAND FX    HX TUBAL LIGATION  1979    HX WRIST FRACTURE TX Left     TOTAL HIP ARTHROPLASTY Right 3/2012       Prior to Admission medications    Medication Sig Start Date End Date Taking? Authorizing Provider   pramipexole (MIRAPEX) 1 mg tablet TAKE 1.5 TABLET EVERY NIGHT 5/15/19  Yes Catalino Senior III, MD   oxyCODONE IR (ROXICODONE) 5 mg immediate release tablet Take 1-2 Tabs by mouth every four (4) hours as needed for Pain for up to 7 days. Max Daily Amount: 60 mg. 5/9/19 5/17/19 Yes Angel Alex PA   gabapentin (NEURONTIN) 300 mg capsule Take 300 mg by mouth nightly. Yes Provider, Historical   naloxone (NARCAN) 4 mg/actuation nasal spray Use 1 spray intranasally, then discard. Repeat with new spray every 2 min as needed for opioid overdose symptoms, alternating nostrils. Indications: Decrease in Rate & Depth of Breathing due to Opioid Drug 4/18/19  Yes IRAJ Gamino   DULoxetine (CYMBALTA) 30 mg capsule  3/29/19  Yes Provider, Historical   calcium-cholecalciferol, D3, (CALCIUM 600 + D) tablet Take 1 Tab by mouth daily. Yes Provider, Historical   magnesium oxide 500 mg tab Take 1 Tab by mouth every Monday, Wednesday, Friday. Yes Provider, Historical   predniSONE (DELTASONE) 1 mg tablet Take 1 mg by mouth daily.  Takes 3 tabs (3mg) daily 11/26/14  Yes Billy Hernandez MD       Social History     Socioeconomic History    Marital status:      Spouse name: Not on file    Number of children: Not on file    Years of education: Not on file    Highest education level: Not on file   Occupational History    Not on file   Social Needs    Financial resource strain: Not on file    Food insecurity:     Worry: Not on file     Inability: Not on file    Transportation needs:     Medical: Not on file     Non-medical: Not on file   Tobacco Use    Smoking status: Former Smoker     Packs/day: 1.00     Years: 20.00     Pack years: 20.00     Last attempt to quit: 3/7/1978     Years since quittin.2    Smokeless tobacco: Never Used   Substance and Sexual Activity    Alcohol use: Yes     Alcohol/week: 0.0 oz     Types: 4 Glasses of wine per week     Comment: maybe not that much    Drug use: No    Sexual activity: Not Currently   Lifestyle    Physical activity:     Days per week: Not on file     Minutes per session: Not on file    Stress: Not on file   Relationships    Social connections:     Talks on phone: Not on file     Gets together: Not on file     Attends Muslim service: Not on file     Active member of club or organization: Not on file     Attends meetings of clubs or organizations: Not on file     Relationship status: Not on file    Intimate partner violence:     Fear of current or ex partner: Not on file     Emotionally abused: Not on file     Physically abused: Not on file     Forced sexual activity: Not on file   Other Topics Concern    Not on file   Social History Narrative    . REMY to Lazaro JARRETT  Per HPI    Visit Vitals  /68   Pulse 71   Temp 98.1 °F (36.7 °C) (Oral)   Resp 16   Ht 4' 11\" (1.499 m)   Wt 177 lb (80.3 kg)   SpO2 98%   BMI 35.75 kg/m²         Physical Exam   Physical Examination: General appearance - alert, well appearing, and in no distress  Chest - clear to auscultation, no wheezes, rales or rhonchi, symmetric air entry  Heart - normal rate, regular rhythm, normal S1, S2, no murmurs, rubs, clicks or gallops  Abdomen - soft, nontender, nondistended, no masses or organomegaly  Neurological - alert, oriented, normal speech, no focal findings or movement disorder noted  Wound has sutures in place in the lower back. There is minimal erythema in the inferior aspect of the wound. Minimal drainage. No tenderness. No fluctuance. Assessment/Plan:  Diagnoses and all orders for this visit:    1. MRSA infection -solving with IV antibiotics. Will continue surgical follow-up.     2. Lumbar surgical wound fluid collection, subsequent encounter-     3. Rheumatoid arthritis involving multiple sites with positive rheumatoid factor ([facility] [address] - will remain off rheumatoid arthritis drugs until cleared from her infection. 4. Mixed hyperlipidemia-continue remaining off Repatha until cleared of her infection. Follow-up and Dispositions    · Return if symptoms worsen or fail to improve. Advised her to call back or return to office if symptoms worsen/change/persist.  Discussed expected course/resolution/complications of diagnosis in detail with patient. Medication risks/benefits/costs/interactions/alternatives discussed with patient. She was given an after visit summary which includes diagnoses, current medications, & vitals. She expressed understanding with the diagnosis and plan.

## 2019-05-19 ENCOUNTER — HOSPITAL ENCOUNTER (OUTPATIENT)
Dept: INFUSION THERAPY | Age: 79
Discharge: HOME | End: 2019-05-19
Payer: MEDICARE

## 2019-05-19 VITALS — TEMPERATURE: 97 F | DIASTOLIC BLOOD PRESSURE: 73 MMHG | SYSTOLIC BLOOD PRESSURE: 166 MMHG | RESPIRATION RATE: 18 BRPM

## 2019-05-19 PROCEDURE — 74011250636 HC RX REV CODE- 250/636: Performed by: INTERNAL MEDICINE

## 2019-05-19 PROCEDURE — 96374 THER/PROPH/DIAG INJ IV PUSH: CPT

## 2019-05-19 PROCEDURE — 74011000250 HC RX REV CODE- 250: Performed by: INTERNAL MEDICINE

## 2019-05-19 RX ORDER — HEPARIN 100 UNIT/ML
500 SYRINGE INTRAVENOUS AS NEEDED
Status: DISCONTINUED | OUTPATIENT
Start: 2019-05-19 | End: 2019-05-20 | Stop reason: HOSPADM

## 2019-05-19 RX ORDER — SODIUM CHLORIDE 0.9 % (FLUSH) 0.9 %
5-10 SYRINGE (ML) INJECTION AS NEEDED
Status: DISCONTINUED | OUTPATIENT
Start: 2019-05-19 | End: 2019-05-20 | Stop reason: HOSPADM

## 2019-05-19 RX ADMIN — Medication 20 ML: at 10:39

## 2019-05-19 RX ADMIN — Medication 500 UNITS: at 10:39

## 2019-05-19 RX ADMIN — DAPTOMYCIN 500 MG: 500 INJECTION, POWDER, LYOPHILIZED, FOR SOLUTION INTRAVENOUS at 10:32

## 2019-05-19 NOTE — PROGRESS NOTES
1020:  Pt arrived ambulatory and in no distress for Dapto IVP. Assessment completed. No new complaints voiced. Right picc flushed with + blood return. Vitals Visit Vitals /73 Temp 97 °F (36.1 °C) Resp 18 Medications: 
Dapto 500mg IVP 
 
1040:  Discharged home ambulatory and in no distress.  Next appointment 5/20/19 at 2pm.

## 2019-05-20 ENCOUNTER — HOSPITAL ENCOUNTER (OUTPATIENT)
Dept: INFUSION THERAPY | Age: 79
Discharge: HOME | End: 2019-05-20
Payer: MEDICARE

## 2019-05-20 VITALS
OXYGEN SATURATION: 95 % | DIASTOLIC BLOOD PRESSURE: 79 MMHG | HEART RATE: 86 BPM | SYSTOLIC BLOOD PRESSURE: 146 MMHG | RESPIRATION RATE: 18 BRPM | TEMPERATURE: 98.8 F

## 2019-05-20 LAB
ALBUMIN SERPL-MCNC: 3 G/DL (ref 3.5–5)
ALBUMIN/GLOB SERPL: 0.7 {RATIO} (ref 1.1–2.2)
ALP SERPL-CCNC: 118 U/L (ref 45–117)
ALT SERPL-CCNC: 21 U/L (ref 12–78)
ANION GAP SERPL CALC-SCNC: 8 MMOL/L (ref 5–15)
AST SERPL-CCNC: 20 U/L (ref 15–37)
BASOPHILS # BLD: 0 K/UL (ref 0–0.1)
BASOPHILS NFR BLD: 1 % (ref 0–1)
BILIRUB DIRECT SERPL-MCNC: <0.1 MG/DL (ref 0–0.2)
BILIRUB SERPL-MCNC: 0.3 MG/DL (ref 0.2–1)
BUN SERPL-MCNC: 14 MG/DL (ref 6–20)
BUN/CREAT SERPL: 16 (ref 12–20)
CALCIUM SERPL-MCNC: 8.9 MG/DL (ref 8.5–10.1)
CHLORIDE SERPL-SCNC: 102 MMOL/L (ref 97–108)
CK SERPL-CCNC: 81 U/L (ref 26–192)
CO2 SERPL-SCNC: 27 MMOL/L (ref 21–32)
CREAT SERPL-MCNC: 0.86 MG/DL (ref 0.55–1.02)
CRP SERPL-MCNC: 1.78 MG/DL (ref 0–0.6)
DIFFERENTIAL METHOD BLD: NORMAL
EOSINOPHIL # BLD: 0.1 K/UL (ref 0–0.4)
EOSINOPHIL NFR BLD: 2 % (ref 0–7)
ERYTHROCYTE [DISTWIDTH] IN BLOOD BY AUTOMATED COUNT: 13.8 % (ref 11.5–14.5)
ERYTHROCYTE [SEDIMENTATION RATE] IN BLOOD: 3 MM/HR (ref 0–30)
GLOBULIN SER CALC-MCNC: 4.6 G/DL (ref 2–4)
GLUCOSE SERPL-MCNC: 126 MG/DL (ref 65–100)
HCT VFR BLD AUTO: 35.9 % (ref 35–47)
HGB BLD-MCNC: 11.5 G/DL (ref 11.5–16)
IMM GRANULOCYTES # BLD AUTO: 0 K/UL (ref 0–0.04)
IMM GRANULOCYTES NFR BLD AUTO: 0 % (ref 0–0.5)
LYMPHOCYTES # BLD: 1.2 K/UL (ref 0.8–3.5)
LYMPHOCYTES NFR BLD: 16 % (ref 12–49)
MCH RBC QN AUTO: 29.5 PG (ref 26–34)
MCHC RBC AUTO-ENTMCNC: 32 G/DL (ref 30–36.5)
MCV RBC AUTO: 92.1 FL (ref 80–99)
MONOCYTES # BLD: 0.5 K/UL (ref 0–1)
MONOCYTES NFR BLD: 6 % (ref 5–13)
NEUTS SEG # BLD: 5.6 K/UL (ref 1.8–8)
NEUTS SEG NFR BLD: 75 % (ref 32–75)
NRBC # BLD: 0 K/UL (ref 0–0.01)
NRBC BLD-RTO: 0 PER 100 WBC
PLATELET # BLD AUTO: 273 K/UL (ref 150–400)
PMV BLD AUTO: 10.3 FL (ref 8.9–12.9)
POTASSIUM SERPL-SCNC: 3.7 MMOL/L (ref 3.5–5.1)
PROT SERPL-MCNC: 7.6 G/DL (ref 6.4–8.2)
RBC # BLD AUTO: 3.9 M/UL (ref 3.8–5.2)
SODIUM SERPL-SCNC: 137 MMOL/L (ref 136–145)
WBC # BLD AUTO: 7.4 K/UL (ref 3.6–11)

## 2019-05-20 PROCEDURE — 85025 COMPLETE CBC W/AUTO DIFF WBC: CPT

## 2019-05-20 PROCEDURE — 74011250636 HC RX REV CODE- 250/636: Performed by: INTERNAL MEDICINE

## 2019-05-20 PROCEDURE — 80048 BASIC METABOLIC PNL TOTAL CA: CPT

## 2019-05-20 PROCEDURE — 74011000250 HC RX REV CODE- 250: Performed by: INTERNAL MEDICINE

## 2019-05-20 PROCEDURE — 85652 RBC SED RATE AUTOMATED: CPT

## 2019-05-20 PROCEDURE — 80076 HEPATIC FUNCTION PANEL: CPT

## 2019-05-20 PROCEDURE — 36415 COLL VENOUS BLD VENIPUNCTURE: CPT

## 2019-05-20 PROCEDURE — 86140 C-REACTIVE PROTEIN: CPT

## 2019-05-20 PROCEDURE — 82550 ASSAY OF CK (CPK): CPT

## 2019-05-20 PROCEDURE — 96374 THER/PROPH/DIAG INJ IV PUSH: CPT

## 2019-05-20 RX ORDER — SODIUM CHLORIDE 0.9 % (FLUSH) 0.9 %
10-40 SYRINGE (ML) INJECTION AS NEEDED
Status: DISCONTINUED | OUTPATIENT
Start: 2019-05-20 | End: 2019-05-21 | Stop reason: HOSPADM

## 2019-05-20 RX ORDER — HEPARIN 100 UNIT/ML
500 SYRINGE INTRAVENOUS AS NEEDED
Status: DISCONTINUED | OUTPATIENT
Start: 2019-05-20 | End: 2019-05-21 | Stop reason: HOSPADM

## 2019-05-20 RX ADMIN — Medication 20 ML: at 14:01

## 2019-05-20 RX ADMIN — Medication 500 UNITS: at 14:05

## 2019-05-20 RX ADMIN — Medication 10 ML: at 14:05

## 2019-05-20 RX ADMIN — DAPTOMYCIN 500 MG: 500 INJECTION, POWDER, LYOPHILIZED, FOR SOLUTION INTRAVENOUS at 14:05

## 2019-05-20 NOTE — PROGRESS NOTES
Pt arrived to [facility] ambulatory in no acute distress at 1355 for antibiotics.  Assessment unremarkable except aching knees. R arm PICC flushed without issue and positive blood return noted.  Labs obtained, CBC, BMP, LFT, ESR, CRP, CPK. Visit Vitals /79 (BP 1 Location: Left arm, BP Patient Position: Sitting) Pulse 86 Temp 98.8 °F (37.1 °C) Resp 18 SpO2 95% The following medications administered: 
Dapto 500mg IVP See [facility] for pending results. Pt tolerated treatment well. PICC flushed per policy and new curos cap placed.  Pt discharged ambulatory in no acute distress at 1410, accompanied by self. Next appointment 5/21/19 at 1400.

## 2019-05-21 ENCOUNTER — HOSPITAL ENCOUNTER (OUTPATIENT)
Dept: INFUSION THERAPY | Age: 79
Discharge: HOME | End: 2019-05-21
Payer: MEDICARE

## 2019-05-21 VITALS
SYSTOLIC BLOOD PRESSURE: 143 MMHG | OXYGEN SATURATION: 95 % | TEMPERATURE: 97.9 F | HEART RATE: 80 BPM | RESPIRATION RATE: 20 BRPM | DIASTOLIC BLOOD PRESSURE: 76 MMHG

## 2019-05-21 PROCEDURE — 96374 THER/PROPH/DIAG INJ IV PUSH: CPT

## 2019-05-21 PROCEDURE — 74011000250 HC RX REV CODE- 250: Performed by: INTERNAL MEDICINE

## 2019-05-21 PROCEDURE — 74011250636 HC RX REV CODE- 250/636: Performed by: INTERNAL MEDICINE

## 2019-05-21 RX ORDER — HEPARIN 100 UNIT/ML
500 SYRINGE INTRAVENOUS AS NEEDED
Status: DISCONTINUED | OUTPATIENT
Start: 2019-05-21 | End: 2019-05-22 | Stop reason: HOSPADM

## 2019-05-21 RX ORDER — SODIUM CHLORIDE 0.9 % (FLUSH) 0.9 %
10-40 SYRINGE (ML) INJECTION AS NEEDED
Status: DISCONTINUED | OUTPATIENT
Start: 2019-05-21 | End: 2019-05-25 | Stop reason: HOSPADM

## 2019-05-21 RX ADMIN — Medication 10 ML: at 13:44

## 2019-05-21 RX ADMIN — Medication 500 UNITS: at 13:44

## 2019-05-21 RX ADMIN — Medication 10 ML: at 13:40

## 2019-05-21 RX ADMIN — DAPTOMYCIN 500 MG: 500 INJECTION, POWDER, LYOPHILIZED, FOR SOLUTION INTRAVENOUS at 13:42

## 2019-05-22 ENCOUNTER — HOSPITAL ENCOUNTER (OUTPATIENT)
Dept: INFUSION THERAPY | Age: 79
Discharge: HOME | End: 2019-05-22
Payer: MEDICARE

## 2019-05-22 VITALS
SYSTOLIC BLOOD PRESSURE: 142 MMHG | HEART RATE: 70 BPM | RESPIRATION RATE: 20 BRPM | OXYGEN SATURATION: 95 % | TEMPERATURE: 98 F | DIASTOLIC BLOOD PRESSURE: 75 MMHG

## 2019-05-22 PROCEDURE — 74011000250 HC RX REV CODE- 250: Performed by: INTERNAL MEDICINE

## 2019-05-22 PROCEDURE — 96374 THER/PROPH/DIAG INJ IV PUSH: CPT

## 2019-05-22 PROCEDURE — 74011250636 HC RX REV CODE- 250/636: Performed by: INTERNAL MEDICINE

## 2019-05-22 RX ADMIN — DAPTOMYCIN 500 MG: 500 INJECTION, POWDER, LYOPHILIZED, FOR SOLUTION INTRAVENOUS at 13:12

## 2019-05-22 NOTE — PROGRESS NOTES
1300: Pt arrived at [facility] ambulatory and in no acute distress for abx. Patient Vitals for the past 12 hrs:   Temp Pulse Resp BP SpO2   05/22/19 1325 98 °F (36.7 °C) 70 20 142/75 95 %     No results found for this or any previous visit (from the past 12 hour(s)). Medications Administered     DAPTOmycin (CUBICIN) 500 mg in sterile water (preservative free) 10 mL IV syringe RF formulation     Admin Date  05/22/2019 Action  Given Dose  500 mg Route  IntraVENous Administered By  Servando Nguyen RN              Dressing changed and line flushed per protocol. 1330: Tolerated abx well, no adverse reaction noted. D/Cd from [facility] and in no acute distress.     Future Appointments   Date Time Provider Jarocho Collins   5/23/2019  2:00 PM CHAIR 3 [facility] REG   5/24/2019  2:00 PM CHAIR 3 [address] REG   5/25/2019 11:00 AM BREMO INFUSION NURSE 1 [facility]   5/26/2019 11:00 AM BREMO INFUSION NURSE 1 [facility]   5/27/2019  9:00 AM BREMO INFUSION NURSE 1 [facility]   5/28/2019  2:00 PM CHAIR 3 [place] [address] REG   5/29/2019  2:00 PM CHAIR 3 [address] REG   5/30/2019  2:00 PM CHAIR 3 [place] [address] REG   5/31/2019  2:00 PM CHAIR 3 [place] [address] REG   6/1/2019 10:00 AM BREMO INFUSION NURSE 1 [facility]   6/2/2019 10:00 AM BREMO INFUSION NURSE 1 [facility]   6/3/2019  2:00 PM CHAIR 3 [place] [address] REG   6/4/2019  2:00 PM CHAIR 3 [place] [address] REG   6/5/2019  2:00 PM CHAIR 3 [address] REG   6/6/2019  2:00 PM CHAIR 3 [place] [address] REG   6/7/2019  2:00 PM CHAIR 3 [place] [address] REG   6/8/2019 10:00 AM BREMO INFUSION NURSE 1 [facility]   6/9/2019 10:00 AM EUGENIO INFUSION NURSE 1 [facility]   6/10/2019  2:00 PM CHAIR 3 [facility]   6/11/2019  2:00 PM CHAIR 3 [facility] [address] REG   6/12/2019  2:00 PM CHAIR 3 [place] [address] REG   6/13/2019  2:00 PM CHAIR 3 [place] [address] REG   6/14/2019  2:00 PM CHAIR 3 [place] [address] REG   6/15/2019 10:00 AM BREMO INFUSION NURSE 1 [facility]   6/16/2019 10:00 AM [facility]   6/17/2019  2:00 PM CHAIR 3 [place] [address] REG   7/23/2019 11:15 AM Hermila Mathews MD [facility] CALLUM SCHED   11/20/2019  2:30 PM Jamil Blank MD [id number] [facility]

## 2019-05-23 ENCOUNTER — HOSPITAL ENCOUNTER (OUTPATIENT)
Dept: INFUSION THERAPY | Age: 79
Discharge: HOME | End: 2019-05-23
Payer: MEDICARE

## 2019-05-23 VITALS
OXYGEN SATURATION: 95 % | DIASTOLIC BLOOD PRESSURE: 77 MMHG | TEMPERATURE: 97.8 F | HEART RATE: 80 BPM | SYSTOLIC BLOOD PRESSURE: 129 MMHG | RESPIRATION RATE: 18 BRPM

## 2019-05-23 LAB
ALBUMIN SERPL-MCNC: 3 G/DL (ref 3.5–5)
ALBUMIN/GLOB SERPL: 0.7 {RATIO} (ref 1.1–2.2)
ALP SERPL-CCNC: 111 U/L (ref 45–117)
ALT SERPL-CCNC: 23 U/L (ref 12–78)
ANION GAP SERPL CALC-SCNC: 5 MMOL/L (ref 5–15)
AST SERPL-CCNC: 23 U/L (ref 15–37)
BASOPHILS # BLD: 0 K/UL (ref 0–0.1)
BASOPHILS NFR BLD: 0 % (ref 0–1)
BILIRUB DIRECT SERPL-MCNC: 0.1 MG/DL (ref 0–0.2)
BILIRUB SERPL-MCNC: 0.4 MG/DL (ref 0.2–1)
BUN SERPL-MCNC: 15 MG/DL (ref 6–20)
BUN/CREAT SERPL: 17 (ref 12–20)
CALCIUM SERPL-MCNC: 9.3 MG/DL (ref 8.5–10.1)
CHLORIDE SERPL-SCNC: 101 MMOL/L (ref 97–108)
CK SERPL-CCNC: 84 U/L (ref 26–192)
CO2 SERPL-SCNC: 29 MMOL/L (ref 21–32)
CREAT SERPL-MCNC: 0.86 MG/DL (ref 0.55–1.02)
CRP SERPL-MCNC: 2.34 MG/DL (ref 0–0.6)
DIFFERENTIAL METHOD BLD: ABNORMAL
EOSINOPHIL # BLD: 0.2 K/UL (ref 0–0.4)
EOSINOPHIL NFR BLD: 3 % (ref 0–7)
ERYTHROCYTE [DISTWIDTH] IN BLOOD BY AUTOMATED COUNT: 13.9 % (ref 11.5–14.5)
ERYTHROCYTE [SEDIMENTATION RATE] IN BLOOD: 70 MM/HR (ref 0–30)
GLOBULIN SER CALC-MCNC: 4.6 G/DL (ref 2–4)
GLUCOSE SERPL-MCNC: 98 MG/DL (ref 65–100)
HCT VFR BLD AUTO: 35 % (ref 35–47)
HGB BLD-MCNC: 11.2 G/DL (ref 11.5–16)
IMM GRANULOCYTES # BLD AUTO: 0 K/UL (ref 0–0.04)
IMM GRANULOCYTES NFR BLD AUTO: 0 % (ref 0–0.5)
LYMPHOCYTES # BLD: 1.2 K/UL (ref 0.8–3.5)
LYMPHOCYTES NFR BLD: 19 % (ref 12–49)
MCH RBC QN AUTO: 29 PG (ref 26–34)
MCHC RBC AUTO-ENTMCNC: 32 G/DL (ref 30–36.5)
MCV RBC AUTO: 90.7 FL (ref 80–99)
MONOCYTES # BLD: 0.5 K/UL (ref 0–1)
MONOCYTES NFR BLD: 7 % (ref 5–13)
NEUTS SEG # BLD: 4.4 K/UL (ref 1.8–8)
NEUTS SEG NFR BLD: 71 % (ref 32–75)
NRBC # BLD: 0 K/UL (ref 0–0.01)
NRBC BLD-RTO: 0 PER 100 WBC
PLATELET # BLD AUTO: 227 K/UL (ref 150–400)
PMV BLD AUTO: 10.4 FL (ref 8.9–12.9)
POTASSIUM SERPL-SCNC: 3.9 MMOL/L (ref 3.5–5.1)
PROT SERPL-MCNC: 7.6 G/DL (ref 6.4–8.2)
RBC # BLD AUTO: 3.86 M/UL (ref 3.8–5.2)
SODIUM SERPL-SCNC: 135 MMOL/L (ref 136–145)
WBC # BLD AUTO: 6.2 K/UL (ref 3.6–11)

## 2019-05-23 PROCEDURE — 96374 THER/PROPH/DIAG INJ IV PUSH: CPT

## 2019-05-23 PROCEDURE — 85652 RBC SED RATE AUTOMATED: CPT

## 2019-05-23 PROCEDURE — 74011000250 HC RX REV CODE- 250: Performed by: INTERNAL MEDICINE

## 2019-05-23 PROCEDURE — 80076 HEPATIC FUNCTION PANEL: CPT

## 2019-05-23 PROCEDURE — 86140 C-REACTIVE PROTEIN: CPT

## 2019-05-23 PROCEDURE — 80048 BASIC METABOLIC PNL TOTAL CA: CPT

## 2019-05-23 PROCEDURE — 74011250636 HC RX REV CODE- 250/636: Performed by: INTERNAL MEDICINE

## 2019-05-23 PROCEDURE — 82550 ASSAY OF CK (CPK): CPT

## 2019-05-23 PROCEDURE — 36415 COLL VENOUS BLD VENIPUNCTURE: CPT

## 2019-05-23 PROCEDURE — 85025 COMPLETE CBC W/AUTO DIFF WBC: CPT

## 2019-05-23 RX ORDER — SODIUM CHLORIDE 0.9 % (FLUSH) 0.9 %
10-40 SYRINGE (ML) INJECTION AS NEEDED
Status: DISCONTINUED | OUTPATIENT
Start: 2019-05-23 | End: 2019-05-24 | Stop reason: HOSPADM

## 2019-05-23 RX ORDER — HEPARIN 100 UNIT/ML
500 SYRINGE INTRAVENOUS AS NEEDED
Status: DISCONTINUED | OUTPATIENT
Start: 2019-05-23 | End: 2019-05-24 | Stop reason: HOSPADM

## 2019-05-23 RX ADMIN — Medication 10 ML: at 13:50

## 2019-05-23 RX ADMIN — Medication 500 UNITS: at 13:50

## 2019-05-23 RX ADMIN — Medication 10 ML: at 13:45

## 2019-05-23 RX ADMIN — DAPTOMYCIN 500 MG: 500 INJECTION, POWDER, LYOPHILIZED, FOR SOLUTION INTRAVENOUS at 13:46

## 2019-05-23 NOTE — PROGRESS NOTES
Outpatient Infusion Center Progress Note    1279:  Pt admit to [facility] for daily abx ambulatory in stable condition. Assessment unchanged. No new concerns voiced. Right arm single lumen PICC line intact and flushed without issue. Labs drawn per order and sent for processing. /77 (BP 1 Location: Left arm, BP Patient Position: Sitting)   Pulse 80   Temp 97.8 °F (36.6 °C)   Resp 18   SpO2 95%      Medications:  Daptomycin IVP    1350:   Pt tolerated treatment well. PICC line flushed, heparinized, and capped. D/c home ambulatory in no distress. Pt aware of next appointment scheduled for 5/24 at 2 PM.    Recent Results (from the past 12 hour(s))   CBC WITH AUTOMATED DIFF    Collection Time: 05/23/19  1:38 PM   Result Value Ref Range    WBC 6.2 3.6 - 11.0 K/uL    RBC 3.86 3.80 - 5.20 M/uL    HGB 11.2 (L) 11.5 - 16.0 g/dL    HCT 35.0 35.0 - 47.0 %    MCV 90.7 80.0 - 99.0 FL    MCH 29.0 26.0 - 34.0 PG    MCHC 32.0 30.0 - 36.5 g/dL    RDW 13.9 11.5 - 14.5 %    PLATELET 673 342 - 736 K/uL    MPV 10.4 8.9 - 12.9 FL    NRBC 0.0 0  WBC    ABSOLUTE NRBC 0.00 0.00 - 0.01 K/uL    NEUTROPHILS 71 32 - 75 %    LYMPHOCYTES 19 12 - 49 %    MONOCYTES 7 5 - 13 %    EOSINOPHILS 3 0 - 7 %    BASOPHILS 0 0 - 1 %    IMMATURE GRANULOCYTES 0 0.0 - 0.5 %    ABS. NEUTROPHILS 4.4 1.8 - 8.0 K/UL    ABS. LYMPHOCYTES 1.2 0.8 - 3.5 K/UL    ABS. MONOCYTES 0.5 0.0 - 1.0 K/UL    ABS. EOSINOPHILS 0.2 0.0 - 0.4 K/UL    ABS. BASOPHILS 0.0 0.0 - 0.1 K/UL    ABS. IMM.  GRANS. 0.0 0.00 - 0.04 K/UL    DF AUTOMATED     METABOLIC PANEL, BASIC    Collection Time: 05/23/19  1:38 PM   Result Value Ref Range    Sodium 135 (L) 136 - 145 mmol/L    Potassium 3.9 3.5 - 5.1 mmol/L    Chloride 101 97 - 108 mmol/L    CO2 29 21 - 32 mmol/L    Anion gap 5 5 - 15 mmol/L    Glucose 98 65 - 100 mg/dL    BUN 15 6 - 20 MG/DL    Creatinine 0.86 0.55 - 1.02 MG/DL    BUN/Creatinine ratio 17 12 - 20      GFR est AA >60 >60 ml/min/1.73m2    GFR est non-AA >60 >60 ml/min/1.73m2    Calcium 9.3 8.5 - 10.1 MG/DL   HEPATIC FUNCTION PANEL    Collection Time: 05/23/19  1:38 PM   Result Value Ref Range    Protein, total 7.6 6.4 - 8.2 g/dL    Albumin 3.0 (L) 3.5 - 5.0 g/dL    Globulin 4.6 (H) 2.0 - 4.0 g/dL    A-G Ratio 0.7 (L) 1.1 - 2.2      Bilirubin, total 0.4 0.2 - 1.0 MG/DL    Bilirubin, direct 0.1 0.0 - 0.2 MG/DL    Alk.  phosphatase 111 45 - 117 U/L    AST (SGOT) 23 15 - 37 U/L    ALT (SGPT) 23 12 - 78 U/L   SED RATE (ESR)    Collection Time: 05/23/19  1:38 PM   Result Value Ref Range    Sed rate, automated 70 (H) 0 - 30 mm/hr   CK    Collection Time: 05/23/19  1:38 PM   Result Value Ref Range    CK 84 26 - 192 U/L

## 2019-05-24 ENCOUNTER — HOSPITAL ENCOUNTER (OUTPATIENT)
Dept: INFUSION THERAPY | Age: 79
Discharge: HOME | End: 2019-05-24
Payer: MEDICARE

## 2019-05-24 VITALS
HEART RATE: 77 BPM | OXYGEN SATURATION: 93 % | TEMPERATURE: 98 F | DIASTOLIC BLOOD PRESSURE: 78 MMHG | RESPIRATION RATE: 18 BRPM | SYSTOLIC BLOOD PRESSURE: 144 MMHG

## 2019-05-24 PROCEDURE — 74011000250 HC RX REV CODE- 250: Performed by: INTERNAL MEDICINE

## 2019-05-24 PROCEDURE — 96374 THER/PROPH/DIAG INJ IV PUSH: CPT

## 2019-05-24 PROCEDURE — 74011250636 HC RX REV CODE- 250/636: Performed by: INTERNAL MEDICINE

## 2019-05-24 PROCEDURE — 74011250636 HC RX REV CODE- 250/636

## 2019-05-24 RX ORDER — HEPARIN 100 UNIT/ML
500 SYRINGE INTRAVENOUS AS NEEDED
Status: DISCONTINUED | OUTPATIENT
Start: 2019-05-24 | End: 2019-05-25 | Stop reason: HOSPADM

## 2019-05-24 RX ORDER — SODIUM CHLORIDE 0.9 % (FLUSH) 0.9 %
10-40 SYRINGE (ML) INJECTION AS NEEDED
Status: DISCONTINUED | OUTPATIENT
Start: 2019-05-24 | End: 2019-05-25 | Stop reason: HOSPADM

## 2019-05-24 RX ADMIN — Medication 20 ML: at 13:57

## 2019-05-24 RX ADMIN — Medication 500 UNITS: at 13:57

## 2019-05-24 RX ADMIN — DAPTOMYCIN 500 MG: 500 INJECTION, POWDER, LYOPHILIZED, FOR SOLUTION INTRAVENOUS at 13:57

## 2019-05-24 NOTE — PROGRESS NOTES
Pt arrived to [facility] ambulatory in no acute distress at 1355 for antibiotics.  Assessment unremarkable. R arm PICC flushed without issue and positive blood return noted. Visit Vitals  /78 (BP 1 Location: Left arm, BP Patient Position: Sitting)   Pulse 77   Temp 98 °F (36.7 °C)   Resp 18   SpO2 93%     The following medications administered:  Dapto IVP    Pt tolerated treatment well. PICC flushed per policy and new curos cap placed.  Pt discharged ambulatory in no acute distress at 1405, accompanied by self. Next appointment 5/25/19 at 1100.

## 2019-05-25 ENCOUNTER — HOSPITAL ENCOUNTER (OUTPATIENT)
Dept: INFUSION THERAPY | Age: 79
Discharge: HOME | End: 2019-05-25
Payer: MEDICARE

## 2019-05-25 VITALS
TEMPERATURE: 97 F | OXYGEN SATURATION: 95 % | SYSTOLIC BLOOD PRESSURE: 151 MMHG | DIASTOLIC BLOOD PRESSURE: 76 MMHG | HEART RATE: 69 BPM | RESPIRATION RATE: 18 BRPM

## 2019-05-25 PROCEDURE — 74011000250 HC RX REV CODE- 250: Performed by: INTERNAL MEDICINE

## 2019-05-25 PROCEDURE — 74011250636 HC RX REV CODE- 250/636: Performed by: INTERNAL MEDICINE

## 2019-05-25 PROCEDURE — 96374 THER/PROPH/DIAG INJ IV PUSH: CPT

## 2019-05-25 RX ORDER — SODIUM CHLORIDE 0.9 % (FLUSH) 0.9 %
5-10 SYRINGE (ML) INJECTION AS NEEDED
Status: DISCONTINUED | OUTPATIENT
Start: 2019-05-25 | End: 2019-05-26 | Stop reason: HOSPADM

## 2019-05-25 RX ORDER — HEPARIN 100 UNIT/ML
500 SYRINGE INTRAVENOUS AS NEEDED
Status: DISCONTINUED | OUTPATIENT
Start: 2019-05-25 | End: 2019-05-26 | Stop reason: HOSPADM

## 2019-05-25 RX ADMIN — SODIUM CHLORIDE, PRESERVATIVE FREE 500 UNITS: 5 INJECTION INTRAVENOUS at 10:20

## 2019-05-25 RX ADMIN — Medication 10 ML: at 10:20

## 2019-05-25 RX ADMIN — DAPTOMYCIN 500 MG: 500 INJECTION, POWDER, LYOPHILIZED, FOR SOLUTION INTRAVENOUS at 10:20

## 2019-05-25 NOTE — PROGRESS NOTES
Outpatient Infusion Center Short Visit Progress Note    1010 Pt admit to [facility] for Daptomycin ambulatory in stable condition. Assessment completed. No new concerns voiced. PICC line flushed with positive blood return. Visit Vitals  /76 (BP 1 Location: Left arm, BP Patient Position: At rest)   Pulse 69   Temp 97 °F (36.1 °C)   Resp 18   SpO2 95%          Medications:  Daptomycin IVP    1020 Pt tolerated treatment well. PICC line flushed, heparinized and capped for tomorrow's infusion. D/c home ambulatory in no distress.  Pt aware of next appointment scheduled for 5/26/19

## 2019-05-26 ENCOUNTER — HOSPITAL ENCOUNTER (OUTPATIENT)
Dept: INFUSION THERAPY | Age: 79
Discharge: HOME | End: 2019-05-26
Payer: MEDICARE

## 2019-05-26 VITALS
HEART RATE: 67 BPM | DIASTOLIC BLOOD PRESSURE: 73 MMHG | OXYGEN SATURATION: 92 % | SYSTOLIC BLOOD PRESSURE: 145 MMHG | TEMPERATURE: 97 F | RESPIRATION RATE: 18 BRPM

## 2019-05-26 PROCEDURE — 96374 THER/PROPH/DIAG INJ IV PUSH: CPT

## 2019-05-26 PROCEDURE — 74011250636 HC RX REV CODE- 250/636: Performed by: INTERNAL MEDICINE

## 2019-05-26 PROCEDURE — 74011000250 HC RX REV CODE- 250: Performed by: INTERNAL MEDICINE

## 2019-05-26 RX ADMIN — DAPTOMYCIN 500 MG: 500 INJECTION, POWDER, LYOPHILIZED, FOR SOLUTION INTRAVENOUS at 10:15

## 2019-05-26 NOTE — PROGRESS NOTES
Outpatient Infusion Center Short Visit Progress Note    1010 Patient admitted to [facility] for Daptomycin ambulatory in stable condition. Assessment completed. No new concerns voiced. PICC line flushed with positive blood return. Visit Vitals  /73 (BP 1 Location: Left arm, BP Patient Position: Sitting)   Pulse 67   Temp 97 °F (36.1 °C)   Resp 18   SpO2 92%     Medications:  Daptomycin IVP    1020 Patient tolerated treatment well. Patient discharged from [facility] ambulatory in no distress at 1020. Patient aware of next appointment scheduled for 5/27/19.

## 2019-05-27 ENCOUNTER — HOSPITAL ENCOUNTER (OUTPATIENT)
Dept: INFUSION THERAPY | Age: 79
Discharge: HOME | End: 2019-05-27
Payer: MEDICARE

## 2019-05-27 VITALS
SYSTOLIC BLOOD PRESSURE: 176 MMHG | HEART RATE: 74 BPM | DIASTOLIC BLOOD PRESSURE: 83 MMHG | RESPIRATION RATE: 18 BRPM | TEMPERATURE: 98.1 F

## 2019-05-27 PROCEDURE — 96374 THER/PROPH/DIAG INJ IV PUSH: CPT

## 2019-05-27 PROCEDURE — 74011250636 HC RX REV CODE- 250/636: Performed by: INTERNAL MEDICINE

## 2019-05-27 PROCEDURE — 74011000250 HC RX REV CODE- 250: Performed by: INTERNAL MEDICINE

## 2019-05-27 RX ORDER — HEPARIN 100 UNIT/ML
500 SYRINGE INTRAVENOUS AS NEEDED
Status: DISCONTINUED | OUTPATIENT
Start: 2019-05-27 | End: 2019-05-28 | Stop reason: HOSPADM

## 2019-05-27 RX ORDER — SODIUM CHLORIDE 0.9 % (FLUSH) 0.9 %
5-10 SYRINGE (ML) INJECTION AS NEEDED
Status: DISCONTINUED | OUTPATIENT
Start: 2019-05-27 | End: 2019-05-28 | Stop reason: HOSPADM

## 2019-05-27 RX ADMIN — DAPTOMYCIN 500 MG: 500 INJECTION, POWDER, LYOPHILIZED, FOR SOLUTION INTRAVENOUS at 08:50

## 2019-05-27 NOTE — PROGRESS NOTES
Outpatient Infusion Center Short Visit Progress Note    0845 Pt admit to [facility] for daily Daptomycin ambulatory in stable condition. Assessment completed. Pt reports taking prednisone and Oxycodone before arrival today for arthritis flare up. No new concerns voiced. PICC line flushed with positive blood return. Patient Vitals for the past 12 hrs:   Temp Pulse Resp BP   05/27/19 0846 98.1 °F (36.7 °C) 74 18 176/83         Medications:  Daptomycin 500 mg IVP    0855 Pt tolerated treatment well. D/c home ambulatory in no distress. Pt aware of next appointment scheduled for 05/28/2019 at the [facility].

## 2019-05-28 ENCOUNTER — HOSPITAL ENCOUNTER (OUTPATIENT)
Dept: INFUSION THERAPY | Age: 79
Discharge: HOME | End: 2019-05-28
Payer: MEDICARE

## 2019-05-28 VITALS
DIASTOLIC BLOOD PRESSURE: 83 MMHG | RESPIRATION RATE: 18 BRPM | TEMPERATURE: 98.5 F | SYSTOLIC BLOOD PRESSURE: 153 MMHG | HEART RATE: 81 BPM

## 2019-05-28 LAB
ALBUMIN SERPL-MCNC: 3.2 G/DL (ref 3.5–5)
ALBUMIN/GLOB SERPL: 0.6 {RATIO} (ref 1.1–2.2)
ALP SERPL-CCNC: 123 U/L (ref 45–117)
ALT SERPL-CCNC: 23 U/L (ref 12–78)
ANION GAP SERPL CALC-SCNC: 8 MMOL/L (ref 5–15)
AST SERPL-CCNC: 22 U/L (ref 15–37)
BASOPHILS # BLD: 0 K/UL (ref 0–0.1)
BASOPHILS NFR BLD: 0 % (ref 0–1)
BILIRUB DIRECT SERPL-MCNC: 0.1 MG/DL (ref 0–0.2)
BILIRUB SERPL-MCNC: 0.4 MG/DL (ref 0.2–1)
BUN SERPL-MCNC: 14 MG/DL (ref 6–20)
BUN/CREAT SERPL: 18 (ref 12–20)
CALCIUM SERPL-MCNC: 9.2 MG/DL (ref 8.5–10.1)
CHLORIDE SERPL-SCNC: 102 MMOL/L (ref 97–108)
CK SERPL-CCNC: 82 U/L (ref 26–192)
CO2 SERPL-SCNC: 26 MMOL/L (ref 21–32)
CREAT SERPL-MCNC: 0.78 MG/DL (ref 0.55–1.02)
CRP SERPL-MCNC: 1.93 MG/DL (ref 0–0.6)
DIFFERENTIAL METHOD BLD: ABNORMAL
EOSINOPHIL # BLD: 0.1 K/UL (ref 0–0.4)
EOSINOPHIL NFR BLD: 1 % (ref 0–7)
ERYTHROCYTE [DISTWIDTH] IN BLOOD BY AUTOMATED COUNT: 13.7 % (ref 11.5–14.5)
ERYTHROCYTE [SEDIMENTATION RATE] IN BLOOD: 78 MM/HR (ref 0–30)
GLOBULIN SER CALC-MCNC: 5.1 G/DL (ref 2–4)
GLUCOSE SERPL-MCNC: 117 MG/DL (ref 65–100)
HCT VFR BLD AUTO: 36.2 % (ref 35–47)
HGB BLD-MCNC: 11.7 G/DL (ref 11.5–16)
IMM GRANULOCYTES # BLD AUTO: 0.1 K/UL (ref 0–0.04)
IMM GRANULOCYTES NFR BLD AUTO: 1 % (ref 0–0.5)
LYMPHOCYTES # BLD: 0.5 K/UL (ref 0.8–3.5)
LYMPHOCYTES NFR BLD: 8 % (ref 12–49)
MCH RBC QN AUTO: 29.3 PG (ref 26–34)
MCHC RBC AUTO-ENTMCNC: 32.3 G/DL (ref 30–36.5)
MCV RBC AUTO: 90.5 FL (ref 80–99)
MONOCYTES # BLD: 0.1 K/UL (ref 0–1)
MONOCYTES NFR BLD: 2 % (ref 5–13)
NEUTS SEG # BLD: 6 K/UL (ref 1.8–8)
NEUTS SEG NFR BLD: 88 % (ref 32–75)
NRBC # BLD: 0 K/UL (ref 0–0.01)
NRBC BLD-RTO: 0 PER 100 WBC
PLATELET # BLD AUTO: 274 K/UL (ref 150–400)
PMV BLD AUTO: 10.2 FL (ref 8.9–12.9)
POTASSIUM SERPL-SCNC: 3.9 MMOL/L (ref 3.5–5.1)
PROT SERPL-MCNC: 8.3 G/DL (ref 6.4–8.2)
RBC # BLD AUTO: 4 M/UL (ref 3.8–5.2)
RBC MORPH BLD: ABNORMAL
SODIUM SERPL-SCNC: 136 MMOL/L (ref 136–145)
WBC # BLD AUTO: 6.8 K/UL (ref 3.6–11)

## 2019-05-28 PROCEDURE — 85025 COMPLETE CBC W/AUTO DIFF WBC: CPT

## 2019-05-28 PROCEDURE — 96374 THER/PROPH/DIAG INJ IV PUSH: CPT

## 2019-05-28 PROCEDURE — 74011250636 HC RX REV CODE- 250/636

## 2019-05-28 PROCEDURE — 80076 HEPATIC FUNCTION PANEL: CPT

## 2019-05-28 PROCEDURE — 36415 COLL VENOUS BLD VENIPUNCTURE: CPT

## 2019-05-28 PROCEDURE — 85652 RBC SED RATE AUTOMATED: CPT

## 2019-05-28 PROCEDURE — 74011250636 HC RX REV CODE- 250/636: Performed by: INTERNAL MEDICINE

## 2019-05-28 PROCEDURE — 80048 BASIC METABOLIC PNL TOTAL CA: CPT

## 2019-05-28 PROCEDURE — 86140 C-REACTIVE PROTEIN: CPT

## 2019-05-28 PROCEDURE — 74011000250 HC RX REV CODE- 250: Performed by: INTERNAL MEDICINE

## 2019-05-28 PROCEDURE — 82550 ASSAY OF CK (CPK): CPT

## 2019-05-28 RX ORDER — HEPARIN SODIUM (PORCINE) LOCK FLUSH IV SOLN 100 UNIT/ML 100 UNIT/ML
500 SOLUTION INTRAVENOUS AS NEEDED
Status: DISCONTINUED | OUTPATIENT
Start: 2019-05-28 | End: 2019-05-29 | Stop reason: HOSPADM

## 2019-05-28 RX ORDER — SODIUM CHLORIDE 0.9 % (FLUSH) 0.9 %
5-10 SYRINGE (ML) INJECTION AS NEEDED
Status: DISCONTINUED | OUTPATIENT
Start: 2019-05-28 | End: 2019-05-29 | Stop reason: HOSPADM

## 2019-05-28 RX ADMIN — DAPTOMYCIN 500 MG: 500 INJECTION, POWDER, LYOPHILIZED, FOR SOLUTION INTRAVENOUS at 14:16

## 2019-05-28 RX ADMIN — Medication 10 ML: at 14:15

## 2019-05-28 RX ADMIN — HEPARIN SODIUM (PORCINE) LOCK FLUSH IV SOLN 100 UNIT/ML 500 UNITS: 100 SOLUTION at 14:20

## 2019-05-28 RX ADMIN — Medication 10 ML: at 14:19

## 2019-05-28 NOTE — PROGRESS NOTES
Outpatient Infusion Center Short Visit Progress Note  9529: Patient admit to [facility] for daily Daptomycin. Patient ambulatory and in stable condition. . Assessment completed. Patient continues to have arthritic pain as well as back pain. Evan Salazar PICC line flushed and positive blood return noted. Patient Vitals for the past 12 hrs:   Temp Pulse Resp BP   05/28/19 1407 98.5 °F (36.9 °C) 81 18 153/83        Medications:  Daptomycin 500 mg IV Push to right PICC line. 1420: Patient tolerated treatment well. Discharged home, ambulatory and in no distress. Patient aware of next appointment scheduled for 05/29/2019 at 1500.

## 2019-05-29 ENCOUNTER — PATIENT OUTREACH (OUTPATIENT)
Dept: INTERNAL MEDICINE CLINIC | Age: 79
End: 2019-05-29

## 2019-05-29 ENCOUNTER — HOSPITAL ENCOUNTER (OUTPATIENT)
Dept: INFUSION THERAPY | Age: 79
Discharge: HOME | End: 2019-05-29
Payer: MEDICARE

## 2019-05-29 VITALS
RESPIRATION RATE: 16 BRPM | HEART RATE: 74 BPM | OXYGEN SATURATION: 97 % | TEMPERATURE: 98.3 F | DIASTOLIC BLOOD PRESSURE: 65 MMHG | SYSTOLIC BLOOD PRESSURE: 117 MMHG

## 2019-05-29 PROCEDURE — 74011000250 HC RX REV CODE- 250: Performed by: INTERNAL MEDICINE

## 2019-05-29 PROCEDURE — 74011250636 HC RX REV CODE- 250/636: Performed by: INTERNAL MEDICINE

## 2019-05-29 PROCEDURE — 77030020847 HC STATLOK BARD -A

## 2019-05-29 PROCEDURE — 96374 THER/PROPH/DIAG INJ IV PUSH: CPT

## 2019-05-29 RX ORDER — HEPARIN 100 UNIT/ML
500 SYRINGE INTRAVENOUS AS NEEDED
Status: DISCONTINUED | OUTPATIENT
Start: 2019-05-29 | End: 2019-05-30 | Stop reason: HOSPADM

## 2019-05-29 RX ORDER — SODIUM CHLORIDE 0.9 % (FLUSH) 0.9 %
10-40 SYRINGE (ML) INJECTION AS NEEDED
Status: DISCONTINUED | OUTPATIENT
Start: 2019-05-29 | End: 2019-05-30 | Stop reason: HOSPADM

## 2019-05-29 RX ADMIN — DAPTOMYCIN 500 MG: 500 INJECTION, POWDER, LYOPHILIZED, FOR SOLUTION INTRAVENOUS at 14:50

## 2019-05-29 RX ADMIN — Medication 10 ML: at 14:49

## 2019-05-29 RX ADMIN — Medication 10 ML: at 14:52

## 2019-05-29 RX ADMIN — Medication 500 UNITS: at 14:53

## 2019-05-29 NOTE — PROGRESS NOTES
Goals      Prevent complications post hospitalization. 5/29/19  Attempted call to patient. No answer. VM box is not set up to receive messages. ST    5/10/19 1:20 pm  stated patient is sleeping and will call back when awakens from nap for med rec. ST    5/10/19  Readmission I&D 5/6-5/9/19   -Patient will ambulate frequently and perform foot pumps while sitting to prevent DVT. Patient reports having compression stockings but will not be wearing them because she is moving around a lot, working with [facility] and doing leg exercises throughout the day. -Patient is able to verbalize 3 s/s infection:N/V and fever. Patient reports unable to see surgical site, but will have  check.  reports gauze bandage C,D&I. PICC site is monitored daily by [facility]. -Patient will manage pain level with oxycodone and tylenol PRN. Patient reports 3/10 pain today after medication. No SE noted. Patient had colectomy. Never has constipation.   -Encouraged use of incentive spirometer for prevention of pneumonia. Patient verbalized understanding  -Patient will schedule follow up appointment with Dr. Luis E Delgado (ortho surgeon). Patient reports she will call to set up today. -NN contact information provided and will follow up in 1 week  Di Vidal RN      4/29/19 call placed to pharmacy to verify dosage and instructions on Keflex and gabapentin  Keflex 500 mg take 1 4 times daily for 7 days and gabapentin 300 mg take HS verified. ST    4/29/19  -Patient reports having drainage from surgical site since Friday night. Attended office visit with  Dr. Marci Torres NP today with follow up appointment scheduled morning of 5/6/19. Encouraged monitor site for redness, swelling, foul odor from drainage, taking temperature at same time of day every day and notify surgeon if temp of 101 or greater or other s/s of infection noticed. Patient verbalized understanding.   -Patient able to verbalize one s/s blood clot.  Patient will be able to identify additional 2 s/s within 1 week. Patient is not wearing JASPER hose. Encouraged to wear during the day and off at night  Activity  -Patient reports moving frequently throughout the day, but is not ambulating long distances yet. Encouraged foot pumps and importance of blood circulation in prevention of DVT  Brace  -Patient was told by ortho to start weaning off brace at last Thursday's office visit and today was told it could be stopped. Patient reports feeling no different with or without brace. Dressing  -Hai Downs dressing was removed at last weeks ortho office visit on 4/25/19. -ABD dressing in place since zipline removed and is being changed 3x daily, per patient. Patient will notify surgeon if change in drainage amount or odor.   -Keflex and gabapentin started at today's ortho office visit  per patient. Patient is not sure of dosages.  left for pharmacy now. NN will follow up for doses later today. Pain Management  -Patient reports 10/10 pain today following ortho office visit. Encouraged ice 15 min on then give skin rest before applying again, rest, pain medication and report unresolved pain to surgeon. Patient verbalized understanding.    -Patient reports taking oxycodone 5 mg for pain relief. Patient will not take other pain medications in combination with oxycodone as instructed by Dr. Ed Mark. Patient verbalized understanding   -NN will follow up in 1 week. Patient has FU appointment with PCP 5/6/19 @ 3:15, Patient aware.     4/19/19  -Pt will call surgeon if signs of infection: red;  drainage;  foul odor, fever over 101 degrees  -Nausea or vomiting, severe headache  -Signs of a blood clot in your leg-calf pain, tenderness, redness, swelling of lower leg  Patient will contact PCP if general health problems  Patient will call 911 if experiencing chest pain, SOB, difficulty breathing  Activity  -Patient will walk - short frequent walks throughout the day  -Limit the amount of time you sit to 20-30 minute intervals.  -Patient will not lift anything over 5 pounds   -Patient will not do any straining, twisting or bending  Brace  -Pt has quick draw back brace and will wear at all times when out of bed. Pt not to wear the brace while in bed or showering.  -Pt will not bend or twist when brace is off  Diet  -Pt will resume usual diet; watch  saturated fats & cholesterol  -Pt will resume regular bowel movements. Driving  -patient will not drive or return to work until instructed by surgeon. Pt. may ride in the car for short periods of time. Incision Care  Pt will keep Zipline dressing on for 2 weeks. (ABD and tape) placed over it should be  changed daily, for at least the first several days after surgery. If no incisional drainage,may leave the incision open to air, still leaving the Zipline in place  Pt will take brief showers but not run water directly onto the wound. After shower, blot incision dry and replace dry dressing. No tape to come in contact with the Zipline. Pt will not rub or apply any lotions or ointments to your incision site. Pt will have Zipline dressing removed during two week FU appointment. Pt will contact surgeon if experiencing drainage leaking from underneath Zipline dressing or if dressing peels off before FU appointment  Showering  -Pt will resume showering approximately 4 days after surgery.  -Pt will leave dressing on during shower and pat dry  -Pt will remove brace while showering and will not bend or twist while brace is off.  -Pt will not take a tub bath. Preventing blood clots  -Pt will wear T.E.D. Stockings for 7 days after discharge (on in morning and off at night)  Pt will perform foot pumps to increase circulation and ambulate regularly throughout the day  Pain management  -Pt will take pain medication as prescribed; decrease the amount used as pain lessens  -Pt will not wait until in extreme pain to take your medication.     -NN supplied contact information and will FU in 1 week  Katina Kaplan RN        ls

## 2019-05-29 NOTE — PROGRESS NOTES
[facility] Short Visit Note:    5026 Pt arrived to [facility] ambulatory and in no distress for Daptomycin/ Dressing change. Single lumen PICC intact to R upper arm, flushed with positive blood return noted. No new complaints voiced. Visit Vitals  /65 (BP 1 Location: Left arm, BP Patient Position: At rest;Sitting)   Pulse 74   Temp 98.3 °F (36.8 °C)   Resp 16   SpO2 97%       Medications received:  NS Flush  Daptomycin IV  Heparin Flush    1505 Pt tolerated treatment well, no adverse reaction noted. PICC flushed per protocol and end cap applied. Discharged from [facility] ambulatory and in no acute distress accompanied by Spouse.   Next appt 5/30/19 @ 2pm.

## 2019-05-30 ENCOUNTER — PATIENT OUTREACH (OUTPATIENT)
Dept: INTERNAL MEDICINE CLINIC | Age: 79
End: 2019-05-30

## 2019-05-30 ENCOUNTER — HOSPITAL ENCOUNTER (OUTPATIENT)
Dept: INFUSION THERAPY | Age: 79
Discharge: HOME | End: 2019-05-30
Payer: MEDICARE

## 2019-05-30 VITALS
TEMPERATURE: 98.1 F | RESPIRATION RATE: 18 BRPM | OXYGEN SATURATION: 95 % | DIASTOLIC BLOOD PRESSURE: 81 MMHG | HEART RATE: 69 BPM | SYSTOLIC BLOOD PRESSURE: 150 MMHG

## 2019-05-30 PROCEDURE — 96374 THER/PROPH/DIAG INJ IV PUSH: CPT

## 2019-05-30 PROCEDURE — 74011250636 HC RX REV CODE- 250/636: Performed by: INTERNAL MEDICINE

## 2019-05-30 PROCEDURE — 74011000250 HC RX REV CODE- 250: Performed by: INTERNAL MEDICINE

## 2019-05-30 RX ORDER — SODIUM CHLORIDE 0.9 % (FLUSH) 0.9 %
10-40 SYRINGE (ML) INJECTION AS NEEDED
Status: DISCONTINUED | OUTPATIENT
Start: 2019-05-30 | End: 2019-05-31 | Stop reason: HOSPADM

## 2019-05-30 RX ORDER — HEPARIN 100 UNIT/ML
500 SYRINGE INTRAVENOUS AS NEEDED
Status: DISCONTINUED | OUTPATIENT
Start: 2019-05-30 | End: 2019-05-31 | Stop reason: HOSPADM

## 2019-05-30 RX ADMIN — DAPTOMYCIN 500 MG: 500 INJECTION, POWDER, LYOPHILIZED, FOR SOLUTION INTRAVENOUS at 14:12

## 2019-05-30 RX ADMIN — Medication 500 UNITS: at 14:17

## 2019-05-30 RX ADMIN — Medication 10 ML: at 14:17

## 2019-05-30 RX ADMIN — Medication 10 ML: at 14:12

## 2019-05-30 NOTE — PROGRESS NOTES
[facility] Short Visit Note:      Pt arrived to [facility] and in no distress for Daptomycin. Single lumen PICC intact to R upper arm, flushed with positive blood return noted. No new complaints voiced.        Visit Vitals  /81 (BP 1 Location: Left arm, BP Patient Position: At rest;Sitting)   Pulse 69   Temp 98.1 °F (36.7 °C)   Resp 18   SpO2 95%           Medications received:  NS Flush  Daptomycin IV  Heparin Flush     1420 Pt tolerated treatment well, no adverse reaction noted. PICC flushed per protocol and end cap applied. Discharged from [facility] and in no acute distress accompanied by Spouse.   Next appt 5/31/19 @ 2 pm.

## 2019-05-30 NOTE — PROGRESS NOTES
Goals      Prevent complications post hospitalization. 5/30/19   4:19 Attempted call again. Still no answer. Will try again tomorrow. ST   3:21 Call placed to both patient and patient's . No answer. Unable to leave VM. Will try to call again. ST    5/29/19  Attempted call to patient. No answer. VM box is not set up to receive messages. ST    5/10/19 1:20 pm  stated patient is sleeping and will call back when awakens from nap for med rec. ST    5/10/19  Readmission I&D 5/6-5/9/19   -Patient will ambulate frequently and perform foot pumps while sitting to prevent DVT. Patient reports having compression stockings but will not be wearing them because she is moving around a lot, working with [facility] and doing leg exercises throughout the day. -Patient is able to verbalize 3 s/s infection:N/V and fever. Patient reports unable to see surgical site, but will have  check.  reports gauze bandage C,D&I. PICC site is monitored daily by [facility]. -Patient will manage pain level with oxycodone and tylenol PRN. Patient reports 3/10 pain today after medication. No SE noted. Patient had colectomy. Never has constipation.   -Encouraged use of incentive spirometer for prevention of pneumonia. Patient verbalized understanding  -Patient will schedule follow up appointment with Dr. Maurice Damian (ortho surgeon). Patient reports she will call to set up today. -NN contact information provided and will follow up in 1 week  Raisa Swann RN      4/29/19 call placed to pharmacy to verify dosage and instructions on Keflex and gabapentin  Keflex 500 mg take 1 4 times daily for 7 days and gabapentin 300 mg take HS verified. ST    4/29/19  -Patient reports having drainage from surgical site since Friday night. Attended office visit with  Dr. Jeanine Pride NP today with follow up appointment scheduled morning of 5/6/19.  Encouraged monitor site for redness, swelling, foul odor from drainage, taking temperature at same time of day every day and notify surgeon if temp of 101 or greater or other s/s of infection noticed. Patient verbalized understanding.   -Patient able to verbalize one s/s blood clot. Patient will be able to identify additional 2 s/s within 1 week. Patient is not wearing JASPER hose. Encouraged to wear during the day and off at night  Activity  -Patient reports moving frequently throughout the day, but is not ambulating long distances yet. Encouraged foot pumps and importance of blood circulation in prevention of DVT  Brace  -Patient was told by ortho to start weaning off brace at last Thursday's office visit and today was told it could be stopped. Patient reports feeling no different with or without brace. Dressing  -Ro [place] dressing was removed at last weeks ortho office visit on 4/25/19. -ABD dressing in place since zipline removed and is being changed 3x daily, per patient. Patient will notify surgeon if change in drainage amount or odor.   -Keflex and gabapentin started at today's ortho office visit  per patient. Patient is not sure of dosages.  left for pharmacy now. NN will follow up for doses later today. Pain Management  -Patient reports 10/10 pain today following ortho office visit. Encouraged ice 15 min on then give skin rest before applying again, rest, pain medication and report unresolved pain to surgeon. Patient verbalized understanding.    -Patient reports taking oxycodone 5 mg for pain relief. Patient will not take other pain medications in combination with oxycodone as instructed by Dr. Manisha Galarza. Patient verbalized understanding   -NN will follow up in 1 week. Patient has FU appointment with PCP 5/6/19 @ 3:15, Patient aware.     4/19/19  -Pt will call surgeon if signs of infection: red;  drainage;  foul odor, fever over 101 degrees  -Nausea or vomiting, severe headache  -Signs of a blood clot in your leg-calf pain, tenderness, redness, swelling of lower leg  Patient will contact PCP if general health problems  Patient will call 911 if experiencing chest pain, SOB, difficulty breathing  Activity  -Patient will walk - short frequent walks throughout the day  -Limit the amount of time you sit to 20-30 minute intervals.  -Patient will not lift anything over 5 pounds   -Patient will not do any straining, twisting or bending  Brace  -Pt has quick draw back brace and will wear at all times when out of bed. Pt not to wear the brace while in bed or showering.  -Pt will not bend or twist when brace is off  Diet  -Pt will resume usual diet; watch  saturated fats & cholesterol  -Pt will resume regular bowel movements. Driving  -patient will not drive or return to work until instructed by surgeon. Pt. may ride in the car for short periods of time. Incision Care  Pt will keep Zipline dressing on for 2 weeks. (ABD and tape) placed over it should be  changed daily, for at least the first several days after surgery. If no incisional drainage,may leave the incision open to air, still leaving the Zipline in place  Pt will take brief showers but not run water directly onto the wound. After shower, blot incision dry and replace dry dressing. No tape to come in contact with the Zipline. Pt will not rub or apply any lotions or ointments to your incision site. Pt will have Zipline dressing removed during two week FU appointment. Pt will contact surgeon if experiencing drainage leaking from underneath Zipline dressing or if dressing peels off before FU appointment  Showering  -Pt will resume showering approximately 4 days after surgery.  -Pt will leave dressing on during shower and pat dry  -Pt will remove brace while showering and will not bend or twist while brace is off.  -Pt will not take a tub bath. Preventing blood clots  -Pt will wear T.E.D.  Stockings for 7 days after discharge (on in morning and off at night)  Pt will perform foot pumps to increase circulation and ambulate regularly throughout the day  Pain management  -Pt will take pain medication as prescribed; decrease the amount used as pain lessens  -Pt will not wait until in extreme pain to take your medication.     -NN supplied contact information and will FU in 1 week  Lisandro Rosen RN

## 2019-05-31 ENCOUNTER — HOSPITAL ENCOUNTER (OUTPATIENT)
Dept: INFUSION THERAPY | Age: 79
Discharge: HOME | End: 2019-05-31
Payer: MEDICARE

## 2019-05-31 ENCOUNTER — PATIENT OUTREACH (OUTPATIENT)
Dept: INTERNAL MEDICINE CLINIC | Age: 79
End: 2019-05-31

## 2019-05-31 VITALS
OXYGEN SATURATION: 95 % | RESPIRATION RATE: 18 BRPM | SYSTOLIC BLOOD PRESSURE: 143 MMHG | DIASTOLIC BLOOD PRESSURE: 78 MMHG | TEMPERATURE: 97.9 F | HEART RATE: 73 BPM

## 2019-05-31 LAB
ALBUMIN SERPL-MCNC: 3.1 G/DL (ref 3.5–5)
ALBUMIN/GLOB SERPL: 0.6 {RATIO} (ref 1.1–2.2)
ALP SERPL-CCNC: 117 U/L (ref 45–117)
ALT SERPL-CCNC: 30 U/L (ref 12–78)
ANION GAP SERPL CALC-SCNC: 7 MMOL/L (ref 5–15)
AST SERPL-CCNC: 24 U/L (ref 15–37)
BASOPHILS # BLD: 0.1 K/UL (ref 0–0.1)
BASOPHILS NFR BLD: 1 % (ref 0–1)
BILIRUB DIRECT SERPL-MCNC: <0.1 MG/DL (ref 0–0.2)
BILIRUB SERPL-MCNC: 0.3 MG/DL (ref 0.2–1)
BUN SERPL-MCNC: 19 MG/DL (ref 6–20)
BUN/CREAT SERPL: 23 (ref 12–20)
CALCIUM SERPL-MCNC: 9.5 MG/DL (ref 8.5–10.1)
CHLORIDE SERPL-SCNC: 102 MMOL/L (ref 97–108)
CK SERPL-CCNC: 62 U/L (ref 26–192)
CO2 SERPL-SCNC: 28 MMOL/L (ref 21–32)
CREAT SERPL-MCNC: 0.82 MG/DL (ref 0.55–1.02)
CRP SERPL-MCNC: 2.56 MG/DL (ref 0–0.6)
DIFFERENTIAL METHOD BLD: ABNORMAL
EOSINOPHIL # BLD: 0.1 K/UL (ref 0–0.4)
EOSINOPHIL NFR BLD: 1 % (ref 0–7)
ERYTHROCYTE [DISTWIDTH] IN BLOOD BY AUTOMATED COUNT: 13.9 % (ref 11.5–14.5)
ERYTHROCYTE [SEDIMENTATION RATE] IN BLOOD: 79 MM/HR (ref 0–30)
GLOBULIN SER CALC-MCNC: 4.8 G/DL (ref 2–4)
GLUCOSE SERPL-MCNC: 123 MG/DL (ref 65–100)
HCT VFR BLD AUTO: 37 % (ref 35–47)
HGB BLD-MCNC: 11.8 G/DL (ref 11.5–16)
IMM GRANULOCYTES # BLD AUTO: 0.1 K/UL (ref 0–0.04)
IMM GRANULOCYTES NFR BLD AUTO: 1 % (ref 0–0.5)
LYMPHOCYTES # BLD: 0.7 K/UL (ref 0.8–3.5)
LYMPHOCYTES NFR BLD: 10 % (ref 12–49)
MCH RBC QN AUTO: 29.1 PG (ref 26–34)
MCHC RBC AUTO-ENTMCNC: 31.9 G/DL (ref 30–36.5)
MCV RBC AUTO: 91.4 FL (ref 80–99)
MONOCYTES # BLD: 0.2 K/UL (ref 0–1)
MONOCYTES NFR BLD: 3 % (ref 5–13)
NEUTS SEG # BLD: 5.4 K/UL (ref 1.8–8)
NEUTS SEG NFR BLD: 84 % (ref 32–75)
NRBC # BLD: 0 K/UL (ref 0–0.01)
NRBC BLD-RTO: 0 PER 100 WBC
PLATELET # BLD AUTO: 281 K/UL (ref 150–400)
PMV BLD AUTO: 10.2 FL (ref 8.9–12.9)
POTASSIUM SERPL-SCNC: 4.1 MMOL/L (ref 3.5–5.1)
PROT SERPL-MCNC: 7.9 G/DL (ref 6.4–8.2)
RBC # BLD AUTO: 4.05 M/UL (ref 3.8–5.2)
RBC MORPH BLD: ABNORMAL
SODIUM SERPL-SCNC: 137 MMOL/L (ref 136–145)
WBC # BLD AUTO: 6.6 K/UL (ref 3.6–11)

## 2019-05-31 PROCEDURE — 96374 THER/PROPH/DIAG INJ IV PUSH: CPT

## 2019-05-31 PROCEDURE — 74011000250 HC RX REV CODE- 250: Performed by: INTERNAL MEDICINE

## 2019-05-31 PROCEDURE — 82550 ASSAY OF CK (CPK): CPT

## 2019-05-31 PROCEDURE — 74011250636 HC RX REV CODE- 250/636: Performed by: INTERNAL MEDICINE

## 2019-05-31 PROCEDURE — 80076 HEPATIC FUNCTION PANEL: CPT

## 2019-05-31 PROCEDURE — 86140 C-REACTIVE PROTEIN: CPT

## 2019-05-31 PROCEDURE — 80048 BASIC METABOLIC PNL TOTAL CA: CPT

## 2019-05-31 PROCEDURE — 36415 COLL VENOUS BLD VENIPUNCTURE: CPT

## 2019-05-31 PROCEDURE — 85652 RBC SED RATE AUTOMATED: CPT

## 2019-05-31 PROCEDURE — 85025 COMPLETE CBC W/AUTO DIFF WBC: CPT

## 2019-05-31 RX ORDER — HEPARIN 100 UNIT/ML
500 SYRINGE INTRAVENOUS AS NEEDED
Status: DISCONTINUED | OUTPATIENT
Start: 2019-05-31 | End: 2019-06-01 | Stop reason: HOSPADM

## 2019-05-31 RX ORDER — SODIUM CHLORIDE 0.9 % (FLUSH) 0.9 %
10-40 SYRINGE (ML) INJECTION AS NEEDED
Status: DISCONTINUED | OUTPATIENT
Start: 2019-05-31 | End: 2019-06-01 | Stop reason: HOSPADM

## 2019-05-31 RX ADMIN — Medication 10 ML: at 14:25

## 2019-05-31 RX ADMIN — DAPTOMYCIN 500 MG: 500 INJECTION, POWDER, LYOPHILIZED, FOR SOLUTION INTRAVENOUS at 14:22

## 2019-05-31 RX ADMIN — Medication 500 UNITS: at 14:25

## 2019-05-31 RX ADMIN — Medication 20 ML: at 14:22

## 2019-05-31 NOTE — PROGRESS NOTES
Goals      Prevent complications post hospitalization. 5/31/19 call placed to patient. No answer. Unable to LVM. Will try again. ST    5/30/19   4:19 Attempted call again. Still no answer. Will try again tomorrow. ST   3:21 Call placed to both patient and patient's . No answer. Unable to leave VM. Will try to call again. ST    5/29/19  Attempted call to patient. No answer. VM box is not set up to receive messages. ST    5/10/19 1:20 pm  stated patient is sleeping and will call back when awakens from nap for med rec. ST    5/10/19  Readmission I&D 5/6-5/9/19   -Patient will ambulate frequently and perform foot pumps while sitting to prevent DVT. Patient reports having compression stockings but will not be wearing them because she is moving around a lot, working with [facility] and doing leg exercises throughout the day. -Patient is able to verbalize 3 s/s infection:N/V and fever. Patient reports unable to see surgical site, but will have  check.  reports gauze bandage C,D&I. PICC site is monitored daily by [facility]. -Patient will manage pain level with oxycodone and tylenol PRN. Patient reports 3/10 pain today after medication. No SE noted. Patient had colectomy. Never has constipation.   -Encouraged use of incentive spirometer for prevention of pneumonia. Patient verbalized understanding  -Patient will schedule follow up appointment with Dr. Kelley Keller (ortho surgeon). Patient reports she will call to set up today. -NN contact information provided and will follow up in 1 week  Frank Mota RN      4/29/19 call placed to pharmacy to verify dosage and instructions on Keflex and gabapentin  Keflex 500 mg take 1 4 times daily for 7 days and gabapentin 300 mg take HS verified. ST    4/29/19  -Patient reports having drainage from surgical site since Friday night. Attended office visit with  Dr. Emerson Blank NP today with follow up appointment scheduled morning of 5/6/19.  Encouraged monitor site for redness, swelling, foul odor from drainage, taking temperature at same time of day every day and notify surgeon if temp of 101 or greater or other s/s of infection noticed. Patient verbalized understanding.   -Patient able to verbalize one s/s blood clot. Patient will be able to identify additional 2 s/s within 1 week. Patient is not wearing JASPER hose. Encouraged to wear during the day and off at night  Activity  -Patient reports moving frequently throughout the day, but is not ambulating long distances yet. Encouraged foot pumps and importance of blood circulation in prevention of DVT  Brace  -Patient was told by ortho to start weaning off brace at last Thursday's office visit and today was told it could be stopped. Patient reports feeling no different with or without brace. Dressing  -Connie Kapoor dressing was removed at last weeks ortho office visit on 4/25/19. -ABD dressing in place since zipline removed and is being changed 3x daily, per patient. Patient will notify surgeon if change in drainage amount or odor.   -Keflex and gabapentin started at today's ortho office visit  per patient. Patient is not sure of dosages.  left for pharmacy now. NN will follow up for doses later today. Pain Management  -Patient reports 10/10 pain today following ortho office visit. Encouraged ice 15 min on then give skin rest before applying again, rest, pain medication and report unresolved pain to surgeon. Patient verbalized understanding.    -Patient reports taking oxycodone 5 mg for pain relief. Patient will not take other pain medications in combination with oxycodone as instructed by Dr. Jillian Wade. Patient verbalized understanding   -NN will follow up in 1 week. Patient has FU appointment with PCP 5/6/19 @ 3:15, Patient aware.     4/19/19  -Pt will call surgeon if signs of infection: red;  drainage;  foul odor, fever over 101 degrees  -Nausea or vomiting, severe headache  -Signs of a blood clot in your leg-calf pain, tenderness, redness, swelling of lower leg  Patient will contact PCP if general health problems  Patient will call 911 if experiencing chest pain, SOB, difficulty breathing  Activity  -Patient will walk - short frequent walks throughout the day  -Limit the amount of time you sit to 20-30 minute intervals.  -Patient will not lift anything over 5 pounds   -Patient will not do any straining, twisting or bending  Brace  -Pt has quick draw back brace and will wear at all times when out of bed. Pt not to wear the brace while in bed or showering.  -Pt will not bend or twist when brace is off  Diet  -Pt will resume usual diet; watch  saturated fats & cholesterol  -Pt will resume regular bowel movements. Driving  -patient will not drive or return to work until instructed by surgeon. Pt. may ride in the car for short periods of time. Incision Care  Pt will keep Zipline dressing on for 2 weeks. (ABD and tape) placed over it should be  changed daily, for at least the first several days after surgery. If no incisional drainage,may leave the incision open to air, still leaving the Zipline in place  Pt will take brief showers but not run water directly onto the wound. After shower, blot incision dry and replace dry dressing. No tape to come in contact with the Zipline. Pt will not rub or apply any lotions or ointments to your incision site. Pt will have Zipline dressing removed during two week FU appointment. Pt will contact surgeon if experiencing drainage leaking from underneath Zipline dressing or if dressing peels off before FU appointment  Showering  -Pt will resume showering approximately 4 days after surgery.  -Pt will leave dressing on during shower and pat dry  -Pt will remove brace while showering and will not bend or twist while brace is off.  -Pt will not take a tub bath. Preventing blood clots  -Pt will wear T.E.D.  Stockings for 7 days after discharge (on in morning and off at night)  Pt will perform foot pumps to increase circulation and ambulate regularly throughout the day  Pain management  -Pt will take pain medication as prescribed; decrease the amount used as pain lessens  -Pt will not wait until in extreme pain to take your medication.     -NN supplied contact information and will FU in 1 week  Heather Camacho RN

## 2019-05-31 NOTE — PROGRESS NOTES
[facility] Short Visit Note:    Pt arrived to [facility] ambulatory and in no distress for daily Daptomycin. Single lumen PICC intact to right upper arm, flushed with positive blood return noted. No new complaints voiced. Labs drawn including CBC with diff, BMP, Hepatic Function Panel, Sed Rate, CRP, and CPK. Visit Vitals  /78 (BP 1 Location: Left arm, BP Patient Position: Sitting)   Pulse 73   Temp 97.9 °F (36.6 °C)   Resp 18   SpO2 95%     Recent Results (from the past 12 hour(s))   CBC WITH AUTOMATED DIFF    Collection Time: 05/31/19  2:18 PM   Result Value Ref Range    WBC 6.6 3.6 - 11.0 K/uL    RBC 4.05 3.80 - 5.20 M/uL    HGB 11.8 11.5 - 16.0 g/dL    HCT 37.0 35.0 - 47.0 %    MCV 91.4 80.0 - 99.0 FL    MCH 29.1 26.0 - 34.0 PG    MCHC 31.9 30.0 - 36.5 g/dL    RDW 13.9 11.5 - 14.5 %    PLATELET 702 533 - 700 K/uL    MPV 10.2 8.9 - 12.9 FL    NRBC 0.0 0  WBC    ABSOLUTE NRBC 0.00 0.00 - 0.01 K/uL    NEUTROPHILS 84 (H) 32 - 75 %    LYMPHOCYTES 10 (L) 12 - 49 %    MONOCYTES 3 (L) 5 - 13 %    EOSINOPHILS 1 0 - 7 %    BASOPHILS 1 0 - 1 %    IMMATURE GRANULOCYTES 1 (H) 0.0 - 0.5 %    ABS. NEUTROPHILS 5.4 1.8 - 8.0 K/UL    ABS. LYMPHOCYTES 0.7 (L) 0.8 - 3.5 K/UL    ABS. MONOCYTES 0.2 0.0 - 1.0 K/UL    ABS. EOSINOPHILS 0.1 0.0 - 0.4 K/UL    ABS. BASOPHILS 0.1 0.0 - 0.1 K/UL    ABS. IMM.  GRANS. 0.1 (H) 0.00 - 0.04 K/UL    DF AUTOMATED      RBC COMMENTS NORMOCYTIC, NORMOCHROMIC     METABOLIC PANEL, BASIC    Collection Time: 05/31/19  2:18 PM   Result Value Ref Range    Sodium 137 136 - 145 mmol/L    Potassium 4.1 3.5 - 5.1 mmol/L    Chloride 102 97 - 108 mmol/L    CO2 28 21 - 32 mmol/L    Anion gap 7 5 - 15 mmol/L    Glucose 123 (H) 65 - 100 mg/dL    BUN 19 6 - 20 MG/DL    Creatinine 0.82 0.55 - 1.02 MG/DL    BUN/Creatinine ratio 23 (H) 12 - 20      GFR est AA >60 >60 ml/min/1.73m2    GFR est non-AA >60 >60 ml/min/1.73m2    Calcium 9.5 8.5 - 10.1 MG/DL   HEPATIC FUNCTION PANEL    Collection Time: 05/31/19 2:18 PM   Result Value Ref Range    Protein, total 7.9 6.4 - 8.2 g/dL    Albumin 3.1 (L) 3.5 - 5.0 g/dL    Globulin 4.8 (H) 2.0 - 4.0 g/dL    A-G Ratio 0.6 (L) 1.1 - 2.2      Bilirubin, total 0.3 0.2 - 1.0 MG/DL    Bilirubin, direct <0.1 0.0 - 0.2 MG/DL    Alk. phosphatase 117 45 - 117 U/L    AST (SGOT) 24 15 - 37 U/L    ALT (SGPT) 30 12 - 78 U/L   SED RATE (ESR)    Collection Time: 05/31/19  2:18 PM   Result Value Ref Range    Sed rate, automated 79 (H) 0 - 30 mm/hr   CK    Collection Time: 05/31/19  2:18 PM   Result Value Ref Range    CK 62 26 - 192 U/L       Medications received:  Daptomycin 500 mg IVP    1430 Pt tolerated treatment well, no adverse reaction noted. PICC flushed per protocol and end cap applied. Discharged from [facility] ambulatory and in no acute distress accompanied by self. Next appt 6/1/19 @ 1000 at [facility].

## 2019-06-01 ENCOUNTER — HOSPITAL ENCOUNTER (OUTPATIENT)
Dept: INFUSION THERAPY | Age: 79
Discharge: HOME | End: 2019-06-01
Payer: MEDICARE

## 2019-06-01 VITALS
DIASTOLIC BLOOD PRESSURE: 79 MMHG | TEMPERATURE: 97.9 F | HEART RATE: 71 BPM | OXYGEN SATURATION: 95 % | SYSTOLIC BLOOD PRESSURE: 162 MMHG | RESPIRATION RATE: 18 BRPM

## 2019-06-01 PROCEDURE — 74011250636 HC RX REV CODE- 250/636: Performed by: INTERNAL MEDICINE

## 2019-06-01 PROCEDURE — 96374 THER/PROPH/DIAG INJ IV PUSH: CPT

## 2019-06-01 PROCEDURE — 74011000250 HC RX REV CODE- 250: Performed by: INTERNAL MEDICINE

## 2019-06-01 RX ORDER — SODIUM CHLORIDE 0.9 % (FLUSH) 0.9 %
10-40 SYRINGE (ML) INJECTION AS NEEDED
Status: DISCONTINUED | OUTPATIENT
Start: 2019-06-01 | End: 2019-06-02 | Stop reason: HOSPADM

## 2019-06-01 RX ORDER — HEPARIN 100 UNIT/ML
500 SYRINGE INTRAVENOUS AS NEEDED
Status: DISCONTINUED | OUTPATIENT
Start: 2019-06-01 | End: 2019-06-02 | Stop reason: HOSPADM

## 2019-06-01 RX ADMIN — Medication 20 ML: at 10:06

## 2019-06-01 RX ADMIN — Medication 500 UNITS: at 10:10

## 2019-06-01 RX ADMIN — DAPTOMYCIN 500 MG: 500 INJECTION, POWDER, LYOPHILIZED, FOR SOLUTION INTRAVENOUS at 10:06

## 2019-06-01 NOTE — PROGRESS NOTES
[facility] Short Visit Note:    10Pt arrived to [facility] ambulatory and in no distress for daily Daptomycin. Single lumen PICC intact to right upper arm, flushed with positive blood return noted. No new complaints voiced. Visit Vitals  /79 (BP 1 Location: Left arm, BP Patient Position: Sitting)   Pulse 71   Temp 97.9 °F (36.6 °C)   Resp 18   SpO2 95%       Medications received:  Daptomycin 500 mg IVP    1010 Pt tolerated treatment well, no adverse reaction noted. PICC flushed per protocol and end cap applied. Discharged from [facility] ambulatory and in no acute distress accompanied by self. Next appt 6/2/19 @ 1000.

## 2019-06-02 ENCOUNTER — HOSPITAL ENCOUNTER (OUTPATIENT)
Dept: INFUSION THERAPY | Age: 79
Discharge: HOME | End: 2019-06-02
Payer: MEDICARE

## 2019-06-02 VITALS
RESPIRATION RATE: 18 BRPM | TEMPERATURE: 97.2 F | HEART RATE: 72 BPM | DIASTOLIC BLOOD PRESSURE: 74 MMHG | SYSTOLIC BLOOD PRESSURE: 142 MMHG | OXYGEN SATURATION: 93 %

## 2019-06-02 PROCEDURE — 96374 THER/PROPH/DIAG INJ IV PUSH: CPT

## 2019-06-02 PROCEDURE — 74011000250 HC RX REV CODE- 250: Performed by: INTERNAL MEDICINE

## 2019-06-02 PROCEDURE — 74011250636 HC RX REV CODE- 250/636: Performed by: INTERNAL MEDICINE

## 2019-06-02 RX ORDER — SODIUM CHLORIDE 0.9 % (FLUSH) 0.9 %
10-40 SYRINGE (ML) INJECTION AS NEEDED
Status: DISCONTINUED | OUTPATIENT
Start: 2019-06-02 | End: 2019-06-03 | Stop reason: HOSPADM

## 2019-06-02 RX ORDER — HEPARIN 100 UNIT/ML
500 SYRINGE INTRAVENOUS AS NEEDED
Status: DISCONTINUED | OUTPATIENT
Start: 2019-06-02 | End: 2019-06-03 | Stop reason: HOSPADM

## 2019-06-02 RX ADMIN — Medication 10 ML: at 10:24

## 2019-06-02 RX ADMIN — Medication 500 UNITS: at 10:25

## 2019-06-02 RX ADMIN — DAPTOMYCIN 500 MG: 500 INJECTION, POWDER, LYOPHILIZED, FOR SOLUTION INTRAVENOUS at 10:20

## 2019-06-02 RX ADMIN — Medication 10 ML: at 10:19

## 2019-06-02 NOTE — PROGRESS NOTES
Vance DUFFY Short Visit Note:    4899 Pt arrived to [facility] ambulatory and in no distress for daily Daptomycin. Single lumen PICC intact to right upper arm, flushed with positive blood return noted. No new complaints voiced. Visit Vitals  /74 (BP 1 Location: Left arm, BP Patient Position: Sitting)   Pulse 72   Temp 97.2 °F (36.2 °C)   Resp 18   SpO2 93%       Medications received:  Daptomycin 500 mg IVP    1025 Pt tolerated treatment well, no adverse reaction noted. PICC flushed per protocol and end cap applied. Discharged from [facility] ambulatory and in no acute distress accompanied by self. Next appt 6/3/19 @ 1000.

## 2019-06-03 ENCOUNTER — HOSPITAL ENCOUNTER (OUTPATIENT)
Dept: INFUSION THERAPY | Age: 79
Discharge: HOME | End: 2019-06-03
Payer: MEDICARE

## 2019-06-03 VITALS
SYSTOLIC BLOOD PRESSURE: 121 MMHG | HEART RATE: 74 BPM | DIASTOLIC BLOOD PRESSURE: 69 MMHG | TEMPERATURE: 98.1 F | OXYGEN SATURATION: 96 % | RESPIRATION RATE: 18 BRPM

## 2019-06-03 LAB
ALBUMIN SERPL-MCNC: 2.9 G/DL (ref 3.5–5)
ALBUMIN/GLOB SERPL: 0.6 {RATIO} (ref 1.1–2.2)
ALP SERPL-CCNC: 108 U/L (ref 45–117)
ALT SERPL-CCNC: 22 U/L (ref 12–78)
ANION GAP SERPL CALC-SCNC: 9 MMOL/L (ref 5–15)
AST SERPL-CCNC: 20 U/L (ref 15–37)
BASOPHILS # BLD: 0 K/UL (ref 0–0.1)
BASOPHILS NFR BLD: 1 % (ref 0–1)
BILIRUB DIRECT SERPL-MCNC: <0.1 MG/DL (ref 0–0.2)
BILIRUB SERPL-MCNC: 0.3 MG/DL (ref 0.2–1)
BUN SERPL-MCNC: 16 MG/DL (ref 6–20)
BUN/CREAT SERPL: 17 (ref 12–20)
CALCIUM SERPL-MCNC: 9.3 MG/DL (ref 8.5–10.1)
CHLORIDE SERPL-SCNC: 101 MMOL/L (ref 97–108)
CK SERPL-CCNC: 86 U/L (ref 26–192)
CO2 SERPL-SCNC: 27 MMOL/L (ref 21–32)
CREAT SERPL-MCNC: 0.95 MG/DL (ref 0.55–1.02)
CRP SERPL-MCNC: 2.77 MG/DL (ref 0–0.6)
DIFFERENTIAL METHOD BLD: ABNORMAL
EOSINOPHIL # BLD: 0.2 K/UL (ref 0–0.4)
EOSINOPHIL NFR BLD: 2 % (ref 0–7)
ERYTHROCYTE [DISTWIDTH] IN BLOOD BY AUTOMATED COUNT: 14 % (ref 11.5–14.5)
ERYTHROCYTE [SEDIMENTATION RATE] IN BLOOD: 70 MM/HR (ref 0–30)
GLOBULIN SER CALC-MCNC: 4.5 G/DL (ref 2–4)
GLUCOSE SERPL-MCNC: 95 MG/DL (ref 65–100)
HCT VFR BLD AUTO: 37.2 % (ref 35–47)
HGB BLD-MCNC: 12.1 G/DL (ref 11.5–16)
IMM GRANULOCYTES # BLD AUTO: 0 K/UL (ref 0–0.04)
IMM GRANULOCYTES NFR BLD AUTO: 1 % (ref 0–0.5)
LYMPHOCYTES # BLD: 1.3 K/UL (ref 0.8–3.5)
LYMPHOCYTES NFR BLD: 15 % (ref 12–49)
MCH RBC QN AUTO: 29.7 PG (ref 26–34)
MCHC RBC AUTO-ENTMCNC: 32.5 G/DL (ref 30–36.5)
MCV RBC AUTO: 91.2 FL (ref 80–99)
MONOCYTES # BLD: 0.7 K/UL (ref 0–1)
MONOCYTES NFR BLD: 8 % (ref 5–13)
NEUTS SEG # BLD: 6.2 K/UL (ref 1.8–8)
NEUTS SEG NFR BLD: 73 % (ref 32–75)
NRBC # BLD: 0 K/UL (ref 0–0.01)
NRBC BLD-RTO: 0 PER 100 WBC
PLATELET # BLD AUTO: 283 K/UL (ref 150–400)
PMV BLD AUTO: 10.3 FL (ref 8.9–12.9)
POTASSIUM SERPL-SCNC: 4 MMOL/L (ref 3.5–5.1)
PROT SERPL-MCNC: 7.4 G/DL (ref 6.4–8.2)
RBC # BLD AUTO: 4.08 M/UL (ref 3.8–5.2)
SODIUM SERPL-SCNC: 137 MMOL/L (ref 136–145)
WBC # BLD AUTO: 8.4 K/UL (ref 3.6–11)

## 2019-06-03 PROCEDURE — 74011000250 HC RX REV CODE- 250: Performed by: INTERNAL MEDICINE

## 2019-06-03 PROCEDURE — 85652 RBC SED RATE AUTOMATED: CPT

## 2019-06-03 PROCEDURE — 80048 BASIC METABOLIC PNL TOTAL CA: CPT

## 2019-06-03 PROCEDURE — 96374 THER/PROPH/DIAG INJ IV PUSH: CPT

## 2019-06-03 PROCEDURE — 82550 ASSAY OF CK (CPK): CPT

## 2019-06-03 PROCEDURE — 80076 HEPATIC FUNCTION PANEL: CPT

## 2019-06-03 PROCEDURE — 85025 COMPLETE CBC W/AUTO DIFF WBC: CPT

## 2019-06-03 PROCEDURE — 74011250636 HC RX REV CODE- 250/636: Performed by: INTERNAL MEDICINE

## 2019-06-03 PROCEDURE — 36415 COLL VENOUS BLD VENIPUNCTURE: CPT

## 2019-06-03 PROCEDURE — 86140 C-REACTIVE PROTEIN: CPT

## 2019-06-03 RX ORDER — HEPARIN 100 UNIT/ML
500 SYRINGE INTRAVENOUS AS NEEDED
Status: DISCONTINUED | OUTPATIENT
Start: 2019-06-03 | End: 2019-06-04 | Stop reason: HOSPADM

## 2019-06-03 RX ORDER — SODIUM CHLORIDE 0.9 % (FLUSH) 0.9 %
5-10 SYRINGE (ML) INJECTION AS NEEDED
Status: DISCONTINUED | OUTPATIENT
Start: 2019-06-03 | End: 2019-06-04 | Stop reason: HOSPADM

## 2019-06-03 RX ADMIN — DAPTOMYCIN 500 MG: 500 INJECTION, POWDER, LYOPHILIZED, FOR SOLUTION INTRAVENOUS at 14:37

## 2019-06-03 RX ADMIN — Medication 40 ML: at 14:38

## 2019-06-03 RX ADMIN — Medication 500 UNITS: at 14:38

## 2019-06-03 NOTE — PROGRESS NOTES
[facility] Short Visit Note:    3615 Pt arrived to [facility] ambulatory and in no distress for Dapto. Purple and only lumen PICC intact to R upper arm, flushed with positive blood return noted. No new complaints voiced. Visit Vitals  /69 (BP 1 Location: Left arm, BP Patient Position: Sitting)   Pulse 74   Temp 98.1 °F (36.7 °C)   Resp 18   SpO2 96%   Breastfeeding? No       Recent Results (from the past 12 hour(s))   CBC WITH AUTOMATED DIFF    Collection Time: 06/03/19  2:29 PM   Result Value Ref Range    WBC 8.4 3.6 - 11.0 K/uL    RBC 4.08 3.80 - 5.20 M/uL    HGB 12.1 11.5 - 16.0 g/dL    HCT 37.2 35.0 - 47.0 %    MCV 91.2 80.0 - 99.0 FL    MCH 29.7 26.0 - 34.0 PG    MCHC 32.5 30.0 - 36.5 g/dL    RDW 14.0 11.5 - 14.5 %    PLATELET 303 043 - 067 K/uL    MPV 10.3 8.9 - 12.9 FL    NRBC 0.0 0  WBC    ABSOLUTE NRBC 0.00 0.00 - 0.01 K/uL    NEUTROPHILS 73 32 - 75 %    LYMPHOCYTES 15 12 - 49 %    MONOCYTES 8 5 - 13 %    EOSINOPHILS 2 0 - 7 %    BASOPHILS 1 0 - 1 %    IMMATURE GRANULOCYTES 1 (H) 0.0 - 0.5 %    ABS. NEUTROPHILS 6.2 1.8 - 8.0 K/UL    ABS. LYMPHOCYTES 1.3 0.8 - 3.5 K/UL    ABS. MONOCYTES 0.7 0.0 - 1.0 K/UL    ABS. EOSINOPHILS 0.2 0.0 - 0.4 K/UL    ABS. BASOPHILS 0.0 0.0 - 0.1 K/UL    ABS. IMM. GRANS. 0.0 0.00 - 0.04 K/UL    DF AUTOMATED     SED RATE (ESR)    Collection Time: 06/03/19  2:29 PM   Result Value Ref Range    Sed rate, automated 70 (H) 0 - 30 mm/hr     Medications received:  Daptomycin    1445 Pt tolerated treatment well, no adverse reaction noted. MEGAN PICC flushed per protocol and end cap applied. Discharged from [facility] ambulatory and in no acute distress accompanied by . Next appt 6/4/19 at 1400.

## 2019-06-04 ENCOUNTER — HOSPITAL ENCOUNTER (OUTPATIENT)
Dept: INFUSION THERAPY | Age: 79
Discharge: HOME | End: 2019-06-04
Payer: MEDICARE

## 2019-06-04 ENCOUNTER — PATIENT OUTREACH (OUTPATIENT)
Dept: INTERNAL MEDICINE CLINIC | Age: 79
End: 2019-06-04

## 2019-06-04 VITALS
DIASTOLIC BLOOD PRESSURE: 77 MMHG | OXYGEN SATURATION: 94 % | RESPIRATION RATE: 18 BRPM | TEMPERATURE: 98.3 F | HEART RATE: 80 BPM | SYSTOLIC BLOOD PRESSURE: 137 MMHG

## 2019-06-04 PROCEDURE — 74011000250 HC RX REV CODE- 250: Performed by: INTERNAL MEDICINE

## 2019-06-04 PROCEDURE — 96374 THER/PROPH/DIAG INJ IV PUSH: CPT

## 2019-06-04 PROCEDURE — 74011250636 HC RX REV CODE- 250/636: Performed by: INTERNAL MEDICINE

## 2019-06-04 RX ORDER — HEPARIN 100 UNIT/ML
500 SYRINGE INTRAVENOUS AS NEEDED
Status: DISCONTINUED | OUTPATIENT
Start: 2019-06-04 | End: 2019-06-05 | Stop reason: HOSPADM

## 2019-06-04 RX ORDER — SODIUM CHLORIDE 0.9 % (FLUSH) 0.9 %
10-40 SYRINGE (ML) INJECTION AS NEEDED
Status: DISCONTINUED | OUTPATIENT
Start: 2019-06-04 | End: 2019-06-05 | Stop reason: HOSPADM

## 2019-06-04 RX ADMIN — Medication 20 ML: at 13:40

## 2019-06-04 RX ADMIN — Medication 500 UNITS: at 13:43

## 2019-06-04 RX ADMIN — DAPTOMYCIN 500 MG: 500 INJECTION, POWDER, LYOPHILIZED, FOR SOLUTION INTRAVENOUS at 13:40

## 2019-06-04 NOTE — PROGRESS NOTES
Goals      Prevent complications post hospitalization. 6/4/19 spoke with pt. Patient is continuing with IV antibiotic therapy daily which should finish up by 6/17/19. PICC line in right arm. (leaves home around 1:00 every day)  - patient reports stitches are out and bandages are removed. - only experiencing pain at incision site occasionally  - finished with SN and PT through home health  - NN will follow up in 1 week  Keyla Muniz RN    5/31/19 call placed to patient. No answer. Unable to LVM. Will try again. ST    5/30/19   4:19 Attempted call again. Still no answer. Will try again tomorrow. ST   3:21 Call placed to both patient and patient's . No answer. Unable to leave VM. Will try to call again. ST    5/29/19  Attempted call to patient. No answer. VM box is not set up to receive messages. ST    5/10/19 1:20 pm  stated patient is sleeping and will call back when awakens from nap for med rec. ST    5/10/19  Readmission I&D 5/6-5/9/19   -Patient will ambulate frequently and perform foot pumps while sitting to prevent DVT. Patient reports having compression stockings but will not be wearing them because she is moving around a lot, working with [facility] PT and doing leg exercises throughout the day. -Patient is able to verbalize 3 s/s infection:N/V and fever. Patient reports unable to see surgical site, but will have  check.  reports gauze bandage C,D&I. PICC site is monitored daily by [facility]. -Patient will manage pain level with oxycodone and tylenol PRN. Patient reports 3/10 pain today after medication. No SE noted. Patient had colectomy. Never has constipation.   -Encouraged use of incentive spirometer for prevention of pneumonia. Patient verbalized understanding  -Patient will schedule follow up appointment with Dr. Gab Mcgrath (ortho surgeon). Patient reports she will call to set up today.   -NN contact information provided and will follow up in 1 week  Keyla Muniz RN      4/29/19 call placed to pharmacy to verify dosage and instructions on Keflex and gabapentin  Keflex 500 mg take 1 4 times daily for 7 days and gabapentin 300 mg take HS verified. ST    4/29/19  -Patient reports having drainage from surgical site since Friday night. Attended office visit with  Dr. Mary Santana NP today with follow up appointment scheduled morning of 5/6/19. Encouraged monitor site for redness, swelling, foul odor from drainage, taking temperature at same time of day every day and notify surgeon if temp of 101 or greater or other s/s of infection noticed. Patient verbalized understanding.   -Patient able to verbalize one s/s blood clot. Patient will be able to identify additional 2 s/s within 1 week. Patient is not wearing JASPER hose. Encouraged to wear during the day and off at night  Activity  -Patient reports moving frequently throughout the day, but is not ambulating long distances yet. Encouraged foot pumps and importance of blood circulation in prevention of DVT  Brace  -Patient was told by ortho to start weaning off brace at last Thursday's office visit and today was told it could be stopped. Patient reports feeling no different with or without brace. Dressing  -Jose Maria  dressing was removed at last weeks ortho office visit on 4/25/19. -ABD dressing in place since zipline removed and is being changed 3x daily, per patient. Patient will notify surgeon if change in drainage amount or odor.   -Keflex and gabapentin started at today's ortho office visit  per patient. Patient is not sure of dosages.  left for pharmacy now. NN will follow up for doses later today. Pain Management  -Patient reports 10/10 pain today following ortho office visit. Encouraged ice 15 min on then give skin rest before applying again, rest, pain medication and report unresolved pain to surgeon. Patient verbalized understanding.    -Patient reports taking oxycodone 5 mg for pain relief.  Patient will not take other pain medications in combination with oxycodone as instructed by Dr. Jillian Wade. Patient verbalized understanding   -NN will follow up in 1 week. Patient has FU appointment with PCP 5/6/19 @ 3:15, Patient aware. 4/19/19  -Pt will call surgeon if signs of infection: red;  drainage;  foul odor, fever over 101 degrees  -Nausea or vomiting, severe headache  -Signs of a blood clot in your leg-calf pain, tenderness, redness, swelling of lower leg  Patient will contact PCP if general health problems  Patient will call 911 if experiencing chest pain, SOB, difficulty breathing  Activity  -Patient will walk - short frequent walks throughout the day  -Limit the amount of time you sit to 20-30 minute intervals.  -Patient will not lift anything over 5 pounds   -Patient will not do any straining, twisting or bending  Brace  -Pt has quick draw back brace and will wear at all times when out of bed. Pt not to wear the brace while in bed or showering.  -Pt will not bend or twist when brace is off  Diet  -Pt will resume usual diet; watch  saturated fats & cholesterol  -Pt will resume regular bowel movements. Driving  -patient will not drive or return to work until instructed by surgeon. Pt. may ride in the car for short periods of time. Incision Care  Pt will keep Zipline dressing on for 2 weeks. (ABD and tape) placed over it should be  changed daily, for at least the first several days after surgery. If no incisional drainage,may leave the incision open to air, still leaving the Zipline in place  Pt will take brief showers but not run water directly onto the wound. After shower, blot incision dry and replace dry dressing. No tape to come in contact with the Zipline. Pt will not rub or apply any lotions or ointments to your incision site. Pt will have Zipline dressing removed during two week FU appointment.    Pt will contact surgeon if experiencing drainage leaking from underneath Zipline dressing or if dressing peels off before FU appointment  Showering  -Pt will resume showering approximately 4 days after surgery.  -Pt will leave dressing on during shower and pat dry  -Pt will remove brace while showering and will not bend or twist while brace is off.  -Pt will not take a tub bath. Preventing blood clots  -Pt will wear T.E.D. Stockings for 7 days after discharge (on in morning and off at night)  Pt will perform foot pumps to increase circulation and ambulate regularly throughout the day  Pain management  -Pt will take pain medication as prescribed; decrease the amount used as pain lessens  -Pt will not wait until in extreme pain to take your medication.     -NN supplied contact information and will FU in 1 week  Adwoa Westbrook RN

## 2019-06-04 NOTE — PROGRESS NOTES
Pt arrived to [facility] ambulatory in no acute distress at 1335 for antibiotics.  Assessment unremarkable except increased fatigue. R arm PICC flushed without issue and positive blood return noted. Visit Vitals  /77 (BP 1 Location: Left arm, BP Patient Position: Sitting)   Pulse 80   Temp 98.3 °F (36.8 °C)   Resp 18   SpO2 94%     The following medications administered:  Dapto 500mg IVP    Pt tolerated treatment well. PICC flushed per policy and new curos cap placed.  Pt discharged ambulatory in no acute distress at 1345, accompanied by self. Next appointment 6/5/19 at 1400.

## 2019-06-05 ENCOUNTER — HOSPITAL ENCOUNTER (OUTPATIENT)
Dept: INFUSION THERAPY | Age: 79
Discharge: HOME | End: 2019-06-05
Payer: MEDICARE

## 2019-06-05 VITALS
TEMPERATURE: 98.6 F | SYSTOLIC BLOOD PRESSURE: 128 MMHG | RESPIRATION RATE: 20 BRPM | OXYGEN SATURATION: 95 % | HEART RATE: 72 BPM | DIASTOLIC BLOOD PRESSURE: 71 MMHG

## 2019-06-05 PROCEDURE — 96374 THER/PROPH/DIAG INJ IV PUSH: CPT

## 2019-06-05 PROCEDURE — 74011250636 HC RX REV CODE- 250/636: Performed by: INTERNAL MEDICINE

## 2019-06-05 PROCEDURE — 74011000250 HC RX REV CODE- 250: Performed by: INTERNAL MEDICINE

## 2019-06-05 PROCEDURE — 77030020847 HC STATLOK BARD -A

## 2019-06-05 RX ORDER — HEPARIN 100 UNIT/ML
500 SYRINGE INTRAVENOUS AS NEEDED
Status: DISCONTINUED | OUTPATIENT
Start: 2019-06-05 | End: 2019-06-06 | Stop reason: HOSPADM

## 2019-06-05 RX ORDER — SODIUM CHLORIDE 0.9 % (FLUSH) 0.9 %
10-40 SYRINGE (ML) INJECTION AS NEEDED
Status: DISCONTINUED | OUTPATIENT
Start: 2019-06-05 | End: 2019-06-09 | Stop reason: HOSPADM

## 2019-06-05 RX ADMIN — Medication 500 UNITS: at 14:03

## 2019-06-05 RX ADMIN — DAPTOMYCIN 500 MG: 500 INJECTION, POWDER, LYOPHILIZED, FOR SOLUTION INTRAVENOUS at 14:00

## 2019-06-05 RX ADMIN — Medication 10 ML: at 14:03

## 2019-06-05 RX ADMIN — Medication 10 ML: at 14:00

## 2019-06-05 NOTE — PROGRESS NOTES
[facility] Note:    5707 Pt arrived at [facility] ambulatory and in no distress for daptomycin. Assessment completed, pt reports increased fatigue. Right arm SL PICC flushes well with blood return. Visit Vitals  /71 (BP 1 Location: Left arm, BP Patient Position: Sitting)   Pulse 72   Temp 98.6 °F (37 °C)   Resp 20   SpO2 95%       Medications received:  Daptomycin 500 mg IV push    Right PICC dressing changed. Flushed with NS and heparin and secured for tomorrow. [address] [place] [address] well, no adverse reaction noted. D/Cd from [facility] ambulatory and in no distress.   Next appt tomorrow

## 2019-06-06 ENCOUNTER — HOSPITAL ENCOUNTER (OUTPATIENT)
Dept: INFUSION THERAPY | Age: 79
Discharge: HOME | End: 2019-06-06
Payer: MEDICARE

## 2019-06-06 VITALS
TEMPERATURE: 98.2 F | DIASTOLIC BLOOD PRESSURE: 76 MMHG | RESPIRATION RATE: 18 BRPM | SYSTOLIC BLOOD PRESSURE: 150 MMHG | HEART RATE: 77 BPM | OXYGEN SATURATION: 95 %

## 2019-06-06 LAB
ALBUMIN SERPL-MCNC: 3.1 G/DL (ref 3.5–5)
ALBUMIN/GLOB SERPL: 0.7 {RATIO} (ref 1.1–2.2)
ALP SERPL-CCNC: 118 U/L (ref 45–117)
ALT SERPL-CCNC: 20 U/L (ref 12–78)
ANION GAP SERPL CALC-SCNC: 6 MMOL/L (ref 5–15)
AST SERPL-CCNC: 19 U/L (ref 15–37)
BASOPHILS # BLD: 0.1 K/UL (ref 0–0.1)
BASOPHILS NFR BLD: 1 % (ref 0–1)
BILIRUB DIRECT SERPL-MCNC: 0.1 MG/DL (ref 0–0.2)
BILIRUB SERPL-MCNC: 0.3 MG/DL (ref 0.2–1)
BUN SERPL-MCNC: 13 MG/DL (ref 6–20)
BUN/CREAT SERPL: 15 (ref 12–20)
CALCIUM SERPL-MCNC: 8.9 MG/DL (ref 8.5–10.1)
CHLORIDE SERPL-SCNC: 103 MMOL/L (ref 97–108)
CK SERPL-CCNC: 79 U/L (ref 26–192)
CO2 SERPL-SCNC: 27 MMOL/L (ref 21–32)
CREAT SERPL-MCNC: 0.85 MG/DL (ref 0.55–1.02)
CRP SERPL-MCNC: 2.97 MG/DL (ref 0–0.6)
DIFFERENTIAL METHOD BLD: ABNORMAL
EOSINOPHIL # BLD: 0.2 K/UL (ref 0–0.4)
EOSINOPHIL NFR BLD: 2 % (ref 0–7)
ERYTHROCYTE [DISTWIDTH] IN BLOOD BY AUTOMATED COUNT: 14 % (ref 11.5–14.5)
ERYTHROCYTE [SEDIMENTATION RATE] IN BLOOD: 83 MM/HR (ref 0–30)
GLOBULIN SER CALC-MCNC: 4.7 G/DL (ref 2–4)
GLUCOSE SERPL-MCNC: 124 MG/DL (ref 65–100)
HCT VFR BLD AUTO: 36.7 % (ref 35–47)
HGB BLD-MCNC: 11.7 G/DL (ref 11.5–16)
IMM GRANULOCYTES # BLD AUTO: 0 K/UL (ref 0–0.04)
IMM GRANULOCYTES NFR BLD AUTO: 1 % (ref 0–0.5)
LYMPHOCYTES # BLD: 1.2 K/UL (ref 0.8–3.5)
LYMPHOCYTES NFR BLD: 19 % (ref 12–49)
MCH RBC QN AUTO: 29.2 PG (ref 26–34)
MCHC RBC AUTO-ENTMCNC: 31.9 G/DL (ref 30–36.5)
MCV RBC AUTO: 91.5 FL (ref 80–99)
MONOCYTES # BLD: 0.5 K/UL (ref 0–1)
MONOCYTES NFR BLD: 8 % (ref 5–13)
NEUTS SEG # BLD: 4.5 K/UL (ref 1.8–8)
NEUTS SEG NFR BLD: 69 % (ref 32–75)
NRBC # BLD: 0 K/UL (ref 0–0.01)
NRBC BLD-RTO: 0 PER 100 WBC
PLATELET # BLD AUTO: 259 K/UL (ref 150–400)
PMV BLD AUTO: 10.3 FL (ref 8.9–12.9)
POTASSIUM SERPL-SCNC: 3.6 MMOL/L (ref 3.5–5.1)
PROT SERPL-MCNC: 7.8 G/DL (ref 6.4–8.2)
RBC # BLD AUTO: 4.01 M/UL (ref 3.8–5.2)
SODIUM SERPL-SCNC: 136 MMOL/L (ref 136–145)
WBC # BLD AUTO: 6.4 K/UL (ref 3.6–11)

## 2019-06-06 PROCEDURE — 36415 COLL VENOUS BLD VENIPUNCTURE: CPT

## 2019-06-06 PROCEDURE — 86140 C-REACTIVE PROTEIN: CPT

## 2019-06-06 PROCEDURE — 74011250636 HC RX REV CODE- 250/636: Performed by: INTERNAL MEDICINE

## 2019-06-06 PROCEDURE — 96374 THER/PROPH/DIAG INJ IV PUSH: CPT

## 2019-06-06 PROCEDURE — 82550 ASSAY OF CK (CPK): CPT

## 2019-06-06 PROCEDURE — 85025 COMPLETE CBC W/AUTO DIFF WBC: CPT

## 2019-06-06 PROCEDURE — 74011000250 HC RX REV CODE- 250: Performed by: INTERNAL MEDICINE

## 2019-06-06 PROCEDURE — 80076 HEPATIC FUNCTION PANEL: CPT

## 2019-06-06 PROCEDURE — 85652 RBC SED RATE AUTOMATED: CPT

## 2019-06-06 PROCEDURE — 80048 BASIC METABOLIC PNL TOTAL CA: CPT

## 2019-06-06 RX ORDER — HEPARIN 100 UNIT/ML
500 SYRINGE INTRAVENOUS AS NEEDED
Status: DISCONTINUED | OUTPATIENT
Start: 2019-06-06 | End: 2019-06-07 | Stop reason: HOSPADM

## 2019-06-06 RX ORDER — SODIUM CHLORIDE 0.9 % (FLUSH) 0.9 %
10-40 SYRINGE (ML) INJECTION AS NEEDED
Status: DISCONTINUED | OUTPATIENT
Start: 2019-06-06 | End: 2019-06-07 | Stop reason: HOSPADM

## 2019-06-06 RX ADMIN — DAPTOMYCIN 500 MG: 500 INJECTION, POWDER, LYOPHILIZED, FOR SOLUTION INTRAVENOUS at 13:55

## 2019-06-06 RX ADMIN — Medication 500 UNITS: at 13:58

## 2019-06-06 RX ADMIN — Medication 10 ML: at 13:52

## 2019-06-06 RX ADMIN — Medication 10 ML: at 13:53

## 2019-06-06 RX ADMIN — Medication 10 ML: at 13:58

## 2019-06-06 NOTE — PROGRESS NOTES
[facility] Short Visit Progress Note    1933  Pt admit to [facility] for daily dapto ambulatory in stable condition. No new concerns voiced. Patient Vitals for the past 12 hrs:   Temp Pulse Resp BP SpO2   06/06/19 1345 98.2 °F (36.8 °C) 77 18 150/76 95 %       Right arm SL PICC line flushed with positive blood return, labs drawn as ordered. Medications:  Daptomycin IV    1405  Pt tolerated treatment well. PICC flushed and secured for use at tomorrow's appt. D/c home ambulatory in no distress. Pt aware of next appointment scheduled for 6/7/19 at 2 pm.    See connect care for pending lab results.

## 2019-06-07 ENCOUNTER — HOSPITAL ENCOUNTER (OUTPATIENT)
Dept: INFUSION THERAPY | Age: 79
Discharge: HOME | End: 2019-06-07
Payer: MEDICARE

## 2019-06-07 VITALS
SYSTOLIC BLOOD PRESSURE: 130 MMHG | HEART RATE: 76 BPM | RESPIRATION RATE: 20 BRPM | TEMPERATURE: 98.5 F | DIASTOLIC BLOOD PRESSURE: 69 MMHG | OXYGEN SATURATION: 95 %

## 2019-06-07 PROCEDURE — 96374 THER/PROPH/DIAG INJ IV PUSH: CPT

## 2019-06-07 PROCEDURE — 74011000250 HC RX REV CODE- 250: Performed by: INTERNAL MEDICINE

## 2019-06-07 PROCEDURE — 74011250636 HC RX REV CODE- 250/636: Performed by: INTERNAL MEDICINE

## 2019-06-07 RX ORDER — SODIUM CHLORIDE 0.9 % (FLUSH) 0.9 %
10-40 SYRINGE (ML) INJECTION AS NEEDED
Status: DISCONTINUED | OUTPATIENT
Start: 2019-06-07 | End: 2019-06-08 | Stop reason: HOSPADM

## 2019-06-07 RX ORDER — HEPARIN 100 UNIT/ML
500 SYRINGE INTRAVENOUS AS NEEDED
Status: DISCONTINUED | OUTPATIENT
Start: 2019-06-07 | End: 2019-06-08 | Stop reason: HOSPADM

## 2019-06-07 RX ADMIN — Medication 10 ML: at 14:00

## 2019-06-07 RX ADMIN — Medication 500 UNITS: at 14:01

## 2019-06-07 RX ADMIN — Medication 10 ML: at 13:54

## 2019-06-07 RX ADMIN — DAPTOMYCIN 500 MG: 500 INJECTION, POWDER, LYOPHILIZED, FOR SOLUTION INTRAVENOUS at 13:54

## 2019-06-07 NOTE — PROGRESS NOTES
[id number] [facility]. Short Visit Note:    2739 Pt arrived to [facility] ambulatory and in no distress for Daptomycin. Single lumen PICC intact to R upper arm, flushed with positive blood return noted. No new complaints voiced. Visit Vitals  /69 (BP 1 Location: Left arm, BP Patient Position: Sitting)   Pulse 76   Temp 98.5 °F (36.9 °C)   Resp 20   SpO2 95%       Medications received:  Daptomycin IV  NS Flush  Heparin Flush    1400 Pt tolerated treatment well, no adverse reaction noted. PICC flushed per protocol and end cap applied. Discharged from [facility] ambulatory and in no acute distress accompanied by 1400.   Next appt 6/8/19 @ 10 am.

## 2019-06-08 ENCOUNTER — HOSPITAL ENCOUNTER (OUTPATIENT)
Dept: INFUSION THERAPY | Age: 79
Discharge: HOME | End: 2019-06-08
Payer: MEDICARE

## 2019-06-08 VITALS
SYSTOLIC BLOOD PRESSURE: 179 MMHG | HEART RATE: 75 BPM | TEMPERATURE: 97.8 F | DIASTOLIC BLOOD PRESSURE: 80 MMHG | RESPIRATION RATE: 20 BRPM

## 2019-06-08 PROCEDURE — 96374 THER/PROPH/DIAG INJ IV PUSH: CPT

## 2019-06-08 PROCEDURE — 74011000250 HC RX REV CODE- 250: Performed by: INTERNAL MEDICINE

## 2019-06-08 PROCEDURE — 74011250636 HC RX REV CODE- 250/636: Performed by: INTERNAL MEDICINE

## 2019-06-08 PROCEDURE — 74011250636 HC RX REV CODE- 250/636

## 2019-06-08 RX ORDER — SODIUM CHLORIDE 0.9 % (FLUSH) 0.9 %
10-40 SYRINGE (ML) INJECTION AS NEEDED
Status: DISCONTINUED | OUTPATIENT
Start: 2019-06-08 | End: 2019-06-09 | Stop reason: HOSPADM

## 2019-06-08 RX ORDER — HEPARIN 100 UNIT/ML
500 SYRINGE INTRAVENOUS AS NEEDED
Status: DISCONTINUED | OUTPATIENT
Start: 2019-06-08 | End: 2019-06-09 | Stop reason: HOSPADM

## 2019-06-08 RX ADMIN — Medication 20 ML: at 09:24

## 2019-06-08 RX ADMIN — SODIUM CHLORIDE, PRESERVATIVE FREE 500 UNITS: 5 INJECTION INTRAVENOUS at 09:28

## 2019-06-08 RX ADMIN — Medication 10 ML: at 09:28

## 2019-06-08 RX ADMIN — DAPTOMYCIN 500 MG: 500 INJECTION, POWDER, LYOPHILIZED, FOR SOLUTION INTRAVENOUS at 09:26

## 2019-06-08 NOTE — PROGRESS NOTES
Pt arrived to [facility] ambulatory in no acute distress at 0930 for antibiotics.  Assessment unremarkable except fatigue. R arm PICC flushed without issue and positive blood return noted. Visit Vitals  /80 (BP 1 Location: Left arm, BP Patient Position: Sitting)   Pulse 75   Temp 97.8 °F (36.6 °C)   Resp 20     The following medications administered:  Dapto IVP    Pt tolerated treatment well. PICC flushed per policy and new curos cap placed.  Pt discharged ambulatory in no acute distress at 0940, accompanied by self. Next appointment 6/9/19.

## 2019-06-09 ENCOUNTER — HOSPITAL ENCOUNTER (OUTPATIENT)
Dept: INFUSION THERAPY | Age: 79
Discharge: HOME | End: 2019-06-09
Payer: MEDICARE

## 2019-06-09 VITALS
TEMPERATURE: 98 F | SYSTOLIC BLOOD PRESSURE: 164 MMHG | HEART RATE: 72 BPM | RESPIRATION RATE: 20 BRPM | DIASTOLIC BLOOD PRESSURE: 70 MMHG

## 2019-06-09 PROCEDURE — 74011250636 HC RX REV CODE- 250/636

## 2019-06-09 PROCEDURE — 74011250636 HC RX REV CODE- 250/636: Performed by: INTERNAL MEDICINE

## 2019-06-09 PROCEDURE — 74011000250 HC RX REV CODE- 250: Performed by: INTERNAL MEDICINE

## 2019-06-09 PROCEDURE — 96374 THER/PROPH/DIAG INJ IV PUSH: CPT

## 2019-06-09 RX ORDER — HEPARIN 100 UNIT/ML
500 SYRINGE INTRAVENOUS AS NEEDED
Status: DISCONTINUED | OUTPATIENT
Start: 2019-06-09 | End: 2019-06-10 | Stop reason: HOSPADM

## 2019-06-09 RX ORDER — SODIUM CHLORIDE 0.9 % (FLUSH) 0.9 %
10-40 SYRINGE (ML) INJECTION AS NEEDED
Status: DISCONTINUED | OUTPATIENT
Start: 2019-06-09 | End: 2019-06-10 | Stop reason: HOSPADM

## 2019-06-09 RX ADMIN — DAPTOMYCIN 500 MG: 500 INJECTION, POWDER, LYOPHILIZED, FOR SOLUTION INTRAVENOUS at 10:01

## 2019-06-09 RX ADMIN — SODIUM CHLORIDE, PRESERVATIVE FREE 500 UNITS: 5 INJECTION INTRAVENOUS at 10:01

## 2019-06-09 RX ADMIN — Medication 10 ML: at 10:01

## 2019-06-09 NOTE — PROGRESS NOTES
[address] at [address]    1000 Patient arrives for daily Dapto without acute problems. Please see connect care for complete assessment and education provided. All central lines follow the [facility]. Vital signs stable throughout and prior to discharge, Pt. Tolerated treatment well and discharged without incident. Patient is aware of next [facility] appointment on 6/10/2019. Medications:  1. Daptomycin 500mg  2.  Heparin flush    VITAL SIGNS   Patient Vitals for the past 12 hrs:   Temp Pulse Resp BP   06/09/19 1002 98 °F (36.7 °C) 72 20 164/70

## 2019-06-10 ENCOUNTER — HOSPITAL ENCOUNTER (OUTPATIENT)
Dept: INFUSION THERAPY | Age: 79
Discharge: HOME | End: 2019-06-10
Payer: MEDICARE

## 2019-06-10 VITALS
RESPIRATION RATE: 18 BRPM | DIASTOLIC BLOOD PRESSURE: 79 MMHG | SYSTOLIC BLOOD PRESSURE: 151 MMHG | HEART RATE: 76 BPM | TEMPERATURE: 98.8 F

## 2019-06-10 LAB
ALBUMIN SERPL-MCNC: 2.8 G/DL (ref 3.5–5)
ALBUMIN/GLOB SERPL: 0.6 {RATIO} (ref 1.1–2.2)
ALP SERPL-CCNC: 112 U/L (ref 45–117)
ALT SERPL-CCNC: 23 U/L (ref 12–78)
ANION GAP SERPL CALC-SCNC: 4 MMOL/L (ref 5–15)
AST SERPL-CCNC: 18 U/L (ref 15–37)
BASOPHILS # BLD: 0.1 K/UL (ref 0–0.1)
BASOPHILS NFR BLD: 1 % (ref 0–1)
BILIRUB DIRECT SERPL-MCNC: <0.1 MG/DL (ref 0–0.2)
BILIRUB SERPL-MCNC: 0.3 MG/DL (ref 0.2–1)
BUN SERPL-MCNC: 13 MG/DL (ref 6–20)
BUN/CREAT SERPL: 15 (ref 12–20)
CALCIUM SERPL-MCNC: 9.1 MG/DL (ref 8.5–10.1)
CHLORIDE SERPL-SCNC: 103 MMOL/L (ref 97–108)
CK SERPL-CCNC: 81 U/L (ref 26–192)
CO2 SERPL-SCNC: 31 MMOL/L (ref 21–32)
CREAT SERPL-MCNC: 0.87 MG/DL (ref 0.55–1.02)
CRP SERPL-MCNC: 2.39 MG/DL (ref 0–0.6)
DIFFERENTIAL METHOD BLD: NORMAL
EOSINOPHIL # BLD: 0.2 K/UL (ref 0–0.4)
EOSINOPHIL NFR BLD: 3 % (ref 0–7)
ERYTHROCYTE [DISTWIDTH] IN BLOOD BY AUTOMATED COUNT: 13.9 % (ref 11.5–14.5)
ERYTHROCYTE [SEDIMENTATION RATE] IN BLOOD: 115 MM/HR (ref 0–30)
GLOBULIN SER CALC-MCNC: 4.8 G/DL (ref 2–4)
GLUCOSE SERPL-MCNC: 123 MG/DL (ref 65–100)
HCT VFR BLD AUTO: 37 % (ref 35–47)
HGB BLD-MCNC: 11.6 G/DL (ref 11.5–16)
IMM GRANULOCYTES # BLD AUTO: 0 K/UL (ref 0–0.04)
IMM GRANULOCYTES NFR BLD AUTO: 0 % (ref 0–0.5)
LYMPHOCYTES # BLD: 1.1 K/UL (ref 0.8–3.5)
LYMPHOCYTES NFR BLD: 15 % (ref 12–49)
MCH RBC QN AUTO: 28.5 PG (ref 26–34)
MCHC RBC AUTO-ENTMCNC: 31.4 G/DL (ref 30–36.5)
MCV RBC AUTO: 90.9 FL (ref 80–99)
MONOCYTES # BLD: 0.5 K/UL (ref 0–1)
MONOCYTES NFR BLD: 6 % (ref 5–13)
NEUTS SEG # BLD: 5.4 K/UL (ref 1.8–8)
NEUTS SEG NFR BLD: 75 % (ref 32–75)
NRBC # BLD: 0 K/UL (ref 0–0.01)
NRBC BLD-RTO: 0 PER 100 WBC
PLATELET # BLD AUTO: 252 K/UL (ref 150–400)
PMV BLD AUTO: 10.5 FL (ref 8.9–12.9)
POTASSIUM SERPL-SCNC: 3.9 MMOL/L (ref 3.5–5.1)
PROT SERPL-MCNC: 7.6 G/DL (ref 6.4–8.2)
RBC # BLD AUTO: 4.07 M/UL (ref 3.8–5.2)
SODIUM SERPL-SCNC: 138 MMOL/L (ref 136–145)
WBC # BLD AUTO: 7.2 K/UL (ref 3.6–11)

## 2019-06-10 PROCEDURE — 74011000250 HC RX REV CODE- 250: Performed by: INTERNAL MEDICINE

## 2019-06-10 PROCEDURE — 80048 BASIC METABOLIC PNL TOTAL CA: CPT

## 2019-06-10 PROCEDURE — 85652 RBC SED RATE AUTOMATED: CPT

## 2019-06-10 PROCEDURE — 36415 COLL VENOUS BLD VENIPUNCTURE: CPT

## 2019-06-10 PROCEDURE — 85025 COMPLETE CBC W/AUTO DIFF WBC: CPT

## 2019-06-10 PROCEDURE — 96374 THER/PROPH/DIAG INJ IV PUSH: CPT

## 2019-06-10 PROCEDURE — 80076 HEPATIC FUNCTION PANEL: CPT

## 2019-06-10 PROCEDURE — 82550 ASSAY OF CK (CPK): CPT

## 2019-06-10 PROCEDURE — 74011250636 HC RX REV CODE- 250/636: Performed by: INTERNAL MEDICINE

## 2019-06-10 PROCEDURE — 86140 C-REACTIVE PROTEIN: CPT

## 2019-06-10 RX ORDER — SODIUM CHLORIDE 0.9 % (FLUSH) 0.9 %
10-40 SYRINGE (ML) INJECTION AS NEEDED
Status: DISCONTINUED | OUTPATIENT
Start: 2019-06-10 | End: 2019-06-11 | Stop reason: HOSPADM

## 2019-06-10 RX ORDER — HEPARIN 100 UNIT/ML
500 SYRINGE INTRAVENOUS AS NEEDED
Status: DISCONTINUED | OUTPATIENT
Start: 2019-06-10 | End: 2019-06-11 | Stop reason: HOSPADM

## 2019-06-10 RX ADMIN — DAPTOMYCIN 500 MG: 500 INJECTION, POWDER, LYOPHILIZED, FOR SOLUTION INTRAVENOUS at 13:57

## 2019-06-10 RX ADMIN — Medication 20 ML: at 13:57

## 2019-06-10 RX ADMIN — Medication 10 ML: at 14:00

## 2019-06-10 RX ADMIN — Medication 500 UNITS: at 14:00

## 2019-06-10 NOTE — ROUTINE PROCESS
[facility] Short Visit Note: 
 
1350 Pt arrived to [facility] ambulatory and in no distress for daily daptomycin. Single lumen PICC intact to right upper arm, flushed with positive blood return noted. No new complaints voiced. Twice weekly labs drawn including CBC with diff, BMP, Hepatic Function Panel, Sed Rate, CPK, and CRP. Visit Vitals /79 (BP 1 Location: Left arm, BP Patient Position: Sitting) Pulse 76 Temp 98.8 °F (37.1 °C) Resp 18 Recent Results (from the past 12 hour(s)) CBC WITH AUTOMATED DIFF Collection Time: 06/10/19  1:53 PM  
Result Value Ref Range WBC 7.2 3.6 - 11.0 K/uL  
 RBC 4.07 3.80 - 5.20 M/uL  
 HGB 11.6 11.5 - 16.0 g/dL HCT 37.0 35.0 - 47.0 % MCV 90.9 80.0 - 99.0 FL  
 MCH 28.5 26.0 - 34.0 PG  
 MCHC 31.4 30.0 - 36.5 g/dL  
 RDW 13.9 11.5 - 14.5 % PLATELET 804 479 - 712 K/uL MPV 10.5 8.9 - 12.9 FL  
 NRBC 0.0 0  WBC ABSOLUTE NRBC 0.00 0.00 - 0.01 K/uL NEUTROPHILS 75 32 - 75 % LYMPHOCYTES 15 12 - 49 % MONOCYTES 6 5 - 13 % EOSINOPHILS 3 0 - 7 % BASOPHILS 1 0 - 1 % IMMATURE GRANULOCYTES 0 0.0 - 0.5 % ABS. NEUTROPHILS 5.4 1.8 - 8.0 K/UL  
 ABS. LYMPHOCYTES 1.1 0.8 - 3.5 K/UL  
 ABS. MONOCYTES 0.5 0.0 - 1.0 K/UL  
 ABS. EOSINOPHILS 0.2 0.0 - 0.4 K/UL  
 ABS. BASOPHILS 0.1 0.0 - 0.1 K/UL  
 ABS. IMM. GRANS. 0.0 0.00 - 0.04 K/UL  
 DF AUTOMATED HEPATIC FUNCTION PANEL Collection Time: 06/10/19  1:53 PM  
Result Value Ref Range Protein, total 7.6 6.4 - 8.2 g/dL Albumin 2.8 (L) 3.5 - 5.0 g/dL Globulin 4.8 (H) 2.0 - 4.0 g/dL A-G Ratio 0.6 (L) 1.1 - 2.2 Bilirubin, total 0.3 0.2 - 1.0 MG/DL Bilirubin, direct <0.1 0.0 - 0.2 MG/DL Alk. phosphatase 112 45 - 117 U/L  
 AST (SGOT) 18 15 - 37 U/L  
 ALT (SGPT) 23 12 - 78 U/L  
METABOLIC PANEL, BASIC Collection Time: 06/10/19  1:53 PM  
Result Value Ref Range Sodium 138 136 - 145 mmol/L Potassium 3.9 3.5 - 5.1 mmol/L  Chloride 103 97 - 108 mmol/L  
 CO2 31 21 - 32 mmol/L Anion gap 4 (L) 5 - 15 mmol/L Glucose 123 (H) 65 - 100 mg/dL BUN 13 6 - 20 MG/DL Creatinine 0.87 0.55 - 1.02 MG/DL  
 BUN/Creatinine ratio 15 12 - 20 GFR est AA >60 >60 ml/min/1.73m2 GFR est non-AA >60 >60 ml/min/1.73m2 Calcium 9.1 8.5 - 10.1 MG/DL  
CK Collection Time: 06/10/19  1:53 PM  
Result Value Ref Range CK 81 26 - 192 U/L Several labs pending at time of note. See [facility] for results. Medications received: 
Daptomycin 500 mg IVP 
 
1405 Pt tolerated treatment well, no adverse reaction noted. PICC flushed per protocol and end cap applied. Discharged from [facility] ambulatory and in no acute distress accompanied by self. Next appt 6/11/19 @ 1400.

## 2019-06-11 ENCOUNTER — HOSPITAL ENCOUNTER (OUTPATIENT)
Dept: INFUSION THERAPY | Age: 79
Discharge: HOME | End: 2019-06-11
Payer: MEDICARE

## 2019-06-11 VITALS
TEMPERATURE: 97.9 F | RESPIRATION RATE: 20 BRPM | HEART RATE: 63 BPM | OXYGEN SATURATION: 96 % | DIASTOLIC BLOOD PRESSURE: 70 MMHG | SYSTOLIC BLOOD PRESSURE: 129 MMHG

## 2019-06-11 PROCEDURE — 74011250636 HC RX REV CODE- 250/636: Performed by: INTERNAL MEDICINE

## 2019-06-11 PROCEDURE — 96374 THER/PROPH/DIAG INJ IV PUSH: CPT

## 2019-06-11 PROCEDURE — 74011000250 HC RX REV CODE- 250: Performed by: INTERNAL MEDICINE

## 2019-06-11 RX ORDER — SODIUM CHLORIDE 0.9 % (FLUSH) 0.9 %
5-10 SYRINGE (ML) INJECTION AS NEEDED
Status: DISCONTINUED | OUTPATIENT
Start: 2019-06-11 | End: 2019-06-12 | Stop reason: HOSPADM

## 2019-06-11 RX ORDER — HEPARIN 100 UNIT/ML
500 SYRINGE INTRAVENOUS AS NEEDED
Status: DISCONTINUED | OUTPATIENT
Start: 2019-06-11 | End: 2019-06-12 | Stop reason: HOSPADM

## 2019-06-11 RX ADMIN — Medication 10 ML: at 14:14

## 2019-06-11 RX ADMIN — DAPTOMYCIN 500 MG: 500 INJECTION, POWDER, LYOPHILIZED, FOR SOLUTION INTRAVENOUS at 14:06

## 2019-06-11 RX ADMIN — Medication 10 ML: at 14:05

## 2019-06-11 RX ADMIN — Medication 500 UNITS: at 14:14

## 2019-06-11 NOTE — PROGRESS NOTES
1400 Pt arrived at [address] and in no distress for IV abx. Assessment completed. Pt reports feeling more fatigued this week. Labs reviewed from 6/10. Sl PICC intact with positive blood return. T 97.8, /70, P 63, R 20, POx 96%    Medications received:  Dapto    1415 Tolerated treatment well, no adverse reaction noted. PICC flushed and capped. D/Cd from [address] [place] [address] and in no distress accompanied by spouse. Next appt 6/12.

## 2019-06-12 ENCOUNTER — HOSPITAL ENCOUNTER (OUTPATIENT)
Dept: INFUSION THERAPY | Age: 79
Discharge: HOME | End: 2019-06-12
Payer: MEDICARE

## 2019-06-12 ENCOUNTER — PATIENT OUTREACH (OUTPATIENT)
Dept: INTERNAL MEDICINE CLINIC | Age: 79
End: 2019-06-12

## 2019-06-12 VITALS
OXYGEN SATURATION: 95 % | SYSTOLIC BLOOD PRESSURE: 132 MMHG | DIASTOLIC BLOOD PRESSURE: 73 MMHG | RESPIRATION RATE: 18 BRPM | HEART RATE: 72 BPM | TEMPERATURE: 97.9 F

## 2019-06-12 PROCEDURE — 77030020847 HC STATLOK BARD -A

## 2019-06-12 PROCEDURE — 74011250636 HC RX REV CODE- 250/636: Performed by: INTERNAL MEDICINE

## 2019-06-12 PROCEDURE — 74011000250 HC RX REV CODE- 250: Performed by: INTERNAL MEDICINE

## 2019-06-12 PROCEDURE — 96374 THER/PROPH/DIAG INJ IV PUSH: CPT

## 2019-06-12 RX ORDER — HEPARIN 100 UNIT/ML
500 SYRINGE INTRAVENOUS AS NEEDED
Status: DISCONTINUED | OUTPATIENT
Start: 2019-06-12 | End: 2019-06-13 | Stop reason: HOSPADM

## 2019-06-12 RX ORDER — SODIUM CHLORIDE 0.9 % (FLUSH) 0.9 %
5-10 SYRINGE (ML) INJECTION AS NEEDED
Status: DISCONTINUED | OUTPATIENT
Start: 2019-06-12 | End: 2019-06-13 | Stop reason: HOSPADM

## 2019-06-12 RX ADMIN — DAPTOMYCIN 500 MG: 500 INJECTION, POWDER, LYOPHILIZED, FOR SOLUTION INTRAVENOUS at 13:44

## 2019-06-12 RX ADMIN — Medication 500 UNITS: at 13:47

## 2019-06-12 RX ADMIN — Medication 10 ML: at 13:47

## 2019-06-12 NOTE — PROGRESS NOTES
[facility] Short Visit Note:    6986 - Pt arrived to [facility] ambulatory and in no distress for daptomycin IV anbx per PICC. Single lumen PICC intact to R upper arm, flushed with positive blood return noted. Adhering to sterile technique dressing changed today (see flowsheet). No new complaints voiced except feeling fatigued and has an appt with Dr Joss Nova tomorrow am.     Visit Vitals  /73 (BP 1 Location: Left arm, BP Patient Position: Sitting)   Pulse 72   Temp 97.9 °F (36.6 °C)   Resp 18   SpO2 95%       Medications received:  Daptomycin IVP    1405 - Pt tolerated treatment well, no adverse reaction noted. Single lumen PICC flushed per protocol and end cap applied. Discharged from [facility] ambulatory and in no acute distress accompanied by  for transportation. Next appt tomorrow as scheduled.

## 2019-06-12 NOTE — PROGRESS NOTES
Patient has graduated from the Transitions of Care Coordination  program on 19. Patient's symptoms are stable at this time. Patient/family has the ability to self-manage. Care management goals have been completed at this time. No further nurse navigator follow up scheduled. Goals Addressed                 This Visit's Progress     COMPLETED: Prevent complications post hospitalization. 19 spoke with patient using 2 IDs, name and . Patient inquiring regarding [email or web address] message and inability to open. Patient will check on getting new password to retrieve messages. Reviewed patient chart. No notes from PCP to patient via [employer]. There have been recent labs drawn with results going to Dr. Jefry Negron. Suggested patient call his office to see if they had messaged. Patient reports having appointment with Dr. Jefry Negron tomorrow and will ask at that time. - patient has follow up with Dr Dominga Negron on 19  - will complete daily IV antibiotic on 19  - reports only slight pain at incision site of PICC in the mornings when she wakes  - feels she is improving as expected other than being tired most of the time. This may resolve following completion of daily trip to infusion center for IV antibiotic administration  - denies s/s of infection.  - NN supplied contact information and will resolve this episode of care. Naseem George        19 spoke with pt. Patient is continuing with IV antibiotic therapy daily which should finish up by 19. PICC line in right arm. (leaves home around 1:00 every day)  - patient reports stitches are out and bandages are removed. - only experiencing pain at incision site occasionally  - finished with SN and PT through home health  - NN will follow up in 1 week  Adwoa Westbrook RN    19 call placed to patient. No answer. Unable to LVM. Will try again. ST    19   4:19 Attempted call again. Still no answer. Will try again tomorrow.  ST   3:21 Call placed to both patient and patient's . No answer. Unable to leave VM. Will try to call again. ST    5/29/19  Attempted call to patient. No answer. VM box is not set up to receive messages. ST    5/10/19 1:20 pm  stated patient is sleeping and will call back when awakens from nap for med rec. ST    5/10/19  Readmission I&D 5/6-5/9/19   -Patient will ambulate frequently and perform foot pumps while sitting to prevent DVT. Patient reports having compression stockings but will not be wearing them because she is moving around a lot, working with [facility] PT and doing leg exercises throughout the day. -Patient is able to verbalize 3 s/s infection:N/V and fever. Patient reports unable to see surgical site, but will have  check.  reports gauze bandage C,D&I. PICC site is monitored daily by [facility]. -Patient will manage pain level with oxycodone and tylenol PRN. Patient reports 3/10 pain today after medication. No SE noted. Patient had colectomy. Never has constipation.   -Encouraged use of incentive spirometer for prevention of pneumonia. Patient verbalized understanding  -Patient will schedule follow up appointment with Dr. Yamilet Borges (ortho surgeon). Patient reports she will call to set up today. -NN contact information provided and will follow up in 1 week  Sloan Fisher RN      4/29/19 call placed to pharmacy to verify dosage and instructions on Keflex and gabapentin  Keflex 500 mg take 1 4 times daily for 7 days and gabapentin 300 mg take HS verified. ST    4/29/19  -Patient reports having drainage from surgical site since Friday night. Attended office visit with  Dr. Alex Tompkins NP today with follow up appointment scheduled morning of 5/6/19. Encouraged monitor site for redness, swelling, foul odor from drainage, taking temperature at same time of day every day and notify surgeon if temp of 101 or greater or other s/s of infection noticed. Patient verbalized understanding.   -Patient able to verbalize one s/s blood clot. Patient will be able to identify additional 2 s/s within 1 week. Patient is not wearing JASPER hose. Encouraged to wear during the day and off at night  Activity  -Patient reports moving frequently throughout the day, but is not ambulating long distances yet. Encouraged foot pumps and importance of blood circulation in prevention of DVT  Brace  -Patient was told by ortho to start weaning off brace at last Thursday's office visit and today was told it could be stopped. Patient reports feeling no different with or without brace. Dressing  -Carlos Pila dressing was removed at last weeks ortho office visit on 4/25/19. -ABD dressing in place since zipline removed and is being changed 3x daily, per patient. Patient will notify surgeon if change in drainage amount or odor.   -Keflex and gabapentin started at today's ortho office visit  per patient. Patient is not sure of dosages.  left for pharmacy now. NN will follow up for doses later today. Pain Management  -Patient reports 10/10 pain today following ortho office visit. Encouraged ice 15 min on then give skin rest before applying again, rest, pain medication and report unresolved pain to surgeon. Patient verbalized understanding.    -Patient reports taking oxycodone 5 mg for pain relief. Patient will not take other pain medications in combination with oxycodone as instructed by Dr. Blas Aguillon. Patient verbalized understanding   -NN will follow up in 1 week. Patient has FU appointment with PCP 5/6/19 @ 3:15, Patient aware.     4/19/19  -Pt will call surgeon if signs of infection: red;  drainage;  foul odor, fever over 101 degrees  -Nausea or vomiting, severe headache  -Signs of a blood clot in your leg-calf pain, tenderness, redness, swelling of lower leg  Patient will contact PCP if general health problems  Patient will call 911 if experiencing chest pain, SOB, difficulty breathing  Activity  -Patient will walk - short frequent walks throughout the day  -Limit the amount of time you sit to 20-30 minute intervals.  -Patient will not lift anything over 5 pounds   -Patient will not do any straining, twisting or bending  Brace  -Pt has quick draw back brace and will wear at all times when out of bed. Pt not to wear the brace while in bed or showering.  -Pt will not bend or twist when brace is off  Diet  -Pt will resume usual diet; watch  saturated fats & cholesterol  -Pt will resume regular bowel movements. Driving  -patient will not drive or return to work until instructed by surgeon. Pt. may ride in the car for short periods of time. Incision Care  Pt will keep Zipline dressing on for 2 weeks. (ABD and tape) placed over it should be  changed daily, for at least the first several days after surgery. If no incisional drainage,may leave the incision open to air, still leaving the Zipline in place  Pt will take brief showers but not run water directly onto the wound. After shower, blot incision dry and replace dry dressing. No tape to come in contact with the Zipline. Pt will not rub or apply any lotions or ointments to your incision site. Pt will have Zipline dressing removed during two week FU appointment. Pt will contact surgeon if experiencing drainage leaking from underneath Zipline dressing or if dressing peels off before FU appointment  Showering  -Pt will resume showering approximately 4 days after surgery.  -Pt will leave dressing on during shower and pat dry  -Pt will remove brace while showering and will not bend or twist while brace is off.  -Pt will not take a tub bath. Preventing blood clots  -Pt will wear T.E.D.  Stockings for 7 days after discharge (on in morning and off at night)  Pt will perform foot pumps to increase circulation and ambulate regularly throughout the day  Pain management  -Pt will take pain medication as prescribed; decrease the amount used as pain lessens  -Pt will not wait until in extreme pain to take your medication. -NN supplied contact information and will FU in 1 week  Dionicio Martin RN            Pt has nurse navigator's contact information for any further questions, concerns, or needs.   Patients upcoming visits:    Future Appointments   Date Time Provider Jarocho Collins   6/12/2019  2:00 PM CHAIR 3 [address] REG   6/13/2019  2:00 PM CHAIR 3 [address] REG   6/14/2019  2:00 PM CHAIR 3 [address] REG   6/15/2019 10:00 AM [facility]   6/16/2019 10:00 AM [facility]   6/17/2019  2:00 PM CHAIR 3 [address] REG   7/23/2019 11:15 AM Phuong Mathews MD [facility] SCHED   11/20/2019  2:30 PM Ghislaine Olsen MD [id number] [facility]

## 2019-06-13 ENCOUNTER — HOSPITAL ENCOUNTER (OUTPATIENT)
Dept: INFUSION THERAPY | Age: 79
Discharge: HOME | End: 2019-06-13
Payer: MEDICARE

## 2019-06-13 VITALS
DIASTOLIC BLOOD PRESSURE: 78 MMHG | HEART RATE: 85 BPM | OXYGEN SATURATION: 95 % | RESPIRATION RATE: 18 BRPM | TEMPERATURE: 98.5 F | SYSTOLIC BLOOD PRESSURE: 126 MMHG

## 2019-06-13 LAB
ALBUMIN SERPL-MCNC: 3 G/DL (ref 3.5–5)
ALBUMIN/GLOB SERPL: 0.7 {RATIO} (ref 1.1–2.2)
ALP SERPL-CCNC: 105 U/L (ref 45–117)
ALT SERPL-CCNC: 24 U/L (ref 12–78)
ANION GAP SERPL CALC-SCNC: 4 MMOL/L (ref 5–15)
AST SERPL-CCNC: 23 U/L (ref 15–37)
BASOPHILS # BLD: 0.1 K/UL (ref 0–0.1)
BASOPHILS NFR BLD: 1 % (ref 0–1)
BILIRUB DIRECT SERPL-MCNC: 0.1 MG/DL (ref 0–0.2)
BILIRUB SERPL-MCNC: 0.3 MG/DL (ref 0.2–1)
BUN SERPL-MCNC: 16 MG/DL (ref 6–20)
BUN/CREAT SERPL: 14 (ref 12–20)
CALCIUM SERPL-MCNC: 9.2 MG/DL (ref 8.5–10.1)
CHLORIDE SERPL-SCNC: 104 MMOL/L (ref 97–108)
CK SERPL-CCNC: 98 U/L (ref 26–192)
CO2 SERPL-SCNC: 31 MMOL/L (ref 21–32)
CREAT SERPL-MCNC: 1.11 MG/DL (ref 0.55–1.02)
CRP SERPL-MCNC: 1.63 MG/DL (ref 0–0.6)
DIFFERENTIAL METHOD BLD: NORMAL
EOSINOPHIL # BLD: 0.1 K/UL (ref 0–0.4)
EOSINOPHIL NFR BLD: 2 % (ref 0–7)
ERYTHROCYTE [DISTWIDTH] IN BLOOD BY AUTOMATED COUNT: 14 % (ref 11.5–14.5)
ERYTHROCYTE [SEDIMENTATION RATE] IN BLOOD: 77 MM/HR (ref 0–30)
GLOBULIN SER CALC-MCNC: 4.6 G/DL (ref 2–4)
GLUCOSE SERPL-MCNC: 123 MG/DL (ref 65–100)
HCT VFR BLD AUTO: 35 % (ref 35–47)
HGB BLD-MCNC: 11.5 G/DL (ref 11.5–16)
IMM GRANULOCYTES # BLD AUTO: 0 K/UL (ref 0–0.04)
IMM GRANULOCYTES NFR BLD AUTO: 0 % (ref 0–0.5)
LYMPHOCYTES # BLD: 1.2 K/UL (ref 0.8–3.5)
LYMPHOCYTES NFR BLD: 16 % (ref 12–49)
MCH RBC QN AUTO: 29.8 PG (ref 26–34)
MCHC RBC AUTO-ENTMCNC: 32.9 G/DL (ref 30–36.5)
MCV RBC AUTO: 90.7 FL (ref 80–99)
MONOCYTES # BLD: 0.5 K/UL (ref 0–1)
MONOCYTES NFR BLD: 6 % (ref 5–13)
NEUTS SEG # BLD: 5.5 K/UL (ref 1.8–8)
NEUTS SEG NFR BLD: 75 % (ref 32–75)
NRBC # BLD: 0 K/UL (ref 0–0.01)
NRBC BLD-RTO: 0 PER 100 WBC
PLATELET # BLD AUTO: 259 K/UL (ref 150–400)
PMV BLD AUTO: 10.6 FL (ref 8.9–12.9)
POTASSIUM SERPL-SCNC: 3.6 MMOL/L (ref 3.5–5.1)
PROT SERPL-MCNC: 7.6 G/DL (ref 6.4–8.2)
RBC # BLD AUTO: 3.86 M/UL (ref 3.8–5.2)
SODIUM SERPL-SCNC: 139 MMOL/L (ref 136–145)
WBC # BLD AUTO: 7.3 K/UL (ref 3.6–11)

## 2019-06-13 PROCEDURE — 36415 COLL VENOUS BLD VENIPUNCTURE: CPT

## 2019-06-13 PROCEDURE — 85652 RBC SED RATE AUTOMATED: CPT

## 2019-06-13 PROCEDURE — 80076 HEPATIC FUNCTION PANEL: CPT

## 2019-06-13 PROCEDURE — 80048 BASIC METABOLIC PNL TOTAL CA: CPT

## 2019-06-13 PROCEDURE — 85025 COMPLETE CBC W/AUTO DIFF WBC: CPT

## 2019-06-13 PROCEDURE — 74011250636 HC RX REV CODE- 250/636: Performed by: INTERNAL MEDICINE

## 2019-06-13 PROCEDURE — 86140 C-REACTIVE PROTEIN: CPT

## 2019-06-13 PROCEDURE — 74011000250 HC RX REV CODE- 250: Performed by: INTERNAL MEDICINE

## 2019-06-13 PROCEDURE — 96374 THER/PROPH/DIAG INJ IV PUSH: CPT

## 2019-06-13 PROCEDURE — 82550 ASSAY OF CK (CPK): CPT

## 2019-06-13 RX ORDER — HEPARIN 100 UNIT/ML
500 SYRINGE INTRAVENOUS AS NEEDED
Status: DISCONTINUED | OUTPATIENT
Start: 2019-06-13 | End: 2019-06-14 | Stop reason: HOSPADM

## 2019-06-13 RX ORDER — SODIUM CHLORIDE 0.9 % (FLUSH) 0.9 %
10-40 SYRINGE (ML) INJECTION AS NEEDED
Status: DISCONTINUED | OUTPATIENT
Start: 2019-06-13 | End: 2019-06-14 | Stop reason: HOSPADM

## 2019-06-13 RX ADMIN — Medication 10 ML: at 12:50

## 2019-06-13 RX ADMIN — Medication 500 UNITS: at 12:56

## 2019-06-13 RX ADMIN — Medication 10 ML: at 12:56

## 2019-06-13 RX ADMIN — DAPTOMYCIN 500 MG: 500 INJECTION, POWDER, LYOPHILIZED, FOR SOLUTION INTRAVENOUS at 12:51

## 2019-06-13 NOTE — PROGRESS NOTES
[facility] short consult note:    200 Pt arrived to [facility] ambulatory and in no distress for Abx. Denies any new complaints. SL PICC intact with positive blood return. /78   Pulse 85   Temp 98.5 °F (36.9 °C)   Resp 18   SpO2 95%     Recent Results (from the past 12 hour(s))   CBC WITH AUTOMATED DIFF    Collection Time: 06/13/19 12:49 PM   Result Value Ref Range    WBC 7.3 3.6 - 11.0 K/uL    RBC 3.86 3.80 - 5.20 M/uL    HGB 11.5 11.5 - 16.0 g/dL    HCT 35.0 35.0 - 47.0 %    MCV 90.7 80.0 - 99.0 FL    MCH 29.8 26.0 - 34.0 PG    MCHC 32.9 30.0 - 36.5 g/dL    RDW 14.0 11.5 - 14.5 %    PLATELET 316 415 - 584 K/uL    MPV 10.6 8.9 - 12.9 FL    NRBC 0.0 0  WBC    ABSOLUTE NRBC 0.00 0.00 - 0.01 K/uL    NEUTROPHILS 75 32 - 75 %    LYMPHOCYTES 16 12 - 49 %    MONOCYTES 6 5 - 13 %    EOSINOPHILS 2 0 - 7 %    BASOPHILS 1 0 - 1 %    IMMATURE GRANULOCYTES 0 0.0 - 0.5 %    ABS. NEUTROPHILS 5.5 1.8 - 8.0 K/UL    ABS. LYMPHOCYTES 1.2 0.8 - 3.5 K/UL    ABS. MONOCYTES 0.5 0.0 - 1.0 K/UL    ABS. EOSINOPHILS 0.1 0.0 - 0.4 K/UL    ABS. BASOPHILS 0.1 0.0 - 0.1 K/UL    ABS. IMM. GRANS. 0.0 0.00 - 0.04 K/UL    DF AUTOMATED     METABOLIC PANEL, BASIC    Collection Time: 06/13/19 12:49 PM   Result Value Ref Range    Sodium 139 136 - 145 mmol/L    Potassium 3.6 3.5 - 5.1 mmol/L    Chloride 104 97 - 108 mmol/L    CO2 31 21 - 32 mmol/L    Anion gap 4 (L) 5 - 15 mmol/L    Glucose 123 (H) 65 - 100 mg/dL    BUN 16 6 - 20 MG/DL    Creatinine 1.11 (H) 0.55 - 1.02 MG/DL    BUN/Creatinine ratio 14 12 - 20      GFR est AA 57 (L) >60 ml/min/1.73m2    GFR est non-AA 47 (L) >60 ml/min/1.73m2    Calcium 9.2 8.5 - 10.1 MG/DL   HEPATIC FUNCTION PANEL    Collection Time: 06/13/19 12:49 PM   Result Value Ref Range    Protein, total 7.6 6.4 - 8.2 g/dL    Albumin 3.0 (L) 3.5 - 5.0 g/dL    Globulin 4.6 (H) 2.0 - 4.0 g/dL    A-G Ratio 0.7 (L) 1.1 - 2.2      Bilirubin, total 0.3 0.2 - 1.0 MG/DL    Bilirubin, direct 0.1 0.0 - 0.2 MG/DL    Alk. phosphatase 105 45 - 117 U/L    AST (SGOT) 23 15 - 37 U/L    ALT (SGPT) 24 12 - 78 U/L   SED RATE (ESR)    Collection Time: 06/13/19 12:49 PM   Result Value Ref Range    Sed rate, automated 77 (H) 0 - 30 mm/hr   CK    Collection Time: 06/13/19 12:49 PM   Result Value Ref Range    CK 98 26 - 192 U/L       Medication given:  LRUFD    8457 Discharged home ambulatory and in no distress. PICC flushed and capped. Tolerated procedure well. Next appointment 6/14.

## 2019-06-14 ENCOUNTER — HOSPITAL ENCOUNTER (OUTPATIENT)
Dept: INFUSION THERAPY | Age: 79
Discharge: HOME | End: 2019-06-14
Payer: MEDICARE

## 2019-06-14 VITALS
DIASTOLIC BLOOD PRESSURE: 75 MMHG | TEMPERATURE: 98 F | HEART RATE: 67 BPM | SYSTOLIC BLOOD PRESSURE: 152 MMHG | RESPIRATION RATE: 18 BRPM

## 2019-06-14 PROCEDURE — 74011000250 HC RX REV CODE- 250: Performed by: INTERNAL MEDICINE

## 2019-06-14 PROCEDURE — 74011250636 HC RX REV CODE- 250/636: Performed by: INTERNAL MEDICINE

## 2019-06-14 PROCEDURE — 96374 THER/PROPH/DIAG INJ IV PUSH: CPT

## 2019-06-14 RX ADMIN — DAPTOMYCIN 500 MG: 500 INJECTION, POWDER, LYOPHILIZED, FOR SOLUTION INTRAVENOUS at 13:59

## 2019-06-14 NOTE — PROGRESS NOTES
[facility] VISIT NOTE    Pt arrived at [address] [place] [address] ambulatory and in no distress for Daptomycin. Assessment completed, pt c/o generalized pain from her rheumatoid arthritis . Patient Vitals for the past 12 hrs:   Temp Pulse Resp BP   06/14/19 1355 98 °F (36.7 °C) 67 18 152/75     PICC line flushed with good blood return noted. Medications received:  Daptomycin IVP    Tolerated treatment well, no adverse reaction noted. PICC flushed per protocol. Positive blood return noted. D/C'd from [address] [place] [address] and in no distress. Next appointment is 6/15/19 at [facility].

## 2019-06-15 ENCOUNTER — HOSPITAL ENCOUNTER (OUTPATIENT)
Dept: INFUSION THERAPY | Age: 79
Discharge: HOME | End: 2019-06-15
Payer: MEDICARE

## 2019-06-15 VITALS
HEART RATE: 71 BPM | DIASTOLIC BLOOD PRESSURE: 77 MMHG | RESPIRATION RATE: 18 BRPM | SYSTOLIC BLOOD PRESSURE: 151 MMHG | TEMPERATURE: 97.7 F

## 2019-06-15 PROCEDURE — 96374 THER/PROPH/DIAG INJ IV PUSH: CPT

## 2019-06-15 PROCEDURE — 74011000250 HC RX REV CODE- 250: Performed by: INTERNAL MEDICINE

## 2019-06-15 PROCEDURE — 74011250636 HC RX REV CODE- 250/636: Performed by: INTERNAL MEDICINE

## 2019-06-15 RX ORDER — SODIUM CHLORIDE 0.9 % (FLUSH) 0.9 %
10-40 SYRINGE (ML) INJECTION AS NEEDED
Status: DISCONTINUED | OUTPATIENT
Start: 2019-06-15 | End: 2019-06-16 | Stop reason: HOSPADM

## 2019-06-15 RX ORDER — HEPARIN 100 UNIT/ML
500 SYRINGE INTRAVENOUS AS NEEDED
Status: DISCONTINUED | OUTPATIENT
Start: 2019-06-15 | End: 2019-06-16 | Stop reason: HOSPADM

## 2019-06-15 RX ADMIN — DAPTOMYCIN 500 MG: 500 INJECTION, POWDER, LYOPHILIZED, FOR SOLUTION INTRAVENOUS at 10:08

## 2019-06-15 NOTE — PROGRESS NOTES
1000 Pt arrived at [facility] ambulatory and in no distress for Dapto IVP. Assessment completed, no new complaints voiced. Right single picc flushed with + blood return. Visit Vitals  /77   Pulse 71   Temp 97.7 °F (36.5 °C)   Resp 18       Medications received:  Dapto IVP    1015 Tolerated treatment well, no adverse reaction noted. D/Cd from [facility] ambulatory and in no distress accompanied by .   Next appt 6/16/19 at 10am.

## 2019-06-16 ENCOUNTER — HOSPITAL ENCOUNTER (OUTPATIENT)
Dept: INFUSION THERAPY | Age: 79
Discharge: HOME | End: 2019-06-16
Payer: MEDICARE

## 2019-06-16 VITALS
DIASTOLIC BLOOD PRESSURE: 76 MMHG | TEMPERATURE: 98.1 F | SYSTOLIC BLOOD PRESSURE: 130 MMHG | RESPIRATION RATE: 18 BRPM | HEART RATE: 56 BPM

## 2019-06-16 PROCEDURE — 96374 THER/PROPH/DIAG INJ IV PUSH: CPT

## 2019-06-16 PROCEDURE — 74011250636 HC RX REV CODE- 250/636: Performed by: INTERNAL MEDICINE

## 2019-06-16 PROCEDURE — 74011250636 HC RX REV CODE- 250/636

## 2019-06-16 PROCEDURE — 74011000250 HC RX REV CODE- 250: Performed by: INTERNAL MEDICINE

## 2019-06-16 RX ORDER — HEPARIN 100 UNIT/ML
500 SYRINGE INTRAVENOUS AS NEEDED
Status: DISCONTINUED | OUTPATIENT
Start: 2019-06-16 | End: 2019-06-17 | Stop reason: HOSPADM

## 2019-06-16 RX ORDER — SODIUM CHLORIDE 0.9 % (FLUSH) 0.9 %
5-10 SYRINGE (ML) INJECTION AS NEEDED
Status: DISCONTINUED | OUTPATIENT
Start: 2019-06-16 | End: 2019-06-17 | Stop reason: HOSPADM

## 2019-06-16 RX ADMIN — Medication 10 ML: at 10:02

## 2019-06-16 RX ADMIN — SODIUM CHLORIDE, PRESERVATIVE FREE 500 UNITS: 5 INJECTION INTRAVENOUS at 10:05

## 2019-06-16 RX ADMIN — DAPTOMYCIN 500 MG: 500 INJECTION, POWDER, LYOPHILIZED, FOR SOLUTION INTRAVENOUS at 10:03

## 2019-06-16 RX ADMIN — Medication 10 ML: at 10:05

## 2019-06-16 NOTE — PROGRESS NOTES
Outpatient Infusion Center Progress Note    2731  Pt admit to [facility] for daily antibiotics ambulatory in stable condition. Assessment completed. No new concerns voiced. Visit Vitals  /76   Pulse (!) 56   Temp 98.1 °F (36.7 °C)   Resp 18     Right arm PICC line flushed with positive blood return. Medications:  Daptomycin IVP      1010  Pt tolerated treatment well. D/c home ambulatory in no distress.  Pt aware of next appointment scheduled for 6/17/19 at 2 pm.

## 2019-06-17 ENCOUNTER — HOSPITAL ENCOUNTER (OUTPATIENT)
Dept: INFUSION THERAPY | Age: 79
Discharge: HOME | End: 2019-06-17
Payer: MEDICARE

## 2019-06-17 VITALS
HEART RATE: 63 BPM | TEMPERATURE: 97.7 F | OXYGEN SATURATION: 94 % | DIASTOLIC BLOOD PRESSURE: 70 MMHG | SYSTOLIC BLOOD PRESSURE: 128 MMHG | RESPIRATION RATE: 20 BRPM

## 2019-06-17 LAB
ALBUMIN SERPL-MCNC: 3 G/DL (ref 3.5–5)
ALBUMIN/GLOB SERPL: 0.7 {RATIO} (ref 1.1–2.2)
ALP SERPL-CCNC: 103 U/L (ref 45–117)
ALT SERPL-CCNC: 26 U/L (ref 12–78)
ANION GAP SERPL CALC-SCNC: 5 MMOL/L (ref 5–15)
AST SERPL-CCNC: 23 U/L (ref 15–37)
BASOPHILS # BLD: 0 K/UL (ref 0–0.1)
BASOPHILS NFR BLD: 1 % (ref 0–1)
BILIRUB DIRECT SERPL-MCNC: 0.1 MG/DL (ref 0–0.2)
BILIRUB SERPL-MCNC: 0.3 MG/DL (ref 0.2–1)
BUN SERPL-MCNC: 15 MG/DL (ref 6–20)
BUN/CREAT SERPL: 16 (ref 12–20)
CALCIUM SERPL-MCNC: 9.3 MG/DL (ref 8.5–10.1)
CHLORIDE SERPL-SCNC: 104 MMOL/L (ref 97–108)
CK SERPL-CCNC: 83 U/L (ref 26–192)
CO2 SERPL-SCNC: 28 MMOL/L (ref 21–32)
CREAT SERPL-MCNC: 0.94 MG/DL (ref 0.55–1.02)
CRP SERPL-MCNC: 1.08 MG/DL (ref 0–0.6)
DIFFERENTIAL METHOD BLD: NORMAL
EOSINOPHIL # BLD: 0.2 K/UL (ref 0–0.4)
EOSINOPHIL NFR BLD: 3 % (ref 0–7)
ERYTHROCYTE [DISTWIDTH] IN BLOOD BY AUTOMATED COUNT: 14 % (ref 11.5–14.5)
ERYTHROCYTE [SEDIMENTATION RATE] IN BLOOD: 65 MM/HR (ref 0–30)
GLOBULIN SER CALC-MCNC: 4.6 G/DL (ref 2–4)
GLUCOSE SERPL-MCNC: 133 MG/DL (ref 65–100)
HCT VFR BLD AUTO: 36.2 % (ref 35–47)
HGB BLD-MCNC: 11.6 G/DL (ref 11.5–16)
IMM GRANULOCYTES # BLD AUTO: 0 K/UL (ref 0–0.04)
IMM GRANULOCYTES NFR BLD AUTO: 0 % (ref 0–0.5)
LYMPHOCYTES # BLD: 1.1 K/UL (ref 0.8–3.5)
LYMPHOCYTES NFR BLD: 17 % (ref 12–49)
MCH RBC QN AUTO: 29.1 PG (ref 26–34)
MCHC RBC AUTO-ENTMCNC: 32 G/DL (ref 30–36.5)
MCV RBC AUTO: 90.7 FL (ref 80–99)
MONOCYTES # BLD: 0.3 K/UL (ref 0–1)
MONOCYTES NFR BLD: 5 % (ref 5–13)
NEUTS SEG # BLD: 4.9 K/UL (ref 1.8–8)
NEUTS SEG NFR BLD: 74 % (ref 32–75)
NRBC # BLD: 0 K/UL (ref 0–0.01)
NRBC BLD-RTO: 0 PER 100 WBC
PLATELET # BLD AUTO: 250 K/UL (ref 150–400)
PMV BLD AUTO: 10.7 FL (ref 8.9–12.9)
POTASSIUM SERPL-SCNC: 3.6 MMOL/L (ref 3.5–5.1)
PROT SERPL-MCNC: 7.6 G/DL (ref 6.4–8.2)
RBC # BLD AUTO: 3.99 M/UL (ref 3.8–5.2)
SODIUM SERPL-SCNC: 137 MMOL/L (ref 136–145)
WBC # BLD AUTO: 6.6 K/UL (ref 3.6–11)

## 2019-06-17 PROCEDURE — 74011000250 HC RX REV CODE- 250: Performed by: INTERNAL MEDICINE

## 2019-06-17 PROCEDURE — 82550 ASSAY OF CK (CPK): CPT

## 2019-06-17 PROCEDURE — 85652 RBC SED RATE AUTOMATED: CPT

## 2019-06-17 PROCEDURE — 80048 BASIC METABOLIC PNL TOTAL CA: CPT

## 2019-06-17 PROCEDURE — 86140 C-REACTIVE PROTEIN: CPT

## 2019-06-17 PROCEDURE — 85025 COMPLETE CBC W/AUTO DIFF WBC: CPT

## 2019-06-17 PROCEDURE — 36415 COLL VENOUS BLD VENIPUNCTURE: CPT

## 2019-06-17 PROCEDURE — 74011250636 HC RX REV CODE- 250/636: Performed by: INTERNAL MEDICINE

## 2019-06-17 PROCEDURE — 80076 HEPATIC FUNCTION PANEL: CPT

## 2019-06-17 PROCEDURE — 96374 THER/PROPH/DIAG INJ IV PUSH: CPT

## 2019-06-17 RX ORDER — HEPARIN 100 UNIT/ML
500 SYRINGE INTRAVENOUS AS NEEDED
Status: DISCONTINUED | OUTPATIENT
Start: 2019-06-17 | End: 2019-06-18 | Stop reason: HOSPADM

## 2019-06-17 RX ORDER — SODIUM CHLORIDE 0.9 % (FLUSH) 0.9 %
10-40 SYRINGE (ML) INJECTION AS NEEDED
Status: DISCONTINUED | OUTPATIENT
Start: 2019-06-17 | End: 2019-06-18 | Stop reason: HOSPADM

## 2019-06-17 RX ADMIN — Medication 10 ML: at 14:06

## 2019-06-17 RX ADMIN — Medication 10 ML: at 14:03

## 2019-06-17 RX ADMIN — DAPTOMYCIN 500 MG: 500 INJECTION, POWDER, LYOPHILIZED, FOR SOLUTION INTRAVENOUS at 14:04

## 2019-06-17 NOTE — PROGRESS NOTES
Outpatient Infusion Center Progress Note    1350- Pt admit to [facility] for daily abx ambulatory in stable condition. Assessment unchanged. No new concerns voiced. Right arm single lumen PICC line intact and flushed without issue with positive blood return noted. Labs drawn per order and sent for processing. Patient Vitals for the past 12 hrs:   Temp Pulse Resp BP SpO2   06/17/19 1441  63  128/70    06/17/19 1352 97.7 °F (36.5 °C) 61 20 138/67 94 %     Medications:  Daptomycin IVP    TORB received from Dr. Adriana Burk nurse to pull PICC line today,6/17/19, after antibiotic treatment is completed. Pt placed in reclined position and PICC line pulled per order. Pt monitored x 30 mins post tx. Pt provided with discharge instructions. Pt verbalized understanding. 1445- Pt tolerated treatment well. D/c home ambulatory in no distress. No further [facility] appts scheduled at this time. Recent Results (from the past 12 hour(s))   CBC WITH AUTOMATED DIFF    Collection Time: 06/17/19  1:54 PM   Result Value Ref Range    WBC 6.6 3.6 - 11.0 K/uL    RBC 3.99 3.80 - 5.20 M/uL    HGB 11.6 11.5 - 16.0 g/dL    HCT 36.2 35.0 - 47.0 %    MCV 90.7 80.0 - 99.0 FL    MCH 29.1 26.0 - 34.0 PG    MCHC 32.0 30.0 - 36.5 g/dL    RDW 14.0 11.5 - 14.5 %    PLATELET 508 535 - 210 K/uL    MPV 10.7 8.9 - 12.9 FL    NRBC 0.0 0  WBC    ABSOLUTE NRBC 0.00 0.00 - 0.01 K/uL    NEUTROPHILS 74 32 - 75 %    LYMPHOCYTES 17 12 - 49 %    MONOCYTES 5 5 - 13 %    EOSINOPHILS 3 0 - 7 %    BASOPHILS 1 0 - 1 %    IMMATURE GRANULOCYTES 0 0.0 - 0.5 %    ABS. NEUTROPHILS 4.9 1.8 - 8.0 K/UL    ABS. LYMPHOCYTES 1.1 0.8 - 3.5 K/UL    ABS. MONOCYTES 0.3 0.0 - 1.0 K/UL    ABS. EOSINOPHILS 0.2 0.0 - 0.4 K/UL    ABS. BASOPHILS 0.0 0.0 - 0.1 K/UL    ABS. IMM.  GRANS. 0.0 0.00 - 0.04 K/UL    DF AUTOMATED     METABOLIC PANEL, BASIC    Collection Time: 06/17/19  1:54 PM   Result Value Ref Range    Sodium 137 136 - 145 mmol/L    Potassium 3.6 3.5 - 5.1 mmol/L Chloride 104 97 - 108 mmol/L    CO2 28 21 - 32 mmol/L    Anion gap 5 5 - 15 mmol/L    Glucose 133 (H) 65 - 100 mg/dL    BUN 15 6 - 20 MG/DL    Creatinine 0.94 0.55 - 1.02 MG/DL    BUN/Creatinine ratio 16 12 - 20      GFR est AA >60 >60 ml/min/1.73m2    GFR est non-AA 57 (L) >60 ml/min/1.73m2    Calcium 9.3 8.5 - 10.1 MG/DL   HEPATIC FUNCTION PANEL    Collection Time: 06/17/19  1:54 PM   Result Value Ref Range    Protein, total 7.6 6.4 - 8.2 g/dL    Albumin 3.0 (L) 3.5 - 5.0 g/dL    Globulin 4.6 (H) 2.0 - 4.0 g/dL    A-G Ratio 0.7 (L) 1.1 - 2.2      Bilirubin, total 0.3 0.2 - 1.0 MG/DL    Bilirubin, direct 0.1 0.0 - 0.2 MG/DL    Alk.  phosphatase 103 45 - 117 U/L    AST (SGOT) 23 15 - 37 U/L    ALT (SGPT) 26 12 - 78 U/L   CK    Collection Time: 06/17/19  1:54 PM   Result Value Ref Range    CK 83 26 - 192 U/L

## 2019-06-21 ENCOUNTER — DOCUMENTATION ONLY (OUTPATIENT)
Dept: CARDIOLOGY CLINIC | Age: 79
End: 2019-06-21

## 2019-06-24 ENCOUNTER — DOCUMENTATION ONLY (OUTPATIENT)
Dept: CARDIOLOGY CLINIC | Age: 79
End: 2019-06-24

## 2019-06-24 NOTE — PROGRESS NOTES
Received fax from [facility] stating pt has been approved for [facility] Roles, through 12/19/19

## 2019-07-16 LAB — CREATININE, EXTERNAL: 0.8

## 2019-08-14 LAB — CREATININE, EXTERNAL: 0.8

## 2019-09-19 ENCOUNTER — OFFICE VISIT (OUTPATIENT)
Dept: INTERNAL MEDICINE CLINIC | Age: 79
End: 2019-09-19

## 2019-09-19 VITALS
DIASTOLIC BLOOD PRESSURE: 94 MMHG | TEMPERATURE: 98.6 F | RESPIRATION RATE: 16 BRPM | HEART RATE: 74 BPM | OXYGEN SATURATION: 95 % | HEIGHT: 59 IN | SYSTOLIC BLOOD PRESSURE: 170 MMHG | BODY MASS INDEX: 35.75 KG/M2

## 2019-09-19 DIAGNOSIS — M05.79 RHEUMATOID ARTHRITIS INVOLVING MULTIPLE SITES WITH POSITIVE RHEUMATOID FACTOR (HCC): ICD-10-CM

## 2019-09-19 DIAGNOSIS — F34.1 DYSTHYMIA: ICD-10-CM

## 2019-09-19 DIAGNOSIS — E78.00 HYPERCHOLESTEROLEMIA: ICD-10-CM

## 2019-09-19 DIAGNOSIS — E66.01 SEVERE OBESITY (HCC): ICD-10-CM

## 2019-09-19 DIAGNOSIS — Z23 ENCOUNTER FOR IMMUNIZATION: Primary | ICD-10-CM

## 2019-09-19 PROBLEM — R06.09 DOE (DYSPNEA ON EXERTION): Status: RESOLVED | Noted: 2019-02-26 | Resolved: 2019-09-19

## 2019-09-19 PROBLEM — E78.2 MIXED HYPERLIPIDEMIA: Status: RESOLVED | Noted: 2019-02-26 | Resolved: 2019-09-19

## 2019-09-19 RX ORDER — AZATHIOPRINE 50 MG/1
50 TABLET ORAL 3 TIMES DAILY
COMMUNITY
Start: 2019-08-06 | End: 2020-12-04 | Stop reason: ALTCHOICE

## 2019-09-19 RX ORDER — PREDNISONE 1 MG/1
1 TABLET ORAL DAILY
Refills: 0 | COMMUNITY
Start: 2019-09-19 | End: 2020-12-04 | Stop reason: ALTCHOICE

## 2019-09-19 RX ORDER — SERTRALINE HYDROCHLORIDE 100 MG/1
TABLET, FILM COATED ORAL
Qty: 90 TAB | Refills: 3 | Status: SHIPPED | OUTPATIENT
Start: 2019-09-19 | End: 2020-04-23

## 2019-09-19 RX ORDER — CHOLECALCIFEROL (VITAMIN D3) 125 MCG
CAPSULE ORAL
COMMUNITY
End: 2022-08-23

## 2019-09-19 RX ORDER — ATENOLOL 50 MG/1
50 TABLET ORAL DAILY
Qty: 30 TAB | Refills: 2 | Status: SHIPPED | OUTPATIENT
Start: 2019-09-19 | End: 2020-04-23 | Stop reason: ALTCHOICE

## 2019-09-19 RX ORDER — HYDROCODONE BITARTRATE AND ACETAMINOPHEN 5; 325 MG/1; MG/1
0.5 TABLET ORAL AS NEEDED
Refills: 0 | COMMUNITY
Start: 2019-09-12 | End: 2020-12-04 | Stop reason: ALTCHOICE

## 2019-09-19 NOTE — PATIENT INSTRUCTIONS

## 2019-09-19 NOTE — PROGRESS NOTES
HPI:  Jani De La Garza is a 78y.o. year old female who is here for a routine visit:    Presents for follow-up visit. Recently she has had some elevations in her blood pressure. She had an headache episode recently and was seen in the emergency room. They are CT scan of the head was unremarkable. Chest x-ray did reveal?  Atelectasis. Blood work was normal.  She continues to be seen by her rheumatologist.  She has been recently been on higher doses of steroid due to an RA flare with markedly elevated inflammatory markers. She is now down on 2 mg of prednisone and seems to be much improved. No further headaches. No dizziness. No nosebleeds. No chest pains or shortness of breath. No cough or wheeze. She does note that the Cymbalta is not helping as well with depression and anxiety as the Zoloft did in the past.      Past Medical History:   Diagnosis Date    Cataracts, bilateral     Chronic pain     LOWER BACK    Colon cancer ([facility] 2015    colectomy with ostomy, then reversed by DR ELVIN Cardenas Colon polyps     Depression     did not do well on duloxetine    GERD (gastroesophageal reflux disease)     Glaucoma     Heart palpitations     MRSA infection 5/8/2019    Other ill-defined conditions(799.89)     GASTROPARESIS    RA (rheumatoid arthritis) ([facility]     DR Alicia Morales, remicade    Restless leg syndrome     Spinal stenosis 5/8/2011    Unspecified adverse effect of anesthesia     HEADACHE    UTI (lower urinary tract infection)        Past Surgical History:   Procedure Laterality Date    HX APPENDECTOMY  2015    HX COLECTOMY  2015    WITH COLOSTOMY AND THEN REOMVAL OF COLOSTOMY    HX COLOSTOMY  2016    REVERSAL    HX COLOSTOMY TAKE DOWN      HX DILATION AND CURETTAGE      HX GI  9/2014    COLONOSCOPY    HX KNEE ARTHROSCOPY Left 1979    MENISCUS REPAIR    HX KNEE ARTHROSCOPY Left     HX KNEE ARTHROSCOPY Right 2005    HX KNEE REPLACEMENT Left 2005    HX KNEE REPLACEMENT Right 05/09/2018  HX LAP CHOLECYSTECTOMY  5/2005    HX LUMBAR LAMINECTOMY  2011    HX ORTHOPAEDIC Right     HAND FX    HX TUBAL LIGATION  1979    HX WRIST FRACTURE TX Left     TOTAL HIP ARTHROPLASTY Right 3/2012       Prior to Admission medications    Medication Sig Start Date End Date Taking? Authorizing Provider   azaTHIOprine (IMURAN) 50 mg tablet Take 50 mg by mouth three (3) times daily. 8/6/19  Yes Provider, Historical   HYDROcodone-acetaminophen (NORCO) 5-325 mg per tablet Take 0.5 Tabs by mouth as needed. 9/12/19  Yes Provider, Historical   BIOTIN PO Take 1,000 mg by mouth every other day. Yes Provider, Historical   CALCIUM PO Take 1,000 mg by mouth daily. Yes Provider, Historical   cholecalciferol, vitamin D3, (VITAMIN D3) 2,000 unit tab Take  by mouth. Yes Provider, Historical   infliximab (REMICADE IV) by IntraVENous route every four (4) weeks. Yes Provider, Historical   predniSONE (DELTASONE) 1 mg tablet Take 2 Tabs by mouth daily. 9/19/19  Yes Neo Tracey MD   sertraline (ZOLOFT) 100 mg tablet 1/2 for 1 week then one whole tab in the AM. 9/19/19  Yes Neo Tracey MD   atenolol (TENORMIN) 50 mg tablet Take 1 Tab by mouth daily. 9/19/19  Yes Neo Tracey MD   gabapentin (NEURONTIN) 300 mg capsule Take 300 mg by mouth nightly. Yes Provider, Historical   naloxone (NARCAN) 4 mg/actuation nasal spray Use 1 spray intranasally, then discard. Repeat with new spray every 2 min as needed for opioid overdose symptoms, alternating nostrils. Indications: Decrease in Rate & Depth of Breathing due to Opioid Drug 4/18/19   IRAJ Blake   calcium-cholecalciferol, D3, (CALCIUM 600 + D) tablet Take 1 Tab by mouth daily. Provider, Historical   magnesium oxide 500 mg tab Take 1 Tab by mouth every Monday, Wednesday, Friday.     Provider, Historical       Social History     Socioeconomic History    Marital status:      Spouse name: Not on file    Number of children: Not on file  Years of education: Not on file    Highest education level: Not on file   Occupational History    Not on file   Social Needs    Financial resource strain: Not on file    Food insecurity:     Worry: Not on file     Inability: Not on file    Transportation needs:     Medical: Not on file     Non-medical: Not on file   Tobacco Use    Smoking status: Former Smoker     Packs/day: 1.00     Years: 20.00     Pack years: 20.00     Last attempt to quit: 3/7/1978     Years since quittin.5    Smokeless tobacco: Never Used   Substance and Sexual Activity    Alcohol use: Yes     Alcohol/week: 0.0 standard drinks     Types: 4 Glasses of wine per week     Comment: maybe not that much    Drug use: No    Sexual activity: Not Currently   Lifestyle    Physical activity:     Days per week: Not on file     Minutes per session: Not on file    Stress: Not on file   Relationships    Social connections:     Talks on phone: Not on file     Gets together: Not on file     Attends [employer] service: Not on file     Active member of club or organization: Not on file     Attends meetings of clubs or organizations: Not on file     Relationship status: Not on file    Intimate partner violence:     Fear of current or ex partner: Not on file     Emotionally abused: Not on file     Physically abused: Not on file     Forced sexual activity: Not on file   Other Topics Concern    Not on file   Social History Narrative    .   REMY to Lazaro JARRETT  Per HPI    Visit Vitals  BP (!) 170/94   Pulse 74   Temp 98.6 °F (37 °C) (Oral)   Resp 16   Ht 4' 11\" (1.499 m)   SpO2 95%   BMI 35.75 kg/m²         Physical Exam   Physical Examination: General appearance - alert, well appearing, and in no distress  Mouth - mucous membranes moist, pharynx normal without lesions  Neck - supple, no significant adenopathy  Lymphatics - no palpable lymphadenopathy, no hepatosplenomegaly  Chest - clear to auscultation, no wheezes, rales or rhonchi, symmetric air entry  Heart - normal rate, regular rhythm, normal S1, S2, no murmurs, rubs, clicks or gallops  Abdomen - soft, nontender, nondistended, no masses or organomegaly  Neurological - alert, oriented, normal speech, no focal findings or movement disorder noted  Extremities - peripheral pulses normal, no pedal edema, no clubbing or cyanosis      Assessment/Plan:  Diagnoses and all orders for this visit:    1. Encounter for immunization  -     ADMIN INFLUENZA VIRUS VAC  -     INFLUENZA VACCINE INACTIVATED (IIV), SUBUNIT, ADJUVANTED, IM    2. Hypercholesterolemia -check labs yearly to be sure that is controlled. 3. Severe obesity ([facility] -she will work on increased exercise and limiting carbohydrates for better control. 4. Dysthymia -we will start sertraline. Will stop Cymbalta and see if that works better. 5. Rheumatoid arthritis involving multiple sites with positive rheumatoid factor ([facility] -per rheumatology. 6.  Hypertension-we will add atenolol 50 mg a day and see if it is helpful. Other orders  -     sertraline (ZOLOFT) 100 mg tablet; 1/2 for 1 week then one whole tab in the AM.  -     atenolol (TENORMIN) 50 mg tablet; Take 1 Tab by mouth daily. Follow-up and Dispositions    · Return in about 1 month (around 10/19/2019). Advised her to call back or return to office if symptoms worsen/change/persist.  Discussed expected course/resolution/complications of diagnosis in detail with patient. Medication risks/benefits/costs/interactions/alternatives discussed with patient. She was given an after visit summary which includes diagnoses, current medications, & vitals. She expressed understanding with the diagnosis and plan.

## 2019-09-23 PROBLEM — Z01.818 PRE-OPERATIVE EXAMINATION: Status: RESOLVED | Noted: 2019-02-26 | Resolved: 2019-09-23

## 2019-09-26 ENCOUNTER — TELEPHONE (OUTPATIENT)
Dept: INTERNAL MEDICINE CLINIC | Age: 79
End: 2019-09-26

## 2019-11-06 LAB — CREATININE, EXTERNAL: 0.8

## 2019-12-31 ENCOUNTER — TELEPHONE (OUTPATIENT)
Dept: CARDIOLOGY CLINIC | Age: 79
End: 2019-12-31

## 2020-01-02 ENCOUNTER — TELEPHONE (OUTPATIENT)
Dept: INTERNAL MEDICINE CLINIC | Age: 80
End: 2020-01-02

## 2020-01-02 RX ORDER — PRAMIPEXOLE DIHYDROCHLORIDE 1 MG/1
TABLET ORAL
Qty: 135 TAB | Refills: 1 | Status: SHIPPED | OUTPATIENT
Start: 2020-01-02 | End: 2020-06-30

## 2020-01-02 NOTE — TELEPHONE ENCOUNTER
Patient requesting refill of Pramipexole 1mg, take 1.5 tabs at night for restless leg -- do not see this on her med list.  Patient is completely out of this med.

## 2020-01-02 NOTE — TELEPHONE ENCOUNTER
PCP d/c pramipexole at last visit w/ reason of therapy completed. No note per [facility] to stop the medication. Patient denies them discussing her stopping at visit has continued to take up until running out of med. She believes it was discontinued in error. Advised would review with on call if okay to refill an will return call to advise.

## 2020-01-02 NOTE — TELEPHONE ENCOUNTER
Orders Placed This Encounter    pramipexole (MIRAPEX) 1 mg tablet     Sig: TAKE 1.5 TABLET EVERY NIGHT     Dispense:  135 Tab     Refill:  1     Attempted to contact patient, unsuccessful.    -Left message advising refill approved.

## 2020-01-09 NOTE — TELEPHONE ENCOUNTER
Called pt,verified pt with two pt identifiers, asked pt if she was taking her Repatha. Pt advised that she never started the medication due to cost. It would be 300 dollars monthly and she could not afford that. I looked in cc and she is due for an appt. Last seen on 4/15/19 and was to f/u in 3 months. I advised our  will call to schedule that appt with her. Advised we will wait and have discussion with  for Repatha at her upcoming appt. Pt verbalized understanding.

## 2020-01-09 NOTE — TELEPHONE ENCOUNTER
Message   Received: Today   Message Contents   Cyrus Lai Dawn M, LPN   Caller: Unspecified (1 week ago)             Pt is scheduled for 2/3 @ 11:30      Noted, thanks.

## 2020-02-03 ENCOUNTER — TELEPHONE (OUTPATIENT)
Dept: INTERNAL MEDICINE CLINIC | Age: 80
End: 2020-02-03

## 2020-02-03 ENCOUNTER — DOCUMENTATION ONLY (OUTPATIENT)
Dept: INTERNAL MEDICINE CLINIC | Age: 80
End: 2020-02-03

## 2020-02-03 NOTE — TELEPHONE ENCOUNTER
[facility]  RODERICK Loo is requesting most recent notes and labs faxed to [phone].  The patient is in the office now

## 2020-03-10 ENCOUNTER — TELEPHONE (OUTPATIENT)
Dept: INTERNAL MEDICINE CLINIC | Age: 80
End: 2020-03-10

## 2020-04-15 ENCOUNTER — TELEPHONE (OUTPATIENT)
Dept: INTERNAL MEDICINE CLINIC | Age: 80
End: 2020-04-15

## 2020-04-21 ENCOUNTER — DOCUMENTATION ONLY (OUTPATIENT)
Dept: INTERNAL MEDICINE CLINIC | Age: 80
End: 2020-04-21

## 2020-04-21 LAB
CREATININE, EXTERNAL: 0.71
EF %, EXTERNAL: NORMAL

## 2020-04-23 ENCOUNTER — OFFICE VISIT (OUTPATIENT)
Dept: INTERNAL MEDICINE CLINIC | Age: 80
End: 2020-04-23

## 2020-04-23 VITALS
OXYGEN SATURATION: 96 % | HEART RATE: 44 BPM | DIASTOLIC BLOOD PRESSURE: 64 MMHG | WEIGHT: 195 LBS | SYSTOLIC BLOOD PRESSURE: 171 MMHG | BODY MASS INDEX: 39.31 KG/M2 | TEMPERATURE: 98 F | HEIGHT: 59 IN | RESPIRATION RATE: 16 BRPM

## 2020-04-23 DIAGNOSIS — M05.79 RHEUMATOID ARTHRITIS INVOLVING MULTIPLE SITES WITH POSITIVE RHEUMATOID FACTOR (HCC): ICD-10-CM

## 2020-04-23 DIAGNOSIS — R06.09 DOE (DYSPNEA ON EXERTION): ICD-10-CM

## 2020-04-23 DIAGNOSIS — R00.2 HEART PALPITATIONS: ICD-10-CM

## 2020-04-23 DIAGNOSIS — I10 ESSENTIAL HYPERTENSION: ICD-10-CM

## 2020-04-23 DIAGNOSIS — H26.9 CATARACT OF RIGHT EYE, UNSPECIFIED CATARACT TYPE: Primary | ICD-10-CM

## 2020-04-23 DIAGNOSIS — E78.00 HYPERCHOLESTEROLEMIA: ICD-10-CM

## 2020-04-23 DIAGNOSIS — Z01.818 PRE-OPERATIVE EXAMINATION: ICD-10-CM

## 2020-04-23 RX ORDER — SERTRALINE HYDROCHLORIDE 100 MG/1
100 TABLET, FILM COATED ORAL DAILY
Qty: 90 TAB | Refills: 3 | COMMUNITY
Start: 2020-04-23 | End: 2020-06-12 | Stop reason: SDDI

## 2020-04-23 NOTE — PROGRESS NOTES
Preoperative Evaluation    Date of Exam: 4/23/2020    Arden Neville is a 78 y.o. female ([id number]) who presents for preoperative evaluation. Procedure/Surgery:Right Eye Cataract  Date of Procedure/Surgery: 5/5/2020  Surgeon: Sean Chávez MD  Hospital/Surgical Facility: [facility]  Primary Physician: Hailee Cantor MD  Latex Allergy: no    Problem List:     Patient Active Problem List    Diagnosis Date Noted    MRSA infection 05/08/2019    Lumbar surgical wound fluid collection 05/06/2019    Lumbar stenosis 04/18/2019    Spinal stenosis, lumbar 04/16/2019    Severe obesity ([address] 01/16/2019    Osteoarthritis of right knee 05/09/2018    Restless leg syndrome     [facility] [address] 01/01/2015    Heart palpitations     Glaucoma     RA (rheumatoid arthritis) [address] [facility]     Depression 05/08/2011    Impaired hearing 05/08/2011    Hypercholesterolemia 09/24/2010     Medical History:     Past Medical History:   Diagnosis Date    Cataracts, bilateral     Chronic pain     LOWER BACK    Colon cancer ([address] 2015    colectomy with ostomy, then reversed by DR ELVIN Collins Colon polyps     Depression     did not do well on duloxetine    GERD (gastroesophageal reflux disease)     Glaucoma     Heart palpitations     MRSA infection 5/8/2019    Other ill-defined conditions(799.89)     GASTROPARESIS    [facility]rhe[facility]oid arthritis) ([facility])     DR Any Rich, remicade    Restless leg syndrome     Spinal stenosis 5/8/2011    Unspecified adverse effect of anesthesia     HEADACHE    UTI (lower urinary tract infection)      Allergies: Allergies   Allergen Reactions    Adhesive Tape-Silicones Other (comments)     \"Left skin raw\"    Statins-Hmg-Coa Reductase Inhibitors Myalgia     Intolerant to lipitor, crestor and pravastatin      Medications:     Current Outpatient Medications   Medication Sig    dilTIAZem ER (CARDIZEM LA) 120 mg tablet Take 120 mg by mouth daily.     sertraline (ZOLOFT) 100 mg tablet Take 1 Tab by mouth daily.  pramipexole (MIRAPEX) 1 mg tablet TAKE 1.5 TABLET EVERY NIGHT    azaTHIOprine (IMURAN) 50 mg tablet Take 50 mg by mouth three (3) times daily.  HYDROcodone-acetaminophen (NORCO) 5-325 mg per tablet Take 0.5 Tabs by mouth as needed.  BIOTIN PO Take 1,000 mg by mouth every other day.  CALCIUM PO Take 1,000 mg by mouth daily.  cholecalciferol, vitamin D3, (VITAMIN D3) 2,000 unit tab Take  by mouth.  infliximab (REMICADE IV) by IntraVENous route every four (4) weeks.  predniSONE (DELTASONE) 1 mg tablet Take 2 Tabs by mouth daily.  calcium-cholecalciferol, D3, (CALCIUM 600 + D) tablet Take 1 Tab by mouth daily.  magnesium oxide 500 mg tab Take 1 Tab by mouth every Monday, Wednesday, Friday.  naloxone (NARCAN) 4 mg/actuation nasal spray Use 1 spray intranasally, then discard. Repeat with new spray every 2 min as needed for opioid overdose symptoms, alternating nostrils. Indications: Decrease in Rate & Depth of Breathing due to Opioid Drug     No current facility-administered medications for this visit.       Surgical History:     Past Surgical History:   Procedure Laterality Date    HX APPENDECTOMY  2015    HX COLECTOMY  2015    WITH COLOSTOMY AND THEN REOMVAL OF COLOSTOMY    HX COLOSTOMY  2016    REVERSAL    HX COLOSTOMY TAKE DOWN      HX DILATION AND CURETTAGE      HX GI  9/2014    COLONOSCOPY    HX KNEE ARTHROSCOPY Left 1979    MENISCUS REPAIR    HX KNEE ARTHROSCOPY Left     HX KNEE ARTHROSCOPY Right 2005    HX KNEE REPLACEMENT Left 2005    HX KNEE REPLACEMENT Right 05/09/2018    HX LAP CHOLECYSTECTOMY  5/2005    HX LUMBAR LAMINECTOMY  2011    HX LUMBAR LAMINECTOMY  04/2019    HX ORTHOPAEDIC Right     HAND FX    HX TUBAL LIGATION  1979    HX WRIST FRACTURE TX Left     TOTAL HIP ARTHROPLASTY Right 3/2012     Social History:     Social History     Socioeconomic History    Marital status:      Spouse name: Not on file    Number of children: Not on file    Years of education: Not on file    Highest education level: Not on file   Tobacco Use    Smoking status: Former Smoker     Packs/day: 1.00     Years: 20.00     Pack years: 20.00     Last attempt to quit: 3/7/1978     Years since quittin.1    Smokeless tobacco: Never Used   Substance and Sexual Activity    Alcohol use: Not Currently     Comment: maybe not that much    Drug use: No    Sexual activity: Not Currently   Social History Narrative    . REMY to [employer]        Recent use of: Steroids    Anesthesia Complications: None  History of abnormal bleeding : None  History of Blood Transfusions: no  Health Care Directive or Living Will: no    REVIEW OF SYSTEMS:  Vision is impaired. Some months of issues with SOB and SARMIENTO. Seen in the ER recently with chest pain. Stress test and ECHO last year remarkable for LVH only. Had an ECHO this week with cardiology. Metoprolol has been changed to cardizem. BP has been high. No PND or orthopnea. No edema. No change in bowels or bladder. RA is still an issue.      EXAM:   Visit Vitals  /64   Pulse (!) 44   Temp 98 °F (36.7 °C) (Oral)   Resp 16   Ht 4' 11\" (1.499 m)   Wt 195 lb (88.5 kg)   SpO2 96%   BMI 39.39 kg/m²     General appearance - alert, well appearing, and in no distress  Mouth - mucous membranes moist, pharynx normal without lesions  Neck - supple, no significant adenopathy  Lymphatics - no palpable lymphadenopathy, no hepatosplenomegaly  Chest - clear to auscultation, no wheezes, rales or rhonchi, symmetric air entry  Heart - normal rate and regular rhythm  Abdomen - soft, nontender, nondistended, no masses or organomegaly  Neurological - alert, oriented, normal speech, no focal findings or movement disorder noted  Musculoskeletal - osteoarthritic changes noted in both hands  Extremities - peripheral pulses normal, no pedal edema, no clubbing or cyanosis          IMPRESSION:   RA - Stable on meds  HTN - BP not well controlled. Will discuss with cardiology and left a message for Dr. Leigh Ann Wright to return my call. Consider diuretics. SARMIENTO - ? LVH and diastolic dysfunction. ? Diuretics. Cataract - OK with surgery  Plan -=  No contraindications to planned surgery  Discuss case with cardiology and decide about changes to meds.      Hussain Taylor MD   4/23/2020

## 2020-04-24 ENCOUNTER — TELEPHONE (OUTPATIENT)
Dept: INTERNAL MEDICINE CLINIC | Age: 80
End: 2020-04-24

## 2020-04-24 DIAGNOSIS — I11.9 HYPERTENSIVE LEFT VENTRICULAR HYPERTROPHY, WITHOUT HEART FAILURE: Primary | ICD-10-CM

## 2020-04-24 RX ORDER — FUROSEMIDE 20 MG/1
20 TABLET ORAL DAILY
Qty: 90 TAB | Refills: 1 | Status: SHIPPED | OUTPATIENT
Start: 2020-04-24 | End: 2020-12-04 | Stop reason: ALTCHOICE

## 2020-04-24 NOTE — TELEPHONE ENCOUNTER
Chief Complaint   Patient presents with    Hypertension    Pre-op Exam     Per [facility]:  HTN - BP not well controlled. Will discuss with cardiology - consider diuretics. Patient scheduled for cataract sx on 5/5/20. Attempted to contact Dr. Abi Kang or his NP Olivier Keller - both were out of office yesterday - will attempt to contact again today. Dr. Anne Bocanegra office # ph [phone].

## 2020-04-24 NOTE — TELEPHONE ENCOUNTER
Returned call to the patient and informed her of her ECHO results, LVH only. Advised to add lasix 20 mg daily and and get her BMP checked here in 2 weeks. She will let me know how she is in 2 weeks.

## 2020-04-24 NOTE — TELEPHONE ENCOUNTER
Discussed case with the NP - Jeanine Perez - her ECHO has not been resulted yet. Her BP has still been high. Discussed adding diuretic and she will get info and provide ECHO to me. She returned call and ECHO unchanged. Agreed with lasix 20 mg daily and repeat BMP in 2 weeks.

## 2020-05-13 ENCOUNTER — TELEPHONE (OUTPATIENT)
Dept: INTERNAL MEDICINE CLINIC | Age: 80
End: 2020-05-13

## 2020-05-13 LAB — CREATININE, EXTERNAL: 0.8

## 2020-05-18 ENCOUNTER — TELEPHONE (OUTPATIENT)
Dept: INTERNAL MEDICINE CLINIC | Age: 80
End: 2020-05-18

## 2020-05-18 DIAGNOSIS — J40 BRONCHITIS: Primary | ICD-10-CM

## 2020-05-18 RX ORDER — GUAIFENESIN 1200 MG/1
1200 TABLET, EXTENDED RELEASE ORAL 2 TIMES DAILY
COMMUNITY
End: 2020-06-12 | Stop reason: ALTCHOICE

## 2020-05-18 RX ORDER — AZITHROMYCIN 250 MG/1
250 TABLET, FILM COATED ORAL SEE ADMIN INSTRUCTIONS
Qty: 6 TAB | Refills: 0 | Status: SHIPPED | OUTPATIENT
Start: 2020-05-18 | End: 2020-05-23

## 2020-05-18 NOTE — TELEPHONE ENCOUNTER
Pt said that she had a chest x-ray done 2 weeks ago that nurse salazar. 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  The chest x-ray was done a [facility] in [address] [place] [address] to know what she should do    Pt number is  [phone]

## 2020-05-18 NOTE — TELEPHONE ENCOUNTER
Orders Placed This Encounter    azithromycin (ZITHROMAX) 250 mg tablet     Sig: Take 1 Tab by mouth See Admin Instructions for 5 days. Dispense:  6 Tab     Refill:  0    guaiFENesin (Mucinex) 1,200 mg Ta12 ER tablet     Sig: Take 1,200 mg by mouth two (2) times a day. The above orders were approved via [facility] per Dr. Autumn Quinteros, III. Patient advised of above orders - to return call if s/sx worsen or fail to improve, verbalized understanding.

## 2020-05-18 NOTE — TELEPHONE ENCOUNTER
Returned call to patient - she is napping - spoke w/ . He states she had a CXR done @ her cardiologist and was told she had bronchitis. They did not treat and asked to contact PCP for this. He reports her current sx are cough and pain in chest worsened by cough, denies fever/chills. She wanted to know if Dr. Yudelka Mar could prescribe meds for treatment. Advised will review w/ PCP and return call.

## 2020-05-18 NOTE — TELEPHONE ENCOUNTER
In Care Everywhere:  [id number] 5/7/2020 1:46:35 PM EDT      RESULT  EXAM DESCRIPTION:  DX CHEST PA/LAT 2 VIEWS    COMPLETED DATE/TIME:  5/7/2020 1:46 pm    REASON FOR EXAM:  SOB  COMPARISON:  04/11/2020 portable chest    TECHNIQUE:  VIEW(S): Frontal and lateral    REPORT:    Mild peribronchial cuffing is noted about the santa. Consider clinical  correlation for viral infection or reactive airway disease. The lungs show  no confluent infiltrates. The mediastinum and bony thorax appear stable. There is mild cardiomegaly, similar to the prior study given differences in  technique. IMPRESSION:  Findings of bronchitis. Stable cardiomegaly.

## 2020-05-29 NOTE — ROUTINE PROCESS
Have you been tested for COVID-19 in the past 7 days? No    Do you have a personal history of COVID-19 within the past 28 days? No  If Yes, What was the method of testing: clinical assumption or test result? Have you had close contact with a known to be positive COVID-19 patient within the past 14 days? No    Are you a healthcare worker or ? No  If Yes, have you been exposed to COVID-19 without proper PPE? Do you live in a SNF, adult home or other institutional setting? No  If Yes, have they experienced a flood of COVID-19 positive patients?     In the past 2-14 days have you had any of the following symptoms No   Cough Chronic Cough   New onset Shortness of breath or difficulty breathing No    Or at least two of these symptoms:   Fever greater than 100 F  No   Chills No   Repeated shaking with chills  No   Muscle pain  No   Headache  No   Sore throat  No   New loss of taste or smell  No   New onset diarrhea  No

## 2020-06-01 ENCOUNTER — HOSPITAL ENCOUNTER (OUTPATIENT)
Dept: INTERVENTIONAL RADIOLOGY/VASCULAR | Age: 80
Discharge: HOME | End: 2020-06-01
Attending: PHYSICIAN ASSISTANT
Payer: MEDICARE

## 2020-06-01 VITALS
TEMPERATURE: 98.4 F | SYSTOLIC BLOOD PRESSURE: 156 MMHG | RESPIRATION RATE: 18 BRPM | HEIGHT: 59 IN | OXYGEN SATURATION: 98 % | BODY MASS INDEX: 37.29 KG/M2 | DIASTOLIC BLOOD PRESSURE: 67 MMHG | HEART RATE: 75 BPM | WEIGHT: 185 LBS

## 2020-06-01 DIAGNOSIS — Z98.890 S/P LUMBAR LAMINECTOMY: ICD-10-CM

## 2020-06-01 DIAGNOSIS — M47.816 FACET ARTHROPATHY, LUMBAR: ICD-10-CM

## 2020-06-01 DIAGNOSIS — M54.16 LUMBAR RADICULOPATHY: ICD-10-CM

## 2020-06-01 DIAGNOSIS — M48.061 FORAMINAL STENOSIS OF LUMBAR REGION: ICD-10-CM

## 2020-06-01 PROCEDURE — 62327 NJX INTERLAMINAR LMBR/SAC: CPT

## 2020-06-01 PROCEDURE — 74011000250 HC RX REV CODE- 250: Performed by: STUDENT IN AN ORGANIZED HEALTH CARE EDUCATION/TRAINING PROGRAM

## 2020-06-01 PROCEDURE — 74011250636 HC RX REV CODE- 250/636: Performed by: STUDENT IN AN ORGANIZED HEALTH CARE EDUCATION/TRAINING PROGRAM

## 2020-06-01 PROCEDURE — 74011636320 HC RX REV CODE- 636/320: Performed by: STUDENT IN AN ORGANIZED HEALTH CARE EDUCATION/TRAINING PROGRAM

## 2020-06-01 RX ORDER — LIDOCAINE HYDROCHLORIDE 20 MG/ML
10 INJECTION, SOLUTION INFILTRATION; PERINEURAL ONCE
Status: COMPLETED | OUTPATIENT
Start: 2020-06-01 | End: 2020-06-01

## 2020-06-01 RX ORDER — LIDOCAINE HYDROCHLORIDE 10 MG/ML
3 INJECTION, SOLUTION EPIDURAL; INFILTRATION; INTRACAUDAL; PERINEURAL ONCE
Status: COMPLETED | OUTPATIENT
Start: 2020-06-01 | End: 2020-06-01

## 2020-06-01 RX ORDER — DEXAMETHASONE SODIUM PHOSPHATE 10 MG/ML
10 INJECTION INTRAMUSCULAR; INTRAVENOUS ONCE
Status: COMPLETED | OUTPATIENT
Start: 2020-06-01 | End: 2020-06-01

## 2020-06-01 RX ADMIN — LIDOCAINE HYDROCHLORIDE 2 ML: 10 INJECTION, SOLUTION EPIDURAL; INFILTRATION; INTRACAUDAL; PERINEURAL at 14:11

## 2020-06-01 RX ADMIN — LIDOCAINE HYDROCHLORIDE 10 ML: 20 INJECTION, SOLUTION INFILTRATION; PERINEURAL at 14:11

## 2020-06-01 RX ADMIN — IOPAMIDOL 1 ML: 408 INJECTION, SOLUTION INTRATHECAL at 14:11

## 2020-06-01 RX ADMIN — DEXAMETHASONE SODIUM PHOSPHATE 10 MG: 10 INJECTION, SOLUTION INTRAMUSCULAR; INTRAVENOUS at 14:11

## 2020-06-01 NOTE — DISCHARGE INSTRUCTIONS
[facility]  Special Procedures/Radiology Department    Radiologist:  Dr. Christin Arthur    Date:  6/1/2020      Steroidal Injection      Go home and rest.     No vigorous physical activity today. Be aware that numbness and/or tingling can occur up to 24 hours after the injection. No driving today. Resume your previous diet. Resume your previous medications. Depending on your job, you may return to work in 25 to 48 hours. It may take up to one week after the injection to see a change or an improvement in your symptoms. Be sure to follow up with your physician. Tell your physician if the injection helped with your symptoms or if the injection did nothing for your symptoms. For minor discomfort, you can take Tylenol, as directed on the label.       If you have any questions or concerns, please call [phone] and ask for the nurse on-call

## 2020-06-01 NOTE — ROUTINE PROCESS
1325  Patient arrived ambulatory to Radiology Recovery, alert and oriented x 4.    1335  Dr. Maynor Luna at bedside evaluating patient and explaining procedure to patient with risks and benefits. Patient verbalized understanding and signed consent for procedure. 1415  Patient alert and oriented x 4, c/o numbness to lower extremities, able to move all extremities without difficulty. [phone]  Patient provided with verbal and written discharge instructions. Patient discharged via wheelchair with  to transport home.

## 2020-06-01 NOTE — H&P
Radiology History and Physical    Patient: Ely Hernadez 78 y.o. female       Chief Complaint: No chief complaint on file. History of Present Illness: Back pain    History:    Past Medical History:   Diagnosis Date    Cataracts, bilateral     Chronic pain     LOWER BACK    Colon cancer ([facility] [address] 2015    colectomy with ostomy, then reversed by DR ELVIN Valiente Colon polyps     Depression     did not do well on duloxetine    GERD (gastroesophageal reflux disease)     Glaucoma     Heart palpitations     MRSA infection 5/8/2019    Other ill-defined conditions(799.89)     GASTROPARESIS    [facility]     DR Malik Jimenez, remicade    Restless leg syndrome     Spinal stenosis 5/8/2011    Unspecified adverse effect of anesthesia     HEADACHE    UTI (lower urinary tract infection)      Family History   Problem Relation Age of Onset    Heart Disease Mother     Hypertension Mother     Arthritis-osteo Mother     Lung Disease Mother         BENIGN TUMOR    Heart Failure Mother     Diabetes Father     COPD Father     Cancer Father         LIP CANCER    Diabetes Sister     Heart Disease Sister         SOMETHING WITH HER HEART MUSCLE    Hypertension Sister     Diabetes Brother     Heart Disease Brother     Hypertension Brother     Pacemaker Brother         AND DEFIBRILLATOR    Stroke Brother     Anesth Problems Neg Hx      Social History     Socioeconomic History    Marital status:      Spouse name: Not on file    Number of children: Not on file    Years of education: Not on file    Highest education level: Not on file   Occupational History    Not on file   Social Needs    Financial resource strain: Not on file    Food insecurity     Worry: Not on file     Inability: Not on file    Transportation needs     Medical: Not on file     Non-medical: Not on file   Tobacco Use    Smoking status: Former Smoker     Packs/day: 1.00     Years: 20.00     Pack years: 20.00 Last attempt to quit: 3/7/1978     Years since quittin.2    Smokeless tobacco: Never Used   Substance and Sexual Activity    Alcohol use: Not Currently     Comment: maybe not that much    Drug use: No    Sexual activity: Not Currently   Lifestyle    Physical activity     Days per week: Not on file     Minutes per session: Not on file    Stress: Not on file   Relationships    Social connections     Talks on phone: Not on file     Gets together: Not on file     Attends Yarsanism service: Not on file     Active member of club or organization: Not on file     Attends meetings of clubs or organizations: Not on file     Relationship status: Not on file    Intimate partner violence     Fear of current or ex partner: Not on file     Emotionally abused: Not on file     Physically abused: Not on file     Forced sexual activity: Not on file   Other Topics Concern    Not on file   Social History Narrative    . REMY to Irais Blades        Allergies: Allergies   Allergen Reactions    Adhesive Tape-Silicones Other (comments)     \"Left skin raw\"    Statins-Hmg-Coa Reductase Inhibitors Myalgia     Intolerant to lipitor, crestor and pravastatin       Current Medications:  Current Outpatient Medications   Medication Sig    guaiFENesin (Mucinex) 1,200 mg Ta12 ER tablet Take 1,200 mg by mouth two (2) times a day.  furosemide (LASIX) 20 mg tablet Take 1 Tab by mouth daily.  dilTIAZem ER (CARDIZEM LA) 120 mg tablet Take 120 mg by mouth daily.  sertraline (ZOLOFT) 100 mg tablet Take 1 Tab by mouth daily.  pramipexole (MIRAPEX) 1 mg tablet TAKE 1.5 TABLET EVERY NIGHT    azaTHIOprine (IMURAN) 50 mg tablet Take 50 mg by mouth three (3) times daily.  HYDROcodone-acetaminophen (NORCO) 5-325 mg per tablet Take 0.5 Tabs by mouth as needed.  BIOTIN PO Take 1,000 mg by mouth every other day.  CALCIUM PO Take 1,000 mg by mouth daily.     cholecalciferol, vitamin D3, (VITAMIN D3) 2,000 unit tab Take  by mouth.    infliximab (REMICADE IV) by IntraVENous route every four (4) weeks.  predniSONE (DELTASONE) 1 mg tablet Take 2 Tabs by mouth daily.  naloxone (NARCAN) 4 mg/actuation nasal spray Use 1 spray intranasally, then discard. Repeat with new spray every 2 min as needed for opioid overdose symptoms, alternating nostrils. Indications: Decrease in Rate & Depth of Breathing due to Opioid Drug    calcium-cholecalciferol, D3, (CALCIUM 600 + D) tablet Take 1 Tab by mouth daily.  magnesium oxide 500 mg tab Take 1 Tab by mouth every Monday, Wednesday, Friday. Current Facility-Administered Medications   Medication Dose Route Frequency    iopamidoL (ISOVUE-M 200) 41 % contrast solution 1 mL  1 mL Intra artICUlar ONCE    lidocaine (XYLOCAINE) 20 mg/mL (2 %) injection 200 mg  10 mL SubCUTAneous ONCE    lidocaine (PF) (XYLOCAINE) 10 mg/mL (1 %) injection 3 mL  3 mL Epidural ONCE    dexamethasone (PF) (DECADRON) 10 mg/mL injection 10 mg  10 mg Intra artICUlar ONCE        Physical Exam:  There were no vitals taken for this visit. GENERAL: alert, cooperative, no distress, appears stated age  LUNG: clear to auscultation bilaterally  HEART: regular rate and rhythm  ABD: Non tender, non distended. Alerts:    Hospital Problems  Date Reviewed: 5/6/2019    None          Laboratory:    No results for input(s): HGB, HCT, WBC, PLT, INR, BUN, CREA, K, CRCLT, HGBEXT, HCTEXT, PLTEXT, INREXT in the last 72 hours. No lab exists for component: PTT, PT      Plan of Care/Planned Procedure:  Risks, benefits, and alternatives reviewed with patient and she agrees to proceed with the procedure.        Jonelle Holcomb MD

## 2020-06-09 ENCOUNTER — TELEPHONE (OUTPATIENT)
Dept: INTERNAL MEDICINE CLINIC | Age: 80
End: 2020-06-09

## 2020-06-12 ENCOUNTER — OFFICE VISIT (OUTPATIENT)
Dept: INTERNAL MEDICINE CLINIC | Age: 80
End: 2020-06-12

## 2020-06-12 VITALS
WEIGHT: 194 LBS | HEART RATE: 69 BPM | SYSTOLIC BLOOD PRESSURE: 138 MMHG | HEIGHT: 59 IN | OXYGEN SATURATION: 97 % | DIASTOLIC BLOOD PRESSURE: 76 MMHG | TEMPERATURE: 98 F | RESPIRATION RATE: 20 BRPM | BODY MASS INDEX: 39.11 KG/M2

## 2020-06-12 DIAGNOSIS — M48.061 SPINAL STENOSIS OF LUMBAR REGION WITHOUT NEUROGENIC CLAUDICATION: ICD-10-CM

## 2020-06-12 DIAGNOSIS — H25.9 SENILE CATARACT, UNSPECIFIED AGE-RELATED CATARACT TYPE, UNSPECIFIED LATERALITY: Primary | ICD-10-CM

## 2020-06-12 DIAGNOSIS — R06.02 SOB (SHORTNESS OF BREATH): ICD-10-CM

## 2020-06-12 DIAGNOSIS — E78.00 HYPERCHOLESTEROLEMIA: ICD-10-CM

## 2020-06-12 DIAGNOSIS — Z01.818 PRE-OPERATIVE EXAMINATION: ICD-10-CM

## 2020-06-12 DIAGNOSIS — H91.93 BILATERAL HEARING LOSS, UNSPECIFIED HEARING LOSS TYPE: ICD-10-CM

## 2020-06-12 DIAGNOSIS — K21.9 GASTROESOPHAGEAL REFLUX DISEASE, ESOPHAGITIS PRESENCE NOT SPECIFIED: ICD-10-CM

## 2020-06-12 DIAGNOSIS — M05.79 RHEUMATOID ARTHRITIS INVOLVING MULTIPLE SITES WITH POSITIVE RHEUMATOID FACTOR (HCC): ICD-10-CM

## 2020-06-12 NOTE — PROGRESS NOTES
Preoperative Evaluation    Date of Exam: 6/12/2020    Rollin Schirmer is a 78 y.o. female ([phone]) who presents for preoperative evaluation. Procedure/Surgery:Left Eye Cataract  Date of Procedure/Surgery: 6/22/2020  Surgeon: Patricio Chaudhary MD  Hospital/Surgical Facility: [facility]  Primary Physician: Elvi Carmen MD  Latex Allergy: no    Problem List:     Patient Active Problem List    Diagnosis Date Noted    MRSA infection 05/08/2019    Lumbar surgical wound fluid collection 05/06/2019    Lumbar stenosis 04/18/2019    Spinal stenosis, lumbar 04/16/2019    Severe obesity ([address] 01/16/2019    Osteoarthritis of right knee 05/09/2018    Restless leg syndrome     [facility] cancer ([facility] 01/01/2015    Heart palpitations     Glaucoma     RA (rheumatoid arthritis) ([facility]     Depression 05/08/2011    Impaired hearing 05/08/2011    Hypercholesterolemia 09/24/2010     Medical History:     Past Medical History:   Diagnosis Date    Cataracts, bilateral     Chronic pain     LOWER BACK    Colon cancer ([facility] 2015    colectomy with ostomy, then reversed by DR C. Tillie Ormond Colon polyps     Depression     did not do well on duloxetine    GERD (gastroesophageal reflux disease)     Glaucoma     Heart palpitations     MRSA infection 5/8/2019    Other ill-defined conditions(799.89)     GASTROPARESIS    [facility]     DR Mery Canada, remicade    Restless leg syndrome     Spinal stenosis 5/8/2011    Unspecified adverse effect of anesthesia     HEADACHE    UTI (lower urinary tract infection)      Allergies: Allergies   Allergen Reactions    Adhesive Tape-Silicones Other (comments)     \"Left skin raw\"    Statins-Hmg-Coa Reductase Inhibitors Myalgia     Intolerant to lipitor, crestor and pravastatin      Medications:     Current Outpatient Medications   Medication Sig    calcium carb/D3/magnesium/zinc (HOLDEN MAG ZINC PLUS D3 PO) Take  by mouth.     furosemide (LASIX) 20 mg tablet Take 1 Tab by mouth daily.  dilTIAZem ER (CARDIZEM LA) 120 mg tablet Take 120 mg by mouth daily.  pramipexole (MIRAPEX) 1 mg tablet TAKE 1.5 TABLET EVERY NIGHT    azaTHIOprine (IMURAN) 50 mg tablet Take 50 mg by mouth three (3) times daily.  HYDROcodone-acetaminophen (NORCO) 5-325 mg per tablet Take 0.5 Tabs by mouth as needed.  BIOTIN PO Take 1,000 mg by mouth every other day.  cholecalciferol, vitamin D3, (VITAMIN D3) 2,000 unit tab Take  by mouth.  infliximab (REMICADE IV) by IntraVENous route every four (4) weeks.  predniSONE (DELTASONE) 1 mg tablet Take 1 mg by mouth daily.  sertraline (ZOLOFT) 100 mg tablet Take 1 Tab by mouth daily.  naloxone (NARCAN) 4 mg/actuation nasal spray Use 1 spray intranasally, then discard. Repeat with new spray every 2 min as needed for opioid overdose symptoms, alternating nostrils. Indications: Decrease in Rate & Depth of Breathing due to Opioid Drug     No current facility-administered medications for this visit.       Surgical History:     Past Surgical History:   Procedure Laterality Date    HX APPENDECTOMY  2015    HX COLECTOMY  2015    WITH COLOSTOMY AND THEN REOMVAL OF COLOSTOMY    HX COLOSTOMY  2016    REVERSAL    HX COLOSTOMY TAKE DOWN      HX DILATION AND CURETTAGE      HX GI  9/2014    COLONOSCOPY    HX KNEE ARTHROSCOPY Left 1979    MENISCUS REPAIR    HX KNEE ARTHROSCOPY Left     HX KNEE ARTHROSCOPY Right 2005    HX KNEE REPLACEMENT Left 2005    HX KNEE REPLACEMENT Right 05/09/2018    HX LAP CHOLECYSTECTOMY  5/2005    HX LUMBAR LAMINECTOMY  2011    HX LUMBAR LAMINECTOMY  04/2019    HX ORTHOPAEDIC Right     HAND FX    HX TUBAL LIGATION  1979    HX WRIST FRACTURE TX Left     IR INJ/CATH THER SUBST LUM/SAC W IMG  6/1/2020    TOTAL HIP ARTHROPLASTY Right 3/2012     Social History:     Social History     Socioeconomic History    Marital status:      Spouse name: Not on file    Number of children: Not on file    Years of education: Not on file    Highest education level: Not on file   Tobacco Use    Smoking status: Former Smoker     Packs/day: 1.00     Years: 20.00     Pack years: 20.00     Last attempt to quit: 3/7/1978     Years since quittin.2    Smokeless tobacco: Never Used   Substance and Sexual Activity    Alcohol use: Not Currently     Comment: maybe not that much    Drug use: No    Sexual activity: Not Currently   Social History Narrative    . REMY to Bart William        Recent use of: NSAIDs    Anesthesia Complications: None  History of abnormal bleeding : None  History of Blood Transfusions: no  Health Care Directive or Living Will: no    REVIEW OF SYSTEMS:  A comprehensive review of systems was negative except for: some SARMIENTO and unchanged with the diltiazem and lasix. NO PND or orthopnea. NO chest pain or palpitations. Some vision changes. EXAM:   Visit Vitals  /76   Pulse 69   Temp 98 °F (36.7 °C) (Temporal)   Resp 20   Ht 4' 11\" (1.499 m)   Wt 194 lb (88 kg)   SpO2 97%   BMI 39.18 kg/m²     General appearance - alert, well appearing, and in no distress  Eyes - pupils equal and reactive, extraocular eye movements intact, cataract noted.    Ears - bilateral TM's and external ear canals normal  Nose - normal and patent, no erythema, discharge or polyps  Mouth - mucous membranes moist, pharynx normal without lesions  Neck - supple, no significant adenopathy  Lymphatics - no palpable lymphadenopathy, no hepatosplenomegaly  Chest - clear to auscultation, no wheezes, rales or rhonchi, symmetric air entry  Heart - normal rate and regular rhythm  Abdomen - soft, nontender, nondistended, no masses or organomegaly  Neurological - alert, oriented, normal speech, no focal findings or movement disorder noted  Musculoskeletal - no joint tenderness, deformity or swelling, osteoarthritic changes noted in both hands  Extremities - peripheral pulses normal, no pedal edema, no clubbing or cyanosis          IMPRESSION:   Diagnoses and all orders for this visit:    1. Senile cataract, unspecified age-related cataract type, unspecified laterality - OK for surgery. 2. Pre-operative examination    3. Rheumatoid arthritis involving multiple sites with positive rheumatoid factor (HCC) - stable    4. Gastroesophageal reflux disease, esophagitis presence not specified - stable    5. Hypercholesterolemia    6. Spinal stenosis of lumbar region without neurogenic claudication - stable    7. Bilateral hearing loss, unspecified hearing loss type - stable    8. SOB (shortness of breath) - Mild LVH but no change with the lasix. Will get PFT's and consider more treatment.   -     PFT DLCO; Future          Advised her to call back or return to office if symptoms worsen/change/persist.  Discussed expected course/resolution/complications of diagnosis in detail with patient. Medication risks/benefits/costs/interactions/alternatives discussed with patient. She was given an after visit summary which includes diagnoses, current medications, & vitals. She expressed understanding with the diagnosis and plan.      No contraindications to planned surgery    Sarah Castillo MD   6/12/2020

## 2020-06-17 ENCOUNTER — TELEPHONE (OUTPATIENT)
Dept: INTERNAL MEDICINE CLINIC | Age: 80
End: 2020-06-17

## 2020-06-17 DIAGNOSIS — R06.02 SOB (SHORTNESS OF BREATH): Primary | ICD-10-CM

## 2020-06-17 NOTE — TELEPHONE ENCOUNTER
Orders Placed This Encounter    PULMONARY FUNCTION TEST     Standing Status:   Future     Standing Expiration Date:   12/17/2020     The above orders were approved via [facility] per Dr. Sarah Willoughby, III.

## 2020-06-17 NOTE — TELEPHONE ENCOUNTER
Chief Complaint   Patient presents with    Order Clarification     Need to clarify patients order for PFT DLCO - states this is only a portion of PFT's and is a 15 minute test - does [facility] want to change to full PFT's (1 1/2 hr. Test).

## 2020-06-24 ENCOUNTER — TELEPHONE (OUTPATIENT)
Dept: INTERNAL MEDICINE CLINIC | Age: 80
End: 2020-06-24

## 2020-06-24 NOTE — TELEPHONE ENCOUNTER
Patient advised that she requires COVID19 testing prior to PFT's scheduled for Tuesday the 30th. Info provided for [facility] for testing. Patient says she cannot get there until likely Friday AM - advised that if results not back by Monday she may have to reschedule her PFT's, pt verbalized understanding.

## 2020-06-24 NOTE — TELEPHONE ENCOUNTER
Per [facility] patient needs to have COVID19 testing prior to PFT's on Monday - new protocol is for patients to go to designated [facility] (no appt required) and instruct that she needs covid testing prior to PFT's.

## 2020-06-24 NOTE — TELEPHONE ENCOUNTER
Dorothy Whitaker at [facility] [phone]     Says that pt needs to have a covid 19 test done by Monday.   Calling regarding PFT

## 2020-06-30 ENCOUNTER — OFFICE VISIT (OUTPATIENT)
Dept: PRIMARY CARE CLINIC | Age: 80
End: 2020-06-30

## 2020-06-30 VITALS — HEART RATE: 73 BPM | OXYGEN SATURATION: 94 % | TEMPERATURE: 97.8 F

## 2020-06-30 DIAGNOSIS — Z01.818 PRE-OP TESTING: Primary | ICD-10-CM

## 2020-06-30 DIAGNOSIS — Z11.59 SPECIAL SCREENING EXAMINATION FOR UNSPECIFIED VIRAL DISEASE: ICD-10-CM

## 2020-06-30 RX ORDER — PRAMIPEXOLE DIHYDROCHLORIDE 1 MG/1
TABLET ORAL
Qty: 135 TAB | Refills: 0 | Status: SHIPPED | OUTPATIENT
Start: 2020-06-30 | End: 2020-10-05 | Stop reason: SDUPTHER

## 2020-06-30 NOTE — PROGRESS NOTES
Patient is being seen at the [facility]. [facility]. Please see scanned documentation as well for further information. S:  Ms. Cameron Wilhelm presents for pre-op or pre-procedure testing for Covid 19. Patient has PFTs scheduled for 7/7/20. Patient denies current Covid type symptoms. O:    Visit Vitals  Pulse 73   Temp 97.8 °F (36.6 °C) (Oral)   SpO2 94%     Alert and oriented  No acute distress, no increased work of breathing  Normocephalic, atraumatic  Skin color normal  Calm and cooperative  Voice clear, conversant without shortness of breath    A/P:  Pre-op, Pre-procedure exam    Covid 19 testing performed  Patient understands she will be contacted if the results are positive. Follow up prn.

## 2020-07-05 LAB — SARS-COV-2, NAA: NOT DETECTED

## 2020-07-06 ENCOUNTER — TELEPHONE (OUTPATIENT)
Dept: INTERNAL MEDICINE CLINIC | Age: 80
End: 2020-07-06

## 2020-07-06 NOTE — TELEPHONE ENCOUNTER
Called patient and informed COVID test was negative - was for preop testing. Pt verbalized understanding.

## 2020-07-06 NOTE — TELEPHONE ENCOUNTER
[phone]    Please call with the Covid results .  The patient said someone was suppose to call her with the results and she still has not received a call

## 2020-07-06 NOTE — TELEPHONE ENCOUNTER
7/5/2020  7:35 AM - Jesus, [facility] Lab Results In     Component Value Flag Ref Range Units Status   SARS-CoV-2, YVONNE Not Detected   Not Detected  Final

## 2020-07-07 ENCOUNTER — HOSPITAL ENCOUNTER (OUTPATIENT)
Dept: PULMONOLOGY | Age: 80
Discharge: HOME | End: 2020-07-07
Attending: INTERNAL MEDICINE
Payer: MEDICARE

## 2020-07-07 DIAGNOSIS — R06.02 SOB (SHORTNESS OF BREATH): ICD-10-CM

## 2020-07-07 PROCEDURE — 94729 DIFFUSING CAPACITY: CPT

## 2020-07-07 PROCEDURE — 94375 RESPIRATORY FLOW VOLUME LOOP: CPT

## 2020-07-10 NOTE — PROCEDURES
[address]  PULMONARY FUNCTION TEST    Name:  Irwin Breen  MR#:  [id number]  :  1940  ACCOUNT #:  [de-identified]  DATE OF SERVICE:  2020      REASON FOR THE TEST:  Shortness of breath. Spirometry and lung volumes were performed and they reveal:  1. No air flow obstruction. 2.  No restrictive lung disease. 3.  Mild reduction in DLCO. 4.  Normal flow-volume loop.       Joni Dukes MD      [id number]  D:  07/10/2020 10:30  T:  07/10/2020 12:39  JOB #:  [id number]  CC:  Dalila Delgadillo MD

## 2020-07-14 ENCOUNTER — TELEPHONE (OUTPATIENT)
Dept: INTERNAL MEDICINE CLINIC | Age: 80
End: 2020-07-14

## 2020-07-14 RX ORDER — METOPROLOL TARTRATE 25 MG/1
25 TABLET, FILM COATED ORAL DAILY
COMMUNITY
End: 2021-06-25 | Stop reason: ALTCHOICE

## 2020-07-14 NOTE — TELEPHONE ENCOUNTER
Spoke with patient and notified per Dr. Magy Alvarezy PFT's okay to see pulmonary if SOB persists. Patient states she had a f/u with her heart doctor and he d/c her diltiazem and added metoprolol 25 mg and advised work on diet and exercise. Med rec updated. She will call us back is sx worsen or fail to improve to get names for pulmonary.

## 2020-08-25 ENCOUNTER — OFFICE VISIT (OUTPATIENT)
Dept: INTERNAL MEDICINE CLINIC | Age: 80
End: 2020-08-25
Payer: MEDICARE

## 2020-08-25 VITALS
DIASTOLIC BLOOD PRESSURE: 70 MMHG | HEART RATE: 66 BPM | SYSTOLIC BLOOD PRESSURE: 138 MMHG | OXYGEN SATURATION: 99 % | BODY MASS INDEX: 38.38 KG/M2 | WEIGHT: 190 LBS | TEMPERATURE: 97.8 F | RESPIRATION RATE: 16 BRPM

## 2020-08-25 DIAGNOSIS — H26.9 CATARACT OF LEFT EYE, UNSPECIFIED CATARACT TYPE: Primary | ICD-10-CM

## 2020-08-25 DIAGNOSIS — Z01.818 PRE-OP EVALUATION: ICD-10-CM

## 2020-08-25 PROCEDURE — G0463 HOSPITAL OUTPT CLINIC VISIT: HCPCS | Performed by: NURSE PRACTITIONER

## 2020-08-25 PROCEDURE — G9717 DOC PT DX DEP/BP F/U NT REQ: HCPCS | Performed by: NURSE PRACTITIONER

## 2020-08-25 PROCEDURE — G8754 DIAS BP LESS 90: HCPCS | Performed by: NURSE PRACTITIONER

## 2020-08-25 PROCEDURE — 1090F PRES/ABSN URINE INCON ASSESS: CPT | Performed by: NURSE PRACTITIONER

## 2020-08-25 PROCEDURE — 99214 OFFICE O/P EST MOD 30 MIN: CPT | Performed by: NURSE PRACTITIONER

## 2020-08-25 PROCEDURE — G8417 CALC BMI ABV UP PARAM F/U: HCPCS | Performed by: NURSE PRACTITIONER

## 2020-08-25 PROCEDURE — G8400 PT W/DXA NO RESULTS DOC: HCPCS | Performed by: NURSE PRACTITIONER

## 2020-08-25 PROCEDURE — G8752 SYS BP LESS 140: HCPCS | Performed by: NURSE PRACTITIONER

## 2020-08-25 PROCEDURE — G8427 DOCREV CUR MEDS BY ELIG CLIN: HCPCS | Performed by: NURSE PRACTITIONER

## 2020-08-25 PROCEDURE — 1101F PT FALLS ASSESS-DOCD LE1/YR: CPT | Performed by: NURSE PRACTITIONER

## 2020-08-25 PROCEDURE — G8536 NO DOC ELDER MAL SCRN: HCPCS | Performed by: NURSE PRACTITIONER

## 2020-08-25 RX ORDER — PROMETHAZINE HYDROCHLORIDE 25 MG/1
25 TABLET ORAL
COMMUNITY
Start: 2019-08-28 | End: 2021-06-25 | Stop reason: ALTCHOICE

## 2020-08-25 RX ORDER — PREDNISONE 5 MG/1
TABLET ORAL
COMMUNITY
End: 2020-12-04 | Stop reason: ALTCHOICE

## 2020-08-25 RX ORDER — SUMATRIPTAN 6 MG/.5ML
6 INJECTION, SOLUTION SUBCUTANEOUS
COMMUNITY
Start: 2019-08-28

## 2020-08-25 NOTE — PROGRESS NOTES
Verified name and birth date for privacy precautions. Chart reviewed in preparation for today's visit.      Chief Complaint   Patient presents with    Pre-op Exam     cataract removal 9/14/2020 left eye           Health Maintenance Due   Topic    DTaP/Tdap/Td series (1 - Tdap)    Shingrix Vaccine Age 50> (1 of 2)    Bone Densitometry (Dexa) Screening     Medicare Yearly Exam     GLAUCOMA SCREENING Q2Y          Wt Readings from Last 3 Encounters:   08/25/20 190 lb (86.2 kg)   06/12/20 194 lb (88 kg)   06/01/20 185 lb (83.9 kg)     Temp Readings from Last 3 Encounters:   08/25/20 97.8 °F (36.6 °C) (Temporal)   06/30/20 97.8 °F (36.6 °C) (Oral)   06/12/20 98 °F (36.7 °C) (Temporal)     BP Readings from Last 3 Encounters:   08/25/20 138/70   06/12/20 138/76   06/01/20 156/67     Pulse Readings from Last 3 Encounters:   08/25/20 66   06/30/20 73   06/12/20 69         Learning Assessment:  :     Learning Assessment 2/24/2014   PRIMARY LEARNER Patient   HIGHEST LEVEL OF EDUCATION - PRIMARY LEARNER  SOME COLLEGE   BARRIERS PRIMARY LEARNER NONE   CO-LEARNER CAREGIVER No   PRIMARY LANGUAGE ENGLISH   LEARNER PREFERENCE PRIMARY READING   ANSWERED BY patient   RELATIONSHIP SELF       Depression Screening:  :     3 most recent PHQ Screens 8/25/2020   Little interest or pleasure in doing things Not at all   Feeling down, depressed, irritable, or hopeless Not at all   Total Score PHQ 2 0   Trouble falling or staying asleep, or sleeping too much -   Feeling tired or having little energy -   Poor appetite, weight loss, or overeating -   Feeling bad about yourself - or that you are a failure or have let yourself or your family down -   Trouble concentrating on things such as school, work, reading, or watching TV -   Moving or speaking so slowly that other people could have noticed; or the opposite being so fidgety that others notice -   Thoughts of being better off dead, or hurting yourself in some way -   PHQ 9 Score -   How difficult have these problems made it for you to do your work, take care of your home and get along with others -

## 2020-09-11 LAB — CREATININE, EXTERNAL: 0.8

## 2020-09-21 ENCOUNTER — CLINICAL SUPPORT (OUTPATIENT)
Dept: INTERNAL MEDICINE CLINIC | Age: 80
End: 2020-09-21

## 2020-09-21 DIAGNOSIS — Z23 NEEDS FLU SHOT: Primary | ICD-10-CM

## 2020-09-21 NOTE — PATIENT INSTRUCTIONS
Vaccine Information Statement    Influenza (Flu) Vaccine (Inactivated or Recombinant): What You Need to Know    Many Vaccine Information Statements are available in [place] and other languages. See www.immunize.org/vis  Hojas de información sobre vacunas están disponibles en español y en muchos otros idiomas. Visite www.immunize.org/vis    1. Why get vaccinated? Influenza vaccine can prevent influenza (flu). Flu is a contagious disease that spreads around the [facility] every year, usually between October and May. Anyone can get the flu, but it is more dangerous for some people. Infants and young children, people 72years of age and older, pregnant women, and people with certain health conditions or a weakened immune system are at greatest risk of flu complications. Pneumonia, bronchitis, sinus infections and ear infections are examples of flu-related complications. If you have a medical condition, such as heart disease, cancer or diabetes, flu can make it worse. Flu can cause fever and chills, sore throat, muscle aches, fatigue, cough, headache, and runny or stuffy nose. Some people may have vomiting and diarrhea, though this is more common in children than adults. Each year thousands of people in the [facility] die from flu, and many more are hospitalized. Flu vaccine prevents millions of illnesses and flu-related visits to the doctor each year. 2. Influenza vaccines     CDC recommends everyone 10months of age and older get vaccinated every flu season. Children 6 months through 6years of age may need 2 doses during a single flu season. Everyone else needs only 1 dose each flu season. It takes about 2 weeks for protection to develop after vaccination. There are many flu viruses, and they are always changing. Each year a new flu vaccine is made to protect against three or four viruses that are likely to cause disease in the upcoming flu season.  Even when the vaccine doesnt exactly match these viruses, it may still provide some protection. Influenza vaccine does not cause flu. Influenza vaccine may be given at the same time as other vaccines. 3. Talk with your health care provider    Tell your vaccine provider if the person getting the vaccine:   Has had an allergic reaction after a previous dose of influenza vaccine, or has any severe, life-threatening allergies.  Has ever had Guillain-Barré Syndrome (also called GBS). In some cases, your health care provider may decide to postpone influenza vaccination to a future visit. People with minor illnesses, such as a cold, may be vaccinated. People who are moderately or severely ill should usually wait until they recover before getting influenza vaccine. Your health care provider can give you more information. 4. Risks of a reaction     Soreness, redness, and swelling where shot is given, fever, muscle aches, and headache can happen after influenza vaccine.  There may be a very small increased risk of Guillain-Barré Syndrome (GBS) after inactivated influenza vaccine (the flu shot). Rebecca Rossi children who get the flu shot along with pneumococcal vaccine (PCV13), and/or DTaP vaccine at the same time might be slightly more likely to have a seizure caused by fever. Tell your health care provider if a child who is getting flu vaccine has ever had a seizure. People sometimes faint after medical procedures, including vaccination. Tell your provider if you feel dizzy or have vision changes or ringing in the ears. As with any medicine, there is a very remote chance of a vaccine causing a severe allergic reaction, other serious injury, or death. 5. What if there is a serious problem? An allergic reaction could occur after the vaccinated person leaves the clinic.  If you see signs of a severe allergic reaction (hives, swelling of the face and throat, difficulty breathing, a fast heartbeat, dizziness, or weakness), call 9-1-1 and get the person to the nearest hospital.    For other signs that concern you, call your health care provider. Adverse reactions should be reported to the Vaccine Adverse Event Reporting System (VAERS). Your health care provider will usually file this report, or you can do it yourself. Visit the VAERS website at www.vaers. [facility].gov or call 0-205.705.7867. VAERS is only for reporting reactions, and VAERS staff do not give medical advice. 6. The National Vaccine Injury Compensation Program    The [facility] Vaccine Injury Compensation Program (VICP) is a federal program that was created to compensate people who may have been injured by certain vaccines. Visit the VICP website at www.hrsa.gov/vaccinecompensation or call 3-817.773.7806 to learn about the program and about filing a claim. There is a time limit to file a claim for compensation. 7. How can I learn more?  Ask your health care provider.  Call your local or state health department.  Contact the Centers for Disease Control and Prevention (CDC):  - Call 1-637.696.3612 (1-800-CDC-INFO) or  - Visit CDCs influenza website at www.cdc.gov/flu    Vaccine Information Statement (Interim)  Inactivated Influenza Vaccine   8/15/2019  [address]   Department of Health and Human Services  Centers for Disease Control and Prevention    Office Use Only

## 2020-10-06 RX ORDER — PRAMIPEXOLE DIHYDROCHLORIDE 1 MG/1
TABLET ORAL
Qty: 135 TAB | Refills: 0 | Status: SHIPPED | OUTPATIENT
Start: 2020-10-06 | End: 2020-12-04 | Stop reason: ALTCHOICE

## 2020-11-17 ENCOUNTER — TELEPHONE (OUTPATIENT)
Dept: INTERNAL MEDICINE CLINIC | Age: 80
End: 2020-11-17

## 2020-11-17 NOTE — TELEPHONE ENCOUNTER
Patient is asking if Dr. Lathan Mcardle would write her prescription for hydrocodone which Dr. Elyssa Santizo used to write -- she switched to another doctor closer to home and they do not write those scripts but the new doctor, Dr. Timoteo Agarwal (arthritis) said she was fine with her taking it. Patient only uses this med when she needs it. Patient states she is not getting infusions any longer.     If Dr. Lathan Mcardle agrees, please send to [facility] in [address] (this pharm is listed in pt's chart now)

## 2020-12-01 ENCOUNTER — TELEPHONE (OUTPATIENT)
Dept: INTERNAL MEDICINE CLINIC | Age: 80
End: 2020-12-01

## 2020-12-01 NOTE — TELEPHONE ENCOUNTER
Karlee Mayers (Self) [phone] (H)     Pt is requesting a rx for duloxitine 30 mg sent to [address] [facility] for her . She one of her other dr filled it for her last time.

## 2020-12-03 ENCOUNTER — TELEPHONE (OUTPATIENT)
Dept: INTERNAL MEDICINE CLINIC | Age: 80
End: 2020-12-03

## 2020-12-04 ENCOUNTER — OFFICE VISIT (OUTPATIENT)
Dept: INTERNAL MEDICINE CLINIC | Age: 80
End: 2020-12-04
Payer: MEDICARE

## 2020-12-04 VITALS
BODY MASS INDEX: 37.7 KG/M2 | OXYGEN SATURATION: 97 % | WEIGHT: 187 LBS | TEMPERATURE: 97.5 F | SYSTOLIC BLOOD PRESSURE: 168 MMHG | DIASTOLIC BLOOD PRESSURE: 86 MMHG | RESPIRATION RATE: 18 BRPM | HEIGHT: 59 IN | HEART RATE: 74 BPM

## 2020-12-04 DIAGNOSIS — M05.79 RHEUMATOID ARTHRITIS INVOLVING MULTIPLE SITES WITH POSITIVE RHEUMATOID FACTOR (HCC): Primary | ICD-10-CM

## 2020-12-04 DIAGNOSIS — M48.061 SPINAL STENOSIS OF LUMBAR REGION WITHOUT NEUROGENIC CLAUDICATION: ICD-10-CM

## 2020-12-04 DIAGNOSIS — C18.9 MALIGNANT NEOPLASM OF COLON, UNSPECIFIED PART OF COLON (HCC): ICD-10-CM

## 2020-12-04 DIAGNOSIS — F34.1 DYSTHYMIA: ICD-10-CM

## 2020-12-04 DIAGNOSIS — E66.01 SEVERE OBESITY (HCC): ICD-10-CM

## 2020-12-04 PROCEDURE — G9717 DOC PT DX DEP/BP F/U NT REQ: HCPCS | Performed by: INTERNAL MEDICINE

## 2020-12-04 PROCEDURE — 1101F PT FALLS ASSESS-DOCD LE1/YR: CPT | Performed by: INTERNAL MEDICINE

## 2020-12-04 PROCEDURE — 99214 OFFICE O/P EST MOD 30 MIN: CPT | Performed by: INTERNAL MEDICINE

## 2020-12-04 PROCEDURE — G8536 NO DOC ELDER MAL SCRN: HCPCS | Performed by: INTERNAL MEDICINE

## 2020-12-04 PROCEDURE — G8427 DOCREV CUR MEDS BY ELIG CLIN: HCPCS | Performed by: INTERNAL MEDICINE

## 2020-12-04 PROCEDURE — G8400 PT W/DXA NO RESULTS DOC: HCPCS | Performed by: INTERNAL MEDICINE

## 2020-12-04 PROCEDURE — G8753 SYS BP > OR = 140: HCPCS | Performed by: INTERNAL MEDICINE

## 2020-12-04 PROCEDURE — G8754 DIAS BP LESS 90: HCPCS | Performed by: INTERNAL MEDICINE

## 2020-12-04 PROCEDURE — 1090F PRES/ABSN URINE INCON ASSESS: CPT | Performed by: INTERNAL MEDICINE

## 2020-12-04 PROCEDURE — G0463 HOSPITAL OUTPT CLINIC VISIT: HCPCS | Performed by: INTERNAL MEDICINE

## 2020-12-04 PROCEDURE — G8417 CALC BMI ABV UP PARAM F/U: HCPCS | Performed by: INTERNAL MEDICINE

## 2020-12-04 RX ORDER — PREDNISONE 1 MG/1
1 TABLET ORAL DAILY
Refills: 0 | COMMUNITY
Start: 2020-12-04 | End: 2021-03-15 | Stop reason: ALTCHOICE

## 2020-12-04 RX ORDER — DULOXETIN HYDROCHLORIDE 30 MG/1
30 CAPSULE, DELAYED RELEASE ORAL DAILY
Qty: 90 CAP | Refills: 1 | Status: SHIPPED | OUTPATIENT
Start: 2020-12-04 | End: 2021-06-10

## 2020-12-04 RX ORDER — TIZANIDINE 2 MG/1
2-4 TABLET ORAL
COMMUNITY
Start: 2020-12-04 | End: 2021-06-25 | Stop reason: ALTCHOICE

## 2020-12-04 RX ORDER — HYDROCODONE BITARTRATE AND ACETAMINOPHEN 5; 325 MG/1; MG/1
0.5 TABLET ORAL
Qty: 20 TAB | Refills: 0 | Status: SHIPPED | OUTPATIENT
Start: 2020-12-04 | End: 2021-02-05 | Stop reason: SDUPTHER

## 2020-12-04 RX ORDER — DEXTROMETHORPHAN HYDROBROMIDE, GUAIFENESIN 5; 100 MG/5ML; MG/5ML
650 LIQUID ORAL
COMMUNITY

## 2020-12-04 NOTE — PROGRESS NOTES
HPI:  Queta Bonilla is a 78y.o. year old female who is here for a routine visit:    Presents for follow-up visit. She recently has had difficulty with her rheumatologist.  She was seeing Dr. Quentin Whyte but decided to switch to Dr. Leobardo Herr to be closer to home. They stopped all of her medications including her prednisone. Recently she treated her with a Medrol Dosepak which made very little difference in her symptoms. She refused also to give her her hydrocodone which she has taken for years and small doses for severe pain. She is recently fallen and broken her left ankle and is seeing podiatry for that. She has a follow-up visit scheduled for today. She has had increasing pain across the neck and shoulders as well as the lower back. She has had increased pain in both hands. Some pain in both knees as well as her left ankle. She denies any fevers and or chills. Denies any nausea or vomiting. No change in bowel habits. Past Medical History:   Diagnosis Date    Cataracts, bilateral     Chronic pain     LOWER BACK    Colon cancer ([facility] [address] 2015    colectomy with ostomy, then reversed by DR ELVIN Hyde Colon polyps     Depression     did not do well on duloxetine    GERD (gastroesophageal reflux disease)     Glaucoma     Heart palpitations     MRSA infection 5/8/2019    Other ill-defined conditions(799.89)     GASTROPARESIS    RA (rheumatoid arthritis) ([facility] [address]     DR Lovella Prader, remicade    Restless leg syndrome     Spinal stenosis 5/8/2011    Unspecified adverse effect of anesthesia     HEADACHE    UTI (lower urinary tract infection)        Past Surgical History:   Procedure Laterality Date    HX APPENDECTOMY  2015    HX CATARACT REMOVAL Right 06/2020    [facility]    HX COLECTOMY  2015    WITH COLOSTOMY AND THEN REOMVAL OF COLOSTOMY    HX COLOSTOMY  2016    REVERSAL    HX COLOSTOMY TAKE DOWN      HX DILATION AND CURETTAGE      HX GI  9/2014    COLONOSCOPY    HX KNEE ARTHROSCOPY Left 1979    MENISCUS REPAIR    HX KNEE ARTHROSCOPY Left     HX KNEE ARTHROSCOPY Right 2005    HX KNEE REPLACEMENT Left 2005    HX KNEE REPLACEMENT Right 05/09/2018    HX LAP CHOLECYSTECTOMY  5/2005    HX LUMBAR LAMINECTOMY  2011    HX LUMBAR LAMINECTOMY  59/3197    complicated by MRSA infection    HX ORTHOPAEDIC Right     HAND FX    HX TUBAL LIGATION  1979    HX WRIST FRACTURE TX Left     IR INJ/CATH THER SUBST LUM/SAC W IMG  6/1/2020    TOTAL HIP ARTHROPLASTY Right 3/2012       Prior to Admission medications    Medication Sig Start Date End Date Taking? Authorizing Provider   acetaminophen (Tylenol Arthritis Pain) 650 mg TbER Take 650 mg by mouth every eight (8) hours. Yes Provider, Historical   DULoxetine (CYMBALTA) 30 mg capsule Take 1 Cap by mouth daily. 12/4/20  Yes Janice Carter MD   tiZANidine (ZANAFLEX) 2 mg tablet Take 1-2 Tabs by mouth nightly. 12/4/20  Yes Janice Carter MD   HYDROcodone-acetaminophen [facility]) 5-325 mg per tablet Take 0.5 Tabs by mouth every eight (8) hours as needed for Pain for up to 30 days. Max Daily Amount: 1.5 Tabs. 12/4/20 1/3/21 Yes Sheri Sullivan III, MD   predniSONE (DELTASONE) 1 mg tablet Take 1 Tab by mouth daily. 12/4/20  Yes Janice Carter MD   promethazine (PHENERGAN) 25 mg tablet Take 25 mg by mouth every six (6) hours as needed. 8/28/19  Yes Provider, Historical   SUMAtriptan (IMITREX) 6 mg/0.5 mL injection 6 mg by SubCUTAneous route once. 8/28/19  Yes Provider, Historical   potassium 99 mg tablet Take 99 mg by mouth daily. Yes Provider, Historical   metoprolol tartrate (LOPRESSOR) 25 mg tablet Take 12.5 mg by mouth two (2) times daily (with meals). Yes Provider, Historical   calcium carb/D3/magnesium/zinc (HOLDEN MAG ZINC PLUS D3 PO) Take  by mouth. Yes Provider, Historical   BIOTIN PO Take 1,000 mg by mouth every other day.    Yes Provider, Historical   cholecalciferol, vitamin D3, (VITAMIN D3) 2,000 unit tab Take  by mouth.   Yes Provider, Historical   pramipexole (MIRAPEX) 1 mg tablet TAKE 1 & 1/2 (ONE & ONE-HALF) TABLETS BY MOUTH ONCE DAILY AT NIGHT 10/6/20 12/4/20  Nico Enriquez III, MD   predniSONE (DELTASONE) 5 mg tablet Take  by mouth. 20  Provider, Historical   furosemide (LASIX) 20 mg tablet Take 1 Tab by mouth daily. 20  Sukhi Ramírez MD   azaTHIOprine (IMURAN) 50 mg tablet Take 50 mg by mouth three (3) times daily. 19  Provider, Historical   HYDROcodone-acetaminophen (NORCO) 5-325 mg per tablet Take 0.5 Tabs by mouth as needed. 19  Provider, Historical   infliximab (REMICADE IV) by IntraVENous route every four (4) weeks. 20  Provider, Historical   predniSONE (DELTASONE) 1 mg tablet Take 1 mg by mouth daily. 19  Sukhi Ramírez MD   naloxone [facility]) 4 mg/actuation nasal spray Use 1 spray intranasally, then discard. Repeat with new spray every 2 min as needed for opioid overdose symptoms, alternating nostrils.   Indications: Decrease in Rate & Depth of Breathing due to Opioid Drug 19  Diego Menendez, PA       Social History     Socioeconomic History    Marital status:      Spouse name: Not on file    Number of children: Not on file    Years of education: Not on file    Highest education level: Not on file   Occupational History    Not on file   Social Needs    Financial resource strain: Not on file    Food insecurity     Worry: Not on file     Inability: Not on file    Transportation needs     Medical: Not on file     Non-medical: Not on file   Tobacco Use    Smoking status: Former Smoker     Packs/day: 1.00     Years: 20.00     Pack years: 20.00     Last attempt to quit: 3/7/1978     Years since quittin.7    Smokeless tobacco: Never Used   Substance and Sexual Activity    Alcohol use: Not Currently     Comment: maybe not that much    Drug use: No    Sexual activity: Not Currently   Lifestyle    Physical activity     Days per week: Not on file     Minutes per session: Not on file    Stress: Not on file   Relationships    Social connections     Talks on phone: Not on file     Gets together: Not on file     Attends Denominational service: Not on file     Active member of club or organization: Not on file     Attends meetings of clubs or organizations: Not on file     Relationship status: Not on file    Intimate partner violence     Fear of current or ex partner: Not on file     Emotionally abused: Not on file     Physically abused: Not on file     Forced sexual activity: Not on file   Other Topics Concern    Not on file   Social History Narrative    . REMY to Lazaro JARRETT  Per HPI    Visit Vitals  BP (!) 168/86   Pulse 74   Temp 97.5 °F (36.4 °C) (Temporal)   Resp 18   Ht 4' 11\" (1.499 m)   Wt 187 lb (84.8 kg)   SpO2 97%   BMI 37.77 kg/m²         Physical Exam   Physical Examination: General appearance - alert, well appearing, and in no distress  Chest - clear to auscultation, no wheezes, rales or rhonchi, symmetric air entry  Heart - normal rate and regular rhythm  Neurological - alert, oriented, normal speech, no focal findings or movement disorder noted, motor and sensory grossly normal bilaterally  She does have tenderness across the metacarpophalangeal joints of both hands. No significant swelling. She has tenderness across the shoulders, upper back,  and lower back. Some tenderness across both knees as well. No joint effusions. Assessment/Plan:  Diagnoses and all orders for this visit:    1. Rheumatoid arthritis involving multiple sites with positive rheumatoid factor (HCC)she wishes to go back to her previous rheumatologist.  I elected to put her back on low-dose prednisone that she taken in the past with good success.   We will also provide a few hydrocodone until she can get back into see her rheumatologist.  Encouraged her to make a decision about which rheumatology she is going to see for the long-term. -     HYDROcodone-acetaminophen (NORCO) 5-325 mg per tablet; Take 0.5 Tabs by mouth every eight (8) hours as needed for Pain for up to 30 days. Max Daily Amount: 1.5 Tabs. 2. Spinal stenosis of lumbar region without neurogenic claudication  -     HYDROcodone-acetaminophen (NORCO) 5-325 mg per tablet; Take 0.5 Tabs by mouth every eight (8) hours as needed for Pain for up to 30 days. Max Daily Amount: 1.5 Tabs. 3. Dysthymiadoes better on Cymbalta 30 mg. She felt more agitated on 60 mg. We will refill that for now. -     DULoxetine (CYMBALTA) 30 mg capsule; Take 1 Cap by mouth daily. 4. Malignant neoplasm of colon, unspecified part of colon (HCC)has a follow-up visit for her follow-up colonoscopy in the next few weeks. 5. Severe obesity (HCC)weight improved. We will continue to work on diet and exercise for that. Advised her to call back or return to office if symptoms worsen/change/persist.  Discussed expected course/resolution/complications of diagnosis in detail with patient. Medication risks/benefits/costs/interactions/alternatives discussed with patient. She was given an after visit summary which includes diagnoses, current medications, & vitals. She expressed understanding with the diagnosis and plan.

## 2020-12-14 ENCOUNTER — TELEPHONE (OUTPATIENT)
Dept: INTERNAL MEDICINE CLINIC | Age: 80
End: 2020-12-14

## 2020-12-14 NOTE — TELEPHONE ENCOUNTER
Taran Meyer (Self) [phone] (H)     Pt says that she has decided that she was going to stay with dr Jah Jerry rheumatologist she is seeing in [place].

## 2021-02-05 DIAGNOSIS — M48.061 SPINAL STENOSIS OF LUMBAR REGION WITHOUT NEUROGENIC CLAUDICATION: ICD-10-CM

## 2021-02-05 DIAGNOSIS — M05.79 RHEUMATOID ARTHRITIS INVOLVING MULTIPLE SITES WITH POSITIVE RHEUMATOID FACTOR (HCC): ICD-10-CM

## 2021-02-05 NOTE — TELEPHONE ENCOUNTER
Patient asked if Dr. Vincent Lomax could refill this to last until her appt with pain management on the 23rd. She has 3 remaining pills. Patient states she can barely walk -- called [facility] this morning but they would not take her to the ER since her condtion was not life threatening. They suggested she call her doctor for pain med.

## 2021-02-08 RX ORDER — HYDROCODONE BITARTRATE AND ACETAMINOPHEN 5; 325 MG/1; MG/1
0.5 TABLET ORAL
Qty: 20 TAB | Refills: 0 | Status: SHIPPED | OUTPATIENT
Start: 2021-02-08 | End: 2021-03-10

## 2021-03-05 ENCOUNTER — TELEPHONE (OUTPATIENT)
Dept: INTERNAL MEDICINE CLINIC | Age: 81
End: 2021-03-05

## 2021-03-15 ENCOUNTER — OFFICE VISIT (OUTPATIENT)
Dept: INTERNAL MEDICINE CLINIC | Age: 81
End: 2021-03-15
Payer: MEDICARE

## 2021-03-15 VITALS
HEIGHT: 59 IN | BODY MASS INDEX: 37.5 KG/M2 | OXYGEN SATURATION: 97 % | WEIGHT: 186 LBS | HEART RATE: 59 BPM | RESPIRATION RATE: 18 BRPM | TEMPERATURE: 97.3 F | DIASTOLIC BLOOD PRESSURE: 82 MMHG | SYSTOLIC BLOOD PRESSURE: 138 MMHG

## 2021-03-15 DIAGNOSIS — M05.79 RHEUMATOID ARTHRITIS INVOLVING MULTIPLE SITES WITH POSITIVE RHEUMATOID FACTOR (HCC): ICD-10-CM

## 2021-03-15 DIAGNOSIS — M48.061 SPINAL STENOSIS OF LUMBAR REGION WITHOUT NEUROGENIC CLAUDICATION: Primary | ICD-10-CM

## 2021-03-15 DIAGNOSIS — E78.00 HYPERCHOLESTEROLEMIA: ICD-10-CM

## 2021-03-15 DIAGNOSIS — J98.11 ATELECTASIS: ICD-10-CM

## 2021-03-15 PROCEDURE — G9717 DOC PT DX DEP/BP F/U NT REQ: HCPCS | Performed by: INTERNAL MEDICINE

## 2021-03-15 PROCEDURE — G8417 CALC BMI ABV UP PARAM F/U: HCPCS | Performed by: INTERNAL MEDICINE

## 2021-03-15 PROCEDURE — 1101F PT FALLS ASSESS-DOCD LE1/YR: CPT | Performed by: INTERNAL MEDICINE

## 2021-03-15 PROCEDURE — 99214 OFFICE O/P EST MOD 30 MIN: CPT | Performed by: INTERNAL MEDICINE

## 2021-03-15 PROCEDURE — G8427 DOCREV CUR MEDS BY ELIG CLIN: HCPCS | Performed by: INTERNAL MEDICINE

## 2021-03-15 PROCEDURE — 1090F PRES/ABSN URINE INCON ASSESS: CPT | Performed by: INTERNAL MEDICINE

## 2021-03-15 PROCEDURE — G0463 HOSPITAL OUTPT CLINIC VISIT: HCPCS | Performed by: INTERNAL MEDICINE

## 2021-03-15 PROCEDURE — G8400 PT W/DXA NO RESULTS DOC: HCPCS | Performed by: INTERNAL MEDICINE

## 2021-03-15 PROCEDURE — G8536 NO DOC ELDER MAL SCRN: HCPCS | Performed by: INTERNAL MEDICINE

## 2021-03-15 RX ORDER — LEFLUNOMIDE 20 MG/1
TABLET ORAL
COMMUNITY
Start: 2021-02-27 | End: 2022-04-15 | Stop reason: ALTCHOICE

## 2021-03-15 RX ORDER — GABAPENTIN 300 MG/1
300 CAPSULE ORAL
Qty: 120 CAP | Refills: 2 | Status: SHIPPED | OUTPATIENT
Start: 2021-03-15 | End: 2021-06-25 | Stop reason: ALTCHOICE

## 2021-03-15 RX ORDER — HYDROCODONE BITARTRATE AND ACETAMINOPHEN 5; 325 MG/1; MG/1
.5-1 TABLET ORAL
Qty: 10 TAB | Refills: 0 | Status: SHIPPED | OUTPATIENT
Start: 2021-03-15 | End: 2021-04-14

## 2021-03-15 NOTE — PROGRESS NOTES
HPI:  Lisette Ordonez is a 78y.o. year old female who is here for a routine visit:    Presents for a follow-up visit after recent emergency room visit for uncontrolled pain. She was having pain in her left shoulder and previously had a had lower back pain with left leg radiculopathy. She is now on Laura and getting Orencia infusions that just started. She is not on any prednisone currently. She does see the rheumatologist tomorrow for follow-up for her infusion. Her pain is not well controlled. She continues to have left shoulder discomfort and lower back pain. She has an appointment for a spinal injection in April. In the hospital they increased her gabapentin to 3 times a day but she has not been taking it that way. She did get a few hydrocodone in the emergency room but has completed those. She was also found to have atelectasis and a low oxygen level. These have now resolved with deep breathing. Past Medical History:   Diagnosis Date    Cataracts, bilateral     Chronic pain     LOWER BACK    Colon cancer ([facility] [address] 2015    colectomy with ostomy, then reversed by DR ELVIN Tang Colon polyps     Depression     did not do well on duloxetine    GERD (gastroesophageal reflux disease)     Glaucoma     Heart palpitations     MRSA infection 5/8/2019    Other ill-defined conditions(799.89)     GASTROPARESIS    RA (rheumatoid arthritis) ([facility] [address]     DR Reyna Connor, remicade    Restless leg syndrome     Spinal stenosis 5/8/2011    Unspecified adverse effect of anesthesia     HEADACHE    UTI (lower urinary tract infection)        Past Surgical History:   Procedure Laterality Date    HX APPENDECTOMY  2015    HX CATARACT REMOVAL Right 06/2020    [facility]    HX COLOSTOMY  2016    REVERSAL    HX COLOSTOMY TAKE DOWN      HX DILATION AND CURETTAGE      HX GI  9/2014    COLONOSCOPY    HX KNEE ARTHROSCOPY Left 1979    MENISCUS REPAIR    HX KNEE ARTHROSCOPY Left     HX KNEE ARTHROSCOPY Right 2005    HX KNEE REPLACEMENT Left 2005    HX KNEE REPLACEMENT Right 05/09/2018    HX LAP CHOLECYSTECTOMY  5/2005    HX LUMBAR LAMINECTOMY  2011    HX LUMBAR LAMINECTOMY  96/5253    complicated by MRSA infection    HX ORTHOPAEDIC Right     HAND FX    HX TOTAL COLECTOMY  2015    WITH COLOSTOMY AND THEN REOMVAL OF COLOSTOMY    HX TUBAL LIGATION  1979    HX WRIST FRACTURE TX Left     IR INJ/CATH THER SUBST LUM/SAC W IMG  6/1/2020    HI TOTAL HIP ARTHROPLASTY Right 3/2012       Prior to Admission medications    Medication Sig Start Date End Date Taking? Authorizing Provider   leflunomide (ARAVA) 20 mg tablet TAKE 1 TABLET BY MOUTH ONCE DAILY 2/27/21  Yes Provider, Historical   gabapentin (NEURONTIN) 300 mg capsule Take 1 Cap by mouth four (4) times daily as needed for Pain. Max Daily Amount: 1,200 mg. 3/15/21  Yes Carlos Mcdonough MD   HYDROcodone-acetaminophen [facility]) 5-325 mg per tablet Take 0.5-1 Tabs by mouth every eight (8) hours as needed for Pain for up to 30 days. Max Daily Amount: 3 Tabs. 3/15/21 4/14/21 Yes Umm Correa III, MD   DULoxetine (CYMBALTA) 30 mg capsule Take 1 Cap by mouth daily. 12/4/20  Yes Carlos Mcdonough MD   tiZANidine (ZANAFLEX) 2 mg tablet Take 1-2 Tabs by mouth nightly. 12/4/20  Yes Carlos Mcdonough MD   potassium 99 mg tablet Take 99 mg by mouth daily. Yes Provider, Historical   metoprolol tartrate (LOPRESSOR) 25 mg tablet Take 12.5 mg by mouth two (2) times daily (with meals). Yes Provider, Historical   calcium carb/D3/magnesium/zinc (HOLDEN MAG ZINC PLUS D3 PO) Take  by mouth. Yes Provider, Historical   BIOTIN PO Take 1,000 mg by mouth every other day. Yes Provider, Historical   cholecalciferol, vitamin D3, (VITAMIN D3) 2,000 unit tab Take  by mouth. Yes Provider, Historical   acetaminophen (Tylenol Arthritis Pain) 650 mg TbER Take 650 mg by mouth every eight (8) hours. Provider, Historical   predniSONE (DELTASONE) 1 mg tablet Take 1 Tab by mouth daily. 12/4/20 3/15/21  Ama Bar III, MD   promethazine (PHENERGAN) 25 mg tablet Take 25 mg by mouth every six (6) hours as needed. 19   Provider, Historical   SUMAtriptan (IMITREX) 6 mg/0.5 mL injection 6 mg by SubCUTAneous route once. 19   Provider, Historical       Social History     Socioeconomic History    Marital status:      Spouse name: Not on file    Number of children: Not on file    Years of education: Not on file    Highest education level: Not on file   Occupational History    Not on file   Social Needs    Financial resource strain: Not on file    Food insecurity     Worry: Not on file     Inability: Not on file    Transportation needs     Medical: Not on file     Non-medical: Not on file   Tobacco Use    Smoking status: Former Smoker     Packs/day: 1.00     Years: 20.00     Pack years: 20.00     Quit date: 3/7/1978     Years since quittin.0    Smokeless tobacco: Never Used   Substance and Sexual Activity    Alcohol use: Not Currently     Comment: maybe not that much    Drug use: No    Sexual activity: Not Currently   Lifestyle    Physical activity     Days per week: Not on file     Minutes per session: Not on file    Stress: Not on file   Relationships    Social connections     Talks on phone: Not on file     Gets together: Not on file     Attends Church service: Not on file     Active member of club or organization: Not on file     Attends meetings of clubs or organizations: Not on file     Relationship status: Not on file    Intimate partner violence     Fear of current or ex partner: Not on file     Emotionally abused: Not on file     Physically abused: Not on file     Forced sexual activity: Not on file   Other Topics Concern    Not on file   Social History Narrative    .   REMY to Lazaro JARRETT  Per HPI    Visit Vitals  /82   Pulse (!) 59   Temp 97.3 °F (36.3 °C) (Temporal)   Resp 18   Ht 4' 11\" (1.499 m)   Wt 186 lb (84.4 kg)   SpO2 97% BMI 37.57 kg/m²         Physical Exam   Physical Examination: General appearance - alert, well appearing, and in no distress  Chest - clear to auscultation, no wheezes, rales or rhonchi, symmetric air entry  Heart - normal rate and regular rhythm  Neurological - alert, oriented, normal speech, no focal findings or movement disorder noted  Musculoskeletal - osteoarthritic changes noted in both hands, abnormal exam of left shoulder with tenderness over the apex of the shoulder and some moderate crepitus. Some rheumatoid arthritis changes in the hands as well. Extremities - peripheral pulses normal, no pedal edema, no clubbing or cyanosis      Assessment/Plan:  Diagnoses and all orders for this visit:    1. Spinal stenosis of lumbar region without neurogenic claudication agree with plans for an injection. Will increase gabapentin to 3 times a day and see if that is helpful. I will give her a few hydrocodone to use for emergencies. She will sign a pain contract today and I explained to her that she should only receive pain medications from us and if she received them and other sources she needed to notify us.  -     gabapentin (NEURONTIN) 300 mg capsule; Take 1 Cap by mouth four (4) times daily as needed for Pain. Max Daily Amount: 1,200 mg.  -     HYDROcodone-acetaminophen (NORCO) 5-325 mg per tablet; Take 0.5-1 Tabs by mouth every eight (8) hours as needed for Pain for up to 30 days. Max Daily Amount: 3 Tabs. 2. Rheumatoid arthritis involving multiple sites with positive rheumatoid factor (HCC)poorly controlled. Her C-reactive protein was 224 in the hospital.  She will discuss that with the rheumatologist to develop a plan and this might include short-term prednisone.  -     gabapentin (NEURONTIN) 300 mg capsule; Take 1 Cap by mouth four (4) times daily as needed for Pain. Max Daily Amount: 1,200 mg.  -     HYDROcodone-acetaminophen (NORCO) 5-325 mg per tablet;  Take 0.5-1 Tabs by mouth every eight (8) hours as needed for Pain for up to 30 days. Max Daily Amount: 3 Tabs. 3. Hypercholesterolemiacontrolled. 4. Atelectasisresolved. Advised her to call back or return to office if symptoms worsen/change/persist.  Discussed expected course/resolution/complications of diagnosis in detail with patient. Medication risks/benefits/costs/interactions/alternatives discussed with patient. She was given an after visit summary which includes diagnoses, current medications, & vitals. She expressed understanding with the diagnosis and plan.

## 2021-03-15 NOTE — LETTER
Name: Randell Brown QAW:[birth year] MR #:[id number] Provider Samara Hoffmann MD  
*[id number]* BSMG-491 (5/16) Page 1 of 5 Initial [employer] CONTROLLED SUBSTANCE AGREEMENT I may be prescribed medications that are controlled substances as part  of my treatment plan for management of my medical condition(s). The goal of my treatment plan is to maintain and/or improve my health and wellbeing. Because controlled substances have an increased risk of abuse or harm, continual re-evaluation is needed determine if the goals of my treatment plan are being met for my safety and the safety of others. Don Moreland  am entering into this Controlled Substance Agreement with my provider, Sofy Prescott MD at [address] [place] [address] . I understand that successful treatment requires mutual trust and honesty between me and my provider. I understand that there are state and federal laws and regulations which apply to the medications that my provider may prescribe that must be followed. I understand there are risks and benefits ts of taking the medicines that my provider may prescribe. I understand and agree that following this Agreement is necessary in continuing my provider-patient relationship and success of my treatment plan. As a part of my treatment plan, I agree to the following: COMMUNICATION: 
 
1. I will communicate fully with my provider about my medical condition(s), including the effect on my daily life and how well my medications are helping. I will tell my provider all of the medications that I take for any reason, including medications I receive from another health care provider, and will notify my provider about all issues, problems or concerns, including any side effects, which may be related to my medications. I understand that this information allows my provider to adjust my treatment plan to help manage my medical condition.  I understand that this information will become part of my permanent medical record. 2. I will notify my provider if I have a history of alcohol/drug misuse/addiction or if I have had treatment for alcohol/drug addiction in the past, or if I have a new problem with or concern about alcohol/drug use/addiction, because this increases the likelihood of high risk behaviors and may lead to serious medical conditions. 3. Females Only: I will notify my provider if I am or become pregnant, or if I intend to become pregnant, or if I intend to breastfeed. I understand that communication of these issues with my provider is important, due to possible effects my medication could have on an unborn fetus or breastfeeding child. Name:.Kathrin TRACY:[birth year] MR #:[id number] Provider Bishop Beena MD  
*[id number]* BSMG-491 (5/16) Page 2 of 5 Initial [employer] MISUSE OF MEDICATIONS / DRUGS: 
 
1. I agree to take all controlled substances as prescribed, and will not misuse or abuse any controlled substances prescribed by my provider. For my safety, I will not increase the amount of medicine I take without first talking with and getting permission from my provider. 2. If I have a medical emergency, another health care provider may prescribe me medication. If I seek emergency treatment, I will notify my provider within seventy-two (72) hours. 3. I understand that my provider may discuss my use and/or possible misuse/abuse of controlled substances and alcohol, as appropriate, with any health care provider involved in my care, pharmacist or legal authority. ILLEGAL DRUGS: 
 
1. I will not use illegal drugs of any kind, including but not limited to marijuana, heroin, cocaine, or any prescription drug which is not prescribed to me. DRUG DIVERSION / PRESCRIPTION FRAUD: 
 
1. I will not share, sell, trade, give away, or otherwise misuse my prescriptions or medications.  
 
2. I will not alter any prescriptions provided to me by my provider. SINGLE PROVIDER: 
 
1. I agree that all controlled substances that I take will be prescribed only by my provider (or his/her covering provider) under this Agreement. This agreement does not prevent me from seeking emergency medical treatment or receiving pain management related to a surgery. PROTECTING MEDICATIONS: 
 
1. I am responsible for keeping my prescriptions and medications in a safe and secure place including safeguarding them from loss or theft. I understand that lost, stolen or damaged/destroyed prescriptions or medications will not be replaced. Name:.Kathrin Myers XEF:[birth year] MR #:[id number] Provider Willie De Luna MD  
*RC-606* MG-491 (5/16) Page 3 of 5 Initial [employer] PRESCRIPTION RENEWALS/REFILLS: 
 
1. I will follow my controlled substance medication schedule as prescribed by my provider. 2. I understand and agree that I will make any requests for renewals or refills of my prescriptions only at the time of an office visit or during my providers regular office hours subject to the prescription refill requirements of the individual practice. 3. I understand that my provider may not call in prescriptions for controlled substances to my pharmacy. 4. I understand that my provider may adjust or discontinue these medications as deemed appropriate for my medical treatment plan. This Agreement does not guarantee the prescription of controlled medications. 5. I agree that if my medications are adjusted or discontinued, I will properly dispose of any remaining medications. I understand that I will be required to dispose of any remaining controlled medications prior to being provided with any prescriptions for other controlled medications.  
 
 
COOPERATION WITH INVESTIGATIONS: 
 
1. I authorize my provider and my pharmacy to cooperate fully with any local, state, or federal law enforcement agency in the investigation of any possible misuse, sale, or other diversion of my controlled substance prescriptions or medications. RISKS: 
 
 
1. I understand that if I do not adhere to this Agreement in any way, my provider may change my prescriptions, stop prescribing controlled substances or end our provider-patient relationship. 2. If my provider decides to stop prescribing medication, or decides to end our provider-patient relationship,my provider may require that I taper my medications slowly. If necessary, my provider may also provide a prescription for other medications to treat my withdrawal symptoms. UNDERSTANDING THIS AGREEMENT: 
 
I understand that my provider may adjust or stop my prescriptions for controlled substances based on my medical condition and my treatment plan. I understand that this Agreement does not guarantee that I will be prescribed medications or controlled substances. I understand that controlled substances may be just one part 
of my treatment plan.  
 
My initial on each page and my signature below shows that I have read each page of this Agreement, I have had an opportunity to ask questions, and all of my questions have been answered to my satisfaction by my provider. By signing below, I agree to comply with this Agreement, and I understand that if I do not follow the Agreements listed above, my provider may stop 
 
 
 
_________________________________________  Date/Time 3/15/2021 11:47 AM   
             (Patient Signature)

## 2021-03-15 NOTE — PROGRESS NOTES
C/o left foot pain - started this am  C/o left shoulder pain  D/C from [facility] 1 week ago- see care everywhere documents    Not taking any prednisone right now, recently completed a course of 10mg daily. Was taking 1mg daily.     Was instructed to take 1/2 tab of Metoprolol bid- she took whole tab this am.     Wants to know if Dr. Mela Jade will fill hydrocodone - see pill bottle    Also wants to know if she should still be taking the Gabapentin - see pill bottle

## 2021-03-24 RX ORDER — ATENOLOL 50 MG/1
TABLET ORAL
Qty: 30 TAB | Refills: 0 | Status: SHIPPED | OUTPATIENT
Start: 2021-03-24 | End: 2021-06-25 | Stop reason: ALTCHOICE

## 2021-06-10 DIAGNOSIS — F34.1 DYSTHYMIA: ICD-10-CM

## 2021-06-10 RX ORDER — DULOXETIN HYDROCHLORIDE 30 MG/1
CAPSULE, DELAYED RELEASE ORAL
Qty: 90 CAPSULE | Refills: 0 | Status: SHIPPED | OUTPATIENT
Start: 2021-06-10 | End: 2021-06-25

## 2021-06-11 ENCOUNTER — TELEPHONE (OUTPATIENT)
Dept: INTERNAL MEDICINE CLINIC | Age: 81
End: 2021-06-11

## 2021-06-11 NOTE — TELEPHONE ENCOUNTER
Returned call to patient. C/o loose stools and head sweats x 2 weeks. Denies fever/chills/n/v or abdominal pain. She took 2 imodium this AM - no BM since. Advised if not better Monday AM to call for appointment w/ Dr. Reji Daugherty. Red flags reviewed, pt verbalized understanding.

## 2021-06-11 NOTE — TELEPHONE ENCOUNTER
Sharon Donald (Self) [phone] (H)     Pt says that she is having diarrhea and sweating a lot and wonders if it is from surgery or what she needs to do? Patient states we MAY NOT respond via mychart.

## 2021-06-25 ENCOUNTER — OFFICE VISIT (OUTPATIENT)
Dept: INTERNAL MEDICINE CLINIC | Age: 81
End: 2021-06-25
Payer: MEDICARE

## 2021-06-25 VITALS
WEIGHT: 182 LBS | BODY MASS INDEX: 36.69 KG/M2 | RESPIRATION RATE: 18 BRPM | OXYGEN SATURATION: 96 % | DIASTOLIC BLOOD PRESSURE: 85 MMHG | HEART RATE: 95 BPM | TEMPERATURE: 97.5 F | HEIGHT: 59 IN | SYSTOLIC BLOOD PRESSURE: 151 MMHG

## 2021-06-25 DIAGNOSIS — R53.82 CHRONIC FATIGUE: ICD-10-CM

## 2021-06-25 DIAGNOSIS — M05.79 RHEUMATOID ARTHRITIS INVOLVING MULTIPLE SITES WITH POSITIVE RHEUMATOID FACTOR (HCC): Primary | ICD-10-CM

## 2021-06-25 DIAGNOSIS — R00.2 HEART PALPITATIONS: ICD-10-CM

## 2021-06-25 DIAGNOSIS — F34.1 DYSTHYMIA: ICD-10-CM

## 2021-06-25 DIAGNOSIS — C18.9 MALIGNANT NEOPLASM OF COLON, UNSPECIFIED PART OF COLON (HCC): ICD-10-CM

## 2021-06-25 PROCEDURE — 99214 OFFICE O/P EST MOD 30 MIN: CPT | Performed by: INTERNAL MEDICINE

## 2021-06-25 PROCEDURE — G8417 CALC BMI ABV UP PARAM F/U: HCPCS | Performed by: INTERNAL MEDICINE

## 2021-06-25 PROCEDURE — G8400 PT W/DXA NO RESULTS DOC: HCPCS | Performed by: INTERNAL MEDICINE

## 2021-06-25 PROCEDURE — G8536 NO DOC ELDER MAL SCRN: HCPCS | Performed by: INTERNAL MEDICINE

## 2021-06-25 PROCEDURE — 1090F PRES/ABSN URINE INCON ASSESS: CPT | Performed by: INTERNAL MEDICINE

## 2021-06-25 PROCEDURE — G0463 HOSPITAL OUTPT CLINIC VISIT: HCPCS | Performed by: INTERNAL MEDICINE

## 2021-06-25 PROCEDURE — 1101F PT FALLS ASSESS-DOCD LE1/YR: CPT | Performed by: INTERNAL MEDICINE

## 2021-06-25 PROCEDURE — G8427 DOCREV CUR MEDS BY ELIG CLIN: HCPCS | Performed by: INTERNAL MEDICINE

## 2021-06-25 PROCEDURE — G9717 DOC PT DX DEP/BP F/U NT REQ: HCPCS | Performed by: INTERNAL MEDICINE

## 2021-06-25 RX ORDER — DULOXETIN HYDROCHLORIDE 30 MG/1
30 CAPSULE, DELAYED RELEASE ORAL 2 TIMES DAILY
Qty: 180 CAPSULE | Refills: 0 | COMMUNITY
Start: 2021-06-25 | End: 2021-08-10

## 2021-06-25 RX ORDER — PRAMIPEXOLE DIHYDROCHLORIDE 1 MG/1
1 TABLET ORAL
COMMUNITY

## 2021-06-25 RX ORDER — ATENOLOL 25 MG/1
25 TABLET ORAL DAILY
Qty: 90 TABLET | Refills: 1 | Status: SHIPPED | OUTPATIENT
Start: 2021-06-25 | End: 2021-09-21 | Stop reason: SDUPTHER

## 2021-06-25 NOTE — PROGRESS NOTES
HPI:  Doyle Eckert is a 78y.o. year old female who is here for several issues. Her arthritis is under better control with her current list of medications. She would like to change her blood pressure medicine as she feels that she is not tolerating metoprolol very well. She did not tolerate atenolol 50 mg due to lightheadedness and dizziness. Her palpitations are under reasonable control. Her blood pressure has been elevated recently. No headaches. No vertigo. No chest pains but she does have dyspnea on exertion. No nausea or vomiting. She does feel more depressed and wondered about increasing her Cymbalta. She denies any bladder issues. She does have some intermittent issues with loose stools that are now better when she stopped taking metoprolol. Past Medical History:   Diagnosis Date    Cataracts, bilateral     Chronic pain     LOWER BACK    Colon cancer ([facility] [address] 2015    colectomy with ostomy, then reversed by DR ELVIN Putnam Colon polyps     Depression     did not do well on duloxetine    GERD (gastroesophageal reflux disease)     Glaucoma     Heart palpitations     MRSA infection 5/8/2019    Other ill-defined conditions(799.89)     GASTROPARESIS    RA (rheumatoid arthritis) ([facility] [address]     DR Jonathan Singletary, remicade    Restless leg syndrome     Spinal stenosis 5/8/2011    Unspecified adverse effect of anesthesia     HEADACHE    UTI (lower urinary tract infection)        Past Surgical History:   Procedure Laterality Date    HX APPENDECTOMY  2015    HX CATARACT REMOVAL Right 06/2020    [facility]    HX COLOSTOMY  2016    REVERSAL    HX COLOSTOMY TAKE DOWN      HX DILATION AND CURETTAGE      HX GI  9/2014    COLONOSCOPY    HX KNEE ARTHROSCOPY Left 1979    MENISCUS REPAIR    HX KNEE ARTHROSCOPY Left     HX KNEE ARTHROSCOPY Right 2005    HX KNEE REPLACEMENT Left 2005    HX KNEE REPLACEMENT Right 05/09/2018    HX LAP CHOLECYSTECTOMY  5/2005    HX LUMBAR LAMINECTOMY  2011    HX LUMBAR LAMINECTOMY  45/3274    complicated by MRSA infection    HX ORTHOPAEDIC Right     HAND FX    HX TOTAL COLECTOMY  2015    WITH COLOSTOMY AND THEN REOMVAL OF COLOSTOMY    HX TUBAL LIGATION  1979    HX WRIST FRACTURE TX Left     IR INJ/CATH THER SUBST LUM/SAC W IMG  6/1/2020    TX TOTAL HIP ARTHROPLASTY Right 3/2012       Prior to Admission medications    Medication Sig Start Date End Date Taking? Authorizing Provider   pramipexole (Mirapex) 1 mg tablet Take 1 mg by mouth daily. Yes Provider, Historical   atenoloL (TENORMIN) 25 mg tablet Take 1 Tablet by mouth daily. 6/25/21  Yes Kandace Castro MD   DULoxetine (CYMBALTA) 30 mg capsule Take 1 Capsule by mouth two (2) times a day. 6/25/21  Yes Kandace Castro MD   leflunomide (ARAVA) 20 mg tablet TAKE 1 TABLET BY MOUTH ONCE DAILY 2/27/21  Yes Provider, Historical   acetaminophen (Tylenol Arthritis Pain) 650 mg TbER Take 650 mg by mouth every eight (8) hours. Yes Provider, Historical   SUMAtriptan (IMITREX) 6 mg/0.5 mL injection 6 mg by SubCUTAneous route once. 8/28/19  Yes Provider, Historical   potassium 99 mg tablet Take 99 mg by mouth daily. Yes Provider, Historical   calcium carb/D3/magnesium/zinc (HOLDEN MAG ZINC PLUS D3 PO) Take  by mouth. Yes Provider, Historical   BIOTIN PO Take 1,000 mg by mouth every other day. Yes Provider, Historical   cholecalciferol, vitamin D3, (VITAMIN D3) 2,000 unit tab Take  by mouth. Yes Provider, Historical   DULoxetine (CYMBALTA) 30 mg capsule Take 1 capsule by mouth once daily 6/10/21 6/25/21  Charis Jackson III, MD   atenoloL (TENORMIN) 50 mg tablet Take 1 tablet by mouth once daily  Patient not taking: Reported on 6/25/2021 3/24/21 6/25/21  Kandace Castro MD   gabapentin (NEURONTIN) 300 mg capsule Take 1 Cap by mouth four (4) times daily as needed for Pain. Max Daily Amount: 1,200 mg.   Patient not taking: Reported on 6/25/2021 3/15/21 6/25/21  Kandace Castro MD   tiZANidine (Renella Dragon) 2 mg tablet Take 1-2 Tabs by mouth nightly. Patient not taking: Reported on 2021  Vimal Dumont MD   promethazine (PHENERGAN) 25 mg tablet Take 25 mg by mouth every six (6) hours as needed. Patient not taking: Reported on 2021  Provider, Historical   metoprolol tartrate (LOPRESSOR) 25 mg tablet Take 25 mg by mouth daily. 21  Provider, Historical       Social History     Socioeconomic History    Marital status:      Spouse name: Not on file    Number of children: Not on file    Years of education: Not on file    Highest education level: Not on file   Occupational History    Not on file   Tobacco Use    Smoking status: Former Smoker     Packs/day: 1.00     Years: 20.00     Pack years: 20.00     Quit date: 3/7/1978     Years since quittin.3    Smokeless tobacco: Never Used   Substance and Sexual Activity    Alcohol use: Not Currently     Comment: maybe not that much    Drug use: No    Sexual activity: Not Currently   Other Topics Concern    Not on file   Social History Narrative    . [employer] to [employer]     Financial Resource Strain:     Difficulty of Paying Living Expenses:    Food Insecurity:     Worried About [address] in the Last Year:     [address] in the Last Year:    Transportation Needs:     Lack of Transportation (Medical):      Lack of Transportation (Non-Medical):    Physical Activity:     Days of Exercise per Week:     Minutes of Exercise per Session:    Stress:     Feeling of Stress :    Social Connections:     Frequency of Communication with Friends and Family:     Frequency of Social Gatherings with Friends and Family:     Attends L[employer] Services:     Active Member of Clubs or Organizations:     Attends Club or Organization Meetings:     Marital Status:    Intimate Partner Violence:     Fear of Current or Ex-Partner:     Emotionally Abused:     Physically Abused:     Sexually Abused:           ROS  Per HPI    Visit Vitals  BP (!) 151/85   Pulse 95   Temp 97.5 °F (36.4 °C) (Temporal)   Resp 18   Ht 4' 11\" (1.499 m)   Wt 182 lb (82.6 kg)   SpO2 96%   BMI 36.76 kg/m²         Physical Exam   Physical Examination: General appearance - alert, well appearing, and in no distress  Chest - clear to auscultation, no wheezes, rales or rhonchi, symmetric air entry  Heart - normal rate, regular rhythm, normal S1, S2, no murmurs, rubs, clicks or gallops  Abdomen - soft, nontender, nondistended, no masses or organomegaly  Neurological - alert, oriented, normal speech, no focal findings or movement disorder noted  Extremities - peripheral pulses normal, no pedal edema, no clubbing or cyanosis      Assessment/Plan:  Diagnoses and all orders for this visit:    1. Rheumatoid arthritis involving multiple sites with positive rheumatoid factor ([facility] [address] stable and followed by rheumatology. Continue the current medications for that. 2. Dysthymianot well controlled. Will increase Cymbalta to 60 mg a day and see if she tolerates that. 3. Malignant neoplasm of colon, unspecified part of colon (HCC)likely the cause for her loose stools. If symptoms recur, consider the addition of bile acid binders. 4. Heart palpitationsadd atenolol 25 mg a day for both palpitations and blood pressure. She will monitor her blood pressures and send me readings in a month. 5. Chronic fatiguelikely depression related. Reviewed her lab work with her from the rheumatologist that was done in April. We will see how her symptoms go with the changed medications. Other orders  -     atenoloL (TENORMIN) 25 mg tablet; Take 1 Tablet by mouth daily. Advised her to call back or return to office if symptoms worsen/change/persist.  Discussed expected course/resolution/complications of diagnosis in detail with patient.     Medication risks/benefits/costs/interactions/alternatives discussed with patient. She was given an after visit summary which includes diagnoses, current medications, & vitals. She expressed understanding with the diagnosis and plan.

## 2021-08-02 ENCOUNTER — TELEPHONE (OUTPATIENT)
Dept: INTERNAL MEDICINE CLINIC | Age: 81
End: 2021-08-02

## 2021-08-02 NOTE — TELEPHONE ENCOUNTER
Ms.Kathrin Lucia called stating that she is having pain from shingles    Wonder if something can be called in to help with the pain    Pls give pt a call back    [phone]    Patient states we MAY NOT respond via [employer].

## 2021-08-02 NOTE — TELEPHONE ENCOUNTER
Patient reports painful rash on right hip/thigh x 5 days. Has tried calamine lotion and ibuprofen with little relief. Scheduled for tomorrow @ 9:30 with Dr. Diamond Laguerre for evaluation.

## 2021-08-10 DIAGNOSIS — F34.1 DYSTHYMIA: ICD-10-CM

## 2021-08-10 RX ORDER — DULOXETIN HYDROCHLORIDE 30 MG/1
CAPSULE, DELAYED RELEASE ORAL
Qty: 90 CAPSULE | Refills: 0 | Status: SHIPPED | OUTPATIENT
Start: 2021-08-10 | End: 2021-11-14

## 2021-08-12 ENCOUNTER — TELEPHONE (OUTPATIENT)
Dept: INTERNAL MEDICINE CLINIC | Age: 81
End: 2021-08-12

## 2021-08-12 NOTE — TELEPHONE ENCOUNTER
PT went to [address] [place] [address] emergency room [facility]    PT went for the rash and it was confirmed to be shingles.      [facility]) [phone] (H)

## 2021-08-12 NOTE — TELEPHONE ENCOUNTER
Patient scheduled to see PCP tomorrow for f/u on shingles. Future Appointments   Date Time Provider Jarocho Collins   8/13/2021  3:00 PM Emily Cheng MD [facility]     Covid-19 screening negative.

## 2021-08-12 NOTE — TELEPHONE ENCOUNTER
Reviewed records from [facility] - pt was dx w/ shingles and d/c home with #15 percocet and 7 day course of Valtrex. Per Dr. Balaji Farris pt will require appointment for any pain medications.

## 2021-09-21 NOTE — TELEPHONE ENCOUNTER
Requested Prescriptions     Pending Prescriptions Disp Refills    atenoloL (TENORMIN) 25 mg tablet 90 Tablet 1     Sig: Take 1 Tablet by mouth daily.      [address] [place] [facility], [address]

## 2021-09-22 RX ORDER — ATENOLOL 25 MG/1
25 TABLET ORAL DAILY
Qty: 90 TABLET | Refills: 1 | Status: SHIPPED | OUTPATIENT
Start: 2021-09-22 | End: 2021-11-21

## 2021-10-29 ENCOUNTER — CLINICAL SUPPORT (OUTPATIENT)
Dept: INTERNAL MEDICINE CLINIC | Age: 81
End: 2021-10-29
Payer: MEDICARE

## 2021-10-29 DIAGNOSIS — Z23 NEEDS FLU SHOT: Primary | ICD-10-CM

## 2021-10-29 PROCEDURE — 90694 VACC AIIV4 NO PRSRV 0.5ML IM: CPT | Performed by: INTERNAL MEDICINE

## 2021-10-29 NOTE — PATIENT INSTRUCTIONS
Vaccine Information Statement    Influenza (Flu) Vaccine (Inactivated or Recombinant): What You Need to Know    Many vaccine information statements are available in [place] and other languages. See www.immunize.org/vis. Hojas de información sobre vacunas están disponibles en español y en muchos otros idiomas. Visite www.immunize.org/vis. 1. Why get vaccinated? Influenza vaccine can prevent influenza (flu). Flu is a contagious disease that spreads around the [facility] every year, usually between October and May. Anyone can get the flu, but it is more dangerous for some people. Infants and young children, people 72 years and older, pregnant people, and people with certain health conditions or a weakened immune system are at greatest risk of flu complications. Pneumonia, bronchitis, sinus infections, and ear infections are examples of flu-related complications. If you have a medical condition, such as heart disease, cancer, or diabetes, flu can make it worse. Flu can cause fever and chills, sore throat, muscle aches, fatigue, cough, headache, and runny or stuffy nose. Some people may have vomiting and diarrhea, though this is more common in children than adults. In an average year, thousands of people in the [facility] die from flu, and many more are hospitalized. Flu vaccine prevents millions of illnesses and flu-related visits to the doctor each year. 2. Influenza vaccines     CDC recommends everyone 6 months and older get vaccinated every flu season. Children 6 months through 6years of age may need 2 doses during a single flu season. Everyone else needs only 1 dose each flu season. It takes about 2 weeks for protection to develop after vaccination. There are many flu viruses, and they are always changing. Each year a new flu vaccine is made to protect against the influenza viruses believed to be likely to cause disease in the upcoming flu season.  Even when the vaccine doesnt exactly match these viruses, it may still provide some protection. Influenza vaccine does not cause flu. Influenza vaccine may be given at the same time as other vaccines. 3. Talk with your health care provider    Tell your vaccination provider if the person getting the vaccine:   Has had an allergic reaction after a previous dose of influenza vaccine, or has any severe, life-threatening allergies    Has ever had Guillain-Barré Syndrome (also called GBS)    In some cases, your health care provider may decide to postpone influenza vaccination until a future visit. Influenza vaccine can be administered at any time during pregnancy. People who are or will be pregnant during influenza season should receive inactivated influenza vaccine. People with minor illnesses, such as a cold, may be vaccinated. People who are moderately or severely ill should usually wait until they recover before getting influenza vaccine. Your health care provider can give you more information. 4. Risks of a vaccine reaction     Soreness, redness, and swelling where the shot is given, fever, muscle aches, and headache can happen after influenza vaccination.  There may be a very small increased risk of Guillain-Barré Syndrome (GBS) after inactivated influenza vaccine (the flu shot). Leoma March children who get the flu shot along with pneumococcal vaccine (PCV13) and/or DTaP vaccine at the same time might be slightly more likely to have a seizure caused by fever. Tell your health care provider if a child who is getting flu vaccine has ever had a seizure. People sometimes faint after medical procedures, including vaccination. Tell your provider if you feel dizzy or have vision changes or ringing in the ears. As with any medicine, there is a very remote chance of a vaccine causing a severe allergic reaction, other serious injury, or death. 5. What if there is a serious problem?     An allergic reaction could occur after the vaccinated person leaves the clinic. If you see signs of a severe allergic reaction (hives, swelling of the face and throat, difficulty breathing, a fast heartbeat, dizziness, or weakness), call 9-1-1 and get the person to the nearest hospital.    For other signs that concern you, call your health care provider. Adverse reactions should be reported to the Vaccine Adverse Event Reporting System (VAERS). Your health care provider will usually file this report, or you can do it yourself. Visit the VAERS website at www.vaers. [facility].gov or call 3-767.854.6384. VAERS is only for reporting reactions, and VAERS staff members do not give medical advice. 6. The National Vaccine Injury Compensation Program    The [facility] Vaccine Injury Compensation Program (VICP) is a federal program that was created to compensate people who may have been injured by certain vaccines. Claims regarding alleged injury or death due to vaccination have a time limit for filing, which may be as short as two years. Visit the VICP website at www.[facility].gov/vaccinecompensation or call 8-698.104.5877 to learn about the program and about filing a claim. 7. How can I learn more?  Ask your health care provider.  Call your local or state health department.  Visit the website of the Food and Drug Administration (FDA) for vaccine package inserts and additional information at www.fda.gov/vaccines-blood-biologics/vaccines.  Contact the Centers for Disease Control and Prevention (CDC):  - Call 7-260.445.4916 (1-800-CDC-INFO) or  - Visit CDCs influenza website at www.cdc.gov/flu. Vaccine Information Statement   Inactivated Influenza Vaccine   8/6/2021  [address] [id number]   Department of Health and Human Services  Centers for Disease Control and Prevention    Office Use Only

## 2021-10-29 NOTE — PROGRESS NOTES
Verified name and birth date for privacy precautions. Chart reviewed in preparation for today's visit.      Chief Complaint   Patient presents with    Immunization/Injection          Health Maintenance Due   Topic    DTaP/Tdap/Td series (1 - Tdap)    Shingrix Vaccine Age 49> (1 of 2)    Bone Densitometry (Dexa) Screening     Medicare Yearly Exam     COVID-19 Vaccine (3 - Pfizer booster)         Wt Readings from Last 3 Encounters:   06/25/21 182 lb (82.6 kg)   03/15/21 186 lb (84.4 kg)   12/04/20 187 lb (84.8 kg)     Temp Readings from Last 3 Encounters:   06/25/21 97.5 °F (36.4 °C) (Temporal)   03/15/21 97.3 °F (36.3 °C) (Temporal)   12/04/20 97.5 °F (36.4 °C) (Temporal)     BP Readings from Last 3 Encounters:   06/25/21 (!) 151/85   03/15/21 138/82   12/04/20 (!) 168/86     Pulse Readings from Last 3 Encounters:   06/25/21 95   03/15/21 (!) 59   12/04/20 74         Learning Assessment:  :     Learning Assessment 2/24/2014   PRIMARY LEARNER Patient   HIGHEST LEVEL OF EDUCATION - PRIMARY LEARNER  SOME COLLEGE   BARRIERS PRIMARY LEARNER NONE   CO-LEARNER CAREGIVER No   PRIMARY LANGUAGE ENGLISH   LEARNER PREFERENCE PRIMARY READING   ANSWERED BY patient   RELATIONSHIP SELF       Depression Screening:  :     3 most recent PHQ Screens 6/25/2021   Little interest or pleasure in doing things Several days   Feeling down, depressed, irritable, or hopeless Several days   Total Score PHQ 2 2   Trouble falling or staying asleep, or sleeping too much -   Feeling tired or having little energy -   Poor appetite, weight loss, or overeating -   Feeling bad about yourself - or that you are a failure or have let yourself or your family down -   Trouble concentrating on things such as school, work, reading, or watching TV -   Moving or speaking so slowly that other people could have noticed; or the opposite being so fidgety that others notice -   Thoughts of being better off dead, or hurting yourself in some way -   PHQ 9 Score - How difficult have these problems made it for you to do your work, take care of your home and get along with others -       Fall Risk Assessment:  :     Fall Risk Assessment, last 12 mths 6/25/2021   Able to walk? Yes   Fall in past 12 months? 0   Do you feel unsteady? 0   Are you worried about falling 0   Number of falls in past 12 months -   Fall with injury? -       Abuse Screening:  :     Abuse Screening Questionnaire 4/15/2019   Do you ever feel afraid of your partner? N   Are you in a relationship with someone who physically or mentally threatens you? N   Is it safe for you to go home?  Chauncey Chou

## 2021-11-14 DIAGNOSIS — F34.1 DYSTHYMIA: ICD-10-CM

## 2021-11-14 RX ORDER — DULOXETIN HYDROCHLORIDE 30 MG/1
CAPSULE, DELAYED RELEASE ORAL
Qty: 90 CAPSULE | Refills: 0 | Status: SHIPPED | OUTPATIENT
Start: 2021-11-14 | End: 2022-02-14 | Stop reason: SDUPTHER

## 2021-11-21 RX ORDER — ATENOLOL 25 MG/1
TABLET ORAL
Qty: 90 TABLET | Refills: 0 | Status: SHIPPED | OUTPATIENT
Start: 2021-11-21 | End: 2021-11-29

## 2021-11-29 RX ORDER — ATENOLOL 25 MG/1
TABLET ORAL
Qty: 90 TABLET | Refills: 0 | Status: SHIPPED | OUTPATIENT
Start: 2021-11-29 | End: 2021-12-03

## 2021-12-03 RX ORDER — ATENOLOL 25 MG/1
TABLET ORAL
Qty: 90 TABLET | Refills: 0 | Status: SHIPPED | OUTPATIENT
Start: 2021-12-03 | End: 2022-02-14 | Stop reason: SDUPTHER

## 2022-02-14 DIAGNOSIS — F34.1 DYSTHYMIA: ICD-10-CM

## 2022-02-14 RX ORDER — DULOXETIN HYDROCHLORIDE 30 MG/1
30 CAPSULE, DELAYED RELEASE ORAL DAILY
Qty: 90 CAPSULE | Refills: 0 | Status: SHIPPED | OUTPATIENT
Start: 2022-02-14 | End: 2022-05-17

## 2022-02-14 RX ORDER — ATENOLOL 25 MG/1
25 TABLET ORAL DAILY
Qty: 90 TABLET | Refills: 0 | Status: SHIPPED | OUTPATIENT
Start: 2022-02-14 | End: 2022-05-17

## 2022-02-14 NOTE — TELEPHONE ENCOUNTER
Requested Prescriptions     Pending Prescriptions Disp Refills    atenoloL (TENORMIN) 25 mg tablet 90 Tablet 0     Sig: Take 1 Tablet by mouth daily.  DULoxetine (CYMBALTA) 30 mg capsule 90 Capsule 0     Sig: Take 1 Capsule by mouth daily.      [facility] [id number] - [address], [address] [facility] [address] [place]  [phone]

## 2022-03-18 PROBLEM — M48.061 SPINAL STENOSIS, LUMBAR: Status: ACTIVE | Noted: 2019-04-16

## 2022-03-19 PROBLEM — E66.01 SEVERE OBESITY (HCC): Status: ACTIVE | Noted: 2019-01-16

## 2022-03-19 PROBLEM — T81.89XA LUMBAR SURGICAL WOUND FLUID COLLECTION: Status: ACTIVE | Noted: 2019-05-06

## 2022-03-19 PROBLEM — A49.02 MRSA INFECTION: Status: ACTIVE | Noted: 2019-05-08

## 2022-03-19 PROBLEM — M48.061 LUMBAR STENOSIS: Status: ACTIVE | Noted: 2019-04-18

## 2022-03-19 PROBLEM — M17.11 OSTEOARTHRITIS OF RIGHT KNEE: Status: ACTIVE | Noted: 2018-05-09

## 2022-04-15 ENCOUNTER — OFFICE VISIT (OUTPATIENT)
Dept: INTERNAL MEDICINE CLINIC | Age: 82
End: 2022-04-15
Payer: MEDICARE

## 2022-04-15 VITALS
WEIGHT: 175.2 LBS | HEIGHT: 59 IN | RESPIRATION RATE: 20 BRPM | HEART RATE: 71 BPM | DIASTOLIC BLOOD PRESSURE: 82 MMHG | TEMPERATURE: 97.5 F | BODY MASS INDEX: 35.32 KG/M2 | SYSTOLIC BLOOD PRESSURE: 138 MMHG | OXYGEN SATURATION: 93 %

## 2022-04-15 DIAGNOSIS — Z00.00 MEDICARE ANNUAL WELLNESS VISIT, SUBSEQUENT: Primary | ICD-10-CM

## 2022-04-15 DIAGNOSIS — H91.93 BILATERAL HEARING LOSS, UNSPECIFIED HEARING LOSS TYPE: ICD-10-CM

## 2022-04-15 DIAGNOSIS — E78.00 HYPERCHOLESTEROLEMIA: ICD-10-CM

## 2022-04-15 DIAGNOSIS — R93.89 ABNORMAL CHEST CT: ICD-10-CM

## 2022-04-15 DIAGNOSIS — M05.79 RHEUMATOID ARTHRITIS INVOLVING MULTIPLE SITES WITH POSITIVE RHEUMATOID FACTOR (HCC): ICD-10-CM

## 2022-04-15 DIAGNOSIS — E66.01 SEVERE OBESITY (BMI 35.0-39.9) WITH COMORBIDITY (HCC): ICD-10-CM

## 2022-04-15 DIAGNOSIS — C18.9 MALIGNANT NEOPLASM OF COLON, UNSPECIFIED PART OF COLON (HCC): ICD-10-CM

## 2022-04-15 PROCEDURE — G8417 CALC BMI ABV UP PARAM F/U: HCPCS | Performed by: INTERNAL MEDICINE

## 2022-04-15 PROCEDURE — 1090F PRES/ABSN URINE INCON ASSESS: CPT | Performed by: INTERNAL MEDICINE

## 2022-04-15 PROCEDURE — G9717 DOC PT DX DEP/BP F/U NT REQ: HCPCS | Performed by: INTERNAL MEDICINE

## 2022-04-15 PROCEDURE — G0439 PPPS, SUBSEQ VISIT: HCPCS | Performed by: INTERNAL MEDICINE

## 2022-04-15 PROCEDURE — G8427 DOCREV CUR MEDS BY ELIG CLIN: HCPCS | Performed by: INTERNAL MEDICINE

## 2022-04-15 PROCEDURE — G8400 PT W/DXA NO RESULTS DOC: HCPCS | Performed by: INTERNAL MEDICINE

## 2022-04-15 PROCEDURE — G8536 NO DOC ELDER MAL SCRN: HCPCS | Performed by: INTERNAL MEDICINE

## 2022-04-15 PROCEDURE — 1101F PT FALLS ASSESS-DOCD LE1/YR: CPT | Performed by: INTERNAL MEDICINE

## 2022-04-15 PROCEDURE — G0463 HOSPITAL OUTPT CLINIC VISIT: HCPCS | Performed by: INTERNAL MEDICINE

## 2022-04-15 PROCEDURE — 99213 OFFICE O/P EST LOW 20 MIN: CPT | Performed by: INTERNAL MEDICINE

## 2022-04-15 RX ORDER — LEFLUNOMIDE 20 MG/1
20 TABLET ORAL DAILY
COMMUNITY
Start: 2022-04-15 | End: 2022-11-04 | Stop reason: ALTCHOICE

## 2022-04-15 RX ORDER — CERTOLIZUMAB PEGOL 200 MG/ML
400 INJECTION, SOLUTION SUBCUTANEOUS
Qty: 2 ML | Refills: 0 | COMMUNITY
Start: 2022-04-15

## 2022-04-15 NOTE — ACP (ADVANCE CARE PLANNING)
Conjuntivae and eyelids appear normal, Sclerae : White without injection Does not have an advanced directive. Will be given information to do one.

## 2022-04-15 NOTE — PROGRESS NOTES
Chief Complaint   Patient presents with   [facility] Annual Wellness Visit       1. \"Have you been to the ER, urgent care clinic since your last visit? Hospitalized since your last visit? \" No    2. \"Have you seen or consulted any other health care providers outside of the [address] [place] [address] since your last visit? \" No     3. For patients aged 39-70: Has the patient had a colonoscopy / FIT/ Cologuard? NA - based on age      If the patient is female:    4. For patients aged 41-77: Has the patient had a mammogram within the past 2 years? NA - based on age or sex      11. For patients aged 21-65: Has the patient had a pap smear?  NA - based on age or sex          Visit Vitals  BP (!) 155/84 (BP 1 Location: Left upper arm, BP Patient Position: Sitting, BP Cuff Size: Adult)   Pulse 71   Temp 97.5 °F (36.4 °C) (Temporal)   Resp 20   Ht 4' 11\" (1.499 m)   Wt 175 lb 3.2 oz (79.5 kg)   SpO2 93%   BMI 35.39 kg/m²

## 2022-04-15 NOTE — PATIENT INSTRUCTIONS

## 2022-04-15 NOTE — PROGRESS NOTES
This is the Subsequent Medicare Annual Wellness Exam, performed 12 months or more after the Initial AWV or the last Subsequent AWV    I have reviewed the patient's medical history in detail and updated the computerized patient record. AS well as a follow up of her health issues. Ongoing issues with joint pain. Seeing the rheum MD for follow up and having labs done with them. Some lower back pain. Some radicular pain. No change in bowels or bladder. No fever or chills. Has ongoing issues with cough and recent Chest CT with ? Pneumonitis. Is seeing the pulmonary MD as well as cardiology for follow up. ROS - Per HPI    Physical Examination: General appearance - alert, well appearing, and in no distress  Ears - bilateral TM's and external ear canals normal, ceruminosis noted, cerumen removed  Mouth - mucous membranes moist, pharynx normal without lesions  Neck - supple, no significant adenopathy  Lymphatics - no palpable lymphadenopathy, no hepatosplenomegaly  Chest - clear to auscultation, no wheezes, rales or rhonchi, symmetric air entry  Heart - normal rate and regular rhythm  Abdomen - soft, nontender, nondistended, no masses or organomegaly  Neurological - alert, oriented, normal speech, no focal findings or movement disorder noted  Musculoskeletal - RA changes in the hands  Extremities - peripheral pulses normal, no pedal edema, no clubbing or cyanosis      Assessment/Plan   Education and counseling provided:  Are appropriate based on today's review and evaluation  End-of-Life planning (with patient's consent)    1. Medicare annual wellness visit, subsequent  2. Severe obesity (BMI 35.0-39. 9) with comorbidity (HCC)-continue to work on diet and exercise for weight loss. 3. Rheumatoid arthritis involving multiple sites with positive rheumatoid factor (HCC)-continue to see rheumatology for follow-up. 4. Malignant neoplasm of colon, unspecified part of colon (HCC)-up-to-date on colonoscopy.   5. Bilateral hearing loss, unspecified hearing loss type-continue ENT follow-up. 6. Hypercholesterolemia-obtain labs recently done. 7. Abnormal chest CT- likely pneumonitis associated with her RA. Agree with plans for pulmonary evaluation. Depression Risk Factor Screening     3 most recent PHQ Screens 4/15/2022   Little interest or pleasure in doing things Several days   Feeling down, depressed, irritable, or hopeless Several days   Total Score PHQ 2 2   Trouble falling or staying asleep, or sleeping too much -   Feeling tired or having little energy -   Poor appetite, weight loss, or overeating -   Feeling bad about yourself - or that you are a failure or have let yourself or your family down -   Trouble concentrating on things such as school, work, reading, or watching TV -   Moving or speaking so slowly that other people could have noticed; or the opposite being so fidgety that others notice -   Thoughts of being better off dead, or hurting yourself in some way -   PHQ 9 Score -   How difficult have these problems made it for you to do your work, take care of your home and get along with others -       Alcohol & Drug Abuse Risk Screen    Do you average more than 1 drink per night or more than 7 drinks a week:  No    On any one occasion in the past three months have you have had more than 3 drinks containing alcohol:  No          Functional Ability and Level of Safety    Hearing: The patient wears hearing aids. Activities of Daily Living: The home contains: no safety equipment. Patient does total self care      Ambulation: with no difficulty     Fall Risk:  Fall Risk Assessment, last 12 mths 4/15/2022   Able to walk? Yes   Fall in past 12 months? 0   Do you feel unsteady? 0   Are you worried about falling 0   Number of falls in past 12 months -   Fall with injury?  -      Abuse Screen:  Patient is not abused       Cognitive Screening    Has your family/caregiver stated any concerns about your memory: no Health Maintenance Due     Health Maintenance Due   Topic Date Due    DTaP/Tdap/Td series (1 - Tdap) Never done    Shingrix Vaccine Age 50> (1 of 2) Never done    Bone Densitometry (Dexa) Screening  Never done    COVID-19 Vaccine (3 - Booster for [employer] series) 08/31/2021       Patient Care Team   Patient Care Team:  Joel Merlin, MD as PCP - General (Internal Medicine)  Joel Merlin, MD as PCP - [facility] Empaneled Provider  Ryan Corral MD (Rheumatology)  Ana Maria Prakash MD (Ophthalmology)  Laura Arriola MD as Physician (Cardiology)  Kameron Zheng NP (Nurse Practitioner)  Keven Ambriz MD (Cardiology)    History     Patient Active Problem List   Diagnosis Code    Depression F32. A    Impaired hearing H91.90    RA [facility]rheumatoid arthritis) ([facility]) M06.9    Heart palpitations R00.2    Glaucoma H40.9    Colon cancer ([facility]) C18.9    Restless leg syndrome G25.81    Osteoarthritis of right knee M17.11    Severe obesity ([facility]) E66.01    Spinal stenosis, lumbar M48.061    Lumbar stenosis M48.061    Lumbar surgical wound fluid collection T81.89XA    MRSA infection A49.02    Hypercholesterolemia E78.00     Past Medical History:   Diagnosis Date    Cataracts, bilateral     Chronic pain     LOWER BACK    Colon cancer ([facility] 2015    colectomy with ostomy, then reversed by DR ELVIN Worley Colon polyps     Depression     did not do well on duloxetine    GERD (gastroesophageal reflux disease)     Glaucoma     Heart palpitations     MRSA infection 5/8/2019    Other ill-defined conditions(799.89)     GASTROPARESIS    RA (rheumatoid arthritis) ([facility] [facility]     DR Stacey Bailon, remicade    Restless leg syndrome     Spinal stenosis 5/8/2011    Unspecified adverse effect of anesthesia     HEADACHE    UTI (lower urinary tract infection)       Past Surgical History:   Procedure Laterality Date    HX APPENDECTOMY  2015    HX CATARACT REMOVAL Right 06/2020    [facility]    HX COLOSTOMY  2016    REVERSAL    HX COLOSTOMY TAKE DOWN      HX DILATION AND CURETTAGE      HX GI  9/2014    COLONOSCOPY    HX KNEE ARTHROSCOPY Left 1979    MENISCUS REPAIR    HX KNEE ARTHROSCOPY Left     HX KNEE ARTHROSCOPY Right 2005    HX KNEE REPLACEMENT Left 2005    HX KNEE REPLACEMENT Right 05/09/2018    HX LAP CHOLECYSTECTOMY  5/2005    HX LUMBAR LAMINECTOMY  2011    HX LUMBAR LAMINECTOMY  60/4302    complicated by MRSA infection    HX ORTHOPAEDIC Right     HAND FX    HX TOTAL COLECTOMY  2015    WITH COLOSTOMY AND THEN REOMVAL OF COLOSTOMY    HX TUBAL LIGATION  1979    HX WRIST FRACTURE TX Left     IR INJ/CATH THER SUBST LUM/SAC W IMG  6/1/2020    [place] TOTAL HIP ARTHROPLASTY Right 3/2012     Current Outpatient Medications   Medication Sig Dispense Refill    leflunomide (Arava) 20 mg tablet Take 1 Tablet by mouth daily.  certolizumab pegoL (Cimzia) 400 mg/2 mL (200 mg/mL x 2) sykt injection 2 mL by SubCUTAneous route every thirty (30) days. 2 mL 0    atenoloL (TENORMIN) 25 mg tablet Take 1 Tablet by mouth daily. 90 Tablet 0    DULoxetine (CYMBALTA) 30 mg capsule Take 1 Capsule by mouth daily. 90 Capsule 0    pramipexole (Mirapex) 1 mg tablet Take 1 mg by mouth daily.  acetaminophen (Tylenol Arthritis Pain) 650 mg TbER Take 650 mg by mouth every eight (8) hours.  SUMAtriptan (IMITREX) 6 mg/0.5 mL injection 6 mg by SubCUTAneous route once.  calcium carb/D3/magnesium/zinc (HOLDEN MAG ZINC PLUS D3 PO) Take  by mouth.  BIOTIN PO Take 1,000 mg by mouth every other day.  cholecalciferol, vitamin D3, (VITAMIN D3) 2,000 unit tab Take  by mouth.        Allergies   Allergen Reactions    Adhesive Tape-Silicones Other (comments)     \"Left skin raw\"    Statins-Hmg-Coa Reductase Inhibitors Myalgia     Intolerant to lipitor, crestor and pravastatin       Family History   Problem Relation Age of Onset    Heart Disease Mother     Hypertension Mother    Sahil Lott OSTEOARTHRITIS Mother  Lung Disease Mother         BENIGN TUMOR    Heart Failure Mother     Diabetes Father     COPD Father     Cancer Father         LIP CANCER    Diabetes Sister     Heart Disease Sister         SOMETHING WITH HER HEART MUSCLE    Hypertension Sister     Diabetes Brother     Heart Disease Brother     Hypertension Brother     Pacemaker Brother         AND DEFIBRILLATOR    Stroke Brother     Anesth Problems Neg Hx      Social History     Tobacco Use    Smoking status: Former Smoker     Packs/day: 1.00     Years: 20.00     Pack years: 20.00     Quit date: 3/7/1978     Years since quittin.1    Smokeless tobacco: Never Used   Substance Use Topics    Alcohol use: Not Currently     Comment: maybe not that much         Kaya Dow MD

## 2022-04-18 ENCOUNTER — PATIENT OUTREACH (OUTPATIENT)
Dept: CASE MANAGEMENT | Age: 82
End: 2022-04-18

## 2022-04-18 NOTE — ACP (ADVANCE CARE PLANNING)
Advance Care Planning   Ambulatory ACP Specialist Patient Outreach    Date:  4/18/2022    ACP Specialist:  Remigio Banks LPN    Outreach call to patient in follow-up to ACP Specialist referral from:    [x] PCP  [] Provider   [] Ambulatory Care Management [] Other     For:                  [x] Advance Directive Assistance              [] Complete Portable DNR order              [] Complete POST/MOST              [] Code Status Discussion             [] Discuss Goals of Care             [] Early ACP Decision-Making              [] Other (Specify)    Date Referral Received: 4/18/22    Today's Outreach:  [x] First   [] Second  [] Third       Third outreach made by: [] Phone  [] Email / mail    [] PlayLabt     Intervention:  [x] Spoke with Patient   [] Left VM requesting return call      Outcome:  LPN spoke with the pt who wishes to move forward in having an ACP conversation with an ACP specialist. Pt is alert and oriented x4. Appointment scheduled for 4/28/2022 at 1 pm. ACP information has been e-mailed to the pt for review prior to the appointment. Next Step:   [x] ACP scheduled conversation  [] Outreach again in one week               [x] Email / Mail ACP Info Sheets  [] Email / Mail Advance Directive   [] Closing referral.  Routing closure to referring provider/staff and to ACP Specialist . [] Closure letter mailed to patient with invitation to contact ACP Specialist if / when ready.   Thank you for this referral.

## 2022-04-27 ENCOUNTER — DOCUMENTATION ONLY (OUTPATIENT)
Dept: CASE MANAGEMENT | Age: 82
End: 2022-04-27

## 2022-04-27 NOTE — ACP (ADVANCE CARE PLANNING)
Advance Care Planning   Ambulatory ACP Specialist Patient Outreach    Date:  4/27/2022    ACP Specialist: Annette Vera LCSW  Outreach call to patient in follow-up to ACP Specialist referral from:    [x] PCP  [] Provider   [] Ambulatory Care Management [] Other     For:                  [x] Advance Directive Assistance              [] Complete Portable DNR order              [] Complete POST/MOST              [x] Code Status Discussion             [] Discuss Goals of Care             [] Early ACP Decision-Making              [] Other (Specify)    Date Referral Received:4/18/22    Today's Outreach:  [x] First   [] Second  [] Third       Third outreach made by: [] Phone  [] Email / mail    [] MyChart     Intervention:  [x] Spoke with Patient   [] Left VM requesting return call      Outcome: Reminder call provided regarding upcoming ACP Conversation appt. Pt confirmed appt for tomorrow. Next Step:   [x] ACP scheduled conversation  [] Outreach again in one week               [] Email / Mail ACP Info Sheets  [] Email / Mail Advance Directive   [] Closing referral.  Routing closure to referring provider/staff and to ACP Specialist . [] Closure letter mailed to patient with invitation to contact ACP Specialist if / when ready.   Thank you for this referral.       .Annette Vera LCSW, [facility]  [address] [address]

## 2022-04-28 ENCOUNTER — DOCUMENTATION ONLY (OUTPATIENT)
Dept: CASE MANAGEMENT | Age: 82
End: 2022-04-28

## 2022-04-28 NOTE — ACP (ADVANCE CARE PLANNING)
Advance Care Planning     Advance Care Planning Clinical Specialist  Conversation Note      Date of ACP Conversation: 04/28/22    Conversation Conducted with:  Patient with Decision Making Capacity and her     ACP Clinical Specialist: Gabriel Mujica LCSW    Health Care Decision Maker:    Current Designated Health Care Decision Maker:     Primary Decision Maker: Levi Morrison - Spouse - [phone]    Secondary Decision Maker: Levi Morrison \"Toby\" - Son - [phone]    Supplemental (Other) Decision Maker: Alphonso Givens \"Trip\" - Darl Ronnie - [phone]    Today we were able to identify Pt's HCDMs, review her wishes in various medical scenarios, and complete an ACP document. Care Preferences    Hospitalization: \"If your health worsens and it becomes clear that your chance of recovery is unlikely, what would your preference be regarding hospitalization? \"    Choice:  []  The patient wants hospitalization  [x]  The patient prefers comfort-focused treatment without hospitalization. \"It wouldn't particularly matter to me as long as I was comfortable. \"    Ventilation: \"If you were in your present state of health and suddenly became very ill and were unable to breathe on your own, what would your preference be about the use of a ventilator (breathing machine) if it were available to you? \"      If patient would desire the use of a ventilator (breathing machine), answer \"yes\", if not \"no\":yes, \"If they thought it would help. \"    \"If your health worsens and it becomes clear that your chance of recovery is unlikely, what would your preference be about the use of a ventilator (breathing machine) if it were available to you? \"     Would the patient desire the use of a ventilator (breathing machine)? NO      Resuscitation  \"CPR works best to restart the heart when there is a sudden event, like a heart attack, in someone who is otherwise healthy.  Unfortunately, CPR does not typically restart the heart for people who have serious health conditions or who are very sick. \"    \"In the event your heart stopped as a result of an underlying serious health condition, would you want attempts to be made to restart your heart (answer \"yes\" for attempt to resuscitate) or would you prefer a natural death (answer \"no\" for do not attempt to resuscitate)? \" no, Pt was very clear she should NOT want to undergo resuscitation if \"it wouldn't make things any better\", specifically in the setting of a serious health issue. However, she would want attempts made to resuscitate her, if it was an acute/accidental event. Education was provided regarding DDNR, should Pt change her mind in the future. [x] Yes  [] No   Educated Patient / Melina Lopez regarding differences between Advance Directives and portable DNR orders. Length of ACP Conversation in minutes:  65m    Conversation Outcomes:  [x] ACP discussion completed  [] Existing advance directive reviewed with patient; no changes to patient's previously recorded wishes   [x] New Advance Directive completed   [] Portable Do Not Resuscitate prepared for Provider review and signature  [] POLST/POST/MOLST/MOST prepared for Provider review and signature      Follow-up plan:    [] Schedule follow-up conversation to continue planning  [] Referred individual to Provider for additional questions/concerns   [x] Advised patient/agent/surrogate to review completed ACP document and update if needed with changes in condition, patient preferences or care setting     [x] This note routed to one or more involved healthcare providers    4/28- Pt and her spouse participated in this ACP conversation. Pt was very engaged throughout and clear about her wishes. She talked about her own eventual death in a very \"matter of fact\" manner and was certain that she did not want to live a life where she was \"bedridden, not able to do anything, and my mind is going\".  She identified 3 HCDMs and will be completing an ACP document today with the use of docusign. Upon its completion, the paperwork will be uploaded into the EMR. At that times, this referral will be recommended for closure.      Seng Bearden LCSW, [facility]-  Advance Care   [facility]

## 2022-05-04 ENCOUNTER — TRANSCRIBE ORDER (OUTPATIENT)
Dept: SCHEDULING | Age: 82
End: 2022-05-04

## 2022-05-04 DIAGNOSIS — R06.09 DYSPNEA ON EFFORT: Primary | ICD-10-CM

## 2022-05-05 ENCOUNTER — TRANSCRIBE ORDER (OUTPATIENT)
Dept: SCHEDULING | Age: 82
End: 2022-05-05

## 2022-05-05 DIAGNOSIS — R06.89 DYSPNEA AND RESPIRATORY ABNORMALITY: Primary | ICD-10-CM

## 2022-05-05 DIAGNOSIS — R06.00 DYSPNEA AND RESPIRATORY ABNORMALITY: Primary | ICD-10-CM

## 2022-05-14 DIAGNOSIS — F34.1 DYSTHYMIA: ICD-10-CM

## 2022-05-17 RX ORDER — DULOXETIN HYDROCHLORIDE 30 MG/1
CAPSULE, DELAYED RELEASE ORAL
Qty: 90 CAPSULE | Refills: 1 | Status: SHIPPED | OUTPATIENT
Start: 2022-05-17 | End: 2022-09-06 | Stop reason: SDUPTHER

## 2022-05-17 RX ORDER — ATENOLOL 25 MG/1
TABLET ORAL
Qty: 90 TABLET | Refills: 1 | Status: SHIPPED | OUTPATIENT
Start: 2022-05-17

## 2022-05-17 NOTE — TELEPHONE ENCOUNTER
Chief Complaint   Patient presents with    Medication Refill     Last Appointment with Dr. Jamar Stacy:  4/15/2022  Future Appointments   Date Time Provider Jarocho Collins   5/31/2022 10:00 AM [facility] 2 [facility]   7/11/2022 10:30 AM Prem Mathews MD [facility]

## 2022-05-31 ENCOUNTER — HOSPITAL ENCOUNTER (OUTPATIENT)
Dept: CT IMAGING | Age: 82
Discharge: HOME | End: 2022-05-31
Attending: INTERNAL MEDICINE
Payer: MEDICARE

## 2022-05-31 DIAGNOSIS — R06.89 DYSPNEA AND RESPIRATORY ABNORMALITY: ICD-10-CM

## 2022-05-31 DIAGNOSIS — R06.00 DYSPNEA AND RESPIRATORY ABNORMALITY: ICD-10-CM

## 2022-05-31 PROCEDURE — 71250 CT THORAX DX C-: CPT

## 2022-06-13 ENCOUNTER — TELEPHONE (OUTPATIENT)
Dept: INTERNAL MEDICINE CLINIC | Age: 82
End: 2022-06-13

## 2022-06-13 NOTE — TELEPHONE ENCOUNTER
Verified patient identity with two identifiers. Spoke with patient by phone. Pt c/o diarrhea - this is not occurring every day - she has a hx of colon surgery- she has had several incidents she was not able to make it to bathroom in time (cannot always tell in time- these accidents are new to her). Also wants to discuss increasing duloxetine. She will be in town next Monday so appt scheduled to see SRJ at that time. Will call her back if there are recommendations prior to appt.

## 2022-06-13 NOTE — TELEPHONE ENCOUNTER
----- Message from Janet Josi sent at 6/13/2022 12:18 PM EDT -----  Subject: Message to Provider    QUESTIONS  Information for Provider? Urgent Appt. during lunch time. Rometta Favre Rometta Favre Pt is having   Diarrhea and would like to see if you have any appt. for June 20 about   10-11 Please advise Pt  ---------------------------------------------------------------------------  --------------  CALL BACK INFO  What is the best way for the office to contact you? OK to leave message on   voicemail  Preferred Call Back Phone Number? [phone]  ---------------------------------------------------------------------------  --------------  SCRIPT ANSWERS  Relationship to Patient?  Self

## 2022-06-19 PROBLEM — R09.02 HYPOXIA: Status: ACTIVE | Noted: 2021-02-26

## 2022-06-20 ENCOUNTER — OFFICE VISIT (OUTPATIENT)
Dept: INTERNAL MEDICINE CLINIC | Age: 82
End: 2022-06-20
Payer: MEDICARE

## 2022-06-20 VITALS
HEART RATE: 60 BPM | HEIGHT: 59 IN | WEIGHT: 172 LBS | RESPIRATION RATE: 16 BRPM | BODY MASS INDEX: 34.68 KG/M2 | TEMPERATURE: 97.6 F | SYSTOLIC BLOOD PRESSURE: 138 MMHG | DIASTOLIC BLOOD PRESSURE: 76 MMHG

## 2022-06-20 DIAGNOSIS — N39.46 MIXED INCONTINENCE URGE AND STRESS: ICD-10-CM

## 2022-06-20 DIAGNOSIS — M48.061 SPINAL STENOSIS OF LUMBAR REGION AT MULTIPLE LEVELS: Primary | ICD-10-CM

## 2022-06-20 DIAGNOSIS — R15.9 FULL INCONTINENCE OF FECES: ICD-10-CM

## 2022-06-20 PROCEDURE — G8400 PT W/DXA NO RESULTS DOC: HCPCS | Performed by: INTERNAL MEDICINE

## 2022-06-20 PROCEDURE — G0463 HOSPITAL OUTPT CLINIC VISIT: HCPCS | Performed by: INTERNAL MEDICINE

## 2022-06-20 PROCEDURE — G8427 DOCREV CUR MEDS BY ELIG CLIN: HCPCS | Performed by: INTERNAL MEDICINE

## 2022-06-20 PROCEDURE — G8536 NO DOC ELDER MAL SCRN: HCPCS | Performed by: INTERNAL MEDICINE

## 2022-06-20 PROCEDURE — G8417 CALC BMI ABV UP PARAM F/U: HCPCS | Performed by: INTERNAL MEDICINE

## 2022-06-20 PROCEDURE — 1101F PT FALLS ASSESS-DOCD LE1/YR: CPT | Performed by: INTERNAL MEDICINE

## 2022-06-20 PROCEDURE — 1090F PRES/ABSN URINE INCON ASSESS: CPT | Performed by: INTERNAL MEDICINE

## 2022-06-20 PROCEDURE — G9717 DOC PT DX DEP/BP F/U NT REQ: HCPCS | Performed by: INTERNAL MEDICINE

## 2022-06-20 PROCEDURE — 99214 OFFICE O/P EST MOD 30 MIN: CPT | Performed by: INTERNAL MEDICINE

## 2022-06-20 RX ORDER — LOPERAMIDE HYDROCHLORIDE 2 MG/1
2-4 CAPSULE ORAL
COMMUNITY
Start: 2022-06-20

## 2022-06-20 RX ORDER — PREDNISONE 5 MG/1
5 TABLET ORAL
COMMUNITY
End: 2022-08-30

## 2022-06-20 RX ORDER — TOLTERODINE 4 MG/1
4 CAPSULE, EXTENDED RELEASE ORAL DAILY
Qty: 30 CAPSULE | Refills: 1 | Status: SHIPPED | OUTPATIENT
Start: 2022-06-20 | End: 2022-10-24

## 2022-06-20 NOTE — PROGRESS NOTES
HPI:  Mayra Vasquez is a 78y.o. year old female who is here for a follow-up visit. She has had ongoing issues with her back. She did have an epidural injection done last week by pain management. She feels that it gave her some improvement in her symptoms. She has had ongoing lower back pain that is becoming more unbearable with pain down her legs. She wanted to discuss a spine stimulator. She is status post total colectomy and a previous colostomy with colostomy reversal.  She now has ongoing issues with loose stools multiple times per day. She has diarrhea after every meal.  No melena or hematochezia. She has incontinence of both stool and urine. Her urine incontinence is both stress and urge related. Past Medical History:   Diagnosis Date    Cataracts, bilateral     Chronic pain     LOWER BACK    Colon cancer ([facility] [address] 2015    colectomy with ostomy, then reversed by DR ELVIN Salgado Colon polyps     Depression     did not do well on duloxetine    GERD (gastroesophageal reflux disease)     Glaucoma     Heart palpitations     MRSA infection 5/8/2019    Other ill-defined conditions(799.89)     GASTROPARESIS    RA (rheumatoid arthritis) ([facility] [address]     DR Royce Mora, remicade    Restless leg syndrome     Spinal stenosis 5/8/2011    Unspecified adverse effect of anesthesia     HEADACHE    UTI (lower urinary tract infection)        Past Surgical History:   Procedure Laterality Date    HX APPENDECTOMY  2015    HX CATARACT REMOVAL Right 06/2020    [facility]    HX COLOSTOMY  2016    REVERSAL    HX COLOSTOMY TAKE DOWN      HX DILATION AND CURETTAGE      HX GI  9/2014    COLONOSCOPY    HX KNEE ARTHROSCOPY Left 1979    MENISCUS REPAIR    HX KNEE ARTHROSCOPY Left     HX KNEE ARTHROSCOPY Right 2005    HX KNEE REPLACEMENT Left 2005    HX KNEE REPLACEMENT Right 05/09/2018    HX LAP CHOLECYSTECTOMY  5/2005    HX LUMBAR LAMINECTOMY  2011    HX LUMBAR LAMINECTOMY  50/1336    complicated by MRSA infection    HX ORTHOPAEDIC Right     HAND FX    HX TOTAL COLECTOMY  2015    WITH COLOSTOMY AND THEN REOMVAL OF COLOSTOMY    HX TUBAL LIGATION  1979    HX WRIST FRACTURE TX Left     IR INJ/CATH THER SUBST LUM/SAC W IMG  6/1/2020    SC TOTAL HIP ARTHROPLASTY Right 3/2012       Prior to Admission medications    Medication Sig Start Date End Date Taking? Authorizing Provider   predniSONE (DELTASONE) 5 mg tablet Take  by mouth as needed. Yes Provider, Historical   tolterodine ER (DETROL LA) 4 mg ER capsule Take 1 Capsule by mouth daily. 6/20/22  Yes Andrade Cordova MD   loperamide (Imodium A-D) 2 mg capsule Take 1-2 Capsules by mouth four (4) times daily as needed for Diarrhea. 6/20/22  Yes Andrade Cordova MD   DULoxetine (CYMBALTA) 30 mg capsule TAKE ONE CAPSULE BY MOUTH DAILY 5/17/22  Yes Keke Garcia III, MD   atenoloL (TENORMIN) 25 mg tablet TAKE ONE TABLET BY MOUTH DAILY 5/17/22  Yes Keke Garcia III, MD   leflunomide (Arava) 20 mg tablet Take 1 Tablet by mouth daily. 4/15/22  Yes Keke Garcia III, MD   certolizumab pegoL (Cimzia) 400 mg/2 mL (200 mg/mL x 2) sykt injection 2 mL by SubCUTAneous route every thirty (30) days. 4/15/22  Yes Andrade Cordova MD   pramipexole (Mirapex) 1 mg tablet Take 1 mg by mouth daily. Yes Provider, Historical   acetaminophen (Tylenol Arthritis Pain) 650 mg TbER Take 650 mg by mouth every eight (8) hours. Yes Provider, Historical   SUMAtriptan (IMITREX) 6 mg/0.5 mL injection 6 mg by SubCUTAneous route once. 8/28/19  Yes Provider, Historical   calcium carb/D3/magnesium/zinc (HOLDEN MAG ZINC PLUS D3 PO) Take  by mouth. Yes Provider, Historical   BIOTIN PO Take 1,000 mg by mouth every other day. Yes Provider, Historical   cholecalciferol, vitamin D3, (VITAMIN D3) 2,000 unit tab Take  by mouth.    Yes Provider, Historical       Social History     Socioeconomic History    Marital status:      Spouse name: Not on file    Number of children: Not on file    Years of education: Not on file    Highest education level: Not on file   Occupational History    Not on file   Tobacco Use    Smoking status: Former Smoker     Packs/day: 1.00     Years: 20.00     Pack years: 20.00     Quit date: 3/7/1978     Years since quittin.3    Smokeless tobacco: Never Used   Vaping Use    Vaping Use: Never used   Substance and Sexual Activity    Alcohol use: Not Currently     Comment: maybe not that much    Drug use: No    Sexual activity: Not Currently   Other Topics Concern    Not on file   Social History Narrative    . REM[employer] to [employer]     Financial Resource Strain:     Difficulty of Paying Living Expenses: Not on file   Food Insecurity:     Worried About [address] in the Last Year: Not on file    Alonzo of Food in the Last Year: Not on file   Transportation Needs:     Lack of Transportation (Medical): Not on file    Lack of Transportation (Non-Medical):  Not on file   Physical Activity:     Days of Exercise per Week: Not on file    Minutes of Exercise per Session: Not on file   Stress:     Feeling of Stress : Not on file   Social Connections:     Frequency of Communication with Friends and Family: Not on file    Frequency of Social Gatherings with Friends and Family: Not on file    Attends Spiritism Services: Not on file    Active Member of [address] [place] [address] or Organizations: Not on file    Attends Club or Organization Meetings: Not on file    Marital Status: Not on file   Intimate Partner Violence:     Fear of Current or Ex-Partner: Not on file    Emotionally Abused: Not on file    Physically Abused: Not on file    Sexually Abused: Not on file   Housing Stability:     Unable to Pay for Housing in the Last Year: Not on file    Number of [place] in the Last Year: Not on file    Unstable Housing in the Last Year: Not on file          ROS  Per HPI    Visit Vitals  /76   Pulse 60   Temp 97.6 °F (36.4 °C) (Temporal)   Resp 16   Ht 4' 11\" (1.499 m)   Wt 172 lb (78 kg)   BMI 34.74 kg/m²         Physical Exam   Physical Examination: General appearance - alert, well appearing, and in no distress  Chest - clear to auscultation, no wheezes, rales or rhonchi, symmetric air entry  Heart - normal rate, regular rhythm, normal S1, S2, no murmurs, rubs, clicks or gallops  Abdomen - soft, nontender, nondistended, no masses or organomegaly  Neurological - alert, oriented, normal speech, no focal findings or movement disorder noted  Extremities - peripheral pulses normal, no pedal edema, no clubbing or cyanosis      Assessment/Plan:  Diagnoses and all orders for this visit:    1. Spinal stenosis of lumbar region at multiple levels  -discussed possible nerve root ablation and she will consider. Discussed a spine stimulator as well and she will discuss that with her pain management doctor. 2. Full incontinence of feces-we will try Imodium on a scheduled basis and see if that is helpful. 3. Mixed incontinence urge and stress-we will try Detrol and see if that helps. If not improved, will see urology for an evaluation. Other orders  -     tolterodine ER (DETROL LA) 4 mg ER capsule; Take 1 Capsule by mouth daily. Advised her to call back or return to office if symptoms worsen/change/persist.  Discussed expected course/resolution/complications of diagnosis in detail with patient. Medication risks/benefits/costs/interactions/alternatives discussed with patient. She was given an after visit summary which includes diagnoses, current medications, & vitals. She expressed understanding with the diagnosis and plan.

## 2022-07-18 ENCOUNTER — TELEPHONE (OUTPATIENT)
Dept: INTERNAL MEDICINE CLINIC | Age: 82
End: 2022-07-18

## 2022-07-18 NOTE — TELEPHONE ENCOUNTER
Reason for call: Pt having a chest cold greater than 8 days, can feel and hear wheezing in her chest. Her nose and ears are stuffed up. She is wanting an appt with  today or tomorrow if possible and does not want to see another provider as he already knows about this and was working with her on this.     Is this a new problem: yes     Date of last appointment:  6/20/2022     Can we respond via [employer]: no    Best call back number: [phone]

## 2022-07-18 NOTE — TELEPHONE ENCOUNTER
Returned call to patient. States 8 days of cough, nasal congestion, runny nose, denies fever & SOB. Has appointment with Dr. Iqra Perkins August 3rd. Advised patient to test for COVID and reviewed resp care instructions with her, red flags to warrant 911/ED reviewed - pt voiced understanding.

## 2022-08-04 ENCOUNTER — TRANSCRIBE ORDER (OUTPATIENT)
Dept: SCHEDULING | Age: 82
End: 2022-08-04

## 2022-08-04 DIAGNOSIS — R06.09 DYSPNEA ON EFFORT: Primary | ICD-10-CM

## 2022-08-08 ENCOUNTER — TRANSCRIBE ORDER (OUTPATIENT)
Dept: SCHEDULING | Age: 82
End: 2022-08-08

## 2022-08-08 DIAGNOSIS — M25.562 LEFT KNEE PAIN, UNSPECIFIED CHRONICITY: Primary | ICD-10-CM

## 2022-08-09 ENCOUNTER — TELEPHONE (OUTPATIENT)
Dept: INTERNAL MEDICINE CLINIC | Age: 82
End: 2022-08-09

## 2022-08-12 ENCOUNTER — HOSPITAL ENCOUNTER (OUTPATIENT)
Dept: NUCLEAR MEDICINE | Age: 82
Discharge: HOME | End: 2022-08-12
Attending: ORTHOPAEDIC SURGERY
Payer: MEDICARE

## 2022-08-12 ENCOUNTER — HOSPITAL ENCOUNTER (OUTPATIENT)
Dept: CT IMAGING | Age: 82
Discharge: HOME | End: 2022-08-12
Attending: INTERNAL MEDICINE
Payer: MEDICARE

## 2022-08-12 DIAGNOSIS — R06.09 DYSPNEA ON EFFORT: ICD-10-CM

## 2022-08-12 DIAGNOSIS — M25.562 LEFT KNEE PAIN, UNSPECIFIED CHRONICITY: ICD-10-CM

## 2022-08-12 PROCEDURE — 78306 BONE IMAGING WHOLE BODY: CPT

## 2022-08-12 PROCEDURE — 71250 CT THORAX DX C-: CPT

## 2022-08-22 ENCOUNTER — OFFICE VISIT (OUTPATIENT)
Dept: INTERNAL MEDICINE CLINIC | Age: 82
End: 2022-08-22
Payer: MEDICARE

## 2022-08-22 VITALS
TEMPERATURE: 97.3 F | WEIGHT: 172 LBS | OXYGEN SATURATION: 96 % | DIASTOLIC BLOOD PRESSURE: 82 MMHG | SYSTOLIC BLOOD PRESSURE: 131 MMHG | HEIGHT: 59 IN | BODY MASS INDEX: 34.68 KG/M2 | HEART RATE: 106 BPM | RESPIRATION RATE: 20 BRPM

## 2022-08-22 DIAGNOSIS — M05.79 RHEUMATOID ARTHRITIS INVOLVING MULTIPLE SITES WITH POSITIVE RHEUMATOID FACTOR (HCC): ICD-10-CM

## 2022-08-22 DIAGNOSIS — M16.12 ARTHRITIS OF LEFT HIP: Primary | ICD-10-CM

## 2022-08-22 DIAGNOSIS — E78.00 HYPERCHOLESTEROLEMIA: ICD-10-CM

## 2022-08-22 DIAGNOSIS — M48.061 SPINAL STENOSIS OF LUMBAR REGION AT MULTIPLE LEVELS: ICD-10-CM

## 2022-08-22 PROCEDURE — G8417 CALC BMI ABV UP PARAM F/U: HCPCS | Performed by: INTERNAL MEDICINE

## 2022-08-22 PROCEDURE — G8536 NO DOC ELDER MAL SCRN: HCPCS | Performed by: INTERNAL MEDICINE

## 2022-08-22 PROCEDURE — 99214 OFFICE O/P EST MOD 30 MIN: CPT | Performed by: INTERNAL MEDICINE

## 2022-08-22 PROCEDURE — G8400 PT W/DXA NO RESULTS DOC: HCPCS | Performed by: INTERNAL MEDICINE

## 2022-08-22 PROCEDURE — G9717 DOC PT DX DEP/BP F/U NT REQ: HCPCS | Performed by: INTERNAL MEDICINE

## 2022-08-22 PROCEDURE — 1090F PRES/ABSN URINE INCON ASSESS: CPT | Performed by: INTERNAL MEDICINE

## 2022-08-22 PROCEDURE — 1101F PT FALLS ASSESS-DOCD LE1/YR: CPT | Performed by: INTERNAL MEDICINE

## 2022-08-22 PROCEDURE — G0463 HOSPITAL OUTPT CLINIC VISIT: HCPCS | Performed by: INTERNAL MEDICINE

## 2022-08-22 PROCEDURE — G8427 DOCREV CUR MEDS BY ELIG CLIN: HCPCS | Performed by: INTERNAL MEDICINE

## 2022-08-22 NOTE — PROGRESS NOTES
Preoperative Evaluation    Date of Exam: 8/22/2022    Iqra Styles is a 78 y.o. female ([phone]) who presents for preoperative evaluation. Procedure/Surgery:Left Total Hip Replacement  Date of Procedure/Surgery: 8/29/2022  Surgeon: Renetta Wallace  [facility] Facility: [facility]  Primary Physician: Ulysses Cower, MD  Latex Allergy: no    Problem List:     Patient Active Problem List    Diagnosis Date Noted    Mixed incontinence urge and stress 06/20/2022    Full incontinence of feces 06/20/2022    Hypoxia 02/26/2021    MRSA infection 05/08/2019    Lumbar surgical wound fluid collection 05/06/2019    Spinal stenosis of lumbar region at multiple levels 04/18/2019    Spinal stenosis, lumbar 04/16/2019    Severe obesity ([address] 01/16/2019    Osteoarthritis of right knee 05/09/2018    Restless leg syndrome     Colon cancer ([address] 01/01/2015    Heart palpitations     Glaucoma     RA (rheumatoid arthritis) ([address]     Depression 05/08/2011    Impaired hearing 05/08/2011    Hypercholesterolemia 09/24/2010     Medical History:     Past Medical History:   Diagnosis Date    Cataracts, bilateral     Chronic pain     LOWER BACK    Colon cancer ([address] 2015    colectomy with ostomy, then reversed by DR ELVIN Burroughs    Colon polyps     Depression     did not do well on duloxetine    GERD (gastroesophageal reflux disease)     Glaucoma     Heart palpitations     MRSA infection 5/8/2019    Other ill-defined conditions(799.89)     GASTROPARESIS    RA (rheumatoid arthritis) ([address]     DR Nayeli Tejada, remicade    Restless leg syndrome     Spinal stenosis 5/8/2011    Unspecified adverse effect of anesthesia     HEADACHE    UTI (lower urinary tract infection)      Allergies:      Allergies   Allergen Reactions    Adhesive Tape-Silicones Other (comments)     \"Left skin raw\"    Statins-Hmg-Coa Reductase Inhibitors Myalgia     Intolerant to lipitor, crestor and pravastatin      Medications:     Current Outpatient Medications   Medication Sig    zolpidem tartrate (AMBIEN PO) Take  by mouth.    predniSONE (DELTASONE) 5 mg tablet Take  by mouth as needed. tolterodine ER (DETROL LA) 4 mg ER capsule Take 1 Capsule by mouth daily. loperamide (IMODIUM) 2 mg capsule Take 1-2 Capsules by mouth four (4) times daily as needed for Diarrhea. DULoxetine (CYMBALTA) 30 mg capsule TAKE ONE CAPSULE BY MOUTH DAILY    atenoloL (TENORMIN) 25 mg tablet TAKE ONE TABLET BY MOUTH DAILY    leflunomide (ARAVA) 20 mg tablet Take 1 Tablet by mouth daily. certolizumab pegoL (Cimzia) 400 mg/2 mL (200 mg/mL x 2) sykt injection 2 mL by SubCUTAneous route every thirty (30) days. pramipexole (MIRAPEX) 1 mg tablet Take 1 mg by mouth daily. acetaminophen (TYLENOL) 650 mg TbER Take 650 mg by mouth every eight (8) hours. SUMAtriptan (IMITREX) 6 mg/0.5 mL injection 6 mg by SubCUTAneous route once. calcium carb/D3/magnesium/zinc (HOLDEN MAG ZINC PLUS D3 PO) Take  by mouth. BIOTIN PO Take 1,000 mg by mouth every other day. cholecalciferol, vitamin D3, 50 mcg (2,000 unit) tab Take  by mouth. No current facility-administered medications for this visit.      Surgical History:     Past Surgical History:   Procedure Laterality Date    HX APPENDECTOMY  2015    HX CATARACT REMOVAL Right 06/2020    [facility] @ [facility]    HX COLOSTOMY  2016    REVERSAL    HX COLOSTOMY TAKE DOWN      HX DILATION AND CURETTAGE      HX GI  9/2014    COLONOSCOPY    HX KNEE ARTHROSCOPY Left 1979    MENISCUS REPAIR    HX KNEE ARTHROSCOPY Left     HX KNEE ARTHROSCOPY Right 2005    HX KNEE REPLACEMENT Left 2005    HX KNEE REPLACEMENT Right 05/09/2018    HX LAP CHOLECYSTECTOMY  5/2005    HX LUMBAR LAMINECTOMY  2011    HX LUMBAR LAMINECTOMY  10/4567    complicated by MRSA infection    HX ORTHOPAEDIC Right     HAND FX    HX TOTAL COLECTOMY  2015    WITH COLOSTOMY AND THEN REOMVAL OF COLOSTOMY    HX TUBAL LIGATION  1979    HX WRIST FRACTURE TX Left     IR INJ/CATH THER SUBST LUM/SAC W IMG  2020    MO TOTAL HIP ARTHROPLASTY Right 3/2012     Social History:     Social History     Socioeconomic History    Marital status:    Tobacco Use    Smoking status: Former     Packs/day: 1.00     Years: 20.00     Pack years: 20.00     Types: Cigarettes     Quit date: 3/7/1978     Years since quittin.4    Smokeless tobacco: Never   Vaping Use    Vaping Use: Never used   Substance and Sexual Activity    Alcohol use: Not Currently     Comment: maybe not that much    Drug use: No    Sexual activity: Not Currently   Social History Narrative    . REMY to Markatelynn Mcduffie        Recent use of: No recent use of aspirin (ASA), NSAIDS or steroids    Tetanus up to date: last tetanus booster within 10 years      Anesthesia Complications: None  History of abnormal bleeding : None  History of Blood Transfusions: no  Health Care Directive or Living Will: no    REVIEW OF SYSTEMS:  A comprehensive review of systems was negative except for: Some ongoing issues with left hip and thigh pain. Some ongoing lower back pain. She is seeing the pulmonary doctor for follow-up of? Rheumatoid lung disease. She does feel that it is stable to slightly improved. No sputum production. No fevers or chills.     EXAM:   Visit Vitals  /82   Pulse (!) 106   Temp 97.3 °F (36.3 °C) (Temporal)   Resp 20   Ht 4' 11\" (1.499 m)   Wt 172 lb (78 kg)   SpO2 96%   BMI 34.74 kg/m²     General appearance - alert, well appearing, and in no distress  Mouth - mucous membranes moist, pharynx normal without lesions  Neck - supple, no significant adenopathy  Lymphatics - no palpable lymphadenopathy, no hepatosplenomegaly  Chest - clear to auscultation, no wheezes, rales or rhonchi, symmetric air entry  Heart - normal rate and regular rhythm  Abdomen - soft, nontender, nondistended, no masses or organomegaly  Neurological - alert, oriented, normal speech, no focal findings or movement disorder noted, motor and sensory grossly normal bilaterally  Musculoskeletal - abnormal exam of left hip with decreased range of motion and painful internal rotation. Extremities - peripheral pulses normal, no pedal edema, no clubbing or cyanosis      DIAGNOSTICS:   1. EKG: EKG FINDINGS - Pending  2. CXR: Pending  3. Labs: Pending    IMPRESSION:     No contraindications to planned surgery  Diagnoses and all orders for this visit:    1. Arthritis of left hip-appears to be an adequate candidate for anesthesia. 2. Rheumatoid arthritis involving multiple sites with positive rheumatoid factor (HCC)-we will assess her cervical spine with x-rays prior to the surgery. She will have those done as part of her preop. -     XR SPINE CERV 4 OR 5 V; Future    3. Hypercholesterolemia-stable on meds. 4. Spinal stenosis of lumbar region at multiple levels-per pain management.           Bard Simón MD   8/22/2022

## 2022-08-23 ENCOUNTER — HOSPITAL ENCOUNTER (OUTPATIENT)
Dept: GENERAL RADIOLOGY | Age: 82
Discharge: HOME | End: 2022-08-23
Attending: ORTHOPAEDIC SURGERY
Payer: MEDICARE

## 2022-08-23 ENCOUNTER — HOSPITAL ENCOUNTER (OUTPATIENT)
Dept: PREADMISSION TESTING | Age: 82
Discharge: HOME | End: 2022-08-23
Attending: ORTHOPAEDIC SURGERY
Payer: MEDICARE

## 2022-08-23 VITALS
HEIGHT: 59 IN | DIASTOLIC BLOOD PRESSURE: 45 MMHG | OXYGEN SATURATION: 96 % | RESPIRATION RATE: 16 BRPM | WEIGHT: 167.77 LBS | HEART RATE: 57 BPM | BODY MASS INDEX: 33.82 KG/M2 | SYSTOLIC BLOOD PRESSURE: 134 MMHG | TEMPERATURE: 97.9 F

## 2022-08-23 DIAGNOSIS — M05.79 RHEUMATOID ARTHRITIS INVOLVING MULTIPLE SITES WITH POSITIVE RHEUMATOID FACTOR (HCC): ICD-10-CM

## 2022-08-23 LAB
ABO + RH BLD: NORMAL
ALBUMIN SERPL-MCNC: 3 G/DL (ref 3.5–5)
ALBUMIN/GLOB SERPL: 0.8 {RATIO} (ref 1.1–2.2)
ALP SERPL-CCNC: 88 U/L (ref 45–117)
ALT SERPL-CCNC: 20 U/L (ref 12–78)
ANION GAP SERPL CALC-SCNC: 3 MMOL/L (ref 5–15)
APPEARANCE UR: CLEAR
AST SERPL-CCNC: 28 U/L (ref 15–37)
BACTERIA URNS QL MICRO: NEGATIVE /HPF
BILIRUB SERPL-MCNC: 0.4 MG/DL (ref 0.2–1)
BILIRUB UR QL: NEGATIVE
BLOOD GROUP ANTIBODIES SERPL: NORMAL
BUN SERPL-MCNC: 19 MG/DL (ref 6–20)
BUN/CREAT SERPL: 23 (ref 12–20)
CALCIUM SERPL-MCNC: 8.7 MG/DL (ref 8.5–10.1)
CHLORIDE SERPL-SCNC: 110 MMOL/L (ref 97–108)
CO2 SERPL-SCNC: 28 MMOL/L (ref 21–32)
COLOR UR: ABNORMAL
CREAT SERPL-MCNC: 0.83 MG/DL (ref 0.55–1.02)
EPITH CASTS URNS QL MICRO: ABNORMAL /LPF
ERYTHROCYTE [DISTWIDTH] IN BLOOD BY AUTOMATED COUNT: 13.4 % (ref 11.5–14.5)
EST. AVERAGE GLUCOSE BLD GHB EST-MCNC: 114 MG/DL
GLOBULIN SER CALC-MCNC: 3.7 G/DL (ref 2–4)
GLUCOSE SERPL-MCNC: 90 MG/DL (ref 65–100)
GLUCOSE UR STRIP.AUTO-MCNC: NEGATIVE MG/DL
HBA1C MFR BLD: 5.6 % (ref 4–5.6)
HCT VFR BLD AUTO: 41.2 % (ref 35–47)
HGB BLD-MCNC: 13.3 G/DL (ref 11.5–16)
HGB UR QL STRIP: NEGATIVE
HYALINE CASTS URNS QL MICRO: ABNORMAL /LPF (ref 0–2)
INR PPP: 0.9 (ref 0.9–1.1)
KETONES UR QL STRIP.AUTO: NEGATIVE MG/DL
LEUKOCYTE ESTERASE UR QL STRIP.AUTO: ABNORMAL
MCH RBC QN AUTO: 30 PG (ref 26–34)
MCHC RBC AUTO-ENTMCNC: 32.3 G/DL (ref 30–36.5)
MCV RBC AUTO: 92.8 FL (ref 80–99)
NITRITE UR QL STRIP.AUTO: NEGATIVE
NRBC # BLD: 0 K/UL (ref 0–0.01)
NRBC BLD-RTO: 0 PER 100 WBC
PH UR STRIP: 7 [PH] (ref 5–8)
PLATELET # BLD AUTO: 178 K/UL (ref 150–400)
PMV BLD AUTO: 11.8 FL (ref 8.9–12.9)
POTASSIUM SERPL-SCNC: 4.1 MMOL/L (ref 3.5–5.1)
PROT SERPL-MCNC: 6.7 G/DL (ref 6.4–8.2)
PROT UR STRIP-MCNC: ABNORMAL MG/DL
PROTHROMBIN TIME: 9.9 SEC (ref 9–11.1)
RBC # BLD AUTO: 4.44 M/UL (ref 3.8–5.2)
RBC #/AREA URNS HPF: ABNORMAL /HPF (ref 0–5)
SODIUM SERPL-SCNC: 141 MMOL/L (ref 136–145)
SP GR UR REFRACTOMETRY: 1.03
SPECIMEN EXP DATE BLD: NORMAL
UA: UC IF INDICATED,UAUC: ABNORMAL
UROBILINOGEN UR QL STRIP.AUTO: 1 EU/DL (ref 0.2–1)
WBC # BLD AUTO: 5 K/UL (ref 3.6–11)
WBC URNS QL MICRO: ABNORMAL /HPF (ref 0–4)

## 2022-08-23 PROCEDURE — 80053 COMPREHEN METABOLIC PANEL: CPT

## 2022-08-23 PROCEDURE — 36415 COLL VENOUS BLD VENIPUNCTURE: CPT

## 2022-08-23 PROCEDURE — 85610 PROTHROMBIN TIME: CPT

## 2022-08-23 PROCEDURE — 81001 URINALYSIS AUTO W/SCOPE: CPT

## 2022-08-23 PROCEDURE — 86900 BLOOD TYPING SEROLOGIC ABO: CPT

## 2022-08-23 PROCEDURE — 83036 HEMOGLOBIN GLYCOSYLATED A1C: CPT

## 2022-08-23 PROCEDURE — 72050 X-RAY EXAM NECK SPINE 4/5VWS: CPT

## 2022-08-23 PROCEDURE — 85027 COMPLETE CBC AUTOMATED: CPT

## 2022-08-23 RX ORDER — ACETAMINOPHEN 500 MG
1000 TABLET ORAL
COMMUNITY
End: 2022-11-04 | Stop reason: ALTCHOICE

## 2022-08-23 NOTE — PERIOP NOTES

## 2022-08-23 NOTE — PERIOP NOTES
Hibiclens/Chlorhexidine    Preventing Infections Before and After - Your Surgery    IMPORTANT INSTRUCTIONS    Please read and follow these instructions carefully. If you are unable to comply with the below instructions your procedure will be cancelled. Every Night for Three (3) nights before your surgery:  Shower with an antibacterial soap, such as Dial, or the soap provided at your preassessment appointment. A shower is better than a bath for cleaning your skin. If needed, ask someone to help you reach all areas of your body. Dont forget to clean your belly button with every shower. The night before your surgery: If you lose your Hibiclens/chlorhexidine please contact surgery center or you can purchase it at a local pharmacy  On the night before your surgery, shower with an antibacterial soap, such as Dial, or the soap provided at your preassessment appointment. With one packet of Hibiclens/Chlorhexidine in hand, turn water off. Apply Hibiclens antiseptic skin cleanser with a clean, freshly washed washcloth. Gently apply to your body from chin to toes (except the genital area) and especially the area(s) where your incision(s) will be. Leave Hibiclens/Chlorhexidine on your skin for at least 20 seconds. CAUTION: If needed, Hibiclens/chlorhexidine may be used to clean the folds of skin of the legs (such as in the area of the groin) and on your buttocks and hips. However, do not use Hibiclens/Chlorhexidine above the neck or in the genital area (your bottom) or put inside any area of your body. Turn the water back on and rinse. Dry gently with a clean, freshly washed towel. After your shower, do not use any powder, deodorant, perfumes or lotion. Use clean, freshly washed towels and washcloths every time you shower. Wear clean, freshly washed pajamas to bed the night before surgery. Sleep on clean, freshly washed sheets. Do not allow pets to sleep in your bed with you.         The Morning of your surgery:  Shower again thoroughly with an antibacterial soap, such as Dial or the soap provided at your preassessment appointment. If needed, ask someone for help to reach all areas of your body. Dont forget to clean your belly button! Rinse. Dry gently with a clean, freshly washed towel. After your shower, do not use any powder, deodorant, perfumes or lotion prior to surgery. Put on clean, freshly washed clothing. Tips to help prevent infections after your surgery:  Protect your surgical wound from germs:  Hand washing is the most important thing you and your caregivers can do to prevent infections. Keep your bandage clean and dry! Do not touch your surgical wound. Use clean, freshly washed towels and washcloths every time you shower; do not share bath linens with others. Until your surgical wound is healed, wear clothing and sleep on bed linens each day that are clean and freshly washed. Do not allow pets to sleep in your bed with you or touch your surgical wound. Do not smoke - smoking delays wound healing. This may be a good time to stop smoking. If you have diabetes, it is important for you to manage your blood sugar levels properly before your surgery as well as after your surgery. Poorly managed blood sugar levels slow down wound healing and prevent you from healing completely. If you lose your Hibiclens/chlorhexidine, please call the [facility], or it is available for purchase at your pharmacy.                ___________________      ___________________      ________________  (Signature of Patient)          (Witness)                   (Date and Time)

## 2022-08-23 NOTE — PROGRESS NOTES
Patient attended the Joint Replacement Education Class at [facility]. The content of the class was presented using a power point presentation specific for patients undergoing hip and knee replacement surgery. The [facility] Joint Replacement Education Handbook was given to the patient. Preparing for surgery, day of surgery routine and expectations, discharge process and help at home expectations, nutrition,medications, infection control, pain management, DVT prevention, ice therapy and safety were reviewed in class. Opportunity for questions provided, patient verbalized understanding of instructions.

## 2022-08-23 NOTE — PERIOP NOTES

## 2022-08-23 NOTE — PERIOP NOTES
[facility]  Joint/Spine Preoperative Instructions        Surgery Date 8/29/22          Time of Arrival to be called @ [phone]    1. On the day of your surgery, please report to the Surgical Services Registration Desk and sign in at your designated time. The Surgery Center is located to the right of the Emergency Room. 2. You must have someone with you to drive you home. You should not drive a car for 24 hours following surgery. Please make arrangements for a friend or family member to stay with you for the first 24 hours after your surgery. 3. No food after midnight . Medications morning of surgery should be taken with a sip of water. Please follow pre-surgery drink instructions that were given at your Pre Admission Testing appointment. 4. We recommend you do not drink any alcoholic beverages for 24 hours before and after your surgery. 5. Contact your surgeons office for instructions on the following medications: non-steroidal anti-inflammatory drugs (i.e. Advil, Aleve), vitamins, and supplements. (Some surgeons will want you to stop these medications prior to surgery and others may allow you to take them)  **If you are currently taking Plavix, Coumadin, Aspirin and/or other blood-thinning agents, contact your surgeon for instructions. ** Your surgeon will partner with the physician prescribing these medications to determine if it is safe to stop or if you need to continue taking. Please do not stop taking these medications without instructions from your surgeon    6. Wear comfortable clothes. Wear glasses instead of contacts. Do not bring any money or jewelry. Please bring picture ID, insurance card, and any prearranged co-payment or hospital payment. Do not wear make-up, particularly mascara the morning of your surgery. Do not wear nail polish, particularly if you are having foot /hand surgery.   Wear your hair loose or down, no ponytails, buns, farhat pins or clips. All body piercings must be removed. Please shower with antibacterial soap for three consecutive days before and on the morning of surgery, but do not apply any lotions, powders or deodorants after the shower on the day of surgery. Please use a fresh towels after each shower. Please sleep in clean clothes and change bed linens the night before surgery. Please do not shave for 48 hours prior to surgery. Shaving of the face is acceptable. 7. You should understand that if you do not follow these instructions your surgery may be cancelled. If your physical condition changes (I.e. fever, cold or flu) please contact your surgeon as soon as possible. 8. It is important that you be on time. If a situation occurs where you may be late, please call [phone] (OR Holding Area). 9. If you have any questions and or problems, please call [phone] (Pre-admission Testing). 10. Your surgery time may be subject to change. You will receive a phone call the evening prior if your time changes. 11.  If having outpatient surgery, you must have someone to drive you here, stay with you during the duration of your stay, and to drive you home at time of discharge. 12. The following link is for the educational video for patients and/or families. [email or web address][email or web address]-vutwroz-[email or web address]-materials    13  Due to current COVID restrictions, only two adults may accompany you the day of the procedure. We have limited seating available. If our waiting room is at capacity, your ride may be asked to remain in their vehicle.   No children are allowed in the waiting room    Special Instructions: vitamins and supplements to stop 5 days prior to surgery per Dr Giang Samples 5 days prior per Dr Jacobson Smaller: No tylenol- may take atenolol, cymbalta, detrol (tolterodine), prednisone if needed with a sip of water     I understand a pre-operative phone call will be made to verify my surgery time. In the event that I am not available, I give permission for a message to be left on my answering service and/or with another person?   yes         ___________________      __________   _________    (Signature of Patient)             (Witness)                (Date and Time)

## 2022-08-24 LAB
BACTERIA SPEC CULT: NORMAL
BACTERIA SPEC CULT: NORMAL
SERVICE CMNT-IMP: NORMAL

## 2022-08-24 NOTE — ADVANCED PRACTICE NURSE
PAT Nurse Practitioner   Pre-Operative Chart Review/Assessment:-ORTHOPEDIC/NEUROSURGICAL SPINE                Patient Name:  Cony Woods                                                           Age:   78 y.o.    :  1940     Today's Date:  2022     Date of PAT:   22      Date of Surgery:    2022      Procedure(s):  Left  Total Hip Arthroplasty     Surgeon:   Marko Edwards     Medical Clearance:  Dr. Julio César Sorto                    PLAN:      1)  Cardiac Clearance:  Dr. Karla Galo       2)  Program for Diabetes Health Consult:  Not indicated-A1C 5.6      3)  Sleep Apnea evaluation:   STOP BANG Score 3;  Snoring-admits, Apnea-denies               4) Treatment for MRSA/Staph Aureus:  Negative      5) Additional Concerns:  RA, fibromyalgia, interstitial lung disease, palpitations, depression, glaucoma, former smoker                Vital Signs:         Visit Vitals  BP (!) 134/45 (BP 1 Location: Right upper arm, BP Patient Position: At rest;Sitting)   Pulse (!) 57   Temp 97.9 °F (36.6 °C)   Resp 16   Ht 4' 11\" (1.499 m)   Wt 76.1 kg (167 lb 12.3 oz)   SpO2 96%   BMI 33.89 kg/m²                        ____________________________________________  PAST MEDICAL HISTORY  Past Medical History:   Diagnosis Date    Cataracts, bilateral     Chronic pain     LOWER BACK    Colon cancer ([facility] [address]     colectomy with ostomy, then reversed by DR ELVIN Burroughs    Colon polyps     Depression     did not do well on duloxetine    Gastroparesis     GERD (gastroesophageal reflux disease)     Glaucoma     Heart palpitations     Interstitial lung disease ([address]     followed by Dr Jaida Chahal    MRSA infection 2019    RA (rheumatoid arthritis) ([facility] [address]     DR Shanae Salinas    Restless leg syndrome     Spinal stenosis 2011    Unspecified adverse effect of anesthesia     HEADACHE after colonoscopy    UTI (lower urinary tract infection)       ____________________________________________  PAST SURGICAL HISTORY  Past Surgical History:   Procedure Laterality Date    HX APPENDECTOMY  2015    HX CATARACT REMOVAL Right 06/2020    [facility] @ [facility]    HX COLONOSCOPY  09/2014    HX COLOSTOMY  2016    REVERSAL    HX COLOSTOMY TAKE DOWN      HX DILATION AND CURETTAGE      HX KNEE ARTHROSCOPY Left 1979    MENISCUS REPAIR    HX KNEE ARTHROSCOPY Left     HX KNEE ARTHROSCOPY Right 2005    HX KNEE REPLACEMENT Left 2005    HX KNEE REPLACEMENT Right 05/09/2018    HX LAP CHOLECYSTECTOMY  05/2005    HX LUMBAR LAMINECTOMY  2011    HX LUMBAR LAMINECTOMY  01/9328    complicated by MRSA infection    HX ORTHOPAEDIC Right     HAND FX    HX TOTAL COLECTOMY  2015    WITH COLOSTOMY AND THEN REOMVAL OF COLOSTOMY    HX TUBAL LIGATION  1979    HX WRIST FRACTURE TX Left     IR INJ/CATH THER SUBST LUM/SAC W IMG  06/01/2020    [place] TOTAL HIP ARTHROPLASTY Right 03/2012      ____________________________________________  HOME MEDICATIONS    Current Outpatient Medications   Medication Sig    acetaminophen (Tylenol Extra Strength) 500 mg tablet Take 1,000 mg by mouth every six (6) hours as needed for Pain.    zolpidem tartrate (AMBIEN PO) Take 5 mg by mouth nightly as needed. predniSONE (DELTASONE) 5 mg tablet Take 5 mg by mouth daily as needed. tolterodine ER (DETROL LA) 4 mg ER capsule Take 1 Capsule by mouth daily. loperamide (IMODIUM) 2 mg capsule Take 1-2 Capsules by mouth four (4) times daily as needed for Diarrhea. DULoxetine (CYMBALTA) 30 mg capsule TAKE ONE CAPSULE BY MOUTH DAILY (Patient taking differently: Take 30 mg by mouth daily.)    atenoloL (TENORMIN) 25 mg tablet TAKE ONE TABLET BY MOUTH DAILY (Patient taking differently: Take 25 mg by mouth daily.)    leflunomide (ARAVA) 20 mg tablet Take 1 Tablet by mouth daily. certolizumab pegoL (Cimzia) 400 mg/2 mL (200 mg/mL x 2) sykt injection 400 mg by SubCUTAneous route every thirty (30) days. Indications: rheumatoid arthritis    pramipexole (MIRAPEX) 1 mg tablet Take 1.5 mg by mouth nightly.  1 1/2 tab qhs    acetaminophen (TYLENOL) 650 mg TbER Take 650 mg by mouth every eight (8) hours as needed. SUMAtriptan (IMITREX) 6 mg/0.5 mL injection 6 mg by SubCUTAneous route daily as needed. calcium carb/D3/magnesium/zinc (HOLDEN MAG ZINC PLUS D3 PO) Take 1 Tablet by mouth daily. BIOTIN PO Take 5,000 mg by mouth daily.      No current facility-administered medications for this encounter.      ____________________________________________  ALLERGIES  Allergies   Allergen Reactions    Adhesive Tape-Silicones Other (comments)     \"Left skin raw\"    Statins-Hmg-Coa Reductase Inhibitors Myalgia     Intolerant to lipitor, crestor and pravastatin      ____________________________________________  SOCIAL HISTORY  Social History     Tobacco Use    Smoking status: Former     Packs/day: 1.00     Years: 20.00     Pack years: 20.00     Types: Cigarettes     Quit date: 3/7/1978     Years since quittin.4    Smokeless tobacco: Never   Substance Use Topics    Alcohol use: Not Currently     Comment: very rare      ____________________________________________  COVID VACCINATION STATUS:      Internal Administration   First Dose COVID-19, PFIZER PURPLE top, DILUTE for use, (age 15 y+), IM, 30mcg/0.3mL  2021   Second Dose COVID-19, PFIZER PURPLE top, DILUTE for use, (age 15 y+), IM, 30mcg/0.3mL  2021      Last COVID Lab SARS-CoV-2, YVONNE (no units)   Date Value   2020 Not Detected                      Labs:     Hospital Outpatient Visit on 2022   Component Date Value Ref Range Status    WBC 2022 5.0  3.6 - 11.0 K/uL Final    RBC 2022 4.44  3.80 - 5.20 M/uL Final    HGB 2022 13.3  11.5 - 16.0 g/dL Final    HCT 2022 41.2  35.0 - 47.0 % Final    MCV 2022 92.8  80.0 - 99.0 FL Final    MCH 2022 30.0  26.0 - 34.0 PG Final    MCHC 2022 32.3  30.0 - 36.5 g/dL Final    RDW 2022 13.4  11.5 - 14.5 % Final    PLATELET  125  150 - 400 K/uL Final    MPV 2022 11.8  8.9 - 12.9 FL Final    NRBC 08/23/2022 0.0  0  WBC Final    ABSOLUTE NRBC 08/23/2022 0.00  0.00 - 0.01 K/uL Final    INR 08/23/2022 0.9  0.9 - 1.1   Final    A single therapeutic range for Vit K antagonists may not be optimal for all indications - see June, 2008 issue of Chest, American College of Chest Physicians Evidence-Based Clinical Practice Guidelines, 8th Edition. Prothrombin time 08/23/2022 9.9  9.0 - 11.1 sec Final    Color 08/23/2022 YELLOW/STRAW    Final    Color Reference Range: Straw, Yellow or Dark Yellow    Appearance 08/23/2022 CLEAR  CLEAR   Final    Specific gravity 08/23/2022 1.027    Final    pH (UA) 08/23/2022 7.0  5.0 - 8.0   Final    Protein 08/23/2022 TRACE (A) NEG mg/dL Final    Glucose 08/23/2022 Negative  NEG mg/dL Final    Ketone 08/23/2022 Negative  NEG mg/dL Final    Bilirubin 08/23/2022 Negative  NEG   Final    Blood 08/23/2022 Negative  NEG   Final    Urobilinogen 08/23/2022 1.0  0.2 - 1.0 EU/dL Final    Nitrites 08/23/2022 Negative  NEG   Final    Leukocyte Esterase 08/23/2022 TRACE (A) NEG   Final    UA:UC IF INDICATED 08/23/2022 CULTURE NOT INDICATED BY UA RESULT  CNI   Final    WBC 08/23/2022 0-4  0 - 4 /hpf Final    RBC 08/23/2022 0-5  0 - 5 /hpf Final    Epithelial cells 08/23/2022 MODERATE (A) FEW /lpf Final    Epithelial cell category consists of squamous cells and /or transitional urothelial cells. Renal tubular cells, if present, are separately identified as such.     Bacteria 08/23/2022 Negative  NEG /hpf Final    Hyaline cast 08/23/2022 0-2  0 - 2 /lpf Final    Hemoglobin A1c 08/23/2022 5.6  4.0 - 5.6 % Final    Comment: NEW METHOD  PLEASE NOTE NEW REFERENCE RANGE  (NOTE)  HbA1C Interpretive Ranges  <5.7              Normal  5.7 - 6.4         Consider Prediabetes  >6.5              Consider Diabetes      Est. average glucose 08/23/2022 114  mg/dL Final    Special Requests: 08/23/2022 NO SPECIAL REQUESTS    Final    Culture result: 08/23/2022 MRSA NOT PRESENT Final    Culture result: 08/23/2022     Final                    Value:Screening of patient nares for MRSA is for surveillance purposes and, if positive, to facilitate isolation considerations in high risk settings. It is not intended for automatic decolonization interventions per se as regimens are not sufficiently effective to warrant routine use. Sodium 08/23/2022 141  136 - 145 mmol/L Final    Potassium 08/23/2022 4.1  3.5 - 5.1 mmol/L Final    Comment: Sample is hemolyzed (hemoglobin is  likely >50 mg/dL) and thus the  potassium, AST, ammonia, and  phosphorus results may be adversely  affected. If the clinical situation  warrants, recommend recollection of  a new specimen with attention to  preventing hemolysis. SPECIMEN LIPEMIC      Chloride 08/23/2022 110 (A) 97 - 108 mmol/L Final    CO2 08/23/2022 28  21 - 32 mmol/L Final    Anion gap 08/23/2022 3 (A) 5 - 15 mmol/L Final    Glucose 08/23/2022 90  65 - 100 mg/dL Final    BUN 08/23/2022 19  6 - 20 MG/DL Final    Creatinine 08/23/2022 0.83  0.55 - 1.02 MG/DL Final    BUN/Creatinine ratio 08/23/2022 23 (A) 12 - 20   Final    GFR est AA 08/23/2022 >60  >60 ml/min/1.73m2 Final    GFR est non-AA 08/23/2022 >60  >60 ml/min/1.73m2 Final    Estimated GFR is calculated using the IDMS-traceable Modification of Diet in Renal Disease (MDRD) Study equation, reported for both  Americans (GFRAA) and non- Americans (GFRNA), and normalized to 1.73m2 body surface area. The physician must decide which value applies to the patient. Calcium 08/23/2022 8.7  8.5 - 10.1 MG/DL Final    Bilirubin, total 08/23/2022 0.4  0.2 - 1.0 MG/DL Final    ALT (SGPT) 08/23/2022 20  12 - 78 U/L Final    AST (SGOT) 08/23/2022 28  15 - 37 U/L Final    Comment: SPECIMEN HEMOLYZED, RESULTS MAY BE AFFECTED  SPECIMEN LIPEMIC      Alk.  phosphatase 08/23/2022 88  45 - 117 U/L Final    Protein, total 08/23/2022 6.7  6.4 - 8.2 g/dL Final    Albumin 08/23/2022 3.0 (A) 3.5 - 5.0 g/dL Final    Globulin 08/23/2022 3.7  2.0 - 4.0 g/dL Final    A-G Ratio 08/23/2022 0.8 (A) 1.1 - 2.2   Final    Crossmatch Expiration 08/23/2022 09/01/2022,2359   Final    ABO/Rh(D) 08/23/2022 O NEGATIVE   Final    Antibody screen 08/23/2022 NEG   Final        XR Results (most recent):    Results from Hospital Encounter encounter on 08/23/22    XR SPINE CERV 4 OR 5 V    Narrative  INDICATION: Cervical neck pain    COMPARISON: CT cervical spine 9/24/2012    FINDINGS:    4 views of the cervical spine submitted for review. No prevertebral soft tissue swelling. No widening of the predental space. Mild  retrolisthesis of C5 on C6 with moderate to severe C5-6 disc disease. No  evidence for acute fracture. Impression  Mild to moderate multilevel cervical spondylosis most pronounced at C5-6 again  noted. No evidence for acute abnormality         Skin:   Denies open wounds, cuts, sores, rashes or other areas of concern in PAT assessment.         Rickey Joaquin, RODERICK

## 2022-08-26 NOTE — H&P
Debbie Reeves MD - Adult Reconstruction and Total Joint Replacement     Orthopaedic History and Physical        NAME: Alaina Navas       :  1940       MRN:  [id number]        Subjective:   Patient ID: Alaina Navas is a 78 y.o. female. Chief Complaint: Follow-up of the Left Hip    Alaina Navas is a pleasant 78 y.o. female for evaluation of left hip and leg pain. During a recent office visit she was complaining of thigh/leg and knee pain. She has a history of left knee replacement and was quite concerned that something was being missed regarding her joint replacement. At the time I noted that she had an antalgic gait around her hip but she wanted to focus on her knee. We sent her for a bone scan which was normal. She returns today for evaluation of the hip and consideration of hip injection      Patient Active Problem List    Diagnosis Date Noted    Mixed incontinence urge and stress 2022    Full incontinence of feces 2022    Hypoxia 2021    MRSA infection 2019    Lumbar surgical wound fluid collection 2019    Spinal stenosis of lumbar region at multiple levels 2019    Spinal stenosis, lumbar 2019    Severe obesity ([address] 2019    Osteoarthritis of right knee 2018    Restless leg syndrome     Colon cancer ([address] 2015    Heart palpitations     Glaucoma     RA (rheumatoid arthritis) ([address]     Depression 2011    Impaired hearing 2011    Hypercholesterolemia 2010     Past Medical History:   Diagnosis Date    Cataracts, bilateral     Chronic pain     LOWER BACK    Colon cancer ([address]     colectomy with ostomy, then reversed by DR ELVIN Burroughs    Colon polyps     Depression     did not do well on duloxetine    Gastroparesis     GERD (gastroesophageal reflux disease)     Glaucoma     Heart palpitations     Interstitial lung disease ([address]     followed by Dr Colby Waldron    MRSA infection 2019    RA (rheumatoid arthritis) ([facility] [address]     DR Jania Ramírez    Restless leg syndrome     Spinal stenosis 05/08/2011    Unspecified adverse effect of anesthesia     HEADACHE after colonoscopy    UTI (lower urinary tract infection)       Past Surgical History:   Procedure Laterality Date    HX APPENDECTOMY  2015    HX CATARACT REMOVAL Right 06/2020    Trice @ [facility]    HX COLONOSCOPY  09/2014    HX COLOSTOMY  2016    REVERSAL    HX COLOSTOMY TAKE DOWN      HX DILATION AND CURETTAGE      HX KNEE ARTHROSCOPY Left 1979    MENISCUS REPAIR    HX KNEE ARTHROSCOPY Left     HX KNEE ARTHROSCOPY Right 2005    HX KNEE REPLACEMENT Left 2005    HX KNEE REPLACEMENT Right 05/09/2018    HX LAP CHOLECYSTECTOMY  05/2005    HX LUMBAR LAMINECTOMY  2011    HX LUMBAR LAMINECTOMY  40/3196    complicated by MRSA infection    HX ORTHOPAEDIC Right     HAND FX    HX TOTAL COLECTOMY  2015    WITH COLOSTOMY AND THEN REOMVAL OF COLOSTOMY    HX TUBAL LIGATION  1979    HX WRIST FRACTURE TX Left     IR INJ/CATH THER SUBST LUM/SAC W IMG  06/01/2020    [place] TOTAL HIP ARTHROPLASTY Right 03/2012      Prior to Admission medications    Medication Sig Start Date End Date Taking? Authorizing Provider   acetaminophen (Tylenol Extra Strength) 500 mg tablet Take 1,000 mg by mouth every six (6) hours as needed for Pain. Provider, Historical   zolpidem tartrate (AMBIEN PO) Take 5 mg by mouth nightly as needed. Provider, Historical   predniSONE (DELTASONE) 5 mg tablet Take 5 mg by mouth daily as needed. Provider, Historical   tolterodine ER (DETROL LA) 4 mg ER capsule Take 1 Capsule by mouth daily. 6/20/22   Chelsey Javier MD   loperamide (IMODIUM) 2 mg capsule Take 1-2 Capsules by mouth four (4) times daily as needed for Diarrhea. 6/20/22   Berto Engle III, MD   DULoxetine (CYMBALTA) 30 mg capsule TAKE ONE CAPSULE BY MOUTH DAILY  Patient taking differently: Take 30 mg by mouth daily.  5/17/22   Berto Engle III, MD   atenoloL (TENORMIN) 25 mg tablet TAKE ONE TABLET BY MOUTH DAILY  Patient taking differently: Take 25 mg by mouth daily. 22   Cristina Drew III, MD   leflunomide (ARAVA) 20 mg tablet Take 1 Tablet by mouth daily. 4/15/22   Cristina Drew III, MD   certolizumab pegoL (Cimzia) 400 mg/2 mL (200 mg/mL x 2) sykt injection 400 mg by SubCUTAneous route every thirty (30) days. Indications: rheumatoid arthritis 4/15/22   Cristina Drew III, MD   pramipexole (MIRAPEX) 1 mg tablet Take 1.5 mg by mouth nightly. 1 1/2 tab qhs    Provider, Historical   acetaminophen (TYLENOL) 650 mg TbER Take 650 mg by mouth every eight (8) hours as needed. Provider, Historical   SUMAtriptan (IMITREX) 6 mg/0.5 mL injection 6 mg by SubCUTAneous route daily as needed. 19   Provider, Historical   calcium carb/D3/magnesium/zinc (HOLDEN MAG ZINC PLUS D3 PO) Take 1 Tablet by mouth daily. Provider, Historical   BIOTIN PO Take 5,000 mg by mouth daily.     Provider, Historical     Allergies   Allergen Reactions    Adhesive Tape-Silicones Other (comments)     \"Left skin raw\"    Statins-Hmg-Coa Reductase Inhibitors Myalgia     Intolerant to lipitor, crestor and pravastatin      Social History     Tobacco Use    Smoking status: Former     Packs/day: 1.00     Years: 20.00     Pack years: 20.00     Types: Cigarettes     Quit date: 3/7/1978     Years since quittin.5    Smokeless tobacco: Never   Substance Use Topics    Alcohol use: Not Currently     Comment: very rare      Family History   Problem Relation Age of Onset    Heart Disease Mother     Hypertension Mother     OSTEOARTHRITIS Mother     Lung Disease Mother         BENIGN TUMOR    Heart Failure Mother     Diabetes Father     COPD Father     Cancer Father         LIP CANCER    Diabetes Sister     Heart Disease Sister         SOMETHING WITH HER HEART MUSCLE    Hypertension Sister     Diabetes Brother     Heart Disease Brother     Hypertension Brother     Pacemaker Brother         AND DEFIBRILLATOR    Stroke Brother Anesth Problems Neg Hx         REVIEW OF SYSTEMS: A comprehensive review of systems was negative except for that written in the HPI. Objective:   Constitutional:CAPHE@ appears stated age. Pt is cooperative and is in no acute distress. Well nourished. Well developed. Body habitus is obese. There is no height or weight on file to calculate BMI. Eyes: Sclera are nonicteric. Respiratory: No labored breathing. Cardiovascular: No marked edema. Well perfused extremities bilaterally. Skin: No marked skin ulcers. No lymphedema or skin abnormalities. Neurological: No marked sensory loss noted. Grossly neurovascularly intact. Both lower extremities are intact to distal sensory and motor function. Psychiatric: Alert and oriented x3. MUSCULOSKELETAL:   Gait is antalgic on the left hip. She has centrally no internal rotation of the left hip, contains about 15 degrees of external rotation. No trochanteric tenderness. Range of motion of the hip is painful at the thigh and knee. .     Radiographs:   Order: XR HIP LEFT 2-3 VIEWS W/ PELVIS WHEN PERFORMED - Indication:   Osteoarthritis of left hip, unspecified osteoarthritis type    X-RAY HIP LEFT 2-3 VIEWS W/ PELVIS WHEN PERFORMED ([id number])    Result Date: 2022  Standing. AP, Frog Lateral.     Impression: Two views left hip show end-stage degenerative change with flattening of the femoral head and complete obliteration of the joint space    Nuclear Medicine bone scan whole body    Result Date: 2022  [employer] - [facility] - [address] [facility] BODY Patient: Sadaf Gaspar : 1940 Date of Service: 2022 1:09:00 PM Reason For Exam: Ordering Provider: Janna Van Physician: Elidia Saldaña Signing Date: 2022 6:29:27 PM EXAM: NM BONE SCAN [facility] BODY INDICATION: Cancer of the colon, left knee pain COMPARISON: None. IMAGING CORRELATION: None. TRACER: 20.2 mCi of Tc-99m MDP.  TECHNIQUE: Imaging of the whole body was performed from the anterior and posterior projections following intravenous administration of Tc-99m MDP and appropriate delay. FINDINGS: Slight activity mid thoracic and mid to lower lumbar spine is most likely degenerative. There is increased bony activity left hip most likely degenerative. Patient is status post right hip replacement and bilateral knee replacements. There is nonspecific slight increased activity along the right tibial component laterally and distally. Focal increased activity right midfoot is most likely degenerative. . Left total knee replacement is within normal limits. . Incidental renal imaging shows no abnormality. IMPRESSION: 1. There is increased activity left hip consistent with degenerative changes. 2. Patient is status post right hip replacement and bilateral knee replacement. There is nonspecific slight increased metabolic activity along the right tibial shaft component laterally which could represent early changes of loosening and correlation would be helpful. The left total knee replacement is within normal limits. Formatting of this result is different from the original. Signing date/time: 8/12/2022 6:29 PM Signed by: Atul Castillo     Assessment:     ICD-10-CM   1. Osteoarthritis of left hip, unspecified osteoarthritis type M16.12     There is no problem list on file for this patient. Plan:   Recommended left total hip replacement. She does have a history of lumbar spine issues and gets regular epidural injections, she may need continue this afterwards and understands that her symptoms may overlap some. Conservative treatments including activity modification, medication, and steroid injections have not successfully relieved pain. Surgery was discussed at length with the pt today. We went over all the pertinent risks, benefits, and alternatives to the procedure. They understand no guarantees can be made about the outcome and they would like to proceed.  I discussed the pre-op total joint class and general recovery timeline with the pt. The pt understands the need for medical clearance. We discussed the risk versus benefit of this treatment plan. All questions were answered to their satisfaction. Follow up p.r.n.     Orders Placed This Encounter   Procedures   X-RAY HIP LEFT 2-3 VIEWS W/ PELVIS WHEN PERFORMED ([id number])   BMI Patient Education     Marvie Mettle, Danell Osler    Supervising physician: IRAJ Wright

## 2022-08-29 ENCOUNTER — APPOINTMENT (OUTPATIENT)
Dept: GENERAL RADIOLOGY | Age: 82
DRG: 470 | End: 2022-08-29
Attending: ORTHOPAEDIC SURGERY
Payer: MEDICARE

## 2022-08-29 ENCOUNTER — ANESTHESIA EVENT (OUTPATIENT)
Dept: SURGERY | Age: 82
DRG: 470 | End: 2022-08-29
Payer: MEDICARE

## 2022-08-29 ENCOUNTER — ANESTHESIA (OUTPATIENT)
Dept: SURGERY | Age: 82
DRG: 470 | End: 2022-08-29
Payer: MEDICARE

## 2022-08-29 ENCOUNTER — HOSPITAL ENCOUNTER (INPATIENT)
Age: 82
LOS: 1 days | Discharge: HOME HEALTH | DRG: 470 | End: 2022-08-30
Attending: ORTHOPAEDIC SURGERY | Admitting: ORTHOPAEDIC SURGERY
Payer: MEDICARE

## 2022-08-29 DIAGNOSIS — Z96.642 STATUS POST TOTAL REPLACEMENT OF LEFT HIP: Primary | ICD-10-CM

## 2022-08-29 PROBLEM — Z96.649 S/P HIP REPLACEMENT: Status: ACTIVE | Noted: 2022-08-29

## 2022-08-29 PROCEDURE — 77030003666 HC NDL SPINAL BD -A: Performed by: ORTHOPAEDIC SURGERY

## 2022-08-29 PROCEDURE — 77030026438 HC STYL ET INTUB CARD -A: Performed by: ANESTHESIOLOGY

## 2022-08-29 PROCEDURE — 65270000046 HC RM TELEMETRY

## 2022-08-29 PROCEDURE — 77030018673: Performed by: ORTHOPAEDIC SURGERY

## 2022-08-29 PROCEDURE — 74011000250 HC RX REV CODE- 250: Performed by: NURSE ANESTHETIST, CERTIFIED REGISTERED

## 2022-08-29 PROCEDURE — 74011250636 HC RX REV CODE- 250/636: Performed by: ORTHOPAEDIC SURGERY

## 2022-08-29 PROCEDURE — 97161 PT EVAL LOW COMPLEX 20 MIN: CPT

## 2022-08-29 PROCEDURE — 0SRB0JZ REPLACEMENT OF LEFT HIP JOINT WITH SYNTHETIC SUBSTITUTE, OPEN APPROACH: ICD-10-PCS | Performed by: ORTHOPAEDIC SURGERY

## 2022-08-29 PROCEDURE — 77030014647 HC SEAL FBRN TISSL BAXT -D: Performed by: ORTHOPAEDIC SURGERY

## 2022-08-29 PROCEDURE — 77030036722: Performed by: ORTHOPAEDIC SURGERY

## 2022-08-29 PROCEDURE — 74011250636 HC RX REV CODE- 250/636: Performed by: PHYSICIAN ASSISTANT

## 2022-08-29 PROCEDURE — 74011250637 HC RX REV CODE- 250/637

## 2022-08-29 PROCEDURE — 77030019908 HC STETH ESOPH SIMS -A: Performed by: ANESTHESIOLOGY

## 2022-08-29 PROCEDURE — 76210000016 HC OR PH I REC 1 TO 1.5 HR: Performed by: ORTHOPAEDIC SURGERY

## 2022-08-29 PROCEDURE — 77030013079 HC BLNKT BAIR HGGR 3M -A: Performed by: ANESTHESIOLOGY

## 2022-08-29 PROCEDURE — 77030008684 HC TU ET CUF COVD -B: Performed by: ANESTHESIOLOGY

## 2022-08-29 PROCEDURE — 94760 N-INVAS EAR/PLS OXIMETRY 1: CPT

## 2022-08-29 PROCEDURE — 77030018723 HC ELCTRD BLD COVD -A: Performed by: ORTHOPAEDIC SURGERY

## 2022-08-29 PROCEDURE — 74011250636 HC RX REV CODE- 250/636: Performed by: STUDENT IN AN ORGANIZED HEALTH CARE EDUCATION/TRAINING PROGRAM

## 2022-08-29 PROCEDURE — C1776 JOINT DEVICE (IMPLANTABLE): HCPCS | Performed by: ORTHOPAEDIC SURGERY

## 2022-08-29 PROCEDURE — 74011250637 HC RX REV CODE- 250/637: Performed by: PHYSICIAN ASSISTANT

## 2022-08-29 PROCEDURE — 77030035643 HC BLD SAW OSC PRECIS STRY -C: Performed by: ORTHOPAEDIC SURGERY

## 2022-08-29 PROCEDURE — 76000 FLUOROSCOPY <1 HR PHYS/QHP: CPT

## 2022-08-29 PROCEDURE — 77010033678 HC OXYGEN DAILY

## 2022-08-29 PROCEDURE — 8E0YXBZ COMPUTER ASSISTED PROCEDURE OF LOWER EXTREMITY: ICD-10-PCS | Performed by: ORTHOPAEDIC SURGERY

## 2022-08-29 PROCEDURE — 74011000250 HC RX REV CODE- 250: Performed by: PHYSICIAN ASSISTANT

## 2022-08-29 PROCEDURE — 2709999900 HC NON-CHARGEABLE SUPPLY: Performed by: ORTHOPAEDIC SURGERY

## 2022-08-29 PROCEDURE — 74011250637 HC RX REV CODE- 250/637: Performed by: ORTHOPAEDIC SURGERY

## 2022-08-29 PROCEDURE — 77030034479 HC ADH SKN CLSR PRINEO J&J -B: Performed by: ORTHOPAEDIC SURGERY

## 2022-08-29 PROCEDURE — 74011250636 HC RX REV CODE- 250/636: Performed by: NURSE ANESTHETIST, CERTIFIED REGISTERED

## 2022-08-29 PROCEDURE — 97116 GAIT TRAINING THERAPY: CPT

## 2022-08-29 PROCEDURE — 77030031139 HC SUT VCRL2 J&J -A: Performed by: ORTHOPAEDIC SURGERY

## 2022-08-29 PROCEDURE — 73501 X-RAY EXAM HIP UNI 1 VIEW: CPT

## 2022-08-29 PROCEDURE — 97530 THERAPEUTIC ACTIVITIES: CPT

## 2022-08-29 PROCEDURE — 77030035236 HC SUT PDS STRATFX BARB J&J -B: Performed by: ORTHOPAEDIC SURGERY

## 2022-08-29 PROCEDURE — 76010000162 HC OR TIME 1.5 TO 2 HR INTENSV-TIER 1: Performed by: ORTHOPAEDIC SURGERY

## 2022-08-29 PROCEDURE — 74011000258 HC RX REV CODE- 258: Performed by: NURSE ANESTHETIST, CERTIFIED REGISTERED

## 2022-08-29 PROCEDURE — 76060000034 HC ANESTHESIA 1.5 TO 2 HR: Performed by: ORTHOPAEDIC SURGERY

## 2022-08-29 PROCEDURE — 77030036660

## 2022-08-29 DEVICE — SHELL ACET MH 52 MM HIP GRP 5 CUP ALTEON: Type: IMPLANTABLE DEVICE | Site: HIP | Status: FUNCTIONAL

## 2022-08-29 DEVICE — COMPONENT TOT HIP CAPPED H2 ADV: Type: IMPLANTABLE DEVICE | Status: FUNCTIONAL

## 2022-08-29 DEVICE — ALTEON CUP
Type: IMPLANTABLE DEVICE | Site: HIP | Status: FUNCTIONAL
Brand: ALTEON

## 2022-08-29 DEVICE — IMPLANTABLE DEVICE
Type: IMPLANTABLE DEVICE | Site: HIP | Status: FUNCTIONAL
Brand: ALTEON

## 2022-08-29 DEVICE — IMPLANTABLE DEVICE
Type: IMPLANTABLE DEVICE | Site: HIP | Status: FUNCTIONAL
Brand: NOVATION

## 2022-08-29 DEVICE — IMPLANTABLE DEVICE
Type: IMPLANTABLE DEVICE | Site: HIP | Status: FUNCTIONAL
Brand: EXACTECH

## 2022-08-29 RX ORDER — AMOXICILLIN 250 MG
1 CAPSULE ORAL 2 TIMES DAILY
Status: DISCONTINUED | OUTPATIENT
Start: 2022-08-29 | End: 2022-08-30 | Stop reason: HOSPADM

## 2022-08-29 RX ORDER — DEXAMETHASONE SODIUM PHOSPHATE 4 MG/ML
INJECTION, SOLUTION INTRA-ARTICULAR; INTRALESIONAL; INTRAMUSCULAR; INTRAVENOUS; SOFT TISSUE AS NEEDED
Status: DISCONTINUED | OUTPATIENT
Start: 2022-08-29 | End: 2022-08-29 | Stop reason: HOSPADM

## 2022-08-29 RX ORDER — SODIUM CHLORIDE 0.9 % (FLUSH) 0.9 %
5-40 SYRINGE (ML) INJECTION EVERY 8 HOURS
Status: DISCONTINUED | OUTPATIENT
Start: 2022-08-29 | End: 2022-08-29

## 2022-08-29 RX ORDER — CELECOXIB 200 MG/1
200 CAPSULE ORAL 2 TIMES DAILY
Status: DISCONTINUED | OUTPATIENT
Start: 2022-08-29 | End: 2022-08-30 | Stop reason: HOSPADM

## 2022-08-29 RX ORDER — GLYCOPYRROLATE 0.2 MG/ML
INJECTION INTRAMUSCULAR; INTRAVENOUS AS NEEDED
Status: DISCONTINUED | OUTPATIENT
Start: 2022-08-29 | End: 2022-08-29 | Stop reason: HOSPADM

## 2022-08-29 RX ORDER — POLYETHYLENE GLYCOL 3350 17 G/17G
17 POWDER, FOR SOLUTION ORAL DAILY
Status: DISCONTINUED | OUTPATIENT
Start: 2022-08-30 | End: 2022-08-30 | Stop reason: HOSPADM

## 2022-08-29 RX ORDER — ONDANSETRON 2 MG/ML
4 INJECTION INTRAMUSCULAR; INTRAVENOUS AS NEEDED
Status: DISCONTINUED | OUTPATIENT
Start: 2022-08-29 | End: 2022-08-29

## 2022-08-29 RX ORDER — CELECOXIB 200 MG/1
400 CAPSULE ORAL ONCE
Status: COMPLETED | OUTPATIENT
Start: 2022-08-29 | End: 2022-08-29

## 2022-08-29 RX ORDER — TRAMADOL HYDROCHLORIDE 50 MG/1
50-100 TABLET ORAL
Qty: 30 TABLET | Refills: 0 | Status: SHIPPED | OUTPATIENT
Start: 2022-08-29 | End: 2022-09-05

## 2022-08-29 RX ORDER — CEFAZOLIN SODIUM/WATER 2 G/20 ML
SYRINGE (ML) INTRAVENOUS AS NEEDED
Status: DISCONTINUED | OUTPATIENT
Start: 2022-08-29 | End: 2022-08-29 | Stop reason: HOSPADM

## 2022-08-29 RX ORDER — PROPOFOL 10 MG/ML
INJECTION, EMULSION INTRAVENOUS AS NEEDED
Status: DISCONTINUED | OUTPATIENT
Start: 2022-08-29 | End: 2022-08-29 | Stop reason: HOSPADM

## 2022-08-29 RX ORDER — DIPHENHYDRAMINE HCL 12.5MG/5ML
12.5 LIQUID (ML) ORAL
Status: DISCONTINUED | OUTPATIENT
Start: 2022-08-29 | End: 2022-08-30 | Stop reason: HOSPADM

## 2022-08-29 RX ORDER — HYDROMORPHONE HYDROCHLORIDE 1 MG/ML
INJECTION, SOLUTION INTRAMUSCULAR; INTRAVENOUS; SUBCUTANEOUS AS NEEDED
Status: DISCONTINUED | OUTPATIENT
Start: 2022-08-29 | End: 2022-08-29 | Stop reason: HOSPADM

## 2022-08-29 RX ORDER — AMOXICILLIN 250 MG
1 CAPSULE ORAL 2 TIMES DAILY
Qty: 14 TABLET | Refills: 0 | Status: SHIPPED | OUTPATIENT
Start: 2022-08-29 | End: 2022-08-29

## 2022-08-29 RX ORDER — HYDROMORPHONE HYDROCHLORIDE 1 MG/ML
0.2 INJECTION, SOLUTION INTRAMUSCULAR; INTRAVENOUS; SUBCUTANEOUS
Status: DISCONTINUED | OUTPATIENT
Start: 2022-08-29 | End: 2022-08-29

## 2022-08-29 RX ORDER — ROPIVACAINE HYDROCHLORIDE 5 MG/ML
INJECTION, SOLUTION EPIDURAL; INFILTRATION; PERINEURAL AS NEEDED
Status: DISCONTINUED | OUTPATIENT
Start: 2022-08-29 | End: 2022-08-29 | Stop reason: HOSPADM

## 2022-08-29 RX ORDER — DEXAMETHASONE SODIUM PHOSPHATE 4 MG/ML
10 INJECTION, SOLUTION INTRA-ARTICULAR; INTRALESIONAL; INTRAMUSCULAR; INTRAVENOUS; SOFT TISSUE ONCE
Status: COMPLETED | OUTPATIENT
Start: 2022-08-30 | End: 2022-08-30

## 2022-08-29 RX ORDER — NEOSTIGMINE METHYLSULFATE 1 MG/ML
INJECTION, SOLUTION INTRAVENOUS AS NEEDED
Status: DISCONTINUED | OUTPATIENT
Start: 2022-08-29 | End: 2022-08-29 | Stop reason: HOSPADM

## 2022-08-29 RX ORDER — ACETAMINOPHEN 325 MG/1
650 TABLET ORAL EVERY 6 HOURS
Status: DISCONTINUED | OUTPATIENT
Start: 2022-08-29 | End: 2022-08-30 | Stop reason: HOSPADM

## 2022-08-29 RX ORDER — FENTANYL CITRATE 50 UG/ML
25 INJECTION, SOLUTION INTRAMUSCULAR; INTRAVENOUS
Status: DISCONTINUED | OUTPATIENT
Start: 2022-08-29 | End: 2022-08-29

## 2022-08-29 RX ORDER — HYDROMORPHONE HYDROCHLORIDE 1 MG/ML
0.5 INJECTION, SOLUTION INTRAMUSCULAR; INTRAVENOUS; SUBCUTANEOUS
Status: DISCONTINUED | OUTPATIENT
Start: 2022-08-29 | End: 2022-08-30 | Stop reason: HOSPADM

## 2022-08-29 RX ORDER — TROSPIUM CHLORIDE 20 MG/1
20 TABLET, FILM COATED ORAL DAILY
Status: DISCONTINUED | OUTPATIENT
Start: 2022-08-30 | End: 2022-08-30 | Stop reason: HOSPADM

## 2022-08-29 RX ORDER — FACIAL-BODY WIPES
10 EACH TOPICAL DAILY PRN
Status: DISCONTINUED | OUTPATIENT
Start: 2022-08-31 | End: 2022-08-30 | Stop reason: HOSPADM

## 2022-08-29 RX ORDER — SODIUM CHLORIDE 0.9 % (FLUSH) 0.9 %
5-40 SYRINGE (ML) INJECTION AS NEEDED
Status: DISCONTINUED | OUTPATIENT
Start: 2022-08-29 | End: 2022-08-29 | Stop reason: HOSPADM

## 2022-08-29 RX ORDER — TRAMADOL HYDROCHLORIDE 50 MG/1
TABLET ORAL
Status: COMPLETED
Start: 2022-08-29 | End: 2022-08-29

## 2022-08-29 RX ORDER — PREGABALIN 75 MG/1
75 CAPSULE ORAL ONCE
Status: COMPLETED | OUTPATIENT
Start: 2022-08-29 | End: 2022-08-29

## 2022-08-29 RX ORDER — SUCCINYLCHOLINE CHLORIDE 20 MG/ML
INJECTION INTRAMUSCULAR; INTRAVENOUS AS NEEDED
Status: DISCONTINUED | OUTPATIENT
Start: 2022-08-29 | End: 2022-08-29 | Stop reason: HOSPADM

## 2022-08-29 RX ORDER — DULOXETIN HYDROCHLORIDE 30 MG/1
30 CAPSULE, DELAYED RELEASE ORAL DAILY
Status: DISCONTINUED | OUTPATIENT
Start: 2022-08-30 | End: 2022-08-30 | Stop reason: HOSPADM

## 2022-08-29 RX ORDER — SODIUM CHLORIDE 0.9 % (FLUSH) 0.9 %
5-40 SYRINGE (ML) INJECTION AS NEEDED
Status: DISCONTINUED | OUTPATIENT
Start: 2022-08-29 | End: 2022-08-29

## 2022-08-29 RX ORDER — ONDANSETRON 2 MG/ML
4 INJECTION INTRAMUSCULAR; INTRAVENOUS
Status: DISCONTINUED | OUTPATIENT
Start: 2022-08-29 | End: 2022-08-30 | Stop reason: HOSPADM

## 2022-08-29 RX ORDER — ONDANSETRON 2 MG/ML
INJECTION INTRAMUSCULAR; INTRAVENOUS AS NEEDED
Status: DISCONTINUED | OUTPATIENT
Start: 2022-08-29 | End: 2022-08-29 | Stop reason: HOSPADM

## 2022-08-29 RX ORDER — SODIUM CHLORIDE, SODIUM LACTATE, POTASSIUM CHLORIDE, CALCIUM CHLORIDE 600; 310; 30; 20 MG/100ML; MG/100ML; MG/100ML; MG/100ML
INJECTION, SOLUTION INTRAVENOUS
Status: DISCONTINUED | OUTPATIENT
Start: 2022-08-29 | End: 2022-08-29 | Stop reason: HOSPADM

## 2022-08-29 RX ORDER — ONDANSETRON 4 MG/1
4 TABLET, ORALLY DISINTEGRATING ORAL
Status: DISCONTINUED | OUTPATIENT
Start: 2022-08-31 | End: 2022-08-30 | Stop reason: HOSPADM

## 2022-08-29 RX ORDER — FAMOTIDINE 20 MG/1
20 TABLET, FILM COATED ORAL 2 TIMES DAILY
Qty: 60 TABLET | Refills: 0 | Status: SHIPPED | OUTPATIENT
Start: 2022-08-29 | End: 2022-09-28

## 2022-08-29 RX ORDER — ACETAMINOPHEN 500 MG
1000 TABLET ORAL ONCE
Status: COMPLETED | OUTPATIENT
Start: 2022-08-29 | End: 2022-08-29

## 2022-08-29 RX ORDER — LIDOCAINE HYDROCHLORIDE 20 MG/ML
INJECTION, SOLUTION EPIDURAL; INFILTRATION; INTRACAUDAL; PERINEURAL AS NEEDED
Status: DISCONTINUED | OUTPATIENT
Start: 2022-08-29 | End: 2022-08-29 | Stop reason: HOSPADM

## 2022-08-29 RX ORDER — FENTANYL CITRATE 50 UG/ML
INJECTION, SOLUTION INTRAMUSCULAR; INTRAVENOUS AS NEEDED
Status: DISCONTINUED | OUTPATIENT
Start: 2022-08-29 | End: 2022-08-29 | Stop reason: HOSPADM

## 2022-08-29 RX ORDER — NALOXONE HYDROCHLORIDE 0.4 MG/ML
0.4 INJECTION, SOLUTION INTRAMUSCULAR; INTRAVENOUS; SUBCUTANEOUS AS NEEDED
Status: DISCONTINUED | OUTPATIENT
Start: 2022-08-29 | End: 2022-08-30 | Stop reason: HOSPADM

## 2022-08-29 RX ORDER — TRAMADOL HYDROCHLORIDE 50 MG/1
100 TABLET ORAL
Status: DISCONTINUED | OUTPATIENT
Start: 2022-08-29 | End: 2022-08-30 | Stop reason: HOSPADM

## 2022-08-29 RX ORDER — FAMOTIDINE 20 MG/1
20 TABLET, FILM COATED ORAL 2 TIMES DAILY
Status: DISCONTINUED | OUTPATIENT
Start: 2022-08-29 | End: 2022-08-30 | Stop reason: HOSPADM

## 2022-08-29 RX ORDER — ROCURONIUM BROMIDE 10 MG/ML
INJECTION, SOLUTION INTRAVENOUS AS NEEDED
Status: DISCONTINUED | OUTPATIENT
Start: 2022-08-29 | End: 2022-08-29 | Stop reason: HOSPADM

## 2022-08-29 RX ORDER — ATENOLOL 25 MG/1
25 TABLET ORAL DAILY
Status: DISCONTINUED | OUTPATIENT
Start: 2022-08-30 | End: 2022-08-30 | Stop reason: HOSPADM

## 2022-08-29 RX ORDER — POLYETHYLENE GLYCOL 3350 17 G/17G
17 POWDER, FOR SOLUTION ORAL DAILY
Qty: 7 PACKET | Refills: 0 | Status: SHIPPED | OUTPATIENT
Start: 2022-08-30 | End: 2022-08-29

## 2022-08-29 RX ORDER — SODIUM CHLORIDE 0.9 % (FLUSH) 0.9 %
5-40 SYRINGE (ML) INJECTION EVERY 8 HOURS
Status: DISCONTINUED | OUTPATIENT
Start: 2022-08-29 | End: 2022-08-29 | Stop reason: HOSPADM

## 2022-08-29 RX ORDER — ASPIRIN 81 MG/1
81 TABLET ORAL 2 TIMES DAILY
Status: DISCONTINUED | OUTPATIENT
Start: 2022-08-29 | End: 2022-08-30 | Stop reason: HOSPADM

## 2022-08-29 RX ORDER — SODIUM CHLORIDE 0.9 % (FLUSH) 0.9 %
5-40 SYRINGE (ML) INJECTION EVERY 8 HOURS
Status: DISCONTINUED | OUTPATIENT
Start: 2022-08-29 | End: 2022-08-30 | Stop reason: HOSPADM

## 2022-08-29 RX ORDER — IBUPROFEN 400 MG/1
400 TABLET ORAL
COMMUNITY

## 2022-08-29 RX ORDER — MIDAZOLAM HYDROCHLORIDE 1 MG/ML
INJECTION, SOLUTION INTRAMUSCULAR; INTRAVENOUS AS NEEDED
Status: DISCONTINUED | OUTPATIENT
Start: 2022-08-29 | End: 2022-08-29 | Stop reason: HOSPADM

## 2022-08-29 RX ORDER — PHENYLEPHRINE HCL IN 0.9% NACL 0.4MG/10ML
SYRINGE (ML) INTRAVENOUS AS NEEDED
Status: DISCONTINUED | OUTPATIENT
Start: 2022-08-29 | End: 2022-08-29 | Stop reason: HOSPADM

## 2022-08-29 RX ORDER — SODIUM CHLORIDE 9 MG/ML
125 INJECTION, SOLUTION INTRAVENOUS CONTINUOUS
Status: DISCONTINUED | OUTPATIENT
Start: 2022-08-29 | End: 2022-08-30 | Stop reason: HOSPADM

## 2022-08-29 RX ORDER — PRAMIPEXOLE DIHYDROCHLORIDE 1 MG/1
1.5 TABLET ORAL
Status: DISCONTINUED | OUTPATIENT
Start: 2022-08-29 | End: 2022-08-30 | Stop reason: HOSPADM

## 2022-08-29 RX ORDER — SODIUM CHLORIDE, SODIUM LACTATE, POTASSIUM CHLORIDE, CALCIUM CHLORIDE 600; 310; 30; 20 MG/100ML; MG/100ML; MG/100ML; MG/100ML
25 INJECTION, SOLUTION INTRAVENOUS CONTINUOUS
Status: DISCONTINUED | OUTPATIENT
Start: 2022-08-29 | End: 2022-08-29 | Stop reason: HOSPADM

## 2022-08-29 RX ORDER — SODIUM CHLORIDE 0.9 % (FLUSH) 0.9 %
5-40 SYRINGE (ML) INJECTION AS NEEDED
Status: DISCONTINUED | OUTPATIENT
Start: 2022-08-29 | End: 2022-08-30 | Stop reason: HOSPADM

## 2022-08-29 RX ORDER — ASPIRIN 81 MG/1
81 TABLET ORAL 2 TIMES DAILY
Qty: 60 TABLET | Refills: 0 | Status: SHIPPED | OUTPATIENT
Start: 2022-08-29 | End: 2022-09-28

## 2022-08-29 RX ADMIN — Medication 3 MG: at 11:27

## 2022-08-29 RX ADMIN — DEXAMETHASONE SODIUM PHOSPHATE 4 MG: 4 INJECTION, SOLUTION INTRAMUSCULAR; INTRAVENOUS at 10:19

## 2022-08-29 RX ADMIN — CELECOXIB 400 MG: 200 CAPSULE ORAL at 09:16

## 2022-08-29 RX ADMIN — SODIUM CHLORIDE, PRESERVATIVE FREE 10 ML: 5 INJECTION INTRAVENOUS at 17:22

## 2022-08-29 RX ADMIN — DOCUSATE SODIUM 50MG AND SENNOSIDES 8.6MG 1 TABLET: 8.6; 5 TABLET, FILM COATED ORAL at 17:21

## 2022-08-29 RX ADMIN — ROCURONIUM BROMIDE 25 MG: 10 INJECTION INTRAVENOUS at 10:33

## 2022-08-29 RX ADMIN — Medication 80 MCG: at 11:29

## 2022-08-29 RX ADMIN — Medication 3 AMPULE: at 09:18

## 2022-08-29 RX ADMIN — SODIUM CHLORIDE, POTASSIUM CHLORIDE, SODIUM LACTATE AND CALCIUM CHLORIDE 25 ML/HR: 600; 310; 30; 20 INJECTION, SOLUTION INTRAVENOUS at 09:35

## 2022-08-29 RX ADMIN — PREGABALIN 75 MG: 75 CAPSULE ORAL at 09:17

## 2022-08-29 RX ADMIN — LIDOCAINE HYDROCHLORIDE 100 MG: 20 INJECTION, SOLUTION EPIDURAL; INFILTRATION; INTRACAUDAL; PERINEURAL at 10:14

## 2022-08-29 RX ADMIN — SODIUM CHLORIDE 125 ML/HR: 0.9 INJECTION, SOLUTION INTRAVENOUS at 12:41

## 2022-08-29 RX ADMIN — HYDROMORPHONE HYDROCHLORIDE 0.2 MG: 1 INJECTION, SOLUTION INTRAMUSCULAR; INTRAVENOUS; SUBCUTANEOUS at 10:08

## 2022-08-29 RX ADMIN — ACETAMINOPHEN 650 MG: 325 TABLET ORAL at 17:21

## 2022-08-29 RX ADMIN — ASPIRIN 81 MG: 81 TABLET, COATED ORAL at 17:21

## 2022-08-29 RX ADMIN — HYDROMORPHONE HYDROCHLORIDE 0.4 MG: 1 INJECTION, SOLUTION INTRAMUSCULAR; INTRAVENOUS; SUBCUTANEOUS at 10:14

## 2022-08-29 RX ADMIN — CELECOXIB 200 MG: 200 CAPSULE ORAL at 17:21

## 2022-08-29 RX ADMIN — ONDANSETRON HYDROCHLORIDE 4 MG: 2 INJECTION, SOLUTION INTRAMUSCULAR; INTRAVENOUS at 11:27

## 2022-08-29 RX ADMIN — ROCURONIUM BROMIDE 10 MG: 10 INJECTION INTRAVENOUS at 11:07

## 2022-08-29 RX ADMIN — ROCURONIUM BROMIDE 5 MG: 10 INJECTION INTRAVENOUS at 10:14

## 2022-08-29 RX ADMIN — PROPOFOL 100 MG: 10 INJECTION, EMULSION INTRAVENOUS at 10:14

## 2022-08-29 RX ADMIN — TRANEXAMIC ACID 1 G: 100 INJECTION, SOLUTION INTRAVENOUS at 10:25

## 2022-08-29 RX ADMIN — TRAMADOL HYDROCHLORIDE 100 MG: 50 TABLET ORAL at 12:45

## 2022-08-29 RX ADMIN — VANCOMYCIN HYDROCHLORIDE 1000 MG: 1 INJECTION, POWDER, LYOPHILIZED, FOR SOLUTION INTRAVENOUS at 09:33

## 2022-08-29 RX ADMIN — Medication 2 G: at 10:25

## 2022-08-29 RX ADMIN — VANCOMYCIN HYDROCHLORIDE 1 G: 10 INJECTION, POWDER, LYOPHILIZED, FOR SOLUTION INTRAVENOUS at 09:55

## 2022-08-29 RX ADMIN — FAMOTIDINE 20 MG: 20 TABLET, FILM COATED ORAL at 17:21

## 2022-08-29 RX ADMIN — FENTANYL CITRATE 25 MCG: 50 INJECTION, SOLUTION INTRAMUSCULAR; INTRAVENOUS at 10:48

## 2022-08-29 RX ADMIN — SODIUM CHLORIDE, PRESERVATIVE FREE 10 ML: 5 INJECTION INTRAVENOUS at 21:02

## 2022-08-29 RX ADMIN — SODIUM CHLORIDE, POTASSIUM CHLORIDE, SODIUM LACTATE AND CALCIUM CHLORIDE: 600; 310; 30; 20 INJECTION, SOLUTION INTRAVENOUS at 10:08

## 2022-08-29 RX ADMIN — Medication 1000 MG: at 09:16

## 2022-08-29 RX ADMIN — ACETAMINOPHEN 650 MG: 325 TABLET ORAL at 23:05

## 2022-08-29 RX ADMIN — CEFAZOLIN 2 G: 1 INJECTION, POWDER, FOR SOLUTION INTRAMUSCULAR; INTRAVENOUS at 17:21

## 2022-08-29 RX ADMIN — FENTANYL CITRATE 25 MCG: 50 INJECTION, SOLUTION INTRAMUSCULAR; INTRAVENOUS at 10:14

## 2022-08-29 RX ADMIN — SUCCINYLCHOLINE CHLORIDE 120 MG: 20 INJECTION, SOLUTION INTRAMUSCULAR; INTRAVENOUS at 10:16

## 2022-08-29 RX ADMIN — MIDAZOLAM HYDROCHLORIDE 0.5 MG: 1 INJECTION, SOLUTION INTRAMUSCULAR; INTRAVENOUS at 10:08

## 2022-08-29 RX ADMIN — GLYCOPYRROLATE 0.4 MG: 0.2 INJECTION, SOLUTION INTRAMUSCULAR; INTRAVENOUS at 11:27

## 2022-08-29 NOTE — PROGRESS NOTES
Occupational Therapy note:    Order acknowledged and chart reviewed. Per PT, patient will not discharge POD 0. Will defer OT consult and follow up with tomorrow.     Renee Weaver, OTR/L

## 2022-08-29 NOTE — PROGRESS NOTES
Ortho / Neurosurgery NP Note    POD# 0  s/p LEFT TOTAL HIP ARTHROPLASTY WITH NAVIGATION ANTERIOR APPROACH   Pt seen with son at bedside. Pt resting in bed. Awake and alert, in NAD. Reports post-op pain well controlled with Tramadol. Recently seen by PT and did very well. Lives with  who is WC bond and unable to provide assistance. Plans to stay overnight due to limited support at home. Tolerating regular diet. No nausea. VSS Afebrile. 2L NC     Visit Vitals  BP (!) 125/90   Pulse 84   Temp 97.9 °F (36.6 °C)   Resp 18   Ht 4' 11\" (1.499 m)   Wt 78.5 kg (173 lb 1 oz)   SpO2 93%   BMI 34.95 kg/m²       Voiding status: voiding   Output (mL)  Urine Voided: 275 ml (08/29/22 1424)  Last Bowel Movement Date: 08/29/22 (08/29/22 0859)  Unmeasurable Output  Urine Occurrence(s): 1 (08/29/22 0859)  Stool Occurrence(s): 1 (08/29/22 0859)      Labs    Lab Results   Component Value Date/Time    HGB 13.3 08/23/2022 09:49 AM      Lab Results   Component Value Date/Time    INR 0.9 08/23/2022 09:49 AM      Lab Results   Component Value Date/Time    Sodium 141 08/23/2022 09:49 AM    Potassium 4.1 08/23/2022 09:49 AM    Chloride 110 (H) 08/23/2022 09:49 AM    CO2 28 08/23/2022 09:49 AM    Glucose 90 08/23/2022 09:49 AM    BUN 19 08/23/2022 09:49 AM    Creatinine 0.83 08/23/2022 09:49 AM    Calcium 8.7 08/23/2022 09:49 AM     Recent Glucose Results: No results found for: GLU, GLUPOC, GLUCPOC        Body mass index is 34.95 kg/m². : A BMI > 30 is classified as obesity and > 40 is classified as morbid obesity. Dressing c.d.i  Cryotherapy in place over incision  Calves soft and supple; No pain with passive stretch  Sensation and motor intact .  +PF/DF/EHL intact   SCDs for mechanical DVT proph while in bed     PLAN:  1) PT BID, OT - WBAT  2) Aspirin 81 mg PO BID for DVT Prophylaxis   3) GI Prophylaxis - Pepcid  4) Readniess for discharge:     [x] Vital Signs stable    [] Hgb stable    [x] + Voiding    [x] Wound intact, drainage minimal    [x] Tolerating PO intake     [] Cleared by PT (OT if applicable)     [] Stair training completed (if applicable)    [] Independent / Contact Guard Assist (household distance)     [] Bed mobility     [] Car transfers     [] ADLs    [x] Adequate pain control on oral medication alone     Anticipate discharge home tomorrow with HH. Reports nearby family who are able to assist if needed.      Jane Hi NP

## 2022-08-29 NOTE — PERIOP NOTES
1147-Handoff Report from Operating Room to PACU    Report received from Cali Vila and MARTINE John CRNA regarding Lito Prescott. Surgeon(s):  Neli Bryatn MD  And Procedure(s) (LRB):  LEFT TOTAL HIP ARTHROPLASTY WITH NAVIGATION ANTERIOR APPROACH (Left)  confirmed   with allergies and dressings discussed. Anesthesia type, drugs, patient history, complications, estimated blood loss, vital signs, intake and output, and last pain medication, lines, reversal medications, and temperature were reviewed. 1220- Report given to Hema Knowles RN.

## 2022-08-29 NOTE — PROGRESS NOTES
Problem: Mobility Impaired (Adult and Pediatric)  Goal: *Acute Goals and Plan of Care (Insert Text)  Description: FUNCTIONAL STATUS PRIOR TO ADMISSION: pt independent without device household and community level mobility. Pt is primary caregiver for  who has physical needs. HOME SUPPORT PRIOR TO ADMISSION: Live in 1 story home with 1 TOSHIA, no rails. Pt owns RW (from mother), grab bars, and adaptive dressing aids from previous hip surgery    Physical Therapy Goals  Initiated 8/29/2022  1. Patient will move from supine to sit and sit to supine , scoot up and down, and roll side to side in bed with modified independence within 7 day(s). 2.  Patient will transfer from bed to chair and chair to bed with supervision/set-up using the least restrictive device within 7 day(s). 3.  Patient will perform sit to stand with supervision/set-up within 7 day(s). 4.  Patient will ambulate with supervision/set-up for 150 feet with the least restrictive device within 7 day(s). 5.  Patient will ascend/descend 1 stairs with no handrail(s) with supervision/set-up within 7 day(s). Outcome: Progressing Towards Goal   PHYSICAL THERAPY EVALUATION  Patient: Negro Valle (22 y.o. female)  Date: 8/29/2022  Primary Diagnosis: S/P hip replacement [Z96.649]  Procedure(s) (LRB):  LEFT TOTAL HIP ARTHROPLASTY WITH NAVIGATION ANTERIOR APPROACH (Left) Day of Surgery   Precautions: Fall, WBAT, Anterior Hip precautions       ASSESSMENT  Based on the objective data described below, the patient presents POD 0 s/p L NARESH. Pt received for PT session supine in bed, eager for PT session. She demonstrated bed mobility with SBA and rail. Sit <> stand with CGA and RW. Pt with urgent need to attempt toileting, performed stand-pivot transfer bed > BSC with RW and CGA. Sitting balance good while performing toileting tasks on [facility] with supervision. Sit <> stands from [facility] with CGA and RW, performed during self care after voiding.  Pt walked 100ft with RW and CGA, no buckling or LOB noted. Pt left end of session supine in bed, call bell in reach, bed alarm on and all needs met. She remains below her mod I baseline and likely will require HH PT and possible family assist upon DC home. Will continue to follow. Current Level of Function Impacting Discharge (mobility/balance): CGA with RW    Functional Outcome Measure: The patient scored 55/100 on the Barthel Index outcome measure which is indicative of 45% impairment in mobility and ADLs. Other factors to consider for discharge: lives with  and is his primary caregiver. Unsure of other local family support. Patient will benefit from skilled therapy intervention to address the above noted impairments. PLAN :  Recommendations and Planned Interventions: bed mobility training, transfer training, gait training, therapeutic exercises, neuromuscular re-education, patient and family training/education, and therapeutic activities      Frequency/Duration: Patient will be followed by physical therapy:  twice daily to address goals. Recommendation for discharge: (in order for the patient to meet his/her long term goals)  Physical therapy at least 2 days/week in the home     This discharge recommendation:  Has been made in collaboration with the attending provider and/or case management    IF patient discharges home will need the following DME: to be determined (TBD) - pt states she has RW \"from her mother\" - unsure of condition of DME - please confirm         SUBJECTIVE:   Patient stated I feel okay actually.     OBJECTIVE DATA SUMMARY:   HISTORY:    Past Medical History:   Diagnosis Date    Cataracts, bilateral     Chronic pain     LOWER BACK    Colon cancer ([facility] [address] 2015    colectomy with ostomy, then reversed by DR ELVIN Burroughs    Colon polyps     Depression     did not do well on duloxetine    Gastroparesis     GERD (gastroesophageal reflux disease)     Glaucoma     Heart palpitations     Interstitial lung disease ([facility] [address]     followed by Dr Rogelio Rincon    MRSA infection 05/08/2019    RA (rheumatoid arthritis) ([facility] [address]     DR Feliciano Garsia    Restless leg syndrome     Spinal stenosis 05/08/2011    Unspecified adverse effect of anesthesia     HEADACHE after colonoscopy    UTI (lower urinary tract infection)      Past Surgical History:   Procedure Laterality Date    HX APPENDECTOMY  2015    HX CATARACT REMOVAL Right 06/2020    Est[facility] @ [facility]    HX COLONOSCOPY  09/2014    HX COLOSTOMY  2016    REVERSAL    HX COLOSTOMY TAKE DOWN      HX DILATION AND CURETTAGE      HX KNEE ARTHROSCOPY Left 1979    MENISCUS REPAIR    HX KNEE ARTHROSCOPY Left     HX KNEE ARTHROSCOPY Right 2005    HX KNEE REPLACEMENT Left 2005    HX KNEE REPLACEMENT Right 05/09/2018    HX LAP CHOLECYSTECTOMY  05/2005    HX LUMBAR LAMINECTOMY  2011    HX LUMBAR LAMINECTOMY  15/7030    complicated by MRSA infection    HX ORTHOPAEDIC Right     HAND FX    HX TOTAL COLECTOMY  2015    WITH COLOSTOMY AND THEN REOMVAL OF COLOSTOMY    HX TUBAL LIGATION  1979    HX WRIST FRACTURE TX Left     IR INJ/CATH THER SUBST LUM/SAC W IMG  06/01/2020    KS TOTAL HIP ARTHROPLASTY Right 03/2012       Personal factors and/or comorbidities impacting plan of care: highly motivated, active and independent PTA    Home Situation  Home Environment: Private residence  # Steps to Enter: 1  Rails to Enter: No  One/Two Story Residence: Two story  # of Interior Steps: 12  Interior Rails: Right  Lift Chair Available: No  Living Alone: No  Support Systems: Spouse/Significant Other (pt is primary caregiver for spouse)  Patient Expects to be Discharged to[de-identified] Home with home health  Current DME Used/Available at Home: Adaptive dressing aides, Walker, rolling, Grab bars    EXAMINATION/PRESENTATION/DECISION MAKING:   Critical Behavior:  Neurologic State: Alert  Orientation Level: Oriented X4  Cognition: Follows commands     Hearing:   Auditory  Auditory Impairment: Hard of hearing, bilateral  Hearing Aids/Status: Bilateral  Skin:  appears intact  Edema: none noted  Range Of Motion:  AROM: Generally decreased, functional                       Strength:    Strength: Generally decreased, functional                    Tone & Sensation:   Tone: Normal              Sensation: Intact               Coordination:  Coordination: Within functional limits  Vision:      Functional Mobility:  Bed Mobility:  Rolling: Contact guard assistance  Supine to Sit: Contact guard assistance  Sit to Supine: Contact guard assistance  Scooting: Stand-by assistance  Transfers:  Sit to Stand: Contact guard assistance  Stand to Sit: Contact guard assistance  Stand Pivot Transfers: Contact guard assistance                    Balance:   Sitting: Intact  Standing: Impaired; With support  Standing - Static: Good;Constant support  Standing - Dynamic : Good;Constant support  Ambulation/Gait Training:  Distance (ft): 40 Feet (ft)  Assistive Device: Gait belt;Walker, rolling  Ambulation - Level of Assistance: Contact guard assistance        Gait Abnormalities: Decreased step clearance;Shuffling gait        Base of Support: Widened     Speed/Ania: Shuffled; Slow  Step Length: Right shortened;Left shortened               Functional Measure:  Barthel Index:    Bathin  Bladder: 10  Bowels: 10  Groomin  Dressin  Feeding: 10  Mobility: 0  Stairs: 0  Toilet Use: 5  Transfer (Bed to Chair and Back): 10  Total: 55/100       The Barthel ADL Index: Guidelines  1. The index should be used as a record of what a patient does, not as a record of what a patient could do. 2. The main aim is to establish degree of independence from any help, physical or verbal, however minor and for whatever reason. 3. The need for supervision renders the patient not independent. 4. A patient's performance should be established using the best available evidence.  Asking the patient, friends/relatives and nurses are the usual sources, but direct observation and common sense are also important. However direct testing is not needed. 5. Usually the patient's performance over the preceding 24-48 hours is important, but occasionally longer periods will be relevant. 6. Middle categories imply that the patient supplies over 50 per cent of the effort. 7. Use of aids to be independent is allowed. Score Interpretation (from [facility] 83)    Independent   60-79 Minimally independent   40-59 Partially dependent   20-39 Very dependent   <20 Totally dependent     -Monica Pal., Barthel DArielW. (1965). Functional evaluation: the Barthel Index. [address] ([address] (1997). The Barthel activities of daily living index: self-reporting versus actual performance in the old (> or = 75 years). Journal [address] 45(7), [address] [facility], [place], Julia Jeffries., Amari Corea. (1999). Measuring the change in disability after inpatient rehabilitation; comparison of the responsiveness of the Barthel Index and Functional [place] Measure. Journal of Neurology, Neurosurgery, and Psychiatry, 66(4), 268-966. Ana Lilia Connor, N.J.A, MONA Helms, & Lise Klein M.A. (2004) Assessment of post-stroke quality of life in cost-effectiveness studies: The usefulness of the Barthel Index and the EuroQoL-5D.  Quality of Life Research, 15, 295-01           Physical Therapy Evaluation Charge Determination   History Examination Presentation Decision-Making   LOW Complexity : Zero comorbidities / personal factors that will impact the outcome / POC LOW Complexity : 1-2 Standardized tests and measures addressing body structure, function, activity limitation and / or participation in recreation  LOW Complexity : Stable, uncomplicated  LOW Complexity : FOTO score of       Based on the above components, the patient evaluation is determined to be of the following complexity level: LOW     Pain Rating:  Denies pain    Activity Tolerance:   Good and requires rest breaks    After treatment patient left in no apparent distress:   Supine in bed, Heels elevated for pressure relief, Call bell within reach, Bed / chair alarm activated, and Side rails x 3    COMMUNICATION/EDUCATION:   The patients plan of care was discussed with: Occupational therapist, Registered nurse, and Case management. Fall prevention education was provided and the patient/caregiver indicated understanding.     Thank you for this referral.  Eduardo Gregg, PT, DPT, NCS   Time Calculation: 28 mins

## 2022-08-29 NOTE — PERIOP NOTES
5 - PT DENIES FEVER, COLD, COUGH, N/V, DIARRHEA. .. PT DOES HAVE CHRONIC SOB WHICH SHE SEES DR. Amparo Ennis. PRE-OP TCHING DONE - PT VERBALIZES UNDERSTANDING. STRETCHER IN LOWEST POSITION, CB IN PLACE AND SR UP X2.

## 2022-08-29 NOTE — OP NOTES
Norma Weaver MD - Adult Reconstruction and Total Joint Replacement    Angela Schrader - MRN [id number] - : 1940 (78 y.o.)      Date: 2022    Pre-operative Diagnosis: Lerft Hip DJD     Post-operative Diagnosis: same     Procedure:  (1) Left Total Hip Arthroplasty, Direct Anterior Approach    (2) Intraoperative Fluoroscopy    (3) Imageless Computer Navigation     Implants Used:   Implant Name Type Inv.  Item Serial No.  Lot No. LRB No. Used Action   SHELL ACET MH 52 MM HIP GRP 5 CUP ALTEON - [id number]  SHELL ACET MH 52 MM HIP GRP 5 CUP ALTEON [id number] [employer] NA Left 1 Implanted   LINER ACET NEUT 36X52 MM HIP GRP 5 XLE ALTEON - [id number]  LINER ACET NEUT 36X52 MM HIP GRP 5 XLE ALTEON [id number] [employer] NA Left 1 Implanted   SCREW BONE L25MM DIA6.5MM FOR NOVATION CRWN CUP ACET SHELL - [id number]  SCREW BONE L25MM DIA6.5MM FOR NOVATION CRWN CUP ACET SHELL [id number] [employer] NA Left 1 Implanted   STEM FEM OD10MM 12/14 STD OFFSET CLLRD TAPR REDUC NK LEE - [id number]  STEM FEM OD10MM 12/14 STD OFFSET CLLRD TAPR REDUC NK LEE [id number] [employer] NA Left 1 Implanted   HEAD FEM TAPR 3.5- MM 36 MM HIP BIOLOX DELT STRL - [id number]  HEAD FEM TAPR 3.5- MM 36 MM HIP BIOLOX DELT STRL [id number] [employer] NA Left 1 Implanted       Anesthesia: GETA + local    Pre-operative antibiotic: Ancef + vancomycin    Surgeon: Norma Weaver     Assist: IRAJ Morales (Performing all or most of the following assistant-at-surgery services including but not limited to: proper patient positioning, sterile/prep and draping, placement of instruments/trackers, operative exposure, minor portions of bone / soft tissue excision, final irrigation and debridement, deep and superficial closure, application of final dressings)    EBL: 275cc     Drains: none     Specimens: none     Complications: none     Condition: stable to PACU     Brief History: Longstanding hip pain, unresponsive to conservative treatment. The risks, benefits, and alternatives to total hip replacement were thoroughly explained. The patient understood no guarantees could be given about the outcome of the procedure and wished to proceed. Description of Procedure: After being identified in the preoperative holding area and having their operative site marked, the patient was brought back to the operating room where they underwent anesthesia to good effect. They were  placed in the supine position with a bump under the hip. The operative extremity was then prepped and draped in the usual fashion using sterile technique. Preoperative antibiotics had been administered. An appropriate time-out was performed. We began by placing the pelvic tracker with two percutaneous Shanz pins into the ipsilateral ilium. The pelvis was then registered with the navigation system. An incision was made 2 fingerbreadths lateral and distal to the ASIS. The skin flaps were developed down to the tensor fascia. This was divided sharply. Blunt dissection was taken medially over the tensor muscle. Retractors were placed superior and inferior to the femoral neck. The anterior fat pad was debrided. Circumflex vessels were identified and cauterized. A provisional neck cut was performed. The head was removed from the acetabulum. We then excised the labrum. We sequentially reamed for the acetabular shell. This was placed in the appropriate abduction and version. Position was confirmed by navigation and fluoroscopy . The liner was then impacted into the locking mechanism. We then turned our attention to the femur. Elevators were used to gain access to the proximal femur. Soft tissue releases were performed as necessary. The lateralizer was utilized. We then sequentially broached up to the final size trial. We placed a trial neck and head and had excellent restoration of leg length and offset with good soft tissue balance. We selected these as our final implants.  Final components were inserted and the hip was reduced after thorough lavage. Restoration of appropriate leg length was confirmed by navigation and fluoroscopy. We again irrigated. The tensor fascia was closed. Periarticular injection was performed. Skin closure was performed in layers. A sterile dressing was applied. The patient was awakened, moved to the stretcher, and taken to the recovery room in stable condition. At the conclusion of the procedure, all counts were correct. There were no immediate complications. Additional Procedure:   Date: 8/29/2022    Preoperative diagnosis: LEFT HIP OA    Postoperative diagnosis: LEFT HIP OA    Procedure performed: Procedure(s):  LEFT TOTAL HIP ARTHROPLASTY WITH NAVIGATION ANTERIOR APPROACH    Anesthesia: General    Surgeon(s) and Role:     Fly Correa MD - Primary      This describes the use of intraoperative fluoroscopy. Fluoroscopic imaging was utilized in orthogonal planes as well as using live technique for all phases of the procedure, described separately in the operative report, including hardware placement.     Osei Block MD

## 2022-08-29 NOTE — ANESTHESIA POSTPROCEDURE EVALUATION
Procedure(s):  LEFT TOTAL HIP ARTHROPLASTY WITH NAVIGATION ANTERIOR APPROACH. general    Anesthesia Post Evaluation      Multimodal analgesia: multimodal analgesia used between 6 hours prior to anesthesia start to PACU discharge  Patient location during evaluation: PACU  Level of consciousness: sleepy but conscious  Pain management: adequate  Airway patency: patent  Anesthetic complications: no  Cardiovascular status: acceptable  Respiratory status: acceptable  Hydration status: acceptable  Comments: +Post-Anesthesia Evaluation and Assessment    Patient: Mini Zavaleta MRN: [id number]  SSN: [id number]   YOB: 1940  Age: 78 y.o. Sex: female      Cardiovascular Function/Vital Signs    BP (!) 131/53   Pulse (!) 52   Temp 36.5 °C (97.7 °F)   Resp 16   Ht 4' 11\" (1.499 m)   Wt 78.5 kg (173 lb 1 oz)   SpO2 95%   BMI 34.95 kg/m²     Patient is status post Procedure(s):  LEFT TOTAL HIP ARTHROPLASTY WITH NAVIGATION ANTERIOR APPROACH. Nausea/Vomiting: Controlled. Postoperative hydration reviewed and adequate. Pain:  Pain Scale 1: Numeric (0 - 10) (08/29/22 1215)  Pain Intensity 1: 0 (08/29/22 1215)   Managed. Neurological Status:   Neuro (WDL): Exceptions to WDL (08/29/22 1150)   At baseline. Mental Status and Level of Consciousness: Arousable. Pulmonary Status:   O2 Device: Nasal cannula (08/29/22 1215)   Adequate oxygenation and airway patent. Complications related to anesthesia: None    Post-anesthesia assessment completed. No concerns. Signed By: Elif Arguelles MD    8/29/2022  Post anesthesia nausea and vomiting:  controlled  Final Post Anesthesia Temperature Assessment:  Normothermia (36.0-37.5 degrees C)      INITIAL Post-op Vital signs:   Vitals Value Taken Time   /51 08/29/22 1230   Temp 36.5 °C (97.7 °F) 08/29/22 1152   Pulse 56 08/29/22 1239   Resp 15 08/29/22 1239   SpO2 96 % 08/29/22 1239   Vitals shown include unvalidated device data.

## 2022-08-29 NOTE — PROGRESS NOTES
End of Shift Note    Bedside shift change report given to  (oncoming nurse) by Greg Sharma (offgoing nurse). Report included the following information SBAR, Kardex, MAR, and Recent Results    Shift worked:  Days     Shift summary and any significant changes:     Pt voiding well, post op vitals obtained, pt stating \"I dont have pain it just aches\"     Concerns for physician to address:       Zone phone for oncoming shift:          Activity:  Activity Level: Up with Assistance  Number times ambulated in hallways past shift: 1  Number of times OOB to chair past shift: 0    Cardiac:   Cardiac Monitoring: No      Cardiac Rhythm: Sinus Michael Baiannabella, 1\" AV Block    Access:  Current line(s): PIV     Genitourinary:   Urinary status: voiding    Respiratory:   O2 Device: Nasal cannula  Chronic home O2 use?: NO  Incentive spirometer at bedside: NO       GI:  Last Bowel Movement Date: 08/29/22  Current diet:  ADULT DIET Regular  DIET ONE TIME MESSAGE  Passing flatus: YES  Tolerating current diet: YES       Pain Management:   Patient states pain is manageable on current regimen: YES    Skin:  Steven Score: 20  Interventions: turn team    Patient Safety:  Fall Score:  Total Score: 1  Interventions: pt to call before getting OOB  High Fall Risk: Yes    Length of Stay:  Expected LOS: - - -  Actual LOS: 0      Greg Sharma

## 2022-08-29 NOTE — DISCHARGE INSTRUCTIONS
Discharge Instructions:  [address]    Surgery: LEFT TOTAL HIP ARTHROPLASTY WITH NAVIGATION ANTERIOR APPROACH  Surgeon:   Dr. Debbie Gonzalez  Surgery Date:  8/29/2022    To relieve pain:  Use ice/gel packs.    -Put the ice pack directly over the wound, or anywhere you are hurting or swollen.   -To control pain and swelling, keep ice on regularly, especially after physical activity.  -The packs should stay cold for 3-4 hours. When it is not cold anymore, rotate with the packs in the freezer. Elevate your leg. This will also keep swelling down. Rest for at least 20 minutes between activity or exercises. To keep track of your pain medications, write down what you take and when you take it. The last dose of pain medication you got in the hospital was:     Medication    Dose    Date & Time      Choose your medications based on the pain scale below: To keep your pain under control, take Tylenol every 6 hours for 14 days - even if you feel like you dont need it. For mild to moderate pain (4-6 on pain scale), take one pain pill every 4 hours or as instructed. For severe pain (7-10 on pain scale), take two pain pills every 4 hours or as instructed. To prevent nausea, take your pain medications with food. Pain Scale              As your pain lessens:    Slowly start taking less pain medication. You may do this by waiting longer between doses or by taking smaller doses. Stop using the pain medications as soon as you no longer need it, usually in 2-3 weeks. Aspirin  To prevent blood clots, you will need to take Aspirin 81 mg twice a day for 30 days. To prevent stomach upset or bleeding:  Do not take non-steroidal anti-inflammatory medications (Ibuprofen, Advil, Motrin, Naproxen, etc.)   Take Pepcid 20 mg twice a day, or a similar home medication, while you are taking a blood thinner.          Keep your waterproof dressing in place. It will be removed by your surgeon during your follow-up appointment in 2 weeks. You may need to change the dressing if you are having drainage to where the dressing is no longer intact. You will be given an extra dressing to use at home. You will have some swelling, warmth, and bruising around the incision and up and down the leg after surgery. This will may get worse in the first few days you are home and will slowly get better over the next few weeks. You may shower with this dressing over your wound. After showering pat the dressing dry. DO NOT's:  Do not rub your surgical wound  Do not put lotion or oils on your wound. Do not take a tub bath or go swimming until your doctor says it is ok. To increase and promote healing:  Stop Smoking (or at least cut back on smoking). Eat a well-balanced diet. High in protein and Vitamin C. If you have a poor appetite, supplement your diet by drinking Ensure, Glucerna or [place] Instant Breakfast for the next 30 days     If you are a diabetic, control you blood sugars to prevent infection and help your wound heal.                     Prevent Infection:    Wash your hands                       -This is the most important thing you or your caregiver can do. -Wash your hands with soap and water (or use the hand ) before you touch any wounds. Shower  -Use the antibacterial soap we gave you when you take a shower.   -Shower with this soap until your wounds are healed. Use Clean Sheets   -Put freshly cleaned sheets on your bed after surgery.   -To keep the surgery site clean, do not allow pets to sleep with you while your wound is still healing. To prevent constipation, stay active and drink plenty of fluid. While using pain medications, you should also take stool softeners and laxatives, such as Pericolace and Miralax.        If you are having too many bowel movements, then you may need to stop taking the laxatives. You should have a bowel movement 3-4 days after surgery and then at least every other day while on pain medication. To improve your recovery, you must be active! Use your walker and take short walks (in your home) about every 2 hours during the day. Try to increase how far you walk each day. You can put as much weight on your leg as you can tolerate while walking. Home health physical therapy will come to your home the day after you leave the hospital. The therapist will visit about 4 times over the next couple of weeks to teach you exercises, how to get out of bed and how to safely walk in your home. NO DRIVING until your surgeon tells you it is ok. You can return to work when cleared by a physician. Please call your physician immediately if you have:  Constant bleeding from your wound. Increasing redness or swelling around your wound (some warmth, bruising and swelling is normal). Change in wound drainage (increase in amount, color, or bad smell). Change in mental status (unusual behavior). Temperature over 101.5 degrees Fahrenheit   Pain or redness in the calf (back of your lower leg)  Increased swelling of the thigh, ankle, calf, or foot. Emergency! CALL 911 if you have:  Shortness of breath  Chest pain when you cough or taking a deep breath        Please call your surgeons office at [phone] for a follow up appointment in 2 weeks. You should call as soon as you get home or the next day after discharge. If you have questions or concerns during normal business hours, you may reach Dr. Danyel Trejo' Team at [phone].         Instructions for 24 hours after receiving General Anesthesia or Intravenous Sedation,   and while taking prescription Narcotics    Common side effects associated with each of these medications includes:  - Drowsiness, dizziness, euphoria, sleepiness or confusion  - Impaired memory recall  - Unsteady gait, loss of fine muscle control and delayed reaction time  - Visual disturbances, difficulty focusing, blurred vision    You may experience some of these side effects or you may not be aware of subtle changes in your behavior or reaction time. Because you received these medications, we are giving you the following instructions. Discharge Instructions:   - Someone should be with you for the next 24 hours  - For your own safety, a responsible adult must drive you home  - Do not consume alcoholic beverages   - Do not make important personal, legal or business decisions for 24 hours  - Move slowly and carefully, do not make sudden position changes. Be alert for dizziness or lightheadedness and move accordingly. - Do not operate equipment for 24 hours - [employer], power tools, Kitchen accessories: stove, etc.  - If you have not urinated within 8 hours after discharge, please contact your surgeon's office.

## 2022-08-29 NOTE — H&P
Date of Surgery Update:  Cony Woods was seen and examined on the day of surgery prior to the procedure. There were no significant clinical changes since the completion of the History and Physical.    Exam today prior to surgery showed no acute cardiac findings, no respiratory difficulty, and no abdominal complaints or pain. This patient is a candidate for TXA. The patient was counseled at length about the risks of mynor Covid-19 during their perioperative period and any recovery window from their procedure. The patient was made aware that mynor Covid-19  may worsen their prognosis for recovering from their procedure and lend to a higher morbidity and/or mortality risk. All material risks, benefits, and reasonable alternatives including postponing the procedure were discussed. The patient does wish to proceed with the procedure at this time. Documentation of Medical Necessity:    Symptoms: pain with activity and at rest, antalgia, interferes with ADLs    Conservative Treatment: activity modification, multiple medications, injection    Physical Findings: painful AROM/PROM, antalgia on ambulation, no trochanteric pain    See PCP/Cardiology/PAT/Anesthesia record for cardiopulmonary evaluation. Imaging: significant OA, sclerosis and osteophytes    Indications:   Failure of conservative treatments with daily pain and functional limitations. Appropriate imaging demonstrating significant disease. Appropriate physical findings consistent with significant degenerative joint disease. All pertinent risks, benefits, and alternatives to operative management including continued conservative care were explained at length. The patient has elected to proceed with appropriately indicated and medically necessary total joint arthroplasty. They understand no guarantees can be given about the outcome.     Signed By: IRAJ Denise     August 29, 2022 9:39 AM

## 2022-08-29 NOTE — ANESTHESIA PREPROCEDURE EVALUATION
Relevant Problems   NEUROLOGY   (+) Depression      ENDOCRINE   (+) RA (rheumatoid arthritis) (HCC)   (+) Severe obesity (HCC)      PERSONAL HX & FAMILY HX OF CANCER   (+) Colon cancer (HCC)       Anesthetic History   No history of anesthetic complications            Review of Systems / Medical History  Patient summary reviewed, nursing notes reviewed and pertinent labs reviewed    Pulmonary  Within defined limits              Comments: Interstitial lung disease   Neuro/Psych         Psychiatric history     Cardiovascular  Within defined limits                Exercise tolerance: >4 METS     GI/Hepatic/Renal     GERD: well controlled          Comments: Hx of gastroparesis Endo/Other        Obesity, arthritis and cancer     Other Findings   Comments: Restless leg syndrome  Hx of lumbar spinal stenosis  glaucoma           Physical Exam    Airway  Mallampati: II  TM Distance: > 6 cm  Neck ROM: normal range of motion   Mouth opening: Normal     Cardiovascular  Regular rate and rhythm,  S1 and S2 normal,  no murmur, click, rub, or gallop  Rhythm: regular  Rate: normal         Dental  No notable dental hx       Pulmonary  Breath sounds clear to auscultation               Abdominal  GI exam deferred       Other Findings            Anesthetic Plan    ASA: 3  Anesthesia type: general    Monitoring Plan: BIS      Induction: Intravenous  Anesthetic plan and risks discussed with: Patient

## 2022-08-29 NOTE — PERIOP NOTES
TRANSFER - OUT REPORT:    Verbal report given to [facility] RN(name) on [facility]  being transferred to [facility](unit) for routine progression of care       Report consisted of patients Situation, Background, Assessment and   Recommendations(SBAR). Information from the following report(s) OR Summary, Intake/Output, and MAR was reviewed with the receiving nurse. Opportunity for questions and clarification was provided.       Patient transported with:   O2 @ 2 liters  [employer]

## 2022-08-30 VITALS
HEART RATE: 54 BPM | SYSTOLIC BLOOD PRESSURE: 117 MMHG | TEMPERATURE: 98.7 F | OXYGEN SATURATION: 96 % | HEIGHT: 59 IN | WEIGHT: 173.06 LBS | BODY MASS INDEX: 34.89 KG/M2 | RESPIRATION RATE: 18 BRPM | DIASTOLIC BLOOD PRESSURE: 57 MMHG

## 2022-08-30 LAB
ANION GAP SERPL CALC-SCNC: 7 MMOL/L (ref 5–15)
BUN SERPL-MCNC: 14 MG/DL (ref 6–20)
BUN/CREAT SERPL: 15 (ref 12–20)
CALCIUM SERPL-MCNC: 8.3 MG/DL (ref 8.5–10.1)
CHLORIDE SERPL-SCNC: 107 MMOL/L (ref 97–108)
CO2 SERPL-SCNC: 22 MMOL/L (ref 21–32)
CREAT SERPL-MCNC: 0.92 MG/DL (ref 0.55–1.02)
GLUCOSE SERPL-MCNC: 146 MG/DL (ref 65–100)
HGB BLD-MCNC: 11.4 G/DL (ref 11.5–16)
POTASSIUM SERPL-SCNC: 4.6 MMOL/L (ref 3.5–5.1)
SODIUM SERPL-SCNC: 136 MMOL/L (ref 136–145)

## 2022-08-30 PROCEDURE — 97116 GAIT TRAINING THERAPY: CPT

## 2022-08-30 PROCEDURE — 74011250637 HC RX REV CODE- 250/637: Performed by: PHYSICIAN ASSISTANT

## 2022-08-30 PROCEDURE — 80048 BASIC METABOLIC PNL TOTAL CA: CPT

## 2022-08-30 PROCEDURE — 97535 SELF CARE MNGMENT TRAINING: CPT

## 2022-08-30 PROCEDURE — 85018 HEMOGLOBIN: CPT

## 2022-08-30 PROCEDURE — 36415 COLL VENOUS BLD VENIPUNCTURE: CPT

## 2022-08-30 PROCEDURE — 74011250636 HC RX REV CODE- 250/636: Performed by: PHYSICIAN ASSISTANT

## 2022-08-30 PROCEDURE — 97165 OT EVAL LOW COMPLEX 30 MIN: CPT

## 2022-08-30 PROCEDURE — 74011000250 HC RX REV CODE- 250: Performed by: PHYSICIAN ASSISTANT

## 2022-08-30 PROCEDURE — 97530 THERAPEUTIC ACTIVITIES: CPT

## 2022-08-30 RX ADMIN — CEFAZOLIN 2 G: 1 INJECTION, POWDER, FOR SOLUTION INTRAMUSCULAR; INTRAVENOUS at 01:29

## 2022-08-30 RX ADMIN — TRAMADOL HYDROCHLORIDE 100 MG: 50 TABLET ORAL at 00:13

## 2022-08-30 RX ADMIN — TROSPIUM CHLORIDE 20 MG: 20 TABLET, FILM COATED ORAL at 08:59

## 2022-08-30 RX ADMIN — ATENOLOL 25 MG: 25 TABLET ORAL at 08:59

## 2022-08-30 RX ADMIN — DULOXETINE HYDROCHLORIDE 30 MG: 30 CAPSULE, DELAYED RELEASE ORAL at 09:00

## 2022-08-30 RX ADMIN — SODIUM CHLORIDE, PRESERVATIVE FREE 10 ML: 5 INJECTION INTRAVENOUS at 06:34

## 2022-08-30 RX ADMIN — CELECOXIB 200 MG: 200 CAPSULE ORAL at 09:00

## 2022-08-30 RX ADMIN — DEXAMETHASONE SODIUM PHOSPHATE 10 MG: 4 INJECTION, SOLUTION INTRA-ARTICULAR; INTRALESIONAL; INTRAMUSCULAR; INTRAVENOUS; SOFT TISSUE at 08:59

## 2022-08-30 RX ADMIN — FAMOTIDINE 20 MG: 20 TABLET, FILM COATED ORAL at 09:00

## 2022-08-30 RX ADMIN — ASPIRIN 81 MG: 81 TABLET, COATED ORAL at 08:59

## 2022-08-30 RX ADMIN — ACETAMINOPHEN 650 MG: 325 TABLET ORAL at 06:34

## 2022-08-30 NOTE — DISCHARGE SUMMARY
Ortho Discharge Summary    Patient ID:  Tika Smallwood  [id number]  female  78 y.o.  1940    Admit date: 8/29/2022    Discharge date: 8/30/2022    Admitting Physician: Jace Sheridan MD     Consulting Physician(s):   Treatment Team: Utilization Review: Neyda Garcia RN; Primary Nurse: Mark Nava; Occupational Therapist: Luis Oropeza OT; Physical Therapy Assistant: Chetna Rodriguez; Care Manager: Delores Valverde    Date of Surgery:   8/29/2022     Preoperative Diagnosis:  LEFT HIP OA    Postoperative Diagnosis:   LEFT HIP OA    Procedure(s):   LEFT TOTAL HIP ARTHROPLASTY WITH NAVIGATION ANTERIOR APPROACH     Anesthesia Type:   General     Surgeon: Himanshu Zavala MD                            HPI:  Pt is a 78 y.o. female who has a history of LEFT HIP OA  with pain and limitations of activities of daily living who presents at this time for a LEFT TOTAL HIP [address] following the failure of conservative management. PMH:   Past Medical History:   Diagnosis Date    Cataracts, bilateral     Chronic pain     LOWER BACK    [facility] 2015    colectomy with ostomy, then reversed by DR ELVIN Burroughs    Colon polyps     Depression     did not do well on duloxetine    Gastroparesis     GERD (gastroesophageal reflux disease)     Glaucoma     Heart palpitations     Interstitial lung disease ([facility]     followed by Dr Fabián Burch    MRSA infection 05/08/2019    RA (rheumatoid arthritis) ([facility]     DR Alex Jimenez    Restless leg syndrome     Spinal stenosis 05/08/2011    Unspecified adverse effect of anesthesia     HEADACHE after colonoscopy    UTI (lower urinary tract infection)        Body mass index is 34.95 kg/m². : A BMI > 30 is classified as obesity and > 40 is classified as morbid obesity. Medications upon admission :   Prior to Admission Medications   Prescriptions Last Dose Informant Patient Reported? Taking?    BIOTIN PO 8/22/2022  Yes Yes   Sig: Take 5,000 mg by mouth daily. DULoxetine (CYMBALTA) 30 mg capsule 8/15/2022  No No   Sig: TAKE ONE CAPSULE BY MOUTH DAILY   Patient taking differently: Take 30 mg by mouth daily. SUMAtriptan (IMITREX) 6 mg/0.5 mL injection Unknown  Yes No   Si mg by SubCUTAneous route daily as needed. acetaminophen (TYLENOL) 500 mg tablet 2022  Yes No   Sig: Take 1,000 mg by mouth every six (6) hours as needed for Pain. acetaminophen (TYLENOL) 650 mg TbER 2022  Yes No   Sig: Take 650 mg by mouth every eight (8) hours as needed. atenoloL (TENORMIN) 25 mg tablet 2022  No Yes   Sig: TAKE ONE TABLET BY MOUTH DAILY   Patient taking differently: Take 25 mg by mouth daily. calcium carb/D3/magnesium/zinc (HOLDEN MAG ZINC PLUS D3 PO) 2022  Yes Yes   Sig: Take 1 Tablet by mouth daily. certolizumab pegoL (Cimzia) 400 mg/2 mL (200 mg/mL x 2) sykt injection 2022  Yes Yes   Si mg by SubCUTAneous route every thirty (30) days. Indications: rheumatoid arthritis   ibuprofen (MOTRIN) 400 mg tablet 2022  Yes Yes   Sig: Take 400 mg by mouth every six (6) hours as needed for Pain. Indications: pain   leflunomide (ARAVA) 20 mg tablet 2022  Yes No   Sig: Take 1 Tablet by mouth daily. loperamide (IMODIUM) 2 mg capsule Unknown  Yes No   Sig: Take 1-2 Capsules by mouth four (4) times daily as needed for Diarrhea. pramipexole (MIRAPEX) 1 mg tablet 8/15/2022  Yes No   Sig: Take 1.5 mg by mouth nightly. 1 1/2 tab qhs   predniSONE (DELTASONE) 5 mg tablet 2022  Yes No   Sig: Take 5 mg by mouth daily as needed. tolterodine ER (DETROL LA) 4 mg ER capsule 2022  No No   Sig: Take 1 Capsule by mouth daily. zolpidem tartrate (AMBIEN PO) 2022  Yes Yes   Sig: Take 5 mg by mouth nightly as needed. Facility-Administered Medications: None        Allergies:     Allergies   Allergen Reactions    Adhesive Tape-Silicones Other (comments)     \"Left skin raw\"    Statins-Hmg-Coa Reductase Inhibitors Myalgia     Intolerant to lipitor, crestor and pravastatin        Hospital Course: The patient underwent surgery. Complications:  None; patient tolerated the procedure well. Was taken to the PACU in stable condition and then transferred to the ortho floor. Perioperative Antibiotics:  Ancef     Postoperative Pain Management:  Tramadol &  Tylenol     DVT Prophylaxis: Aspirin 81BID    Postoperative transfusions:    Number of units banked PRBCs =   none     Post Op complications: none    Hemoglobin at discharge:    Lab Results   Component Value Date/Time    HGB 11.4 (L) 08/30/2022 01:34 AM    INR 0.9 08/23/2022 09:49 AM       Dressing remained clean, dry and intact. No significant erythema or swelling. Wound appears to be healing without any evidence of infection. Neurovascular exam found to be within normal limits. Physical Therapy started following surgery and participated in bed mobility, transfers and ambulation. Gait:  Gait  Base of Support: Widened  Speed/Ania: Pace decreased (<100 feet/min)  Step Length: Right shortened, Left shortened  Gait Abnormalities: Decreased step clearance  Ambulation - Level of Assistance: Contact guard assistance, Stand-by assistance  Distance (ft): 300 Feet (ft)  Assistive Device: Gait belt, Walker, rolling  Rail Use: Both  Stairs - Level of Assistance: Contact guard assistance, Stand-by assistance  Number of Stairs Trained: 4                   Discharged to: Home with . Condition on Discharge:   stable    Discharge instructions:  - Anticoagulate with Aspirin 81 BID  - Take pain medications as prescribed  - Resume pre hospital diet      - Discharge activity: activity as tolerated  - Ambulate with assistive device as needed. - Weight bearing status - WBAT  - Wound Care Keep wound clean and dry. See discharge instruction sheet.             -DISCHARGE MEDICATION LIST     Discharge Medication List as of 8/30/2022 11:37 AM        START taking these medications Details   aspirin delayed-release 81 mg tablet Take 1 Tablet by mouth two (2) times a day for 30 days. , Normal, Disp-60 Tablet, R-0      famotidine (PEPCID) 20 mg tablet Take 1 Tablet by mouth two (2) times a day for 30 days. , Normal, Disp-60 Tablet, R-0      traMADoL (ULTRAM) 50 mg tablet Take 1-2 Tablets by mouth every six (6) hours as needed for Pain for up to 7 days. Max Daily Amount: 400 mg. One tab for mild to moderate pain level 1-6, or 2 tabs for severe pain level 7-10, Normal, Disp-30 Tablet, R-0           CONTINUE these medications which have NOT CHANGED    Details   ibuprofen (MOTRIN) 400 mg tablet Take 400 mg by mouth every six (6) hours as needed for Pain. Indications: pain, Historical Med      acetaminophen (TYLENOL) 500 mg tablet Take 1,000 mg by mouth every six (6) hours as needed for Pain., Historical Med      zolpidem tartrate (AMBIEN PO) Take 5 mg by mouth nightly as needed., Historical Med      tolterodine ER (DETROL LA) 4 mg ER capsule Take 1 Capsule by mouth daily. , Normal, Disp-30 Capsule, R-1      loperamide (IMODIUM) 2 mg capsule Take 1-2 Capsules by mouth four (4) times daily as needed for Diarrhea., OTC      DULoxetine (CYMBALTA) 30 mg capsule TAKE ONE CAPSULE BY MOUTH DAILY, Normal, Disp-90 Capsule, R-1      atenoloL (TENORMIN) 25 mg tablet TAKE ONE TABLET BY MOUTH DAILY, Normal, Disp-90 Tablet, R-1      leflunomide (ARAVA) 20 mg tablet Take 1 Tablet by mouth daily. , Historical Med      certolizumab pegoL (Cimzia) 400 mg/2 mL (200 mg/mL x 2) sykt injection 400 mg by SubCUTAneous route every thirty (30) days. Indications: rheumatoid arthritis, Historical Med, Disp-2 mL, R-0      pramipexole (MIRAPEX) 1 mg tablet Take 1.5 mg by mouth nightly.  1 1/2 tab qhs, Historical Med      acetaminophen (TYLENOL) 650 mg TbER Take 650 mg by mouth every eight (8) hours as needed., Historical Med      SUMAtriptan (IMITREX) 6 mg/0.5 mL injection 6 mg by SubCUTAneous route daily as needed., Historical Med calcium carb/D3/magnesium/zinc (HOLDEN MAG ZINC PLUS D3 PO) Take 1 Tablet by mouth daily. , Historical Med      BIOTIN PO Take 5,000 mg by mouth daily. , Historical Med           STOP taking these medications       polyethylene glycol (MIRALAX) 17 gram packet Comments:   Reason for Stopping:         senna-docusate (PERICOLACE) 8.6-50 mg per tablet Comments:   Reason for Stopping:         predniSONE (DELTASONE) 5 mg tablet Comments:   Reason for Stopping:            per medical continuation form  Patient reports prn imodium for occasional diarrhea s/p colectomy, discussed starting bowel regimen if no BM 2 days after discharge.       -Follow up in office in 2 weeks      Signed:  Torsten Vega NP  Orthopaedic Nurse Practitioner    8/30/2022  2:05 PM

## 2022-08-30 NOTE — PROGRESS NOTES
Ortho / Neurosurgery NP Note    POD# 1  s/p LEFT TOTAL HIP ARTHROPLASTY WITH NAVIGATION ANTERIOR APPROACH   Pt seen this morning with Pedro Seth NP no visitor present. Pt sitting in chair, talkative. Tolerating regular diet and PO fluids without N/V. States pain is ok and patient ready to go home. Discussed pain control at home. No complaints. VSS Afebrile. Visit Vitals  BP (!) 136/49 (BP Patient Position: Sitting)   Pulse 61   Temp 98.7 °F (37.1 °C)   Resp 18   Ht 4' 11\" (1.499 m)   Wt 78.5 kg (173 lb 1 oz)   SpO2 95%   BMI 34.95 kg/m²       Voiding status: Voiding without problems  Output (mL)  Urine Voided: 200 ml (08/29/22 1855)  Last Bowel Movement Date: 08/29/22 (08/29/22 0859)  Unmeasurable Output  Urine Occurrence(s): 1 (08/29/22 0859)  Stool Occurrence(s): 1 (08/29/22 0859)      Labs    Lab Results   Component Value Date/Time    HGB 11.4 (L) 08/30/2022 01:34 AM      Lab Results   Component Value Date/Time    INR 0.9 08/23/2022 09:49 AM      Lab Results   Component Value Date/Time    Sodium 136 08/30/2022 01:34 AM    Potassium 4.6 08/30/2022 01:34 AM    Chloride 107 08/30/2022 01:34 AM    CO2 22 08/30/2022 01:34 AM    Glucose 146 (H) 08/30/2022 01:34 AM    BUN 14 08/30/2022 01:34 AM    Creatinine 0.92 08/30/2022 01:34 AM    Calcium 8.3 (L) 08/30/2022 01:34 AM     Recent Glucose Results:   Lab Results   Component Value Date/Time     (H) 08/30/2022 01:34 AM       Body mass index is 34.95 kg/m². : A BMI > 30 is classified as obesity and > 40 is classified as morbid obesity. Dressing c.d.i  Calves soft and supple;  No pain with passive stretch  Sensation and motor intact   SCDs for mechanical DVT proph while in bed     PLAN:  1) PT BID-WBAT  2) Aspirin 81 mg PO BID for DVT Prophylaxis   3) GI Prophylaxis - Pepcid  4) Readniess for discharge:     [x] Vital Signs stable    [x] Hgb stable    [x] + Voiding    [x] Wound intact, drainage minimal    [x] Tolerating PO intake     [x] Cleared by PT (OT if applicable)     [x] Stair training completed (if applicable)    [x] Independent / Contact Guard Assist (household distance)     [x] Bed mobility     [x] Car transfers     [x] ADLs    [x] Adequate pain control on oral medication alone     Discharge home today after cleared by PT/OT. Reports nearby family available for assistance if needed. She is the stole caregiver for her w/c bound .  HH and RW needed as well    Bailey Moses

## 2022-08-30 NOTE — PROGRESS NOTES
Spiritual Care Partner Volunteer visited patient at [address] in [facility] on 8/30/2022   Documented by: Adrien Jacome  To page : [phone]  ([phone]

## 2022-08-30 NOTE — PROGRESS NOTES
OCCUPATIONAL THERAPY EVALUATION/DISCHARGE  Patient: Irma Steele (1 y.o. female)  Date: 8/30/2022  Primary Diagnosis: S/P hip replacement [Z96.649]  Procedure(s) (LRB):  LEFT TOTAL HIP ARTHROPLASTY WITH NAVIGATION ANTERIOR APPROACH (Left) 1 Day Post-Op   Precautions:   Fall, WBAT    ASSESSMENT  Based on the objective data described below, the patient is s/p L NARESH POD #1 and presents with decreased endurance, mildly impaired balance, and generalized weakness. Patient reports prior to admission she was independent w/o AD at baseline, her  is WC bound and she is his primary caregiver. Reports her son, and nieces/nephews will be available to [facility]. Patient received seated in chair at start of session. Patient tolerated standing grooming, toilet transfers, and functional mobility without LOB or fatigue. Patient reports her pain in well managed as well. Patient able to don dress and underwear without difficulty overall SARKAR/SPV, reports using sock aid, reacher, long handled shoe horn, and dressing stick at baseline. Educated pt on energy conservation, patient reporting excellent understanding. VSS throughout session. No further OT needs indicated at this time, recommend home with [facility] and [facility] at [facility]. Current Level of Function (ADLs/self-care):   Feeding: Independent    Oral Facial Hygiene/Grooming: Independent    Bathing: Setup;Supervision         Upper Body Dressing: Independent    Lower Body Dressing: Setup;Supervision    Toileting: Setup;Supervision    Functional Outcome Measure: The patient scored 60/100 on the barthel outcome measure which is indicative of minimal independence.       Other factors to consider for discharge: hx of ortho surgeries, caregiver to spouse     PLAN :  Recommendation for discharge: (in order for the patient to meet his/her long term goals)  Occupational therapy at least 2 days/week in the home AND ensure assist and/or supervision for safety with bathing and higher level transfers    This discharge recommendation:  Has been made in collaboration with the attending provider and/or case management    IF patient discharges home will need the following DME: walker: rolling       SUBJECTIVE:   Patient stated I learned the hard way that I need to take my time when I have surgery.     OBJECTIVE DATA SUMMARY:   HISTORY:   Past Medical History:   Diagnosis Date    Cataracts, bilateral     Chronic pain     LOWER BACK    Colon cancer ([facility] [address] 2015    colectomy with ostomy, then reversed by DR ELVIN Burroughs    Colon polyps     Depression     did not do well on duloxetine    Gastroparesis     GERD (gastroesophageal reflux disease)     Glaucoma     Heart palpitations     Interstitial lung disease ([facility] [address]     followed by Dr Valentina Choudhury    MRSA infection 05/08/2019    RA (rheumatoid arthritis) ([facility]     DR Nadira Verde    Restless leg syndrome     Spinal stenosis 05/08/2011    Unspecified adverse effect of anesthesia     HEADACHE after colonoscopy    UTI (lower urinary tract infection)      Past Surgical History:   Procedure Laterality Date    HX APPENDECTOMY  2015    HX CATARACT REMOVAL Right 06/2020    Est[facility] @ [facility]    HX COLONOSCOPY  09/2014    HX COLOSTOMY  2016    REVERSAL    HX COLOSTOMY TAKE DOWN      HX DILATION AND CURETTAGE      HX KNEE ARTHROSCOPY Left 1979    MENISCUS REPAIR    HX KNEE ARTHROSCOPY Left     HX KNEE ARTHROSCOPY Right 2005    HX KNEE REPLACEMENT Left 2005    HX KNEE REPLACEMENT Right 05/09/2018    HX LAP CHOLECYSTECTOMY  05/2005    HX LUMBAR LAMINECTOMY  2011    HX LUMBAR LAMINECTOMY  32/0247    complicated by MRSA infection    HX ORTHOPAEDIC Right     HAND FX    HX TOTAL COLECTOMY  2015    WITH COLOSTOMY AND THEN REOMVAL OF COLOSTOMY    HX TUBAL LIGATION  1979    HX WRIST FRACTURE TX Left     IR INJ/CATH THER SUBST LUM/SAC W IMG  06/01/2020    [place] TOTAL HIP ARTHROPLASTY Right 03/2012       Prior Level of Function/Environment/Context: patient was independent, no AD, reports using adaptive dressing aides/bathing aides at baseline, caregiver to , has supportive family    Expanded or extensive additional review of patient history:     Home Situation  Home Environment: Private residence  # Steps to Enter: 1  Rails to [employer]: No  One/Two Story Residence: Two story  # of Interior Steps: 12  Interior Rails: Right  Lift Chair Available: No  Living Alone: No  Support Systems: Spouse/Significant Other (pt is primary caregiver for spouse)  Patient Expects to be Discharged to[de-identified] Home with home health  Current DME Used/Available at Home: Adaptive dressing aides, Walker, rolling, Grab bars    Hand dominance: Right    EXAMINATION OF PERFORMANCE DEFICITS:  Cognitive/Behavioral Status:  Neurologic State: Alert  Orientation Level: Oriented X4  Cognition: Follows commands  Perception: Appears intact  Perseveration: No perseveration noted  Safety/Judgement: Awareness of environment    Skin: intact    Edema: mild edema noted in BLEs, encouraged ankle pumps    Hearing: Auditory  Auditory Impairment: Hard of hearing, bilateral  Hearing Aids/Status: Bilateral    Vision/Perceptual:                           Acuity: Within Defined Limits    Corrective Lenses: Glasses    Range of Motion:    AROM: Generally decreased, functional                         Strength:    Strength: Generally decreased, functional                Coordination:  Coordination: Generally decreased, functional  Fine Motor Skills-Upper: Left Intact; Right Intact    Gross Motor Skills-Upper: Left Intact; Right Intact    Tone & Sensation:    Tone: Normal  Sensation: Intact                      Balance:  Sitting: Intact  Standing: Impaired  Standing - Static: Good  Standing - Dynamic : Good    Functional Mobility and Transfers for ADLs:  Bed Mobility:       Transfers:  Sit to Stand: Supervision  Stand to Sit: Supervision  Bathroom Mobility: Supervision/set up  Assistive Device : Walker, rolling    ADL Assessment:  Feeding: Independent    Oral Facial Hygiene/Grooming: Independent    Bathing: Setup;Supervision         Upper Body Dressing: Independent    Lower Body Dressing: Setup;Supervision    Toileting: Setup;Supervision                ADL Intervention and task modifications:       Grooming  Grooming Assistance: Supervision  Position Performed: Standing (at sink)  Washing Face: Set-up; Supervision  Washing Hands: Supervision  Brushing Teeth: Supervision                        Lower Body Dressing Assistance  Socks: Set-up; Supervision  Slip on Shoes with Back: Set-up; Supervision (SARKAR/SPV w/ AE, min A w/o AE)         Cognitive Retraining  Safety/Judgement: Awareness of environment    Precautions: Patient recalled and demonstrated avoiding extreme planes of movement with Left LE during ADLs and functional mobility with no cues. Bathing: Patient instructed when bathing to not submerge wound in water, stand to shower or sponge bathe, cover wound with plastic and tape to ensure no water reaches bandage/wound without cues. Patient indicated understanding. Dressing joint: Patient instructed and demonstrated understanding to don/doff Left LE first/last with no cues. Patient instructed and demonstrated to don all clothing while sitting prior to standing, doff all clothing to knees while standing, then sit to doff clothing off from knees to feet to facilitate fall prevention, pain management, and energy conservation with Supervision. Dressing joint reach exercise: To increase independence with lower body dressing, patient instructed and demonstrated to reach down Left LE in a seated position slowly to prevent tearing/shearing until slight pull is felt, hold at end range for 10 seconds, then return to starting upright position with Supervision. Patient instructed to complete three sets of three repetitions each daily.     Home safety: Patient instructed on home modifications and safety (raise height of ADL objects, appropriate height of chair surfaces, recliner safety, change of floor surfaces, clear pathways) to increase independence and fall prevention. Patient indicated understanding. Standing: Patient instructed and demonstrated to walk up to sink/countertop/surfaces, step into walker to increase safety of joint and fall prevention with Supervision. Instructed to apply concept of hip contraindications to ADLs within the home (no extreme reaching across body to Left side, square off while using objects, slide objects along surfaces). Patient instructed to increase amount of time standing, observe standing position during ADLs to increase even weight bearing through bilateral LEs to increase independence with ADLs. Goal to be reached 30 days post - op, per orthopedic surgeon or per PT. Patient indicated understanding. Functional Measure:    Barthel Index:  Bathin  Bladder: 10  Bowels: 10  Groomin  Dressin  Feeding: 10  Mobility: 0  Stairs: 5  Toilet Use: 5  Transfer (Bed to Chair and Back): 10  Total: 60/100      The Barthel ADL Index: Guidelines  1. The index should be used as a record of what a patient does, not as a record of what a patient could do. 2. The main aim is to establish degree of independence from any help, physical or verbal, however minor and for whatever reason. 3. The need for supervision renders the patient not independent. 4. A patient's performance should be established using the best available evidence. Asking the patient, friends/relatives and nurses are the usual sources, but direct observation and common sense are also important. However direct testing is not needed. 5. Usually the patient's performance over the preceding 24-48 hours is important, but occasionally longer periods will be relevant. 6. Middle categories imply that the patient supplies over 50 per cent of the effort. 7. Use of aids to be independent is allowed.     Score Interpretation (from [address] [address])    Independent 60-79 Minimally independent   40-59 Partially dependent   20-39 Very dependent   <20 Totally dependent     -aSndy Pal, Barthel, D.W. (5577). Functional evaluation: the Barthel Index. [address] ([address] (1997). The Barthel activities of daily living index: self-reporting versus actual performance in the old (> or = 75 years). Journal of [address] [place] [address] 45(7), [facility], SHIREENF, Aris Bio., Laura Buddhism. (1999). Measuring the change in disability after inpatient rehabilitation; comparison of the responsiveness of the Barthel Index and Functional [place] Measure. Journal of Neurology, Neurosurgery, and Psychiatry, 66(4), 061-510. Dejuan Caba, N.ANA.KATIE, MONA Helms, & Lazaro Henry M.A. (2004) Assessment of post-stroke quality of life in cost-effectiveness studies: The usefulness of the Barthel Index and the EuroQoL-5D. Quality of Life Research, 15, 200-03         Occupational Therapy Evaluation Charge Determination   History Examination Decision-Making   LOW Complexity : Brief history review  LOW Complexity : 1-3 performance deficits relating to physical, cognitive , or psychosocial skils that result in activity limitations and / or participation restrictions  LOW Complexity : No comorbidities that affect functional and no verbal or physical assistance needed to complete eval tasks       Based on the above components, the patient evaluation is determined to be of the following complexity level: LOW   Pain Rating:  Pt did not endorse pain     Activity Tolerance:   Fair and requires rest breaks      After treatment patient left in no apparent distress:    Sitting in chair and Call bell within reach    COMMUNICATION/EDUCATION:   The patients plan of care was discussed with: Physical therapist, Occupational therapist, and Registered nurse.      Thank you for this referral.  Amena Travis OT  Time Calculation: 23 mins

## 2022-08-30 NOTE — PROGRESS NOTES
Problem: Mobility Impaired (Adult and Pediatric)  Goal: *Acute Goals and Plan of Care (Insert Text)  Description: FUNCTIONAL STATUS PRIOR TO ADMISSION: pt independent without device household and community level mobility. Pt is primary caregiver for  who has physical needs. HOME SUPPORT PRIOR TO ADMISSION: Live in 1 story home with 1 TOSHIA, no rails. Pt owns RW (from mother), grab bars, and adaptive dressing aids from previous hip surgery    Physical Therapy Goals  Initiated 8/29/2022  1. Patient will move from supine to sit and sit to supine , scoot up and down, and roll side to side in bed with modified independence within 7 day(s). 2.  Patient will transfer from bed to chair and chair to bed with supervision/set-up using the least restrictive device within 7 day(s). 3.  Patient will perform sit to stand with supervision/set-up within 7 day(s). 4.  Patient will ambulate with supervision/set-up for 150 feet with the least restrictive device within 7 day(s). 5.  Patient will ascend/descend 1 stairs with no handrail(s) with supervision/set-up within 7 day(s). Outcome: Resolved/Met   PHYSICAL THERAPY TREATMENT  Patient: Lito Prescott (58 y.o. female)  Date: 8/30/2022  Diagnosis: S/P hip replacement [Z96.649] <principal problem not specified>  Procedure(s) (LRB):  LEFT TOTAL HIP ARTHROPLASTY WITH NAVIGATION ANTERIOR APPROACH (Left) 1 Day Post-Op  Precautions: Fall, WBAT  Chart, physical therapy assessment, plan of care and goals were reviewed. ASSESSMENT  Patient continues with skilled PT services and is progressing towards goals. Pt presents with decreased strength and safety awareness. Pt performed sit to stand transfer at supervision. Pt ambulated 300ft with RW at CGA/SBA. Pt requiring cuing to stay within walker. Pt able to correct with cueing. Pt ascended/descended 4 steps with bot railings at CGA/SBA. Pt performed a car transfer at [facility] with cueing for sequencing.  Pt moving well with safety improving with time. From physical therapy stand point pt cleared for discharge. Current Level of Function Impacting Discharge (mobility/balance): mobility at CGA/SBA     Other factors to consider for discharge: safety awareness          PLAN :  Patient continues to benefit from skilled intervention to address the above impairments. Continue treatment per established plan of care. to address goals. Recommendation for discharge: (in order for the patient to meet his/her long term goals)  Physical therapy at least 2 days/week in the home     This discharge recommendation:  Has been made in collaboration with the attending provider and/or case management    IF patient discharges home will need the following DME: patient owns DME required for discharge       SUBJECTIVE:   Patient stated  I have done this a bunch of times so I have everything.     OBJECTIVE DATA SUMMARY:   Critical Behavior:  Neurologic State: Alert  Orientation Level: Oriented X4  Cognition: Follows commands  Safety/Judgement: Awareness of environment  Functional Mobility Training:  Bed Mobility:                    Transfers:  Sit to Stand: Supervision  Stand to Sit: Supervision                             Balance:  Sitting: Intact  Standing: Impaired  Standing - Static: Good  Standing - Dynamic : Good  Ambulation/Gait Training:  Distance (ft): 300 Feet (ft)  Assistive Device: Gait belt;Walker, rolling  Ambulation - Level of Assistance: Contact guard assistance;Stand-by assistance        Gait Abnormalities: Decreased step clearance        Base of Support: Widened     Speed/Ania: Pace decreased (<100 feet/min)  Step Length: Right shortened;Left shortened     Stairs:  Number of Stairs Trained: 4  Stairs - Level of Assistance: Contact guard assistance;Stand-by assistance   Rail Use: Both    Pain Rating:  Pt with no complaints of pain     Activity Tolerance:   WNL and Good      After treatment patient left in no apparent distress: Sitting in chair, Call bell within reach, and Caregiver / family present    COMMUNICATION/COLLABORATION:   The patients plan of care was discussed with: Registered nurse.      Padmaja Skelton PTA   Time Calculation: 23 mins

## 2022-08-30 NOTE — PROGRESS NOTES
Transition of Care Plan:  RUR: 6% low   Disposition: home with home health- at home care  Follow up appointments: ortho  DME needed: pt owns dme needed for d/c  Transportation at Discharge: son  Lalita Navarro or means to access home: yes        Medicare Letter: provided  Caregiver Contact: Lian Lopez  [phone]  Discharge Caregiver contacted prior to discharge? Patient wishes to contact  Care Conference needed?:  no                 Reason for Admission:  left total hip arthroplasty                    RUR Score: 6%                    Plan for utilizing home health: per recommendation          PCP: First and Last name:  Hailee Cantor MD   [facility] of [facility]: [facility]   Are you a current patient: Yes/No: yes   Approximate date of last visit: 8/22/22   Can you participate in a virtual visit with your PCP: yes                    Current Advanced Directive/Advance Care Plan: Pt confirmed ACP on file listing  and son as POA                  Transition of Care Plan:                        CM made room visit with patient and son at bedside. Pt confirmed demographics, insruance, and emergency contact on file. Pt and  live in a 3 story home with ramp to enter. Pt has a cane, RW, and bedside commode. Pt is independent with ADLs and driving. Pt has used [facility] for [place] and would like to use them again upon d/c. FOC signed and placed on bedside chart. Referral sent to [facility] and accepted. AVS updated. Care Management Interventions  PCP Verified by CM: Yes  Mode of Transport at Discharge:  Other (see comment) (son)  Transition of Care Consult (CM Consult): Discharge Planning, [address]: No  Reason Outside [place]: Out of service area  Discharge Durable Medical Equipment: No  Physical Therapy Consult: Yes  Occupational Therapy Consult: Yes  Speech Therapy Consult: No  Support Systems: Spouse/Significant Other, Child(isael), Other Family Member(s)  Confirm Follow Up Transport: Family  The Plan for Transition of Care is Related to the Following Treatment Goals : hh  The Patient and/or Patient Representative was Provided with a Choice of Provider and Agrees with the Discharge Plan?: Yes  Freedom of Choice List was Provided with Basic Dialogue that Supports the Patient's Individualized Plan of Care/Goals, Treatment Preferences and Shares the Quality Data Associated with the Providers?: Yes  Discharge Location  Patient Expects to be Discharged to[de-identified] Home with home health    Taylor Bridges, [address] [facility] [address] [facility]

## 2022-08-31 ENCOUNTER — PATIENT OUTREACH (OUTPATIENT)
Dept: CASE MANAGEMENT | Age: 82
End: 2022-08-31

## 2022-08-31 NOTE — PROGRESS NOTES
Care Transitions Initial Call    Call within 2 business days of discharge: Yes     Patient: Irma Steele Patient : 1940 MRN: [id number]    Last Discharge  Street       Date Complaint Diagnosis Description Type Department Provider    22  Status post total replacement of left hip Admission (Discharged) Ethan Bran MD            Was this an external facility discharge? No     Challenges to be reviewed by the provider   Additional needs identified to be addressed with provider: no  none    22- Left TKR done- anterior approach. New medications: ASA 81 mg BID, tramadol 50 mg- 1-2 Q6H PRN. Pepcid   Ortho follow up on:   at 1 pm.   Discharged with PT through [facility]. Lives out of the [address] [place] [address]        Method of communication with provider : chart routing    COVID-19 related testing was not done at this time. Advance Care Planning:   Does patient have an Advance Directive: yes, reviewed and current. Inpatient Readmission Risk score: Unplanned Readmit Risk Score: 5.7    Was this a readmission? no     Patients top risk factors for readmission: medical condition- , medication management, support system, and transportation   Interventions to address risk factors: Communication with home health agencies or other community services the patient is currently [address], Education of patient/family/caregiver/guardian to support self-management- , and Assessment and support for treatment adherence and medication management-     Care Transition Nurse (CTN) contacted the patient by telephone to perform post hospital discharge assessment. Verified name and  with patient as identifiers. Provided introduction to self, and explanation of the CTN role. CTN reviewed discharge instructions, medical action plan and red flags with patient who verbalized understanding. Were discharge instructions available to patient? yes.  Reviewed appropriate site of care based on symptoms and resources available to patient including: Specialist, [address] [place] [address] Suman Perez, and [employer]. Patient given an opportunity to ask questions and does not have any further questions or concerns at this time. The patient agrees to contact the PCP office for questions related to their healthcare. Medication reconciliation was performed with patient, who verbalizes understanding of administration of home medications. Advised obtaining a 90-day supply of all daily and as-needed medications. Referral to Pharm D needed: no     Home Health/Outpatient orders at discharge: [address]: [facility]  Date of initial visit: CTN reached out to [facility] office, Left  notifying of discharge and to schedule SOC. Durable Medical Equipment ordered at discharge: None    Was patient discharged with a pulse oximeter? NA    Discussed follow-up appointments. If no appointment was previously scheduled, appointment scheduling offered:  not needed . Is follow up appointment scheduled within 7 days of discharge? no.   [facility] follow up appointment(s): No future appointments. CTN reached out to PCP to clarify follow up   [id number] [address] follow up appointment(s):     Ortho- Dr. Arnold Lizarraga- 2 wk follow up - 9/13 at 1 with PA. Plan for follow-up call in 5-7 days based on severity of symptoms and risk factors. Plan for next call:   Assess current symptoms including pain management  HH therapy services in place, progress. Follow up appt in place with ortho surgery    CTN provided contact information for future needs. Goals Addressed                   This Visit's Progress       General     Reduce risk for hospitalization        8/31/22- initially reached Mr. Noe Lu- he is asking for CTN to find out if her 15 day driving restriction can be waived. He states he is wheelchair bound and cannot drive. He agreed to locate his wife and ask her to call me.     Received return call from Ms. Eric Arreola. She was lying down. She admits she overdid it this morning and her back was stiff. She is taking acetaminophen and has taken one tramadol this morning. She has good understanding of pain management- CTN encouraged her to take the acetaminophen doses on schedule. Use the Tramadol for resting and prepare for therapy. She states it does make her a little sleepy. She has not heard from [address] [place] [address] reached out. Left  notifying of discharge yesterday and asked for return call for [facility]. Asked her to let CTN know if they do not reach out to schedule sometime today. She relays that on 9/8- they have an appointment- she would prefer to drive- son still works and her  gets in and out of their car more easily- most do not want to drive their vehicle. Encouraged her to reach out for options that will allow her to not to drive- be behind the wheel of a car until she sees Dr. Tez Jackson. Explained reasons for and reminded her about not driving if taking tramadol. Pt will remain out of the hospital or ER for remainder of JABARI period.       LLC

## 2022-09-06 ENCOUNTER — TELEPHONE (OUTPATIENT)
Dept: INTERNAL MEDICINE CLINIC | Age: 82
End: 2022-09-06

## 2022-09-06 DIAGNOSIS — F34.1 DYSTHYMIA: ICD-10-CM

## 2022-09-06 RX ORDER — DULOXETIN HYDROCHLORIDE 30 MG/1
30 CAPSULE, DELAYED RELEASE ORAL DAILY
Qty: 90 CAPSULE | Refills: 1 | Status: SHIPPED | OUTPATIENT
Start: 2022-09-06

## 2022-09-06 NOTE — TELEPHONE ENCOUNTER
Reason for call:    Patient recently had her prescription for Cymbalta called into [facility] in [place]. She just had hip surgery and can't drive to [place] and does not have anyone to go for her.   She wants to know if Dr. Salima Alarcon can call in her prescription to [facility], [address] [place] [address]., [phone]    Is this a new problem: yes     Date of last appointment:  8/22/2022     Can we respond via [employer]: no    Best call back number:     Akbar Verde - [phone]

## 2022-09-06 NOTE — TELEPHONE ENCOUNTER
Spoke with patient and notified rx sent to [facility] per her request.    Orders Placed This Encounter    DULoxetine (CYMBALTA) 30 mg capsule     Sig: Take 1 Capsule by mouth daily. Dispense:  90 Capsule     Refill:  1     The above orders were approved via [facility] per Dr. Alexander Tate, III.

## 2022-09-20 ENCOUNTER — PATIENT OUTREACH (OUTPATIENT)
Dept: CASE MANAGEMENT | Age: 82
End: 2022-09-20

## 2022-09-20 NOTE — PROGRESS NOTES
Care Transitions Follow Up Call    Challenges to be reviewed by the provider   Additional needs identified to be addressed with provider: yes  none           Method of communication with provider : none    Care Transition Nurse (CTN) contacted the patient by telephone to follow up after admission on 22. Verified name and  with patient as identifiers. Addressed changes since last contact: refer to goals section. CTN reviewed current symptoms, discharge instructions, medical action plan and red flags with patient and discussed any barriers to care and/or understanding of plan of care after discharge. Discussed appropriate site of care based on symptoms and resources available to patient including: Specialist, MyChart Messaging, and refer to her ortho surgery book . The patient agrees to contact the PCP office for questions related to their healthcare. [facility] follow up appointment(s): No future appointments. Non-[facility] follow up appointment(s):   Ortho- Dr. Olga Westbrook- saw on . CTN provided contact information for future needs. Plan for follow-up call in 7-10 days based on severity of symptoms and risk factors. Plan for next call:   Assess current symptoms including pain management  Attended follow up with ortho   Assess need for additional resources, refer to ACM. Goals Addressed                   This Visit's Progress       General     Reduce risk for hospitalization        22- spoke with Ms. Priya Amaro- she is struggling with pain management due to her Rheumatoid- she has been off her injections for a couple months- can get one next week- will be thankful. She was asking about taking prednisone- advised if incision is healed, can take low doses- reminded her about reducing immune system. PT has discharged her. She has seen ortho PA-doing well.  Reports her incision line is closed, but sensitive to touch- advised she can use a kleenex to help cover- it is in a fold and it will help absorb moisture- will need to be changed frequently. She will try. She was asking about discharge instructions- reminded her most of the post instructions are located in her ortho book- she forgot and will look- nothing in particular but wants to review. [facility]    8/31/22- initially reached Mr. Valeri Garay- he is asking for CTN to find out if her 15 day driving restriction can be waived. He states he is wheelchair bound and cannot drive. He agreed to locate his wife and ask her to call me. Received return call from Ms. Valeri Garay. She was lying down. She admits she overdid it this morning and her back was stiff. She is taking acetaminophen and has taken one tramadol this morning. She has good understanding of pain management- CTN encouraged her to take the acetaminophen doses on schedule. Use the Tramadol for resting and prepare for therapy. She states it does make her a little sleepy. She has not heard from [address] [place] [address] reached out. Left  notifying of discharge yesterday and asked for return call for [facility]. Asked her to let CTN know if they do not reach out to schedule sometime today. She relays that on 9/8- they have an appointment- she would prefer to drive- son still works and her  gets in and out of their car more easily- most do not want to drive their vehicle. Encouraged her to reach out for options that will allow her to not to drive- be behind the wheel of a car until she sees Dr. Yazmin Herbert. Explained reasons for and reminded her about not driving if taking tramadol. Pt will remain out of the hospital or ER for remainder of [facility] period.       [facility]

## 2022-09-29 ENCOUNTER — PATIENT OUTREACH (OUTPATIENT)
Dept: CASE MANAGEMENT | Age: 82
End: 2022-09-29

## 2022-09-29 NOTE — PROGRESS NOTES
Patient has graduated from the Transitions of Care Coordination  program on 9/29/22. Patient/family has the ability to self-manage at this time Care management goals have been completed. [facility] for further management. Goals Addressed                   This Visit's Progress     COMPLETED: Reduce risk for hospitalization        09/29/22   Called and spoke to patient, she states her incision is healed and is getting tougher. Never had to have PT and not using any assist devise. Is taking an occasional Tylenol for pain. David Salazar MSN, RN, CCM / Care Transition Nurse / [phone]     9/20/22- spoke with Ms. Lacey Smith- she is struggling with pain management due to her Rheumatoid- she has been off her injections for a couple months- can get one next week- will be thankful. She was asking about taking prednisone- advised if incision is healed, can take low doses- reminded her about reducing immune system. PT has discharged her. She has seen ortho PA-doing well. Reports her incision line is closed, but sensitive to touch- advised she can use a kleenex to help cover- it is in a fold and it will help absorb moisture- will need to be changed frequently. She will try. She was asking about discharge instructions- reminded her most of the post instructions are located in her ortho book- she forgot and will look- nothing in particular but wants to review. [facility]    8/31/22- initially reached Mr. Lacey Smith- he is asking for CTN to find out if her 15 day driving restriction can be waived. He states he is wheelchair bound and cannot drive. He agreed to locate his wife and ask her to call me. Received return call from Ms. Lacey Smith. She was lying down. She admits she overdid it this morning and her back was stiff. She is taking acetaminophen and has taken one tramadol this morning.  She has good understanding of pain management- CTN encouraged her to take the acetaminophen doses on schedule. Use the Tramadol for resting and prepare for therapy. She states it does make her a little sleepy. She has not heard from [address] [place] [address] Avenue reached out. Left  notifying of discharge yesterday and asked for return call for [facility]. Asked her to let CTN know if they do not reach out to schedule sometime today. She relays that on 9/8- they have an appointment- she would prefer to drive- son still works and her  gets in and out of their car more easily- most do not want to drive their vehicle. Encouraged her to reach out for options that will allow her to not to drive- be behind the wheel of a car until she sees Dr. Shwetha Truong. Explained reasons for and reminded her about not driving if taking tramadol. Pt will remain out of the hospital or ER for remainder of [facility] period. [facility]                 Patient has Care Transition Nurse's contact information for any further questions, concerns, or needs. Patients upcoming visits:  No future appointments.

## 2022-10-24 ENCOUNTER — TELEPHONE (OUTPATIENT)
Dept: INTERNAL MEDICINE CLINIC | Age: 82
End: 2022-10-24

## 2022-10-24 RX ORDER — TOLTERODINE 4 MG/1
CAPSULE, EXTENDED RELEASE ORAL
Qty: 30 CAPSULE | Refills: 1 | Status: SHIPPED | OUTPATIENT
Start: 2022-10-24

## 2022-10-24 NOTE — TELEPHONE ENCOUNTER
----- Message from Marquitaheberchuy Schultzkarmen sent at 10/24/2022 11:14 AM EDT -----  Subject: Message to Provider    QUESTIONS  Information for Provider? PT was diagnosed with Covid on 10/16. she has   appt to get flu shot on 10/27. she was wondering if it was ok for her to   still get the flu shot. If not, her and her (Servando Myers)   flu shot appt need to be rescheduled  ---------------------------------------------------------------------------  --------------  Jose TAMEZ  [phone]; OK to leave message on voicemail  ---------------------------------------------------------------------------  --------------  SCRIPT ANSWERS  Relationship to Patient?  Self

## 2022-11-04 ENCOUNTER — OFFICE VISIT (OUTPATIENT)
Dept: INTERNAL MEDICINE CLINIC | Age: 82
End: 2022-11-04
Payer: MEDICARE

## 2022-11-04 VITALS
TEMPERATURE: 97.3 F | HEIGHT: 59 IN | WEIGHT: 171 LBS | SYSTOLIC BLOOD PRESSURE: 137 MMHG | RESPIRATION RATE: 16 BRPM | DIASTOLIC BLOOD PRESSURE: 68 MMHG | OXYGEN SATURATION: 96 % | BODY MASS INDEX: 34.47 KG/M2 | HEART RATE: 54 BPM

## 2022-11-04 DIAGNOSIS — Z23 NEEDS FLU SHOT: ICD-10-CM

## 2022-11-04 DIAGNOSIS — U07.1 COVID-19: ICD-10-CM

## 2022-11-04 DIAGNOSIS — F34.1 DYSTHYMIA: ICD-10-CM

## 2022-11-04 DIAGNOSIS — M48.061 SPINAL STENOSIS OF LUMBAR REGION AT MULTIPLE LEVELS: ICD-10-CM

## 2022-11-04 DIAGNOSIS — M05.79 RHEUMATOID ARTHRITIS INVOLVING MULTIPLE SITES WITH POSITIVE RHEUMATOID FACTOR (HCC): ICD-10-CM

## 2022-11-04 DIAGNOSIS — E86.0 DEHYDRATION: Primary | ICD-10-CM

## 2022-11-04 PROCEDURE — G8427 DOCREV CUR MEDS BY ELIG CLIN: HCPCS | Performed by: INTERNAL MEDICINE

## 2022-11-04 PROCEDURE — G8400 PT W/DXA NO RESULTS DOC: HCPCS | Performed by: INTERNAL MEDICINE

## 2022-11-04 PROCEDURE — 1101F PT FALLS ASSESS-DOCD LE1/YR: CPT | Performed by: INTERNAL MEDICINE

## 2022-11-04 PROCEDURE — 1090F PRES/ABSN URINE INCON ASSESS: CPT | Performed by: INTERNAL MEDICINE

## 2022-11-04 PROCEDURE — G8417 CALC BMI ABV UP PARAM F/U: HCPCS | Performed by: INTERNAL MEDICINE

## 2022-11-04 PROCEDURE — 90694 VACC AIIV4 NO PRSRV 0.5ML IM: CPT | Performed by: INTERNAL MEDICINE

## 2022-11-04 PROCEDURE — G0463 HOSPITAL OUTPT CLINIC VISIT: HCPCS | Performed by: INTERNAL MEDICINE

## 2022-11-04 PROCEDURE — G9717 DOC PT DX DEP/BP F/U NT REQ: HCPCS | Performed by: INTERNAL MEDICINE

## 2022-11-04 PROCEDURE — 99214 OFFICE O/P EST MOD 30 MIN: CPT | Performed by: INTERNAL MEDICINE

## 2022-11-04 PROCEDURE — G8536 NO DOC ELDER MAL SCRN: HCPCS | Performed by: INTERNAL MEDICINE

## 2022-11-04 RX ORDER — NAPROXEN SOD/DIPHENHYDRAMINE 220MG-25MG
TABLET ORAL
COMMUNITY

## 2022-11-04 NOTE — PATIENT INSTRUCTIONS
Vaccine Information Statement    Influenza (Flu) Vaccine (Inactivated or Recombinant): What You Need to Know    Many vaccine information statements are available in Amharic and other languages. See www.immunize.org/vis. Hojas de información sobre vacunas están disponibles en español y en muchos otros idiomas. Visite www.immunize.org/vis. 1. Why get vaccinated? Influenza vaccine can prevent influenza (flu). Flu is a contagious disease that spreads around the [facility] every year, usually between October and May. Anyone can get the flu, but it is more dangerous for some people. Infants and young children, people 72 years and older, pregnant people, and people with certain health conditions or a weakened immune system are at greatest risk of flu complications. Pneumonia, bronchitis, sinus infections, and ear infections are examples of flu-related complications. If you have a medical condition, such as heart disease, cancer, or diabetes, flu can make it worse. Flu can cause fever and chills, sore throat, muscle aches, fatigue, cough, headache, and runny or stuffy nose. Some people may have vomiting and diarrhea, though this is more common in children than adults. In an average year, thousands of people in the [facility] die from flu, and many more are hospitalized. Flu vaccine prevents millions of illnesses and flu-related visits to the doctor each year. 2. Influenza vaccines     CDC recommends everyone 6 months and older get vaccinated every flu season. Children 6 months through 6years of age may need 2 doses during a single flu season. Everyone else needs only 1 dose each flu season. It takes about 2 weeks for protection to develop after vaccination. There are many flu viruses, and they are always changing. Each year a new flu vaccine is made to protect against the influenza viruses believed to be likely to cause disease in the upcoming flu season.  Even when the vaccine doesnt exactly match these viruses, it may still provide some protection. Influenza vaccine does not cause flu. Influenza vaccine may be given at the same time as other vaccines. 3. Talk with your health care provider    Tell your vaccination provider if the person getting the vaccine:  Has had an allergic reaction after a previous dose of influenza vaccine, or has any severe, life-threatening allergies   Has ever had Guillain-Barré Syndrome (also called GBS)    In some cases, your health care provider may decide to postpone influenza vaccination until a future visit. Influenza vaccine can be administered at any time during pregnancy. People who are or will be pregnant during influenza season should receive inactivated influenza vaccine. People with minor illnesses, such as a cold, may be vaccinated. People who are moderately or severely ill should usually wait until they recover before getting influenza vaccine. Your health care provider can give you more information. 4. Risks of a vaccine reaction    Soreness, redness, and swelling where the shot is given, fever, muscle aches, and headache can happen after influenza vaccination. There may be a very small increased risk of Guillain-Barré Syndrome (GBS) after inactivated influenza vaccine (the flu shot). Rayna Mccord children who get the flu shot along with pneumococcal vaccine (PCV13) and/or DTaP vaccine at the same time might be slightly more likely to have a seizure caused by fever. Tell your health care provider if a child who is getting flu vaccine has ever had a seizure. People sometimes faint after medical procedures, including vaccination. Tell your provider if you feel dizzy or have vision changes or ringing in the ears. As with any medicine, there is a very remote chance of a vaccine causing a severe allergic reaction, other serious injury, or death. 5. What if there is a serious problem?     An allergic reaction could occur after the vaccinated person leaves the clinic. If you see signs of a severe allergic reaction (hives, swelling of the face and throat, difficulty breathing, a fast heartbeat, dizziness, or weakness), call 9-1-1 and get the person to the nearest hospital.    For other signs that concern you, call your health care provider. Adverse reactions should be reported to the Vaccine Adverse Event Reporting System (VAERS). Your health care provider will usually file this report, or you can do it yourself. Visit the VAERS website at www.vaers. [facility].gov or call 7-548.559.4859. VAERS is only for reporting reactions, and VAERS staff members do not give medical advice. 6. The National Vaccine Injury Compensation Program    The [facility] Vaccine Injury Compensation Program (VICP) is a federal program that was created to compensate people who may have been injured by certain vaccines. Claims regarding alleged injury or death due to vaccination have a time limit for filing, which may be as short as two years. Visit the VICP website at www.[facility].gov/vaccinecompensation or call 9-739.732.9227 to learn about the program and about filing a claim. 7. How can I learn more? Ask your health care provider. Call your local or state health department. Visit the website of the Food and Drug Administration (FDA) for vaccine package inserts and additional information at www.fda.gov/vaccines-blood-biologics/vaccines. Contact the Centers for Disease Control and Prevention (CDC): Call 8-420.918.7529 (1-800-CDC-INFO) or  Visit CDCs influenza website at www.cdc.gov/flu. Vaccine Information Statement   Inactivated Influenza Vaccine   8/6/2021  [address] [id number]   Department of Health and Human Services  Centers for Disease Control and Prevention    Office Use Only

## 2022-11-04 NOTE — PROGRESS NOTES
HPI:  Ronnie Stevenson is a 78y.o. year old female who is here for a follow-up visit. She was recently seen in the emergency room in [address] [place] [address] for upper respiratory symptoms and fever. She was found to have COVID. Interestingly her renal function numbers were abnormal.  She has been feeling better. She does continue to have some nasal congestion and cough productive of little sputum. Arthritis is under good control and she stopped [address] recently. No chest pains or shortness of breath. No vomiting. No change in bowel habits currently. Past Medical History:   Diagnosis Date    Cataracts, bilateral     Chronic pain     LOWER BACK    Colon cancer ([facility] 2015    colectomy with ostomy, then reversed by DR ELVIN Burroughs    Colon polyps     Depression     did not do well on duloxetine    Gastroparesis     GERD (gastroesophageal reflux disease)     Glaucoma     Heart palpitations     Interstitial lung disease ([facility]     followed by Dr Boni Gaytan    MRSA infection 05/08/2019    [facility]     DR Aga Oneal    Restless leg syndrome     Spinal stenosis 05/08/2011    Unspecified adverse effect of anesthesia     HEADACHE after colonoscopy    UTI (lower urinary tract infection)        Past Surgical History:   Procedure Laterality Date    HX APPENDECTOMY  2015    HX CATARACT REMOVAL Right 06/2020    [facility] @ [facility]    HX COLONOSCOPY  09/2014    HX COLOSTOMY  2016    REVERSAL    HX COLOSTOMY TAKE DOWN      HX DILATION AND CURETTAGE      HX KNEE ARTHROSCOPY Left 1979    MENISCUS REPAIR    HX KNEE ARTHROSCOPY Left     HX KNEE ARTHROSCOPY Right 2005    HX KNEE REPLACEMENT Left 2005    HX KNEE REPLACEMENT Right 05/09/2018    HX LAP CHOLECYSTECTOMY  05/2005    HX LUMBAR LAMINECTOMY  2011    HX LUMBAR LAMINECTOMY  91/0445    complicated by MRSA infection    HX ORTHOPAEDIC Right     HAND FX    HX TOTAL COLECTOMY  2015    WITH COLOSTOMY AND THEN REOMVAL OF COLOSTOMY    HX TUBAL LIGATION  1979    HX WRIST FRACTURE TX Left     IR INJ/CATH THER SUBST LUM/SAC W IMG  06/01/2020    MT TOTAL HIP ARTHROPLASTY Right 03/2012       Prior to Admission medications    Medication Sig Start Date End Date Taking? Authorizing Provider   naproxen-diphenhydramine (Aleve PM) 220-25 mg tab Take  by mouth. Yes Provider, Historical   tolterodine ER (DETROL LA) 4 mg ER capsule TAKE ONE CAPSULE BY MOUTH DAILY  Patient taking differently: as needed. 10/24/22  Yes Maricarmen Rios MD   DULoxetine (CYMBALTA) 30 mg capsule Take 1 Capsule by mouth daily. 9/6/22  Yes Maricarmen Rios MD   ibuprofen (MOTRIN) 400 mg tablet Take 400 mg by mouth every six (6) hours as needed for Pain. Indications: pain   Yes Provider, Historical   zolpidem tartrate (AMBIEN PO) Take 5 mg by mouth nightly as needed. Yes Provider, Historical   loperamide (IMODIUM) 2 mg capsule Take 1-2 Capsules by mouth four (4) times daily as needed for Diarrhea. 6/20/22  Yes Maricarmen Rios MD   atenoloL (TENORMIN) 25 mg tablet TAKE ONE TABLET BY MOUTH DAILY  Patient taking differently: Take 25 mg by mouth daily. 5/17/22  Yes Greg Darby III, MD   certolizumab pegoL (Cimzia) 400 mg/2 mL (200 mg/mL x 2) sykt injection 400 mg by SubCUTAneous route every thirty (30) days. Indications: rheumatoid arthritis 4/15/22  Yes Maricarmen Rios MD   pramipexole (MIRAPEX) 1 mg tablet Take 1 mg by mouth nightly. Yes Provider, Historical   acetaminophen (TYLENOL) 650 mg TbER Take 650 mg by mouth every eight (8) hours as needed. Yes Provider, Historical   calcium carb/D3/magnesium/zinc (HOLDEN MAG ZINC PLUS D3 PO) Take 1 Tablet by mouth daily. Yes Provider, Historical   BIOTIN PO Take 5,000 mg by mouth daily. Yes Provider, Historical   acetaminophen (TYLENOL) 500 mg tablet Take 1,000 mg by mouth every six (6) hours as needed for Pain. 11/4/22  Provider, Historical   leflunomide (ARAVA) 20 mg tablet Take 1 Tablet by mouth daily.   Patient not taking: Reported on 11/4/2022 4/15/22 11/4/22  Yashira Mendoza III, MD   SUMAtriptan (IMITREX) 6 mg/0.5 mL injection 6 mg by SubCUTAneous route daily as needed. Patient not taking: Reported on 2022  Provider, Historical       Social History     Socioeconomic History    Marital status:      Spouse name: Not on file    Number of children: Not on file    Years of education: Not on file    Highest education level: Not on file   Occupational History    Not on file   Tobacco Use    Smoking status: Former     Packs/day: 1.00     Years: 20.00     Pack years: 20.00     Types: Cigarettes     Quit date: 3/7/1978     Years since quittin.6    Smokeless tobacco: Never   Vaping Use    Vaping Use: Never used   Substance and Sexual Activity    Alcohol use: Not Currently     Comment: very rare    Drug use: No    Sexual activity: Not Currently   Other Topics Concern    Not on file   Social History Narrative    .   REMY to [employer]: Low Risk     Difficulty of Paying Living Expenses: Not hard at all   Food Insecurity: No Food Insecurity    Worried About Running Out of Food in the Last Year: Never true    Ran Out of Food in the Last Year: Never true   Transportation Needs: Not on file   Physical Activity: Not on file   Stress: Not on file   Social Connections: Not on file   Intimate Partner Violence: Not on file   Housing Stability: Not on file          ROS  Per HPI    Visit Vitals  /68   Pulse (!) 54   Temp 97.3 °F (36.3 °C) (Temporal)   Resp 16   Ht 4' 11\" (1.499 m)   Wt 171 lb (77.6 kg)   SpO2 96%   BMI 34.54 kg/m²         Physical Exam   Physical Examination: General appearance - alert, well appearing, and in no distress  Chest - clear to auscultation, no wheezes, rales or rhonchi, symmetric air entry  Heart - normal rate, regular rhythm, normal S1, S2, no murmurs, rubs, clicks or gallops  Abdomen - soft, nontender, nondistended, no masses or organomegaly  Extremities - peripheral pulses normal, no pedal edema, no clubbing or cyanosis      Assessment/Plan:  Diagnoses and all orders for this visit:    1. Dehydration-related to COVID. We will repeat renal function to be sure it has improved. -     METABOLIC PANEL, BASIC; Future    2. Needs flu shot  -     INFLUENZA, FLUAD, (AGE 65 Y+), IM, PF, 0.5 ML    3. Spinal stenosis of lumbar region at multiple levels-stable on Tylenol. 4. Rheumatoid arthritis involving multiple sites with positive rheumatoid factor (HCC)-stable.-Well-controlled    5. Dysthymia-well-controlled on Cymbalta. We will continue it at its current dose. 6. COVID-19-resolved. -     METABOLIC PANEL, BASIC; Future       Follow-up and Dispositions    Return in about 6 months (around 5/4/2023). Advised her to call back or return to office if symptoms worsen/change/persist.  Discussed expected course/resolution/complications of diagnosis in detail with patient. Medication risks/benefits/costs/interactions/alternatives discussed with patient. She was given an after visit summary which includes diagnoses, current medications, & vitals. She expressed understanding with the diagnosis and plan.

## 2022-11-05 LAB
ANION GAP SERPL CALC-SCNC: 7 MMOL/L (ref 5–15)
BUN SERPL-MCNC: 13 MG/DL (ref 6–20)
BUN/CREAT SERPL: 18 (ref 12–20)
CALCIUM SERPL-MCNC: 9 MG/DL (ref 8.5–10.1)
CHLORIDE SERPL-SCNC: 106 MMOL/L (ref 97–108)
CO2 SERPL-SCNC: 27 MMOL/L (ref 21–32)
CREAT SERPL-MCNC: 0.72 MG/DL (ref 0.55–1.02)
GLUCOSE SERPL-MCNC: 97 MG/DL (ref 65–100)
POTASSIUM SERPL-SCNC: 4 MMOL/L (ref 3.5–5.1)
SODIUM SERPL-SCNC: 140 MMOL/L (ref 136–145)

## 2022-11-07 ENCOUNTER — TELEPHONE (OUTPATIENT)
Dept: INTERNAL MEDICINE CLINIC | Age: 82
End: 2022-11-07

## 2022-11-15 ENCOUNTER — TRANSCRIBE ORDER (OUTPATIENT)
Dept: SCHEDULING | Age: 82
End: 2022-11-15

## 2022-11-15 DIAGNOSIS — J84.9 ILD (INTERSTITIAL LUNG DISEASE) (HCC): Primary | ICD-10-CM

## 2022-12-22 DIAGNOSIS — F34.1 DYSTHYMIA: ICD-10-CM

## 2022-12-22 RX ORDER — DULOXETIN HYDROCHLORIDE 30 MG/1
CAPSULE, DELAYED RELEASE ORAL
Qty: 90 CAPSULE | Refills: 0 | Status: SHIPPED | OUTPATIENT
Start: 2022-12-22

## 2022-12-23 RX ORDER — ATENOLOL 25 MG/1
TABLET ORAL
Qty: 90 TABLET | Refills: 1 | Status: SHIPPED | OUTPATIENT
Start: 2022-12-23

## 2022-12-28 RX ORDER — PRAMIPEXOLE DIHYDROCHLORIDE 1 MG/1
1 TABLET ORAL
Qty: 90 TABLET | Refills: 1 | Status: SHIPPED | OUTPATIENT
Start: 2022-12-28

## 2022-12-28 NOTE — TELEPHONE ENCOUNTER
Patient does not want to use [facility] any longer -- it is too far away. Please send this refill for pramipexole 1mg to [facility] in [facility].

## 2023-01-10 RX ORDER — TOLTERODINE 4 MG/1
4 CAPSULE, EXTENDED RELEASE ORAL DAILY
Qty: 30 CAPSULE | Refills: 1 | Status: SHIPPED | OUTPATIENT
Start: 2023-01-10

## 2023-01-10 NOTE — TELEPHONE ENCOUNTER
Chief Complaint   Patient presents with    Medication Refill     Last Appointment with Dr. Adina Smith:  11/4/2022  No future appointments.

## 2023-01-10 NOTE — TELEPHONE ENCOUNTER
Requested Prescriptions     Pending Prescriptions Disp Refills    tolterodine ER (DETROL LA) 4 mg ER capsule 30 Capsule 1     Sig: Take 1 Capsule by mouth daily.

## 2023-03-30 ENCOUNTER — TELEPHONE (OUTPATIENT)
Dept: INTERNAL MEDICINE CLINIC | Age: 83
End: 2023-03-30

## 2023-03-30 RX ORDER — ATENOLOL 25 MG/1
25 TABLET ORAL DAILY
Qty: 90 TABLET | Refills: 1 | Status: SHIPPED | OUTPATIENT
Start: 2023-03-30

## 2023-03-30 NOTE — TELEPHONE ENCOUNTER
----- Message from Faith Ochoaletty sent at 3/30/2023  3:00 PM EDT -----  Subject: Refill Request    QUESTIONS  Name of Medication? atenoloL (TENORMIN) 25 mg tablet  Patient-reported dosage and instructions? 25 mg; once a day  How many days do you have left? 7  [facility] [address] [facility]  Pharmacy phone number (if available)? [phone]  Additional Information for Provider? 90 day supply  ---------------------------------------------------------------------------  --------------  CALL BACK INFO  What is the best way for the office to contact you? OK to leave message on   voicemail  Preferred Call Back Phone Number? [phone]  ---------------------------------------------------------------------------  --------------  SCRIPT ANSWERS  Relationship to Patient?  Self

## 2023-03-30 NOTE — TELEPHONE ENCOUNTER
Chief Complaint   Patient presents with    Medication Refill     Last Appointment with Dr. Mauricio Muñoz:  11/4/2022  No future appointments.

## 2023-04-12 LAB — CREATININE, EXTERNAL: 0.83

## 2023-04-23 DIAGNOSIS — J84.9 ILD (INTERSTITIAL LUNG DISEASE) (HCC): Primary | ICD-10-CM

## 2023-05-05 ENCOUNTER — TRANSCRIBE ORDER (OUTPATIENT)
Dept: SCHEDULING | Age: 83
End: 2023-05-05

## 2023-05-05 DIAGNOSIS — J84.9 ILD (INTERSTITIAL LUNG DISEASE) (HCC): Primary | ICD-10-CM

## 2023-05-25 RX ORDER — DULOXETIN HYDROCHLORIDE 30 MG/1
CAPSULE, DELAYED RELEASE ORAL
Qty: 90 CAPSULE | Refills: 0 | Status: SHIPPED | OUTPATIENT
Start: 2023-05-25

## 2023-06-08 ENCOUNTER — OFFICE VISIT (OUTPATIENT)
Age: 83
End: 2023-06-08
Payer: MEDICARE

## 2023-06-08 VITALS
SYSTOLIC BLOOD PRESSURE: 136 MMHG | WEIGHT: 171.2 LBS | DIASTOLIC BLOOD PRESSURE: 76 MMHG | HEIGHT: 59 IN | BODY MASS INDEX: 34.51 KG/M2 | OXYGEN SATURATION: 94 % | HEART RATE: 57 BPM | RESPIRATION RATE: 18 BRPM | TEMPERATURE: 97.3 F

## 2023-06-08 DIAGNOSIS — C18.9 MALIGNANT NEOPLASM OF COLON, UNSPECIFIED PART OF COLON (HCC): ICD-10-CM

## 2023-06-08 DIAGNOSIS — R15.9 FULL INCONTINENCE OF FECES: ICD-10-CM

## 2023-06-08 DIAGNOSIS — F33.0 MAJOR DEPRESSIVE DISORDER, RECURRENT, MILD (HCC): ICD-10-CM

## 2023-06-08 DIAGNOSIS — R32 URINARY INCONTINENCE, UNSPECIFIED TYPE: ICD-10-CM

## 2023-06-08 DIAGNOSIS — Z00.00 MEDICARE ANNUAL WELLNESS VISIT, SUBSEQUENT: Primary | ICD-10-CM

## 2023-06-08 DIAGNOSIS — M05.79 RHEUMATOID ARTHRITIS WITH RHEUMATOID FACTOR OF MULTIPLE SITES WITHOUT ORGAN OR SYSTEMS INVOLVEMENT (HCC): ICD-10-CM

## 2023-06-08 PROCEDURE — 1123F ACP DISCUSS/DSCN MKR DOCD: CPT | Performed by: INTERNAL MEDICINE

## 2023-06-08 PROCEDURE — 1090F PRES/ABSN URINE INCON ASSESS: CPT | Performed by: INTERNAL MEDICINE

## 2023-06-08 PROCEDURE — 99214 OFFICE O/P EST MOD 30 MIN: CPT | Performed by: INTERNAL MEDICINE

## 2023-06-08 PROCEDURE — G8400 PT W/DXA NO RESULTS DOC: HCPCS | Performed by: INTERNAL MEDICINE

## 2023-06-08 PROCEDURE — 1036F TOBACCO NON-USER: CPT | Performed by: INTERNAL MEDICINE

## 2023-06-08 PROCEDURE — 0509F URINE INCON PLAN DOCD: CPT | Performed by: INTERNAL MEDICINE

## 2023-06-08 PROCEDURE — G8417 CALC BMI ABV UP PARAM F/U: HCPCS | Performed by: INTERNAL MEDICINE

## 2023-06-08 PROCEDURE — G8427 DOCREV CUR MEDS BY ELIG CLIN: HCPCS | Performed by: INTERNAL MEDICINE

## 2023-06-08 PROCEDURE — G0439 PPPS, SUBSEQ VISIT: HCPCS | Performed by: INTERNAL MEDICINE

## 2023-06-08 RX ORDER — TOLTERODINE 4 MG/1
4 CAPSULE, EXTENDED RELEASE ORAL DAILY
Qty: 90 CAPSULE | Refills: 3 | Status: SHIPPED | OUTPATIENT
Start: 2023-06-08

## 2023-06-08 RX ORDER — LEFLUNOMIDE 20 MG/1
20 TABLET ORAL DAILY
COMMUNITY
Start: 2023-04-10

## 2023-06-08 RX ORDER — PREDNISONE 10 MG/1
10 TABLET ORAL AS NEEDED
COMMUNITY

## 2023-06-08 SDOH — ECONOMIC STABILITY: HOUSING INSECURITY
IN THE LAST 12 MONTHS, WAS THERE A TIME WHEN YOU DID NOT HAVE A STEADY PLACE TO SLEEP OR SLEPT IN A SHELTER (INCLUDING NOW)?: NO

## 2023-06-08 SDOH — ECONOMIC STABILITY: FOOD INSECURITY: WITHIN THE PAST 12 MONTHS, THE FOOD YOU BOUGHT JUST DIDN'T LAST AND YOU DIDN'T HAVE MONEY TO GET MORE.: NEVER TRUE

## 2023-06-08 SDOH — ECONOMIC STABILITY: INCOME INSECURITY: HOW HARD IS IT FOR YOU TO PAY FOR THE VERY BASICS LIKE FOOD, HOUSING, MEDICAL CARE, AND HEATING?: NOT HARD AT ALL

## 2023-06-08 SDOH — ECONOMIC STABILITY: FOOD INSECURITY: WITHIN THE PAST 12 MONTHS, YOU WORRIED THAT YOUR FOOD WOULD RUN OUT BEFORE YOU GOT MONEY TO BUY MORE.: NEVER TRUE

## 2023-06-08 ASSESSMENT — LIFESTYLE VARIABLES
HOW MANY STANDARD DRINKS CONTAINING ALCOHOL DO YOU HAVE ON A TYPICAL DAY: 1 OR 2
HOW OFTEN DO YOU HAVE A DRINK CONTAINING ALCOHOL: MONTHLY OR LESS

## 2023-06-08 ASSESSMENT — PATIENT HEALTH QUESTIONNAIRE - PHQ9
5. POOR APPETITE OR OVEREATING: 2
SUM OF ALL RESPONSES TO PHQ9 QUESTIONS 1 & 2: 1
1. LITTLE INTEREST OR PLEASURE IN DOING THINGS: 0
SUM OF ALL RESPONSES TO PHQ QUESTIONS 1-9: 8
10. IF YOU CHECKED OFF ANY PROBLEMS, HOW DIFFICULT HAVE THESE PROBLEMS MADE IT FOR YOU TO DO YOUR WORK, TAKE CARE OF THINGS AT HOME, OR GET ALONG WITH OTHER PEOPLE: 1
2. FEELING DOWN, DEPRESSED OR HOPELESS: 1
9. THOUGHTS THAT YOU WOULD BE BETTER OFF DEAD, OR OF HURTING YOURSELF: 0
7. TROUBLE CONCENTRATING ON THINGS, SUCH AS READING THE NEWSPAPER OR WATCHING TELEVISION: 0
SUM OF ALL RESPONSES TO PHQ QUESTIONS 1-9: 8
4. FEELING TIRED OR HAVING LITTLE ENERGY: 2
3. TROUBLE FALLING OR STAYING ASLEEP: 2
SUM OF ALL RESPONSES TO PHQ QUESTIONS 1-9: 8
8. MOVING OR SPEAKING SO SLOWLY THAT OTHER PEOPLE COULD HAVE NOTICED. OR THE OPPOSITE, BEING SO FIGETY OR RESTLESS THAT YOU HAVE BEEN MOVING AROUND A LOT MORE THAN USUAL: 0
6. FEELING BAD ABOUT YOURSELF - OR THAT YOU ARE A FAILURE OR HAVE LET YOURSELF OR YOUR FAMILY DOWN: 1
SUM OF ALL RESPONSES TO PHQ QUESTIONS 1-9: 8

## 2023-06-08 NOTE — PROGRESS NOTES
Medicare Annual Wellness Visit    Sharlene Newman is here for Medicare AWV    Assessment & Plan   Medicare annual wellness visit, subsequent  Rheumatoid arthritis with rheumatoid factor of multiple sites without organ or systems involvement (HCC)-encouraged her to retry Actemra and let me know how the symptoms go. Malignant neoplasm of colon, unspecified part of colon (HCC)-stable. Major depressive disorder, recurrent, mild-stable on Cymbalta. Full incontinence of feces  Urinary incontinence, unspecified type-continue Ditropan as needed. Recommendations for Preventive Services Due: see orders and patient instructions/AVS.  Recommended screening schedule for the next 5-10 years is provided to the patient in written form: see Patient Instructions/AVS.   Discussed a repeat mammogram and she wishes to defer that for now. She is not interested in any further mammograms. Return in 1 year (on 6/8/2024). Subjective   Presents for her Medicare wellness as well as a follow-up visit. She recently has received Actemra for her rheumatoid arthritis. The first infusion went very well and her pain all went away. The second infusion was associated with nausea and upset stomach. She has refused since then to get another infusion. Her recent labs done by her rheumatologist showed a blood sugar of 117 and a sed rate of 60. She does feel that her improvement in her joints has continued somewhat. She continues to have issues with incontinence of urine. This is reasonably controlled on using current meds for that. Patient's complete Health Risk Assessment and screening values have been reviewed and are found in Flowsheets. The following problems were reviewed today and where indicated follow up appointments were made and/or referrals ordered.     Positive Risk Factor Screenings with Interventions:        Depression:  PHQ-2 Score: 1  PHQ-9 Total Score: 8    Interpretation:   1-4 = minimal  5-9 = mild  10-14 =

## 2023-06-08 NOTE — PATIENT INSTRUCTIONS
diet, exercise more, and quit smoking. These steps really work to lower your chance of heart disease. Follow-up care is a key part of your treatment and safety. Be sure to make and go to all appointments, and call your doctor if you are having problems. It's also a good idea to know your test results and keep a list of the medicines you take. How can you care for yourself at home? Diet    Use less salt when you cook and eat. This helps lower your blood pressure. Taste food before salting. Add only a little salt when you think you need it. With time, your taste buds will adjust to less salt.     Eat fewer snack items, fast foods, canned soups, and other high-salt, high-fat, processed foods.     Read food labels and try to avoid saturated and trans fats. They increase your risk of heart disease by raising cholesterol levels.     Limit the amount of solid fat-butter, margarine, and shortening-you eat. Use olive, peanut, or canola oil when you cook. Bake, broil, and steam foods instead of frying them.     Eat a variety of fruit and vegetables every day. Dark green, deep orange, red, or yellow fruits and vegetables are especially good for you. Examples include spinach, carrots, peaches, and berries.     Foods high in fiber can reduce your cholesterol and provide important vitamins and minerals. High-fiber foods include whole-grain cereals and breads, oatmeal, beans, brown rice, citrus fruits, and apples.     Eat lean proteins. Heart-healthy proteins include seafood, lean meats and poultry, eggs, beans, peas, nuts, seeds, and soy products.     Limit drinks and foods with added sugar. These include candy, desserts, and soda pop. Lifestyle changes    If your doctor recommends it, get more exercise. Walking is a good choice. Bit by bit, increase the amount you walk every day. Try for at least 30 minutes on most days of the week. You also may want to swim, bike, or do other activities.     Do not smoke.  If you need help

## 2023-06-17 LAB — CREATININE, EXTERNAL: 0.75

## 2023-07-24 ENCOUNTER — HOSPITAL ENCOUNTER (OUTPATIENT)
Facility: HOSPITAL | Age: 83
Discharge: HOME | End: 2023-07-27
Payer: MEDICARE

## 2023-07-24 DIAGNOSIS — J84.9 ILD (INTERSTITIAL LUNG DISEASE) (HCC): ICD-10-CM

## 2023-07-24 PROCEDURE — 71250 CT THORAX DX C-: CPT

## 2023-08-13 RX ORDER — PRAMIPEXOLE DIHYDROCHLORIDE 1 MG/1
TABLET ORAL
Qty: 90 TABLET | Refills: 0 | Status: SHIPPED | OUTPATIENT
Start: 2023-08-13

## 2023-08-21 RX ORDER — DULOXETIN HYDROCHLORIDE 30 MG/1
CAPSULE, DELAYED RELEASE ORAL
Qty: 90 CAPSULE | Refills: 0 | Status: SHIPPED | OUTPATIENT
Start: 2023-08-21

## 2023-10-02 RX ORDER — ATENOLOL 25 MG/1
25 TABLET ORAL DAILY
Qty: 90 TABLET | Refills: 0 | Status: SHIPPED | OUTPATIENT
Start: 2023-10-02

## 2023-11-02 ENCOUNTER — NURSE ONLY (OUTPATIENT)
Age: 83
End: 2023-11-02
Payer: MEDICARE

## 2023-11-02 DIAGNOSIS — Z23 NEED FOR INFLUENZA VACCINATION: Primary | ICD-10-CM

## 2023-11-02 PROCEDURE — 90694 VACC AIIV4 NO PRSRV 0.5ML IM: CPT

## 2023-11-21 RX ORDER — DULOXETIN HYDROCHLORIDE 30 MG/1
CAPSULE, DELAYED RELEASE ORAL
Qty: 90 CAPSULE | Refills: 0 | Status: SHIPPED | OUTPATIENT
Start: 2023-11-21

## 2023-11-21 RX ORDER — PRAMIPEXOLE DIHYDROCHLORIDE 1 MG/1
TABLET ORAL
Qty: 90 TABLET | Refills: 0 | Status: SHIPPED | OUTPATIENT
Start: 2023-11-21

## 2024-01-07 RX ORDER — ATENOLOL 25 MG/1
25 TABLET ORAL DAILY
Qty: 90 TABLET | Refills: 0 | Status: SHIPPED | OUTPATIENT
Start: 2024-01-07

## 2024-01-12 ENCOUNTER — TELEPHONE (OUTPATIENT)
Age: 84
End: 2024-01-12

## 2024-01-12 NOTE — TELEPHONE ENCOUNTER
Verified patient identity with two identifiers. Spoke with patient by phone.  Pt states for several days she has felt weak, SOB/GARVIN, swelling of bilateral feet and hands.  Pt has h/o RA but states this is not normal   She will go the [facility] ER near her in [place], advised she needs to be seen today.  Pt hesitant to go today because she is so tired and needs to get her  set up for breakfast (he is in a WC). Advised she needs to be seen today, she can call 911, she states she will also call her son for assistance. Pt states she will call me back with her plan.

## 2024-02-18 RX ORDER — PRAMIPEXOLE DIHYDROCHLORIDE 1 MG/1
TABLET ORAL
Qty: 90 TABLET | Refills: 0 | Status: SHIPPED | OUTPATIENT
Start: 2024-02-18

## 2024-03-11 ENCOUNTER — OFFICE VISIT (OUTPATIENT)
Age: 84
End: 2024-03-11
Payer: MEDICARE

## 2024-03-11 VITALS
OXYGEN SATURATION: 93 % | TEMPERATURE: 98 F | WEIGHT: 189.5 LBS | SYSTOLIC BLOOD PRESSURE: 138 MMHG | HEIGHT: 59 IN | DIASTOLIC BLOOD PRESSURE: 78 MMHG | HEART RATE: 61 BPM | BODY MASS INDEX: 38.2 KG/M2 | RESPIRATION RATE: 15 BRPM

## 2024-03-11 DIAGNOSIS — C18.9 MALIGNANT NEOPLASM OF COLON, UNSPECIFIED PART OF COLON (HCC): ICD-10-CM

## 2024-03-11 DIAGNOSIS — F33.0 MAJOR DEPRESSIVE DISORDER, RECURRENT, MILD (HCC): Primary | ICD-10-CM

## 2024-03-11 DIAGNOSIS — E66.01 SEVERE OBESITY (BMI 35.0-39.9) WITH COMORBIDITY (HCC): ICD-10-CM

## 2024-03-11 DIAGNOSIS — M05.79 RHEUMATOID ARTHRITIS WITH RHEUMATOID FACTOR OF MULTIPLE SITES WITHOUT ORGAN OR SYSTEMS INVOLVEMENT (HCC): ICD-10-CM

## 2024-03-11 PROCEDURE — 1123F ACP DISCUSS/DSCN MKR DOCD: CPT | Performed by: INTERNAL MEDICINE

## 2024-03-11 PROCEDURE — G8400 PT W/DXA NO RESULTS DOC: HCPCS | Performed by: INTERNAL MEDICINE

## 2024-03-11 PROCEDURE — 99214 OFFICE O/P EST MOD 30 MIN: CPT | Performed by: INTERNAL MEDICINE

## 2024-03-11 PROCEDURE — G8417 CALC BMI ABV UP PARAM F/U: HCPCS | Performed by: INTERNAL MEDICINE

## 2024-03-11 PROCEDURE — G8427 DOCREV CUR MEDS BY ELIG CLIN: HCPCS | Performed by: INTERNAL MEDICINE

## 2024-03-11 PROCEDURE — 1090F PRES/ABSN URINE INCON ASSESS: CPT | Performed by: INTERNAL MEDICINE

## 2024-03-11 PROCEDURE — G8484 FLU IMMUNIZE NO ADMIN: HCPCS | Performed by: INTERNAL MEDICINE

## 2024-03-11 PROCEDURE — 1036F TOBACCO NON-USER: CPT | Performed by: INTERNAL MEDICINE

## 2024-03-11 RX ORDER — IBUPROFEN 200 MG
200 TABLET ORAL EVERY 6 HOURS PRN
COMMUNITY

## 2024-03-11 RX ORDER — ALBUTEROL SULFATE 90 UG/1
2 AEROSOL, METERED RESPIRATORY (INHALATION) EVERY 6 HOURS PRN
Qty: 18 G | Refills: 3 | Status: SHIPPED | OUTPATIENT
Start: 2024-03-11

## 2024-03-11 RX ORDER — BUPROPION HYDROCHLORIDE 150 MG/1
150 TABLET ORAL EVERY MORNING
Qty: 30 TABLET | Refills: 0 | Status: SHIPPED | OUTPATIENT
Start: 2024-03-11

## 2024-03-11 ASSESSMENT — PATIENT HEALTH QUESTIONNAIRE - PHQ9
SUM OF ALL RESPONSES TO PHQ QUESTIONS 1-9: 22
SUM OF ALL RESPONSES TO PHQ QUESTIONS 1-9: 22
4. FEELING TIRED OR HAVING LITTLE ENERGY: 3
6. FEELING BAD ABOUT YOURSELF - OR THAT YOU ARE A FAILURE OR HAVE LET YOURSELF OR YOUR FAMILY DOWN: 3
SUM OF ALL RESPONSES TO PHQ QUESTIONS 1-9: 22
SUM OF ALL RESPONSES TO PHQ9 QUESTIONS 1 & 2: 5
7. TROUBLE CONCENTRATING ON THINGS, SUCH AS READING THE NEWSPAPER OR WATCHING TELEVISION: 3
10. IF YOU CHECKED OFF ANY PROBLEMS, HOW DIFFICULT HAVE THESE PROBLEMS MADE IT FOR YOU TO DO YOUR WORK, TAKE CARE OF THINGS AT HOME, OR GET ALONG WITH OTHER PEOPLE: 2
3. TROUBLE FALLING OR STAYING ASLEEP: 3
SUM OF ALL RESPONSES TO PHQ QUESTIONS 1-9: 21
5. POOR APPETITE OR OVEREATING: 3
8. MOVING OR SPEAKING SO SLOWLY THAT OTHER PEOPLE COULD HAVE NOTICED. OR THE OPPOSITE, BEING SO FIGETY OR RESTLESS THAT YOU HAVE BEEN MOVING AROUND A LOT MORE THAN USUAL: 1
2. FEELING DOWN, DEPRESSED OR HOPELESS: 3
9. THOUGHTS THAT YOU WOULD BE BETTER OFF DEAD, OR OF HURTING YOURSELF: 1
1. LITTLE INTEREST OR PLEASURE IN DOING THINGS: 2

## 2024-03-11 NOTE — PROGRESS NOTES
HPI:  Jessie Nuñez is a 83 y.o. year old female who is here for a follow-up visit.  She stopped taking Cymbalta as she felt it made her feel numb.  She has now developed increasing issues with depression.  She does not want to get out of bed and wants to be withdrawn.  She is read about using Wellbutrin for that and wants to consider.  She denies any suicidality but does say she feels that she would be better off if she did not wake up.  Her pain is under reasonable control with current meds.  She is seeing rheumatology regularly.  Her blood pressure has been stable as well.  Her weight continues to be a struggle and she has gained more than 20 pounds.      Past Medical History:   Diagnosis Date    Cataracts, bilateral     Chronic pain     LOWER BACK    Colon cancer ([facility]) 2015    colectomy with ostomy, then reversed by DR JUANA Garner    Colon polyps     Depression     did not do well on duloxetine    Gastroparesis     GERD (gastroesophageal reflux disease)     Glaucoma     Heart palpitations     Interstitial lung disease ([facility])     followed by Dr Fidel Samuel    MRSA infection 05/08/2019    RA (rheumatoid arthritis) ([facility])     DR Cynthia Yeung    Restless leg syndrome     Spinal stenosis 05/08/2011    Unspecified adverse effect of anesthesia     HEADACHE after colonoscopy    UTI (lower urinary tract infection)        Past Surgical History:   Procedure Laterality Date    APPENDECTOMY  2015    CATARACT REMOVAL Right 06/2020    Anai @ [facility]    CHOLECYSTECTOMY, LAPAROSCOPIC  05/2005    COLONOSCOPY  09/2014    COLOSTOMY  2016    REVERSAL    COLOSTOMY CLOSURE      DILATION AND CURETTAGE OF UTERUS      IR INJ/CATH THER SUBST LUM/SAC W IMG  06/01/2020    KNEE ARTHROSCOPY Left 1979    MENISCUS REPAIR    KNEE ARTHROSCOPY Left     KNEE ARTHROSCOPY Right 2005    LUMBAR LAMINECTOMY  2011    LUMBAR LAMINECTOMY  04/2019    complicated by MRSA infection    ORTHOPEDIC SURGERY Right     HAND FX    TOTAL COLECTOMY  2015    WITH

## 2024-03-28 ENCOUNTER — TELEPHONE (OUTPATIENT)
Age: 84
End: 2024-03-28

## 2024-04-01 RX ORDER — ATENOLOL 25 MG/1
25 TABLET ORAL DAILY
Qty: 90 TABLET | Refills: 0 | Status: SHIPPED | OUTPATIENT
Start: 2024-04-01

## 2024-04-01 NOTE — TELEPHONE ENCOUNTER
Chief Complaint   Patient presents with    Medication Refill     Last Appointment with Dr. John Sosa:  3/11/2024   Future Appointments   Date Time Provider Department Center   4/22/2024  3:00 PM John Sosa MD [facility]

## 2024-05-20 RX ORDER — PRAMIPEXOLE DIHYDROCHLORIDE 1 MG/1
TABLET ORAL
Qty: 90 TABLET | Refills: 0 | Status: SHIPPED | OUTPATIENT
Start: 2024-05-20

## 2024-06-16 RX ORDER — DULOXETIN HYDROCHLORIDE 30 MG/1
CAPSULE, DELAYED RELEASE ORAL
Qty: 90 CAPSULE | Refills: 0 | Status: SHIPPED | OUTPATIENT
Start: 2024-06-16

## 2024-06-16 RX ORDER — ATENOLOL 25 MG/1
25 TABLET ORAL DAILY
Qty: 90 TABLET | Refills: 0 | Status: SHIPPED | OUTPATIENT
Start: 2024-06-16

## 2024-07-25 ENCOUNTER — OFFICE VISIT (OUTPATIENT)
Age: 84
End: 2024-07-25
Payer: MEDICARE

## 2024-07-25 VITALS
OXYGEN SATURATION: 95 % | SYSTOLIC BLOOD PRESSURE: 144 MMHG | HEART RATE: 63 BPM | BODY MASS INDEX: 38.02 KG/M2 | WEIGHT: 188.6 LBS | HEIGHT: 59 IN | DIASTOLIC BLOOD PRESSURE: 83 MMHG | TEMPERATURE: 98.1 F | RESPIRATION RATE: 15 BRPM

## 2024-07-25 DIAGNOSIS — Z91.81 AT HIGH RISK FOR FALLS: ICD-10-CM

## 2024-07-25 DIAGNOSIS — F33.0 MAJOR DEPRESSIVE DISORDER, RECURRENT, MILD (HCC): Primary | ICD-10-CM

## 2024-07-25 DIAGNOSIS — M05.79 RHEUMATOID ARTHRITIS WITH RHEUMATOID FACTOR OF MULTIPLE SITES WITHOUT ORGAN OR SYSTEMS INVOLVEMENT (HCC): ICD-10-CM

## 2024-07-25 DIAGNOSIS — I10 ESSENTIAL HYPERTENSION: ICD-10-CM

## 2024-07-25 PROCEDURE — 99214 OFFICE O/P EST MOD 30 MIN: CPT | Performed by: INTERNAL MEDICINE

## 2024-07-25 PROCEDURE — 1090F PRES/ABSN URINE INCON ASSESS: CPT | Performed by: INTERNAL MEDICINE

## 2024-07-25 PROCEDURE — 1036F TOBACCO NON-USER: CPT | Performed by: INTERNAL MEDICINE

## 2024-07-25 PROCEDURE — G8417 CALC BMI ABV UP PARAM F/U: HCPCS | Performed by: INTERNAL MEDICINE

## 2024-07-25 PROCEDURE — G8427 DOCREV CUR MEDS BY ELIG CLIN: HCPCS | Performed by: INTERNAL MEDICINE

## 2024-07-25 PROCEDURE — 1123F ACP DISCUSS/DSCN MKR DOCD: CPT | Performed by: INTERNAL MEDICINE

## 2024-07-25 RX ORDER — DULOXETIN HYDROCHLORIDE 60 MG/1
60 CAPSULE, DELAYED RELEASE ORAL DAILY
Qty: 90 CAPSULE | Refills: 1 | Status: SHIPPED | OUTPATIENT
Start: 2024-07-25

## 2024-07-25 RX ORDER — ATENOLOL 25 MG/1
25 TABLET ORAL 2 TIMES DAILY
Qty: 180 TABLET | Refills: 1 | Status: SHIPPED | OUTPATIENT
Start: 2024-07-25

## 2024-07-25 NOTE — PROGRESS NOTES
HPI:  Jessie Nuñez is a 84 y.o. year old female who is here for a follow-up visit.  Her blood pressures at home have been quite elevated at times.  At times she has had systolic readings of 170.  She is felt somewhat lightheaded and dizzy at times.  She has had 2 falls where she lost her balance.  She does feel that her depression is not well-controlled.  She did not tolerate the Wellbutrin well as she did not like how she felt with it.  No chest pains or shortness of breath.  She is rarely using albuterol now and it helps      Past Medical History:   Diagnosis Date    Cataracts, bilateral     Chronic pain     LOWER BACK    Colon cancer ([facility]) 2015    colectomy with ostomy, then reversed by DR JUANA Garner    Colon polyps     Depression     did not do well on duloxetine    Gastroparesis     GERD (gastroesophageal reflux disease)     Glaucoma     Heart palpitations     Interstitial lung disease ([facility])     followed by Dr Fidel Samuel    MRSA infection 05/08/2019    RA (rheumatoid arthritis) ([facility])     DR Cynthia Yueng    Restless leg syndrome     Spinal stenosis 05/08/2011    Unspecified adverse effect of anesthesia     HEADACHE after colonoscopy    UTI (lower urinary tract infection)        Past Surgical History:   Procedure Laterality Date    APPENDECTOMY  2015    CATARACT REMOVAL Right 06/2020    [facility] @ [facility]    CHOLECYSTECTOMY, LAPAROSCOPIC  05/2005    COLONOSCOPY  09/2014    COLOSTOMY  2016    REVERSAL    COLOSTOMY CLOSURE      DILATION AND CURETTAGE OF UTERUS      IR INJ/CATH THER SUBST LUM/SAC W IMG  06/01/2020    KNEE ARTHROSCOPY Left 1979    MENISCUS REPAIR    KNEE ARTHROSCOPY Left     KNEE ARTHROSCOPY Right 2005    LUMBAR LAMINECTOMY  2011    LUMBAR LAMINECTOMY  04/2019    complicated by MRSA infection    ORTHOPEDIC SURGERY Right     HAND FX    TOTAL COLECTOMY  2015    WITH COLOSTOMY AND THEN REOMVAL OF COLOSTOMY    TOTAL HIP ARTHROPLASTY Right 03/2012    TOTAL KNEE ARTHROPLASTY Left 2005    TOTAL KNEE

## 2024-08-26 ENCOUNTER — CARE COORDINATION (OUTPATIENT)
Dept: CARE COORDINATION | Age: 84
End: 2024-08-26

## 2024-08-26 NOTE — CARE COORDINATION
SECURE email notification sent to identified IDT members at   [facility]

## (undated) DEVICE — STERILE POLYISOPRENE POWDER-FREE SURGICAL GLOVES WITH EMOLLIENT COATING: Brand: PROTEXIS

## (undated) DEVICE — PREP SKN PREVAIL 40ML APPL --

## (undated) DEVICE — KENDALL SCD EXPRESS SLEEVES, KNEE LENGTH, MEDIUM: Brand: KENDALL SCD

## (undated) DEVICE — SUTURE ABSRB BRAID COAT UD OS-6 NO 1 27IN VCRL J535H

## (undated) DEVICE — BOWL BNE CEM MIX SPAT CURET SMARTMIX CTS

## (undated) DEVICE — 3M™ IOBAN™ 2 ANTIMICROBIAL INCISE DRAPE 6651EZ: Brand: IOBAN™ 2

## (undated) DEVICE — NDL SPNE QNCKE 18GX3.5IN LF --

## (undated) DEVICE — ZIP 8I SURGICAL SKIN CLOSURE DEVICE: Brand: ZIP 8I SURGICAL SKIN CLOSURE DEVICE

## (undated) DEVICE — DRAPE,EXTREMITY,89X128,STERILE: Brand: MEDLINE

## (undated) DEVICE — SUTURE ABSRB L30CM 2-0 VLT SPRL PDS + STRATAFIX SXPP1B410

## (undated) DEVICE — LAMINECTOMY RICHMOND-LF: Brand: MEDLINE INDUSTRIES, INC.

## (undated) DEVICE — PREP KIT PEEL PTCH POVIDONE IOD

## (undated) DEVICE — SOLUTION IV 250ML 0.9% SOD CHL CLR INJ FLX BG CONT PRT CLSR

## (undated) DEVICE — DEVICE TRNSF SPIK STL 2008S] MICROTEK MEDICAL INC]

## (undated) DEVICE — CONTAINER,SPECIMEN,3OZ,OR STRL: Brand: MEDLINE

## (undated) DEVICE — PADDING CAST SPEC 6INX4YD COT --

## (undated) DEVICE — BLADE SAW W083XL354IN THK0047IN CUT THK0047IN SAG FLR

## (undated) DEVICE — T4 HOOD

## (undated) DEVICE — GOWN,SIRUS,NONRNF,SETINSLV,2XL,18/CS: Brand: MEDLINE

## (undated) DEVICE — REM POLYHESIVE ADULT PATIENT RETURN ELECTRODE: Brand: VALLEYLAB

## (undated) DEVICE — SUTURE STRATAFIX SYMMETRIC SZ 1 L18IN ABSRB VLT CT1 L36CM SXPP1A404

## (undated) DEVICE — NEEDLE HYPO 21GA L1.5IN INTRAMUSCULAR S STL LATCH BVL UP

## (undated) DEVICE — DEVON™ KNEE AND BODY STRAP 60" X 3" (1.5 M X 7.6 CM): Brand: DEVON

## (undated) DEVICE — HOOK LOCK LATEX FREE ELASTIC BANDAGE D/L 6INX10YD

## (undated) DEVICE — Z DISCONTINUED USE 2744636  DRESSING AQUACEL 14 IN ALG W3.5XL14IN POLYUR FLM CVR W/ HYDRCOLL

## (undated) DEVICE — SUTURE STRATAFIX SZ 3-0 30CM NONABSORB UD 26MM FS 3/8 SXMP2B412

## (undated) DEVICE — SUTURE VCRL SZ 2-0 L36IN ABSRB UD L40MM CT 1/2 CIR J957H

## (undated) DEVICE — APPLICATOR BNDG 1MM ADH PREMIERPRO EXOFIN

## (undated) DEVICE — SUTURE STRATAFIX SYMMETRIC PDS + SZ 1 L18IN ABSRB VLT L48MM SXPP1A400

## (undated) DEVICE — BIPOLAR IRRIGATOR INTEGRATED TUBING AND BIPOLAR CORD SET, DISPOSABLE

## (undated) DEVICE — FRAZIER SUCTION INSTRUMENT 12 FR W/CONTROL VENT & OBTURATOR: Brand: FRAZIER

## (undated) DEVICE — CATH KT URETH INTMIT 15FR LTX -- CONVERT TO ITEM 363176

## (undated) DEVICE — SOLUTION IRRIG 3000ML 0.9% SOD CHL FLX CONT 0797208] ICU MEDICAL INC]

## (undated) DEVICE — PIN EXT FIX SCHNZ 3 MM

## (undated) DEVICE — POSITIONER HD REST FOAM CMFRT TCH

## (undated) DEVICE — 4-PORT MANIFOLD: Brand: NEPTUNE 2

## (undated) DEVICE — ZIMMER® STERILE DISPOSABLE TOURNIQUET CUFF WITH PLC, DUAL PORT, SINGLE BLADDER, 34 IN. (86 CM)

## (undated) DEVICE — FLOSEAL MATRIX IS INDICATED IN SURGICAL PROCEDURES (OTHER THAN IN OPHTHALMIC) AS AN ADJUNCT TO HEMOSTASIS WHEN CONTROL OF BLEEDING BY LIGATURE OR CONVENTIONALPROCEDURES IS INEFFECTIVE OR IMPRACTICAL.: Brand: FLOSEAL HEMOSTATIC MATRIX

## (undated) DEVICE — SNAP KOVER: Brand: UNBRANDED

## (undated) DEVICE — FLOSEAL HEMOSTATIC MATRIX, 5 ML: Brand: FLOSEAL

## (undated) DEVICE — TRANSFER SET 3": Brand: MEDLINE INDUSTRIES, INC.

## (undated) DEVICE — SUTURE MCRYL SZ 3-0 L27IN ABSRB UD L19MM PS-2 3/8 CIR PRIM Y427H

## (undated) DEVICE — DRAIN KT WND 10FR RND 400ML --

## (undated) DEVICE — SOLUTION IRRIG 1000ML 0.9% SOD CHL USP POUR PLAS BTL

## (undated) DEVICE — COVER,MAYO STAND,STERILE: Brand: MEDLINE

## (undated) DEVICE — SOLUTION IV 1000ML 0.9% SOD CHL

## (undated) DEVICE — HANDPIECE SET WITH BONE CLEANING TIP AND SUCTION TUBE: Brand: INTERPULSE

## (undated) DEVICE — ELECTRODE BLDE L4IN NONINSULATED EDGE

## (undated) DEVICE — OSCILLATING TIP SAW CARTRIDGE: Brand: PRECISION FALCON

## (undated) DEVICE — GLOVE SURG SZ 8 L12IN FNGR THK79MIL GRN LTX FREE

## (undated) DEVICE — TOTAL JOINT-MRMC: Brand: MEDLINE INDUSTRIES, INC.

## (undated) DEVICE — GAUZE SPONGES,12 PLY: Brand: CURITY

## (undated) DEVICE — SYRINGE MED 20ML STD CLR PLAS LUERLOCK TIP N CTRL DISP

## (undated) DEVICE — SOL IRR SOD CL 0.9% 1000ML BTL --

## (undated) DEVICE — INFECTION CONTROL KIT SYS

## (undated) DEVICE — SUTURE VCRL 2-0 L27IN ABSRB UD CP-2 L26MM 1/2 CIR REV CUT J869H

## (undated) DEVICE — Device

## (undated) DEVICE — GLOVE ORTHO 8   MSG9480

## (undated) DEVICE — SCRUB DRY SURG EZ SCRUB BRUSH PREOPERATIVE GRN

## (undated) DEVICE — DRESSING WND ISLAND STD 4X10 IN MULT LAYR STRL SILVERLON

## (undated) DEVICE — SUTURE VCRL 1 L27IN ABSRB CT BRAID COAT UD J281H

## (undated) DEVICE — NEEDLE HYPO 18GA L1.5IN PNK S STL HUB POLYPR SHLD REG BVL

## (undated) DEVICE — BONE WAX WHITE: Brand: BONE WAX WHITE

## (undated) DEVICE — STERILE POLYISOPRENE POWDER-FREE SURGICAL GLOVES: Brand: PROTEXIS

## (undated) DEVICE — PREP SKN CHLRAPRP APL 26ML STR --

## (undated) DEVICE — YANKAUER,SMOOTH HANDLE,HIGH CAPACITY: Brand: MEDLINE INDUSTRIES, INC.

## (undated) DEVICE — SUTURE VCRL SZ 3-0 L27IN ABSRB UD CP-2 L26MM 1/2 CIR REV J868H

## (undated) DEVICE — INSTRUMENT BATTERY

## (undated) DEVICE — PADDING CST 6IN STERILE --

## (undated) DEVICE — SUTURE VCRL SZ 0 L36IN ABSRB UD L36MM CT-1 1/2 CIR J946H

## (undated) DEVICE — SOLUTION IRRIG 1000ML H2O STRL BLT

## (undated) DEVICE — BLADE SAW W098XL354IN THK0047IN CUT THK0047IN SAG

## (undated) DEVICE — SPONGE GZ W4XL4IN COT 12 PLY TYP VII WVN C FLD DSGN

## (undated) DEVICE — DRAPE XR C ARM 41X74IN LF --

## (undated) DEVICE — TRAY CATH 16F URIN MTR LTX -- CONVERT TO ITEM 363111

## (undated) DEVICE — SYSTEM SKIN CLSR 22CM DERMBND PRINEO

## (undated) DEVICE — SOLUTION IRRIG 1000ML STRL H2O USP PLAS POUR BTL

## (undated) DEVICE — DRSG AQUACEL SURG 3.5 X 10IN -- CONVERT TO ITEM 370183